# Patient Record
Sex: FEMALE | Race: OTHER | NOT HISPANIC OR LATINO
[De-identification: names, ages, dates, MRNs, and addresses within clinical notes are randomized per-mention and may not be internally consistent; named-entity substitution may affect disease eponyms.]

---

## 2018-05-21 PROBLEM — Z00.00 ENCOUNTER FOR PREVENTIVE HEALTH EXAMINATION: Status: ACTIVE | Noted: 2018-05-21

## 2018-06-08 ENCOUNTER — APPOINTMENT (OUTPATIENT)
Dept: PULMONOLOGY | Facility: CLINIC | Age: 83
End: 2018-06-08
Payer: MEDICARE

## 2018-06-08 VITALS
BODY MASS INDEX: 32.95 KG/M2 | HEART RATE: 64 BPM | WEIGHT: 157 LBS | HEIGHT: 58 IN | SYSTOLIC BLOOD PRESSURE: 124 MMHG | RESPIRATION RATE: 14 BRPM | OXYGEN SATURATION: 95 % | DIASTOLIC BLOOD PRESSURE: 80 MMHG

## 2018-06-08 DIAGNOSIS — Z87.891 PERSONAL HISTORY OF NICOTINE DEPENDENCE: ICD-10-CM

## 2018-06-08 DIAGNOSIS — Z86.39 PERSONAL HISTORY OF OTHER ENDOCRINE, NUTRITIONAL AND METABOLIC DISEASE: ICD-10-CM

## 2018-06-08 DIAGNOSIS — K63.9 DISEASE OF INTESTINE, UNSPECIFIED: ICD-10-CM

## 2018-06-08 DIAGNOSIS — Z86.79 PERSONAL HISTORY OF OTHER DISEASES OF THE CIRCULATORY SYSTEM: ICD-10-CM

## 2018-06-08 DIAGNOSIS — Z87.19 PERSONAL HISTORY OF OTHER DISEASES OF THE DIGESTIVE SYSTEM: ICD-10-CM

## 2018-06-08 DIAGNOSIS — Z63.4 DISAPPEARANCE AND DEATH OF FAMILY MEMBER: ICD-10-CM

## 2018-06-08 PROCEDURE — 85018 HEMOGLOBIN: CPT | Mod: QW

## 2018-06-08 PROCEDURE — 94729 DIFFUSING CAPACITY: CPT

## 2018-06-08 PROCEDURE — 99204 OFFICE O/P NEW MOD 45 MIN: CPT | Mod: 25

## 2018-06-08 PROCEDURE — 94727 GAS DIL/WSHOT DETER LNG VOL: CPT

## 2018-06-08 PROCEDURE — 94010 BREATHING CAPACITY TEST: CPT

## 2018-06-08 RX ORDER — METOPROLOL SUCCINATE 50 MG/1
50 TABLET, EXTENDED RELEASE ORAL
Qty: 180 | Refills: 0 | Status: ACTIVE | COMMUNITY
Start: 2018-01-08

## 2018-06-08 RX ORDER — ROSUVASTATIN CALCIUM 10 MG/1
10 TABLET, FILM COATED ORAL
Qty: 90 | Refills: 0 | Status: ACTIVE | COMMUNITY
Start: 2018-05-31 | End: 1900-01-01

## 2018-06-08 RX ORDER — AMOXICILLIN AND CLAVULANATE POTASSIUM 875; 125 MG/1; MG/1
875-125 TABLET, COATED ORAL
Qty: 14 | Refills: 0 | Status: DISCONTINUED | COMMUNITY
Start: 2018-05-19 | End: 2018-06-08

## 2018-06-08 RX ORDER — FUROSEMIDE 40 MG/1
40 TABLET ORAL
Qty: 90 | Refills: 0 | Status: ACTIVE | COMMUNITY
Start: 2018-04-03

## 2018-06-08 RX ORDER — LEVOTHYROXINE SODIUM 0.1 MG/1
100 TABLET ORAL
Qty: 90 | Refills: 0 | Status: DISCONTINUED | COMMUNITY
Start: 2018-01-08

## 2018-06-08 RX ORDER — PANTOPRAZOLE 40 MG/1
40 TABLET, DELAYED RELEASE ORAL
Qty: 90 | Refills: 0 | Status: ACTIVE | COMMUNITY
Start: 2018-04-04

## 2018-06-08 RX ORDER — FLUTICASONE PROPIONATE AND SALMETEROL 50; 500 UG/1; UG/1
500-50 POWDER RESPIRATORY (INHALATION)
Qty: 60 | Refills: 0 | Status: DISCONTINUED | COMMUNITY
Start: 2018-05-31 | End: 2018-06-08

## 2018-06-08 SDOH — SOCIAL STABILITY - SOCIAL INSECURITY: DISSAPEARANCE AND DEATH OF FAMILY MEMBER: Z63.4

## 2018-08-09 ENCOUNTER — FORM ENCOUNTER (OUTPATIENT)
Age: 83
End: 2018-08-09

## 2018-08-10 ENCOUNTER — APPOINTMENT (OUTPATIENT)
Dept: CT IMAGING | Facility: CLINIC | Age: 83
End: 2018-08-10
Payer: MEDICARE

## 2018-08-10 ENCOUNTER — OUTPATIENT (OUTPATIENT)
Dept: OUTPATIENT SERVICES | Facility: HOSPITAL | Age: 83
LOS: 1 days | End: 2018-08-10
Payer: MEDICARE

## 2018-08-10 DIAGNOSIS — Z00.8 ENCOUNTER FOR OTHER GENERAL EXAMINATION: ICD-10-CM

## 2018-08-10 DIAGNOSIS — R93.8 ABNORMAL FINDINGS ON DIAGNOSTIC IMAGING OF OTHER SPECIFIED BODY STRUCTURES: ICD-10-CM

## 2018-08-10 PROCEDURE — 71250 CT THORAX DX C-: CPT

## 2018-08-10 PROCEDURE — 71250 CT THORAX DX C-: CPT | Mod: 26

## 2018-10-09 ENCOUNTER — APPOINTMENT (OUTPATIENT)
Dept: PULMONOLOGY | Facility: CLINIC | Age: 83
End: 2018-10-09
Payer: MEDICARE

## 2018-10-09 VITALS
WEIGHT: 160 LBS | HEART RATE: 68 BPM | OXYGEN SATURATION: 96 % | SYSTOLIC BLOOD PRESSURE: 142 MMHG | DIASTOLIC BLOOD PRESSURE: 80 MMHG | BODY MASS INDEX: 33.44 KG/M2 | RESPIRATION RATE: 14 BRPM

## 2018-10-09 PROCEDURE — 99214 OFFICE O/P EST MOD 30 MIN: CPT

## 2019-03-08 ENCOUNTER — CHART COPY (OUTPATIENT)
Age: 84
End: 2019-03-08

## 2019-03-25 ENCOUNTER — RX RENEWAL (OUTPATIENT)
Age: 84
End: 2019-03-25

## 2019-04-12 ENCOUNTER — APPOINTMENT (OUTPATIENT)
Dept: PULMONOLOGY | Facility: CLINIC | Age: 84
End: 2019-04-12
Payer: MEDICARE

## 2019-04-12 VITALS
BODY MASS INDEX: 33.65 KG/M2 | HEART RATE: 68 BPM | WEIGHT: 161 LBS | DIASTOLIC BLOOD PRESSURE: 80 MMHG | SYSTOLIC BLOOD PRESSURE: 138 MMHG | RESPIRATION RATE: 16 BRPM | OXYGEN SATURATION: 98 %

## 2019-04-12 PROCEDURE — 99214 OFFICE O/P EST MOD 30 MIN: CPT

## 2019-04-12 NOTE — PROCEDURE
[FreeTextEntry1] : PFTs performed previously were essentially normal with a mildly reduced diffusion capacity which corrected for alveolar volume.

## 2019-04-12 NOTE — HISTORY OF PRESENT ILLNESS
[Dyspnea on Exertion] : dyspnea on exertion [None] : No associated symptoms are reported [Short-Acting Beta Agonists] : short-acting beta agonists [Long-Acting Beta Agonists] : long-acting beta agonists [Anticholinergics (Inhalation)] : inhaled anticholinergics [Inhaled Corticosteroids] : inhaled corticosteroids [Good Compliance] : good compliance with treatment [Good Tolerance] : good tolerance of treatment [Good Symptom Control] : good symptom control [Follow-Up - Routine Clinic] : a routine clinic follow-up of [Excess Weight] : excess weight [Currently Experiencing] : The patient is currently experiencing symptoms. [Dyspnea] : dyspnea [Low Calorie Diet] : low calorie diet [Fair Compliance] : fair compliance with treatment [Fair Tolerance] : fair tolerance of treatment [Poor Symptom Control] : poor symptom control [Dyslipidemia] : dyslipidemia [Hypertension] : hypertension [High] : high [Low Calorie] : low calorie [Well Balanced Diet] : well balanced meals [Infrequently] : exercises infrequently [Walking] : walking [On ___] : performed on [unfilled] [Patient] : the patient [To Assess ___] : to assess [unfilled] [Non-Productive Cough] : no non-productive cough [FreeTextEntry1] : The patient was recently hospitalized at [facility] for SOB and cough and was found to have pneumonia. She was treated with abx with now essentially resolution of symptoms. The patient was last seen in our office in 2013.\par  [FreeTextEntry9] : Chest CT  [FreeTextEntry8] : Bibasilar consolidations

## 2019-04-12 NOTE — DISCUSSION/SUMMARY
[FreeTextEntry1] : \par #1. PFTs performed today are essentially normal except for a mildly reduced diffusion capacity which corrects for alveolar volume\par #2. Decreased Advair to 250 previously; pt only takes once daily\par #3. Continue Spiriva for now but would consider decreasing her medications as tolerates but currently she is doing well \par #4. Repeat chest CT for 4 months for 12 month f/u to evaluate for reevaluation of her small nodule\par #5. Diet and exercise for weight loss\par #6. F/u in 4 months

## 2019-04-12 NOTE — REVIEW OF SYSTEMS
[Hypertension] : ~T hypertension [Reflux] : reflux [Thyroid Problem] : thyroid problem [Fever] : no fever [Chills] : no chills [Dry Eyes] : no dryness of the eyes [Nasal Congestion] : no nasal congestion [Eye Irritation] : no ~T irritation of the eyes [Sinus Problems] : no sinus problems [Postnasal Drip] : no postnasal drip [Epistaxis] : no nosebleeds [Cough] : no cough [Sputum] : not coughing up ~M sputum [Chest Tightness] : no chest tightness [Dyspnea] : no dyspnea [Pleuritic Pain] : no pleuritic pain [Dysrhythmia] : no dysrhythmia [Wheezing] : no wheezing [Chest Discomfort] : no chest discomfort [Palpitations] : no palpitations [Murmurs] : no murmurs were heard [Edema] : ~T edema was not present [Hay Fever] : no hay fever [Itchy Eyes] : no itching of ~T the eyes [Nausea] : no nausea [Constipation] : no constipation [Vomiting] : no vomiting [Abdominal Pain] : no abdominal pain [Diarrhea] : no diarrhea [Dysuria] : no dysuria [Trauma] : no ~T physical trauma [Anemia] : no anemia [Fracture] : no fracture [Headache] : no headache [Syncope] : no fainting [Dizziness] : no dizziness [Paralysis] : no paralysis was seen [Numbness] : no numbness [Depression] : no depression [Seizures] : no seizures [Anxiety] : no anxiety [Diabetes] : no diabetes mellitus [Snoring] : no snoring [Witnessed Apneas] : demonstrated no ~M apnea [Nonrestorative Sleep] : restorative sleep

## 2019-04-12 NOTE — REASON FOR VISIT
[Follow-Up] : a follow-up visit [COPD] : COPD [Shortness of Breath] : shortness of Breath [FreeTextEntry2] : obese

## 2019-04-12 NOTE — CONSULT LETTER
[Dear  ___] : Dear  [unfilled], [Consult Letter:] : I had the pleasure of evaluating your patient, [unfilled]. [Please see my note below.] : Please see my note below. [Consult Closing:] : Thank you very much for allowing me to participate in the care of this patient.  If you have any questions, please do not hesitate to contact me. [Sincerely,] : Sincerely, [Jose Cotto MD] : Jose Cotto MD [FreeTextEntry3] : Jose Cotto MD, FCCP, DARRON. ABSM\par

## 2019-08-14 ENCOUNTER — FORM ENCOUNTER (OUTPATIENT)
Age: 84
End: 2019-08-14

## 2019-08-15 ENCOUNTER — APPOINTMENT (OUTPATIENT)
Dept: CT IMAGING | Facility: CLINIC | Age: 84
End: 2019-08-15
Payer: MEDICARE

## 2019-08-15 ENCOUNTER — OUTPATIENT (OUTPATIENT)
Dept: OUTPATIENT SERVICES | Facility: HOSPITAL | Age: 84
LOS: 1 days | End: 2019-08-15
Payer: MEDICARE

## 2019-08-15 DIAGNOSIS — R91.1 SOLITARY PULMONARY NODULE: ICD-10-CM

## 2019-08-15 PROCEDURE — 71250 CT THORAX DX C-: CPT

## 2019-08-15 PROCEDURE — 71250 CT THORAX DX C-: CPT | Mod: 26

## 2019-08-22 ENCOUNTER — APPOINTMENT (OUTPATIENT)
Dept: PULMONOLOGY | Facility: CLINIC | Age: 84
End: 2019-08-22
Payer: MEDICARE

## 2019-08-22 VITALS
DIASTOLIC BLOOD PRESSURE: 60 MMHG | BODY MASS INDEX: 32.95 KG/M2 | SYSTOLIC BLOOD PRESSURE: 100 MMHG | OXYGEN SATURATION: 98 % | WEIGHT: 157 LBS | HEIGHT: 58 IN | HEART RATE: 74 BPM

## 2019-08-22 PROCEDURE — 94060 EVALUATION OF WHEEZING: CPT

## 2019-08-22 PROCEDURE — 99215 OFFICE O/P EST HI 40 MIN: CPT | Mod: 25

## 2019-08-22 PROCEDURE — 94664 DEMO&/EVAL PT USE INHALER: CPT | Mod: 59

## 2019-08-22 NOTE — PHYSICAL EXAM
[General Appearance - Well Developed] : well developed [Normal Appearance] : normal appearance [General Appearance - In No Acute Distress] : no acute distress [Normal Conjunctiva] : the conjunctiva exhibited no abnormalities [Neck Appearance] : the appearance of the neck was normal [Normal Oropharynx] : normal oropharynx [Heart Sounds] : normal S1 and S2 [Heart Rate And Rhythm] : heart rate and rhythm were normal [Murmurs] : no murmurs present [Edema] : no peripheral edema present [] : no respiratory distress [Respiration, Rhythm And Depth] : normal respiratory rhythm and effort [Auscultation Breath Sounds / Voice Sounds] : lungs were clear to auscultation bilaterally [Exaggerated Use Of Accessory Muscles For Inspiration] : no accessory muscle use [Abdomen Soft] : soft [Abdomen Tenderness] : non-tender [Abnormal Walk] : normal gait [Cyanosis, Localized] : no localized cyanosis [Nail Clubbing] : no clubbing of the fingernails [No Focal Deficits] : no focal deficits [Oriented To Time, Place, And Person] : oriented to person, place, and time [FreeTextEntry1] : No abnormalities.

## 2019-08-22 NOTE — DISCUSSION/SUMMARY
[FreeTextEntry1] : \par #1. PFTs performed previously were essentially normal except for a mildly reduced diffusion capacity which corrected for alveolar volume\par #2. Decreased Advair to 250 previously\par #3. Continue Spiriva for now but would consider decreasing her medications as tolerates but currently she is doing well \par #4. Repeat chest CT in 2 months to re-evaluate new RUL opacity for possible resolution\par #5. Diet and exercise for weight loss\par #6. Z-du and prednisone taper for cough and RUL opacity\par #7. F/u in 2 months

## 2019-08-22 NOTE — HISTORY OF PRESENT ILLNESS
[Dyspnea on Exertion] : dyspnea on exertion [None] : No associated symptoms are reported [Short-Acting Beta Agonists] : short-acting beta agonists [Long-Acting Beta Agonists] : long-acting beta agonists [Anticholinergics (Inhalation)] : inhaled anticholinergics [Inhaled Corticosteroids] : inhaled corticosteroids [Good Compliance] : good compliance with treatment [Good Tolerance] : good tolerance of treatment [Good Symptom Control] : good symptom control [Excess Weight] : excess weight [Follow-Up - Routine Clinic] : a routine clinic follow-up of [Currently Experiencing] : The patient is currently experiencing symptoms. [Dyspnea] : dyspnea [Low Calorie Diet] : low calorie diet [Fair Compliance] : fair compliance with treatment [Fair Tolerance] : fair tolerance of treatment [Poor Symptom Control] : poor symptom control [Dyslipidemia] : dyslipidemia [High] : high [Hypertension] : hypertension [Low Calorie] : low calorie [Well Balanced Diet] : well balanced meals [Walking] : walking [Infrequently] : exercises infrequently [On ___] : performed on [unfilled] [Patient] : the patient [To Assess ___] : to assess [unfilled] [FreeTextEntry1] : The patient was recently hospitalized at [facility] for SOB and cough and was found to have pneumonia. She was treated with abx with now essentially resolution of symptoms. The patient was last seen in our office in 2013.\par She reports that she was admitted to [facility] observation one month ago with chills and was sent home with abx for pneumonia. She now c/o congested cough productive of whitish sputum but fevers, chills.\par  [Non-Productive Cough] : no non-productive cough [FreeTextEntry9] : Chest CT  [FreeTextEntry8] : Bibasilar consolidations

## 2019-08-22 NOTE — REVIEW OF SYSTEMS
[Hypertension] : ~T hypertension [Reflux] : reflux [Thyroid Problem] : thyroid problem [Fever] : no fever [Chills] : no chills [Dry Eyes] : no dryness of the eyes [Eye Irritation] : no ~T irritation of the eyes [Nasal Congestion] : no nasal congestion [Epistaxis] : no nosebleeds [Postnasal Drip] : no postnasal drip [Sinus Problems] : no sinus problems [Cough] : no cough [Sputum] : not coughing up ~M sputum [Dyspnea] : no dyspnea [Chest Tightness] : no chest tightness [Pleuritic Pain] : no pleuritic pain [Wheezing] : no wheezing [Chest Discomfort] : no chest discomfort [Dysrhythmia] : no dysrhythmia [Murmurs] : no murmurs were heard [Palpitations] : no palpitations [Edema] : ~T edema was not present [Hay Fever] : no hay fever [Itchy Eyes] : no itching of ~T the eyes [Vomiting] : no vomiting [Nausea] : no nausea [Constipation] : no constipation [Diarrhea] : no diarrhea [Abdominal Pain] : no abdominal pain [Dysuria] : no dysuria [Fracture] : no fracture [Trauma] : no ~T physical trauma [Anemia] : no anemia [Headache] : no headache [Dizziness] : no dizziness [Syncope] : no fainting [Numbness] : no numbness [Paralysis] : no paralysis was seen [Seizures] : no seizures [Anxiety] : no anxiety [Depression] : no depression [Diabetes] : no diabetes mellitus [Snoring] : no snoring [Witnessed Apneas] : demonstrated no ~M apnea [Nonrestorative Sleep] : restorative sleep

## 2019-08-22 NOTE — PROCEDURE
[FreeTextEntry1] : PFTs performed previously were essentially normal with a mildly reduced diffusion capacity which corrected for alveolar volume.\par Spirometry 8/22/19 - Normal FVC and FEV1 but with an obstructive pattern and slightly worse from previous

## 2019-08-22 NOTE — CONSULT LETTER
[Dear  ___] : Dear  [unfilled], [Consult Letter:] : I had the pleasure of evaluating your patient, [unfilled]. [Please see my note below.] : Please see my note below. [Consult Closing:] : Thank you very much for allowing me to participate in the care of this patient.  If you have any questions, please do not hesitate to contact me. [Sincerely,] : Sincerely, [Jose Cotto MD] : Jose Cotto MD [FreeTextEntry3] : Jose Cotto MD, FCCP, D. ABSM\par Pulmonary and Sleep Medicine\par [facility] [place]\par

## 2019-10-15 ENCOUNTER — FORM ENCOUNTER (OUTPATIENT)
Age: 84
End: 2019-10-15

## 2019-10-16 ENCOUNTER — APPOINTMENT (OUTPATIENT)
Dept: CT IMAGING | Facility: CLINIC | Age: 84
End: 2019-10-16
Payer: MEDICARE

## 2019-10-16 ENCOUNTER — OUTPATIENT (OUTPATIENT)
Dept: OUTPATIENT SERVICES | Facility: HOSPITAL | Age: 84
LOS: 1 days | End: 2019-10-16
Payer: MEDICARE

## 2019-10-16 DIAGNOSIS — R91.1 SOLITARY PULMONARY NODULE: ICD-10-CM

## 2019-10-16 DIAGNOSIS — R93.89 ABNORMAL FINDINGS ON DIAGNOSTIC IMAGING OF OTHER SPECIFIED BODY STRUCTURES: ICD-10-CM

## 2019-10-16 DIAGNOSIS — R91.8 OTHER NONSPECIFIC ABNORMAL FINDING OF LUNG FIELD: ICD-10-CM

## 2019-10-16 DIAGNOSIS — Z00.00 ENCOUNTER FOR GENERAL ADULT MEDICAL EXAMINATION WITHOUT ABNORMAL FINDINGS: ICD-10-CM

## 2019-10-16 PROCEDURE — 71250 CT THORAX DX C-: CPT | Mod: 26

## 2019-10-16 PROCEDURE — 71250 CT THORAX DX C-: CPT

## 2019-10-28 ENCOUNTER — APPOINTMENT (OUTPATIENT)
Dept: PULMONOLOGY | Facility: CLINIC | Age: 84
End: 2019-10-28
Payer: MEDICARE

## 2019-10-28 VITALS — BODY MASS INDEX: 33.58 KG/M2 | WEIGHT: 160 LBS | HEIGHT: 58 IN

## 2019-10-28 VITALS
RESPIRATION RATE: 16 BRPM | OXYGEN SATURATION: 97 % | SYSTOLIC BLOOD PRESSURE: 142 MMHG | DIASTOLIC BLOOD PRESSURE: 80 MMHG | HEART RATE: 84 BPM

## 2019-10-28 PROCEDURE — 94010 BREATHING CAPACITY TEST: CPT

## 2019-10-28 PROCEDURE — 99214 OFFICE O/P EST MOD 30 MIN: CPT | Mod: 25

## 2019-10-28 NOTE — HISTORY OF PRESENT ILLNESS
[Dyspnea on Exertion] : dyspnea on exertion [None] : No associated symptoms are reported [Short-Acting Beta Agonists] : short-acting beta agonists [Long-Acting Beta Agonists] : long-acting beta agonists [Anticholinergics (Inhalation)] : inhaled anticholinergics [Inhaled Corticosteroids] : inhaled corticosteroids [Good Compliance] : good compliance with treatment [Good Tolerance] : good tolerance of treatment [Good Symptom Control] : good symptom control [Follow-Up - Routine Clinic] : a routine clinic follow-up of [Excess Weight] : excess weight [Currently Experiencing] : The patient is currently experiencing symptoms. [Dyspnea] : dyspnea [Low Calorie Diet] : low calorie diet [Fair Compliance] : fair compliance with treatment [Fair Tolerance] : fair tolerance of treatment [Poor Symptom Control] : poor symptom control [Dyslipidemia] : dyslipidemia [Hypertension] : hypertension [High] : high [Low Calorie] : low calorie [Well Balanced Diet] : well balanced meals [Infrequently] : exercises infrequently [Walking] : walking [On ___] : performed on [unfilled] [Patient] : the patient [To Assess ___] : to assess [unfilled] [FreeTextEntry1] : The patient was recently hospitalized at [facility] for SOB and cough and was found to have pneumonia. She was treated with abx with now essentially resolution of symptoms. The patient was last seen in our office in 2013.\par She reports that she was admitted to [facility] observation previously with chills and was sent home with abx for pneumonia. She now c/o congested cough productive of whitish sputum but no fevers, chills. She also c/o PNDS.\par  [Non-Productive Cough] : no non-productive cough [FreeTextEntry9] : Chest CT  [FreeTextEntry8] : Bibasilar consolidations

## 2019-10-28 NOTE — REVIEW OF SYSTEMS
[Hypertension] : ~T hypertension [Reflux] : reflux [Thyroid Problem] : thyroid problem [Fever] : no fever [Chills] : no chills [Dry Eyes] : no dryness of the eyes [Eye Irritation] : no ~T irritation of the eyes [Nasal Congestion] : no nasal congestion [Epistaxis] : no nosebleeds [Postnasal Drip] : no postnasal drip [Sinus Problems] : no sinus problems [Cough] : no cough [Sputum] : not coughing up ~M sputum [Dyspnea] : no dyspnea [Chest Tightness] : no chest tightness [Pleuritic Pain] : no pleuritic pain [Wheezing] : no wheezing [Chest Discomfort] : no chest discomfort [Dysrhythmia] : no dysrhythmia [Murmurs] : no murmurs were heard [Palpitations] : no palpitations [Edema] : ~T edema was not present [Hay Fever] : no hay fever [Itchy Eyes] : no itching of ~T the eyes [Nausea] : no nausea [Vomiting] : no vomiting [Constipation] : no constipation [Diarrhea] : no diarrhea [Abdominal Pain] : no abdominal pain [Dysuria] : no dysuria [Trauma] : no ~T physical trauma [Fracture] : no fracture [Anemia] : no anemia [Headache] : no headache [Dizziness] : no dizziness [Syncope] : no fainting [Numbness] : no numbness [Paralysis] : no paralysis was seen [Seizures] : no seizures [Depression] : no depression [Anxiety] : no anxiety [Diabetes] : no diabetes mellitus [Snoring] : no snoring [Witnessed Apneas] : demonstrated no ~M apnea [Nonrestorative Sleep] : restorative sleep

## 2019-10-28 NOTE — CONSULT LETTER
[Dear  ___] : Dear  [unfilled], [Consult Letter:] : I had the pleasure of evaluating your patient, [unfilled]. [Please see my note below.] : Please see my note below. [Consult Closing:] : Thank you very much for allowing me to participate in the care of this patient.  If you have any questions, please do not hesitate to contact me. [Sincerely,] : Sincerely, [Jose Cotto MD] : Jose Cotto MD [FreeTextEntry3] : Jose Cotto MD, FCCP, D. ABSM\par Pulmonary and Sleep Medicine\par [facility] at [place]\par

## 2019-10-28 NOTE — PROCEDURE
[FreeTextEntry1] : PFTs performed previously were essentially normal with a mildly reduced diffusion capacity which corrected for alveolar volume.\par Spirometry 8/22/19 - Normal FVC and FEV1 but with an obstructive pattern and slightly worse from previous\par Spirometry 10/28/19 - normal and back to baseline

## 2019-10-28 NOTE — DISCUSSION/SUMMARY
[FreeTextEntry1] : \par #1. PFTs performed previously were essentially normal except for a mildly reduced diffusion capacity which corrected for alveolar volume\par #2. Decreased Advair to 250 previously\par #3. Continue Spiriva for now but would consider decreasing her medications as tolerates but currently she is doing well \par #4. Repeat chest CT in 2 months to re-evaluate new RUL opacity for possible resolution\par #5. Diet and exercise for weight loss\par #6. Z-du and prednisone taper for cough and RML opacity\par #7. F/u in 3 months with chest CT\par #8. Spirometry is now back to baseline

## 2019-11-26 ENCOUNTER — RX RENEWAL (OUTPATIENT)
Age: 84
End: 2019-11-26

## 2020-01-20 ENCOUNTER — FORM ENCOUNTER (OUTPATIENT)
Age: 85
End: 2020-01-20

## 2020-01-21 ENCOUNTER — OUTPATIENT (OUTPATIENT)
Dept: OUTPATIENT SERVICES | Facility: HOSPITAL | Age: 85
LOS: 1 days | End: 2020-01-21
Payer: MEDICARE

## 2020-01-21 ENCOUNTER — APPOINTMENT (OUTPATIENT)
Dept: CT IMAGING | Facility: CLINIC | Age: 85
End: 2020-01-21
Payer: MEDICARE

## 2020-01-21 DIAGNOSIS — R93.89 ABNORMAL FINDINGS ON DIAGNOSTIC IMAGING OF OTHER SPECIFIED BODY STRUCTURES: ICD-10-CM

## 2020-01-21 DIAGNOSIS — Z00.00 ENCOUNTER FOR GENERAL ADULT MEDICAL EXAMINATION WITHOUT ABNORMAL FINDINGS: ICD-10-CM

## 2020-01-21 DIAGNOSIS — R91.8 OTHER NONSPECIFIC ABNORMAL FINDING OF LUNG FIELD: ICD-10-CM

## 2020-01-21 PROCEDURE — 71250 CT THORAX DX C-: CPT

## 2020-01-21 PROCEDURE — 71250 CT THORAX DX C-: CPT | Mod: 26

## 2020-01-31 ENCOUNTER — APPOINTMENT (OUTPATIENT)
Dept: PULMONOLOGY | Facility: CLINIC | Age: 85
End: 2020-01-31
Payer: MEDICARE

## 2020-01-31 VITALS
DIASTOLIC BLOOD PRESSURE: 84 MMHG | SYSTOLIC BLOOD PRESSURE: 140 MMHG | WEIGHT: 154 LBS | BODY MASS INDEX: 32.32 KG/M2 | HEIGHT: 58 IN

## 2020-01-31 VITALS — OXYGEN SATURATION: 97 % | HEART RATE: 67 BPM

## 2020-01-31 DIAGNOSIS — R91.8 OTHER NONSPECIFIC ABNORMAL FINDING OF LUNG FIELD: ICD-10-CM

## 2020-01-31 DIAGNOSIS — R93.89 ABNORMAL FINDINGS ON DIAGNOSTIC IMAGING OF OTHER SPECIFIED BODY STRUCTURES: ICD-10-CM

## 2020-01-31 PROCEDURE — 99214 OFFICE O/P EST MOD 30 MIN: CPT

## 2020-01-31 RX ORDER — AZITHROMYCIN 250 MG/1
250 TABLET, FILM COATED ORAL
Qty: 1 | Refills: 0 | Status: DISCONTINUED | COMMUNITY
Start: 2019-08-22 | End: 2020-01-31

## 2020-01-31 RX ORDER — AZITHROMYCIN 250 MG/1
250 TABLET, FILM COATED ORAL
Qty: 1 | Refills: 0 | Status: DISCONTINUED | COMMUNITY
Start: 2019-10-28 | End: 2020-01-31

## 2020-01-31 NOTE — CONSULT LETTER
[Dear  ___] : Dear  [unfilled], [Consult Letter:] : I had the pleasure of evaluating your patient, [unfilled]. [Please see my note below.] : Please see my note below. [Consult Closing:] : Thank you very much for allowing me to participate in the care of this patient.  If you have any questions, please do not hesitate to contact me. [Sincerely,] : Sincerely, [Jose Cotto MD] : Jose Cotto MD [FreeTextEntry3] : Jose Cotto MD, FCCP, D. ABSM\par Pulmonary and Sleep Medicine\par [facility] at [place]\par

## 2020-01-31 NOTE — REASON FOR VISIT
[Follow-Up] : a follow-up visit [COPD] : COPD [Shortness of Breath] : shortness of breath [Obesity] : obesity

## 2020-01-31 NOTE — HISTORY OF PRESENT ILLNESS
[Dyspnea on Exertion] : dyspnea on exertion [None] : No associated symptoms are reported [Short-Acting Beta Agonists] : short-acting beta agonists [Long-Acting Beta Agonists] : long-acting beta agonists [Anticholinergics (Inhalation)] : inhaled anticholinergics [Inhaled Corticosteroids] : inhaled corticosteroids [Good Compliance] : good compliance with treatment [Good Tolerance] : good tolerance of treatment [Good Symptom Control] : good symptom control [Follow-Up - Routine Clinic] : a routine clinic follow-up of [Excess Weight] : excess weight [Currently Experiencing] : The patient is currently experiencing symptoms. [Dyspnea] : dyspnea [Low Calorie Diet] : low calorie diet [Fair Compliance] : fair compliance with treatment [Fair Tolerance] : fair tolerance of treatment [Poor Symptom Control] : poor symptom control [Dyslipidemia] : dyslipidemia [Hypertension] : hypertension [High] : high [Low Calorie] : low calorie [Well Balanced Diet] : well balanced meals [Infrequently] : exercises infrequently [Walking] : walking [On ___] : performed on [unfilled] [Patient] : the patient [To Assess ___] : to assess [unfilled] [FreeTextEntry1] : The patient was recently hospitalized at [facility] for SOB and cough and was found to have pneumonia. She was treated with abx with now essentially resolution of symptoms. The patient was She reports that she was admitted to [facility] observation previously with chills and was sent home with abx for pneumonia. She now c/o congested cough productive of whitish sputum but no fevers, chills. She also c/o PNDS.  [Non-Productive Cough] : no non-productive cough [FreeTextEntry9] : Chest CT  [FreeTextEntry8] : Bibasilar consolidations

## 2020-01-31 NOTE — DISCUSSION/SUMMARY
[FreeTextEntry1] : \par #1. PFTs performed previously were essentially normal except for a mildly reduced diffusion capacity which corrected for alveolar volume\par #2. Decreased Advair to 250 previously but encouraged twice daily use as she is only using once daily\par #3. Continue Spiriva for now but would consider decreasing her medications as tolerates but currently she is doing well \par #4. Repeat chest CT with resolution of RML opacity; f/u as needed\par #5. Diet and exercise for weight loss\par #6. F/u in 4-6 months with PFTs\par #7. Last spirometry was now back to baseline

## 2020-07-30 ENCOUNTER — APPOINTMENT (OUTPATIENT)
Dept: PULMONOLOGY | Facility: CLINIC | Age: 85
End: 2020-07-30

## 2020-08-28 DIAGNOSIS — Z01.818 ENCOUNTER FOR OTHER PREPROCEDURAL EXAMINATION: ICD-10-CM

## 2020-08-31 ENCOUNTER — APPOINTMENT (OUTPATIENT)
Dept: DISASTER EMERGENCY | Facility: CLINIC | Age: 85
End: 2020-08-31

## 2020-08-31 ENCOUNTER — APPOINTMENT (OUTPATIENT)
Dept: PULMONOLOGY | Facility: CLINIC | Age: 85
End: 2020-08-31

## 2020-09-01 LAB — SARS-COV-2 N GENE NPH QL NAA+PROBE: NOT DETECTED

## 2020-09-03 ENCOUNTER — APPOINTMENT (OUTPATIENT)
Dept: PULMONOLOGY | Facility: CLINIC | Age: 85
End: 2020-09-03
Payer: MEDICARE

## 2020-09-03 VITALS — HEART RATE: 64 BPM | OXYGEN SATURATION: 98 %

## 2020-09-03 VITALS — BODY MASS INDEX: 32.12 KG/M2 | HEIGHT: 58 IN | WEIGHT: 153 LBS

## 2020-09-03 VITALS — DIASTOLIC BLOOD PRESSURE: 78 MMHG | SYSTOLIC BLOOD PRESSURE: 132 MMHG

## 2020-09-03 DIAGNOSIS — R91.1 SOLITARY PULMONARY NODULE: ICD-10-CM

## 2020-09-03 DIAGNOSIS — E66.9 OBESITY, UNSPECIFIED: ICD-10-CM

## 2020-09-03 PROCEDURE — 99214 OFFICE O/P EST MOD 30 MIN: CPT | Mod: 25

## 2020-09-03 PROCEDURE — 85018 HEMOGLOBIN: CPT | Mod: QW

## 2020-09-03 PROCEDURE — 94727 GAS DIL/WSHOT DETER LNG VOL: CPT

## 2020-09-03 PROCEDURE — 94729 DIFFUSING CAPACITY: CPT

## 2020-09-03 PROCEDURE — 94010 BREATHING CAPACITY TEST: CPT

## 2020-09-03 NOTE — PROCEDURE
[FreeTextEntry1] : PFTs performed previously were essentially normal with a mildly reduced diffusion capacity which corrected for alveolar volume.\par Spirometry 8/22/19 - Normal FVC and FEV1 but with an obstructive pattern and slightly worse from previous\par Spirometry 10/28/19 - normal and back to baseline\par PFTs 9/3/20 - normal

## 2020-09-03 NOTE — CONSULT LETTER
[Dear  ___] : Dear  [unfilled], [Consult Letter:] : I had the pleasure of evaluating your patient, [unfilled]. [Please see my note below.] : Please see my note below. [Consult Closing:] : Thank you very much for allowing me to participate in the care of this patient.  If you have any questions, please do not hesitate to contact me. [Sincerely,] : Sincerely, [DrAudelia  ___] : Dr. PHELAN [FreeTextEntry3] : Jose Cotto MD, FCCP, D. ABSM\par Pulmonary and Sleep Medicine\par [facility] at [place]\par

## 2020-09-03 NOTE — REVIEW OF SYSTEMS
[Hypertension] : ~T hypertension [Reflux] : reflux [Thyroid Problem] : thyroid problem [Fever] : no fever [Chills] : no chills [Dry Eyes] : no dryness of the eyes [Nasal Congestion] : no nasal congestion [Eye Irritation] : no ~T irritation of the eyes [Postnasal Drip] : no postnasal drip [Epistaxis] : no nosebleeds [Sinus Problems] : no sinus problems [Cough] : no cough [Sputum] : not coughing up ~M sputum [Dyspnea] : no dyspnea [Pleuritic Pain] : no pleuritic pain [Chest Tightness] : no chest tightness [Wheezing] : no wheezing [Chest Discomfort] : no chest discomfort [Palpitations] : no palpitations [Murmurs] : no murmurs were heard [Dysrhythmia] : no dysrhythmia [Hay Fever] : no hay fever [Itchy Eyes] : no itching of ~T the eyes [Edema] : ~T edema was not present [Vomiting] : no vomiting [Nausea] : no nausea [Abdominal Pain] : no abdominal pain [Diarrhea] : no diarrhea [Constipation] : no constipation [Dysuria] : no dysuria [Trauma] : no ~T physical trauma [Fracture] : no fracture [Headache] : no headache [Anemia] : no anemia [Dizziness] : no dizziness [Paralysis] : no paralysis was seen [Syncope] : no fainting [Numbness] : no numbness [Depression] : no depression [Anxiety] : no anxiety [Seizures] : no seizures [Diabetes] : no diabetes mellitus [Snoring] : no snoring [Witnessed Apneas] : demonstrated no ~M apnea [Nonrestorative Sleep] : restorative sleep

## 2020-09-03 NOTE — DISCUSSION/SUMMARY
[FreeTextEntry1] : \par #1. PFTs 9/3/20 were essentially normal\par #2. Doing well with the decreased Advair dose to 250 and encouraged twice daily use as she is only using once daily\par #3. Continue Spiriva for now but would consider decreasing her medications as tolerates but currently she is doing well \par #4. Repeat chest CT with resolution of RML opacity; f/u as needed\par #5. Diet and exercise for weight loss\par #6. F/u in 6 months\par #7. PFTs are back to baseline\par #8. There is no pulmonary contraindication for the patient's upcoming carpal tunnel surgery. I would recommend Duoneb therapy on call to the OR with Duoneb Q6h post-op until the patient can resume inhaler therapy (Advair 250). I would also recommend post op incentive spirometry, GI/DVT prophylaxis as needed, early ambulation as able, and adequate pain control. Would recommend that O2 saturation should be monitored during and after the procedure.\par #9. Reviewed risks of exposure and symptoms of Covid-19 virus, including how the virus is spread.\par \par Discussed the following risk-reducing strategies:\par -Wash hands with soap and water (proper technique reviewed) \par -Use alcohol based  when hand-washing is not an option\par -Maintain a social distance of at least 6 feet whenever possible\par -Avoid contact, especially hand shaking\par -Avoid touching eyes, nose, and mouth\par -Cover face/mouth when coughing or sneezing\par -Avoid close contact with sick people\par -Seek medical help if you develop a fever and/or respiratory symptoms (e.g. cough, chest pain, SOB)\par \par Patient's questions were answered and patient appears to understand these recommendations

## 2020-09-03 NOTE — HISTORY OF PRESENT ILLNESS
[Dyspnea on Exertion] : dyspnea on exertion [None] : No associated symptoms are reported [Short-Acting Beta Agonists] : short-acting beta agonists [Anticholinergics (Inhalation)] : inhaled anticholinergics [Long-Acting Beta Agonists] : long-acting beta agonists [Inhaled Corticosteroids] : inhaled corticosteroids [Good Compliance] : good compliance with treatment [Good Tolerance] : good tolerance of treatment [Follow-Up - Routine Clinic] : a routine clinic follow-up of [Good Symptom Control] : good symptom control [Currently Experiencing] : The patient is currently experiencing symptoms. [Excess Weight] : excess weight [Fair Compliance] : fair compliance with treatment [Dyspnea] : dyspnea [Low Calorie Diet] : low calorie diet [Fair Tolerance] : fair tolerance of treatment [Poor Symptom Control] : poor symptom control [Dyslipidemia] : dyslipidemia [Hypertension] : hypertension [High] : high [Low Calorie] : low calorie [Well Balanced Diet] : well balanced meals [Infrequently] : exercises infrequently [Walking] : walking [On ___] : performed on [unfilled] [Patient] : the patient [To Assess ___] : to assess [unfilled] [FreeTextEntry1] : The patient was recently hospitalized at [facility] for SOB and cough and was found to have pneumonia. She was treated with abx with now essentially resolution of symptoms. The patient was She reports that she was admitted to [facility] observation previously with chills and was sent home with abx for pneumonia. She now c/o congested cough productive of whitish sputum but no fevers, chills. She also c/o PNDS. She reports that she will be undergoing carpal tunnel release in the near future. [Non-Productive Cough] : no non-productive cough [FreeTextEntry9] : Chest CT  [FreeTextEntry8] : Bibasilar consolidations

## 2020-09-03 NOTE — PHYSICAL EXAM
[General Appearance - Well Developed] : well developed [General Appearance - In No Acute Distress] : no acute distress [Normal Appearance] : normal appearance [Normal Oropharynx] : normal oropharynx [Normal Conjunctiva] : the conjunctiva exhibited no abnormalities [Neck Appearance] : the appearance of the neck was normal [Heart Rate And Rhythm] : heart rate and rhythm were normal [Murmurs] : no murmurs present [Heart Sounds] : normal S1 and S2 [] : no respiratory distress [Edema] : no peripheral edema present [Auscultation Breath Sounds / Voice Sounds] : lungs were clear to auscultation bilaterally [Respiration, Rhythm And Depth] : normal respiratory rhythm and effort [Exaggerated Use Of Accessory Muscles For Inspiration] : no accessory muscle use [Abdomen Tenderness] : non-tender [Abdomen Soft] : soft [Abnormal Walk] : normal gait [Nail Clubbing] : no clubbing of the fingernails [No Focal Deficits] : no focal deficits [Oriented To Time, Place, And Person] : oriented to person, place, and time [Cyanosis, Localized] : no localized cyanosis [FreeTextEntry1] : No abnormalities.

## 2020-12-15 ENCOUNTER — EMERGENCY (EMERGENCY)
Facility: HOSPITAL | Age: 85
LOS: 1 days | Discharge: HOME | End: 2020-12-15
Attending: EMERGENCY MEDICINE
Payer: MEDICARE

## 2020-12-15 VITALS
WEIGHT: 160.06 LBS | SYSTOLIC BLOOD PRESSURE: 176 MMHG | RESPIRATION RATE: 20 BRPM | DIASTOLIC BLOOD PRESSURE: 95 MMHG | OXYGEN SATURATION: 92 % | HEIGHT: 59 IN | HEART RATE: 71 BPM | TEMPERATURE: 99 F

## 2020-12-15 VITALS
TEMPERATURE: 98 F | RESPIRATION RATE: 20 BRPM | DIASTOLIC BLOOD PRESSURE: 81 MMHG | SYSTOLIC BLOOD PRESSURE: 160 MMHG | OXYGEN SATURATION: 97 % | HEART RATE: 80 BPM

## 2020-12-15 LAB
ALBUMIN SERPL ELPH-MCNC: 4 G/DL — SIGNIFICANT CHANGE UP (ref 3.3–5.2)
ALP SERPL-CCNC: 112 U/L — SIGNIFICANT CHANGE UP (ref 40–120)
ALT FLD-CCNC: 16 U/L — SIGNIFICANT CHANGE UP
ANION GAP SERPL CALC-SCNC: 12 MMOL/L — SIGNIFICANT CHANGE UP (ref 5–17)
APTT BLD: 33.1 SEC — SIGNIFICANT CHANGE UP (ref 27.5–35.5)
AST SERPL-CCNC: 27 U/L — SIGNIFICANT CHANGE UP
BASOPHILS # BLD AUTO: 0.05 K/UL — SIGNIFICANT CHANGE UP (ref 0–0.2)
BASOPHILS NFR BLD AUTO: 0.4 % — SIGNIFICANT CHANGE UP (ref 0–2)
BILIRUB SERPL-MCNC: 0.4 MG/DL — SIGNIFICANT CHANGE UP (ref 0.4–2)
BUN SERPL-MCNC: 27 MG/DL — HIGH (ref 8–20)
CALCIUM SERPL-MCNC: 9.7 MG/DL — SIGNIFICANT CHANGE UP (ref 8.6–10.2)
CHLORIDE SERPL-SCNC: 104 MMOL/L — SIGNIFICANT CHANGE UP (ref 98–107)
CO2 SERPL-SCNC: 26 MMOL/L — SIGNIFICANT CHANGE UP (ref 22–29)
CREAT SERPL-MCNC: 0.77 MG/DL — SIGNIFICANT CHANGE UP (ref 0.5–1.3)
EOSINOPHIL # BLD AUTO: 0.25 K/UL — SIGNIFICANT CHANGE UP (ref 0–0.5)
EOSINOPHIL NFR BLD AUTO: 2.2 % — SIGNIFICANT CHANGE UP (ref 0–6)
GLUCOSE SERPL-MCNC: 84 MG/DL — SIGNIFICANT CHANGE UP (ref 70–99)
HCT VFR BLD CALC: 38.1 % — SIGNIFICANT CHANGE UP (ref 34.5–45)
HGB BLD-MCNC: 12.1 G/DL — SIGNIFICANT CHANGE UP (ref 11.5–15.5)
IMM GRANULOCYTES NFR BLD AUTO: 0.3 % — SIGNIFICANT CHANGE UP (ref 0–1.5)
INR BLD: 1.05 RATIO — SIGNIFICANT CHANGE UP (ref 0.88–1.16)
LYMPHOCYTES # BLD AUTO: 1.14 K/UL — SIGNIFICANT CHANGE UP (ref 1–3.3)
LYMPHOCYTES # BLD AUTO: 10 % — LOW (ref 13–44)
MCHC RBC-ENTMCNC: 30.7 PG — SIGNIFICANT CHANGE UP (ref 27–34)
MCHC RBC-ENTMCNC: 31.8 GM/DL — LOW (ref 32–36)
MCV RBC AUTO: 96.7 FL — SIGNIFICANT CHANGE UP (ref 80–100)
MONOCYTES # BLD AUTO: 0.52 K/UL — SIGNIFICANT CHANGE UP (ref 0–0.9)
MONOCYTES NFR BLD AUTO: 4.5 % — SIGNIFICANT CHANGE UP (ref 2–14)
NEUTROPHILS # BLD AUTO: 9.45 K/UL — HIGH (ref 1.8–7.4)
NEUTROPHILS NFR BLD AUTO: 82.6 % — HIGH (ref 43–77)
PLATELET # BLD AUTO: 260 K/UL — SIGNIFICANT CHANGE UP (ref 150–400)
POTASSIUM SERPL-MCNC: 5 MMOL/L — SIGNIFICANT CHANGE UP (ref 3.5–5.3)
POTASSIUM SERPL-SCNC: 5 MMOL/L — SIGNIFICANT CHANGE UP (ref 3.5–5.3)
PROT SERPL-MCNC: 7.8 G/DL — SIGNIFICANT CHANGE UP (ref 6.6–8.7)
PROTHROM AB SERPL-ACNC: 12.1 SEC — SIGNIFICANT CHANGE UP (ref 10.6–13.6)
RBC # BLD: 3.94 M/UL — SIGNIFICANT CHANGE UP (ref 3.8–5.2)
RBC # FLD: 13.3 % — SIGNIFICANT CHANGE UP (ref 10.3–14.5)
SODIUM SERPL-SCNC: 142 MMOL/L — SIGNIFICANT CHANGE UP (ref 135–145)
WBC # BLD: 11.45 K/UL — HIGH (ref 3.8–10.5)
WBC # FLD AUTO: 11.45 K/UL — HIGH (ref 3.8–10.5)

## 2020-12-15 PROCEDURE — 85730 THROMBOPLASTIN TIME PARTIAL: CPT

## 2020-12-15 PROCEDURE — 93971 EXTREMITY STUDY: CPT | Mod: 26,LT

## 2020-12-15 PROCEDURE — 99284 EMERGENCY DEPT VISIT MOD MDM: CPT | Mod: 25

## 2020-12-15 PROCEDURE — 85025 COMPLETE CBC W/AUTO DIFF WBC: CPT

## 2020-12-15 PROCEDURE — 99284 EMERGENCY DEPT VISIT MOD MDM: CPT

## 2020-12-15 PROCEDURE — 36415 COLL VENOUS BLD VENIPUNCTURE: CPT

## 2020-12-15 PROCEDURE — 80053 COMPREHEN METABOLIC PANEL: CPT

## 2020-12-15 PROCEDURE — 96366 THER/PROPH/DIAG IV INF ADDON: CPT

## 2020-12-15 PROCEDURE — 87040 BLOOD CULTURE FOR BACTERIA: CPT

## 2020-12-15 PROCEDURE — 96365 THER/PROPH/DIAG IV INF INIT: CPT

## 2020-12-15 PROCEDURE — 93971 EXTREMITY STUDY: CPT

## 2020-12-15 PROCEDURE — 85610 PROTHROMBIN TIME: CPT

## 2020-12-15 RX ORDER — CEFAZOLIN SODIUM 1 G
1000 VIAL (EA) INJECTION ONCE
Refills: 0 | Status: COMPLETED | OUTPATIENT
Start: 2020-12-15 | End: 2020-12-15

## 2020-12-15 RX ORDER — ACETAMINOPHEN 500 MG
650 TABLET ORAL ONCE
Refills: 0 | Status: COMPLETED | OUTPATIENT
Start: 2020-12-15 | End: 2020-12-15

## 2020-12-15 RX ORDER — CEPHALEXIN 500 MG
1 CAPSULE ORAL
Qty: 40 | Refills: 0
Start: 2020-12-15 | End: 2020-12-24

## 2020-12-15 RX ADMIN — Medication 100 MILLIGRAM(S): at 09:00

## 2020-12-15 RX ADMIN — Medication 1000 MILLIGRAM(S): at 12:27

## 2020-12-15 RX ADMIN — Medication 650 MILLIGRAM(S): at 09:00

## 2020-12-15 NOTE — ED PROVIDER NOTE - OBJECTIVE STATEMENT
88 year old female with PMh COPD not on home O2, chf presents with leg swelling. Pt states that 2-3 days ago she developed L calf cramping. Then today when she got out of the shower she noticed redness and swelling. Sx are constant, no alleviating/exacerbating factors. She denies fevers, chills, chest pain, SOB, numbness, tingling, weakness.

## 2020-12-15 NOTE — ED PROVIDER NOTE - PATIENT PORTAL LINK FT
You can access the FollowMyHealth Patient Portal offered by [facility] by registering at the following website: [email or web address]. By joining [employer]’s FollowMyHealth portal, you will also be able to view your health information using other applications (apps) compatible with our system.

## 2020-12-15 NOTE — ED ADULT TRIAGE NOTE - CHIEF COMPLAINT QUOTE
Pt c/o redness and warmth to left calf starting this morning, denies recent injury/trauma, swelling noted to area, pt [place], hx of emphysema Pt c/o redness and warmth to left calf starting this morning, denies recent injury/trauma, swelling noted to area, pt [place], hx of emphysema, not on home O2, denies SOB

## 2020-12-15 NOTE — ED PROVIDER NOTE - SKIN, MLM
Skin normal color for race, warm, dry and intact. L anterior shin with redness, edema, tenderness, induration

## 2020-12-15 NOTE — ED PROVIDER NOTE - CLINICAL SUMMARY MEDICAL DECISION MAKING FREE TEXT BOX
Pt with no pain out of proportion, no fever or systemic signs of illness, no crepitus, well appearing, no joint effusions, not circumferetnial. Suitable for PO outpt mgt, gave first dose IV here.
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
## 2020-12-15 NOTE — ED ADULT NURSE NOTE - OBJECTIVE STATEMENT
89 y/o female presents to ED with swelling/redness/tenderness to left lower leg.  Patient reports she noticed symptoms this morning after getting out of shower.  Patient denies fever, chills, headache.

## 2020-12-15 NOTE — ED ADULT NURSE NOTE - CHIEF COMPLAINT QUOTE
Pt c/o redness and warmth to left calf starting this morning, denies recent injury/trauma, swelling noted to area, pt [place], hx of emphysema, not on home O2, denies SOB

## 2020-12-20 LAB
CULTURE RESULTS: SIGNIFICANT CHANGE UP
CULTURE RESULTS: SIGNIFICANT CHANGE UP
SPECIMEN SOURCE: SIGNIFICANT CHANGE UP
SPECIMEN SOURCE: SIGNIFICANT CHANGE UP

## 2021-04-29 ENCOUNTER — RX CHANGE (OUTPATIENT)
Age: 86
End: 2021-04-29

## 2021-05-03 ENCOUNTER — RX CHANGE (OUTPATIENT)
Age: 86
End: 2021-05-03

## 2021-06-01 ENCOUNTER — RX RENEWAL (OUTPATIENT)
Age: 86
End: 2021-06-01

## 2021-06-03 ENCOUNTER — INPATIENT (INPATIENT)
Facility: HOSPITAL | Age: 86
LOS: 7 days | Discharge: HOME | DRG: 872 | End: 2021-06-11
Attending: STUDENT IN AN ORGANIZED HEALTH CARE EDUCATION/TRAINING PROGRAM | Admitting: HOSPITALIST
Payer: MEDICARE

## 2021-06-03 VITALS
WEIGHT: 149.91 LBS | DIASTOLIC BLOOD PRESSURE: 80 MMHG | RESPIRATION RATE: 20 BRPM | SYSTOLIC BLOOD PRESSURE: 148 MMHG | TEMPERATURE: 101 F | OXYGEN SATURATION: 94 % | HEIGHT: 59 IN | HEART RATE: 84 BPM

## 2021-06-03 LAB
ALBUMIN SERPL ELPH-MCNC: 3.6 G/DL — SIGNIFICANT CHANGE UP (ref 3.3–5.2)
ALP SERPL-CCNC: 101 U/L — SIGNIFICANT CHANGE UP (ref 40–120)
ALT FLD-CCNC: 12 U/L — SIGNIFICANT CHANGE UP
ANION GAP SERPL CALC-SCNC: 11 MMOL/L — SIGNIFICANT CHANGE UP (ref 5–17)
ANISOCYTOSIS BLD QL: SLIGHT — SIGNIFICANT CHANGE UP
APTT BLD: 31.4 SEC — SIGNIFICANT CHANGE UP (ref 27.5–35.5)
AST SERPL-CCNC: 18 U/L — SIGNIFICANT CHANGE UP
BASE EXCESS BLDV CALC-SCNC: 6.2 MMOL/L — HIGH (ref -2–2)
BASOPHILS # BLD AUTO: 0 K/UL — SIGNIFICANT CHANGE UP (ref 0–0.2)
BASOPHILS NFR BLD AUTO: 0 % — SIGNIFICANT CHANGE UP (ref 0–2)
BILIRUB SERPL-MCNC: 0.4 MG/DL — SIGNIFICANT CHANGE UP (ref 0.4–2)
BUN SERPL-MCNC: 30.2 MG/DL — HIGH (ref 8–20)
CA-I SERPL-SCNC: 1.2 MMOL/L — SIGNIFICANT CHANGE UP (ref 1.15–1.33)
CALCIUM SERPL-MCNC: 9.6 MG/DL — SIGNIFICANT CHANGE UP (ref 8.6–10.2)
CHLORIDE BLDV-SCNC: 104 MMOL/L — SIGNIFICANT CHANGE UP (ref 98–107)
CHLORIDE SERPL-SCNC: 101 MMOL/L — SIGNIFICANT CHANGE UP (ref 98–107)
CO2 SERPL-SCNC: 28 MMOL/L — SIGNIFICANT CHANGE UP (ref 22–29)
CREAT SERPL-MCNC: 0.81 MG/DL — SIGNIFICANT CHANGE UP (ref 0.5–1.3)
EOSINOPHIL # BLD AUTO: 0 K/UL — SIGNIFICANT CHANGE UP (ref 0–0.5)
EOSINOPHIL NFR BLD AUTO: 0 % — SIGNIFICANT CHANGE UP (ref 0–6)
GAS PNL BLDV: 145 MMOL/L — SIGNIFICANT CHANGE UP (ref 135–145)
GAS PNL BLDV: SIGNIFICANT CHANGE UP
GAS PNL BLDV: SIGNIFICANT CHANGE UP
GLUCOSE BLDV-MCNC: 139 MG/DL — HIGH (ref 70–99)
GLUCOSE SERPL-MCNC: 141 MG/DL — HIGH (ref 70–99)
HCO3 BLDV-SCNC: 29 MMOL/L — SIGNIFICANT CHANGE UP (ref 21–29)
HCT VFR BLD CALC: 34 % — LOW (ref 34.5–45)
HCT VFR BLDA CALC: 35 — LOW (ref 39–50)
HGB BLD CALC-MCNC: 11.3 G/DL — LOW (ref 11.5–15.5)
HGB BLD-MCNC: 11 G/DL — LOW (ref 11.5–15.5)
INR BLD: 1.18 RATIO — HIGH (ref 0.88–1.16)
LACTATE BLDV-MCNC: 1.3 MMOL/L — SIGNIFICANT CHANGE UP (ref 0.5–2)
LYMPHOCYTES # BLD AUTO: 0.35 K/UL — LOW (ref 1–3.3)
LYMPHOCYTES # BLD AUTO: 0.9 % — LOW (ref 13–44)
MACROCYTES BLD QL: SLIGHT — SIGNIFICANT CHANGE UP
MANUAL SMEAR VERIFICATION: SIGNIFICANT CHANGE UP
MCHC RBC-ENTMCNC: 30.4 PG — SIGNIFICANT CHANGE UP (ref 27–34)
MCHC RBC-ENTMCNC: 32.4 GM/DL — SIGNIFICANT CHANGE UP (ref 32–36)
MCV RBC AUTO: 93.9 FL — SIGNIFICANT CHANGE UP (ref 80–100)
MONOCYTES # BLD AUTO: 0.65 K/UL — SIGNIFICANT CHANGE UP (ref 0–0.9)
MONOCYTES NFR BLD AUTO: 1.7 % — LOW (ref 2–14)
NEUTROPHILS # BLD AUTO: 37.49 K/UL — HIGH (ref 1.8–7.4)
NEUTROPHILS NFR BLD AUTO: 94.8 % — HIGH (ref 43–77)
NEUTS BAND # BLD: 2.6 % — SIGNIFICANT CHANGE UP (ref 0–8)
OTHER CELLS CSF MANUAL: 6 ML/DL — LOW (ref 18–22)
OVALOCYTES BLD QL SMEAR: SLIGHT — SIGNIFICANT CHANGE UP
PCO2 BLDV: 44 MMHG — SIGNIFICANT CHANGE UP (ref 35–50)
PH BLDV: 7.46 — HIGH (ref 7.32–7.43)
PLAT MORPH BLD: NORMAL — SIGNIFICANT CHANGE UP
PLATELET # BLD AUTO: 296 K/UL — SIGNIFICANT CHANGE UP (ref 150–400)
PO2 BLDV: 24 MMHG — LOW (ref 25–45)
POIKILOCYTOSIS BLD QL AUTO: SLIGHT — SIGNIFICANT CHANGE UP
POLYCHROMASIA BLD QL SMEAR: SLIGHT — SIGNIFICANT CHANGE UP
POTASSIUM BLDV-SCNC: 4.2 MMOL/L — SIGNIFICANT CHANGE UP (ref 3.4–4.5)
POTASSIUM SERPL-MCNC: 4.4 MMOL/L — SIGNIFICANT CHANGE UP (ref 3.5–5.3)
POTASSIUM SERPL-SCNC: 4.4 MMOL/L — SIGNIFICANT CHANGE UP (ref 3.5–5.3)
PROT SERPL-MCNC: 7.6 G/DL — SIGNIFICANT CHANGE UP (ref 6.6–8.7)
PROTHROM AB SERPL-ACNC: 13.6 SEC — SIGNIFICANT CHANGE UP (ref 10.6–13.6)
RAPID RVP RESULT: SIGNIFICANT CHANGE UP
RBC # BLD: 3.62 M/UL — LOW (ref 3.8–5.2)
RBC # FLD: 12.4 % — SIGNIFICANT CHANGE UP (ref 10.3–14.5)
RBC BLD AUTO: ABNORMAL
SAO2 % BLDV: 42 % — SIGNIFICANT CHANGE UP
SARS-COV-2 RNA SPEC QL NAA+PROBE: SIGNIFICANT CHANGE UP
SODIUM SERPL-SCNC: 140 MMOL/L — SIGNIFICANT CHANGE UP (ref 135–145)
TROPONIN T SERPL-MCNC: <0.01 NG/ML — SIGNIFICANT CHANGE UP (ref 0–0.06)
WBC # BLD: 38.49 K/UL — HIGH (ref 3.8–10.5)
WBC # FLD AUTO: 38.49 K/UL — HIGH (ref 3.8–10.5)

## 2021-06-03 PROCEDURE — 71045 X-RAY EXAM CHEST 1 VIEW: CPT | Mod: 26

## 2021-06-03 PROCEDURE — 74177 CT ABD & PELVIS W/CONTRAST: CPT | Mod: 26,MA

## 2021-06-03 PROCEDURE — 99291 CRITICAL CARE FIRST HOUR: CPT | Mod: CS

## 2021-06-03 PROCEDURE — 73590 X-RAY EXAM OF LOWER LEG: CPT | Mod: 26,LT

## 2021-06-03 PROCEDURE — 71260 CT THORAX DX C+: CPT | Mod: 26,MA

## 2021-06-03 RX ORDER — FAMOTIDINE 10 MG/ML
20 INJECTION INTRAVENOUS ONCE
Refills: 0 | Status: COMPLETED | OUTPATIENT
Start: 2021-06-03 | End: 2021-06-03

## 2021-06-03 RX ORDER — SODIUM CHLORIDE 9 MG/ML
2100 INJECTION INTRAMUSCULAR; INTRAVENOUS; SUBCUTANEOUS ONCE
Refills: 0 | Status: COMPLETED | OUTPATIENT
Start: 2021-06-03 | End: 2021-06-03

## 2021-06-03 RX ORDER — ACETAMINOPHEN 500 MG
650 TABLET ORAL ONCE
Refills: 0 | Status: COMPLETED | OUTPATIENT
Start: 2021-06-03 | End: 2021-06-03

## 2021-06-03 RX ORDER — ONDANSETRON 8 MG/1
4 TABLET, FILM COATED ORAL ONCE
Refills: 0 | Status: COMPLETED | OUTPATIENT
Start: 2021-06-03 | End: 2021-06-03

## 2021-06-03 RX ORDER — CEFTRIAXONE 500 MG/1
1000 INJECTION, POWDER, FOR SOLUTION INTRAMUSCULAR; INTRAVENOUS ONCE
Refills: 0 | Status: COMPLETED | OUTPATIENT
Start: 2021-06-03 | End: 2021-06-03

## 2021-06-03 RX ADMIN — Medication 650 MILLIGRAM(S): at 17:35

## 2021-06-03 RX ADMIN — FAMOTIDINE 20 MILLIGRAM(S): 10 INJECTION INTRAVENOUS at 19:40

## 2021-06-03 RX ADMIN — CEFTRIAXONE 100 MILLIGRAM(S): 500 INJECTION, POWDER, FOR SOLUTION INTRAMUSCULAR; INTRAVENOUS at 17:35

## 2021-06-03 RX ADMIN — ONDANSETRON 4 MILLIGRAM(S): 8 TABLET, FILM COATED ORAL at 17:35

## 2021-06-03 RX ADMIN — SODIUM CHLORIDE 2100 MILLILITER(S): 9 INJECTION INTRAMUSCULAR; INTRAVENOUS; SUBCUTANEOUS at 17:35

## 2021-06-03 NOTE — ED PROVIDER NOTE - PHYSICAL EXAMINATION
Const: Awake, alert and oriented. Appears uncomfortable, actively vomiting.  HEENT: NC/AT. Moist mucous membranes.  Eyes: No scleral icterus. EOMI.  Neck:. Soft and supple. Full ROM without pain.  Cardiac: Regular rate and regular rhythm. +S1/S2. No murmurs. Peripheral pulses 2+ and symmetric. + b/l 2+ pitting edema to the knee.  Resp: Speaking in full sentences, satting 94% on room air. No evidence of respiratory distress. No wheezes, rales or rhonchi.  Abd: Soft, non-tender, non-distended. Normal bowel sounds in all 4 quadrants. No guarding or rebound.  Back: Spine midline and non-tender. No CVAT.  Skin: Erythema, warmth and TTP of the left lower leg with induration, no fluctuance.  Neuro: Awake, alert & oriented x 3. Moves all extremities symmetrically.

## 2021-06-03 NOTE — ED PROVIDER NOTE - OBJECTIVE STATEMENT
89 y/o F with PMH HTN, COPD presents complaining of chills, vomiting and feeling weak since yesterday. She states nothing comes up when she vomits, denies abdominal pain, denies diarrhea or constipation. She notes redness and swelling to her left lower leg and states "I take a water pill." She cannot give more history due to being hard of hearing and actively vomiting.    Her son Ed provides more history over the phone, however he is a very poor historian: She was admitted to [facility] for hypoxia and was Covid positive, she was discharged with oxygen, but does not use it. His sister Andressa lives with the patient.

## 2021-06-03 NOTE — ED ADULT NURSE REASSESSMENT NOTE - NS ED NURSE REASSESS COMMENT FT1
Pt desated to 84% on RA while sleeping, patient placed on 2L NC. Pt denies sob, chest pain and dizziness. Will monitor. Pt resp are even and unlabored ,skin color jones for race.

## 2021-06-03 NOTE — ED PROVIDER NOTE - CLINICAL SUMMARY MEDICAL DECISION MAKING FREE TEXT BOX
87 y/o F presents as code sepsis, vomiting, unclear source, however she does have cellulitis of her left lower leg. Per protocol, 30 cc/kg fluid bolus, rocephin, zofran for vomiting, tylenol for fever. She was recently hospitalized at [facility] for Covid, was discharged with home O2, but isn't using it. Patient not hypoxic on room air. Abd soft, non-tender, non-distended, however will obtain CT A/P and reassess.

## 2021-06-03 NOTE — ED ADULT NURSE REASSESSMENT NOTE - NS ED NURSE REASSESS COMMENT FT1
Pt is alert and oriented. Pt states that she is slightly nauseous after being boosted in the bed. Will medicate. Pt denies sob, chest pain, vomiting, headache, dizziness and pain. Pt resp are even and unlabored, skin color jones for race. pt educated on plan of care, pt able to successfully teach back plan of care to RN, RN will continue to reeducate pt during hospital stay. Assumed care of patient from previous RN at 19:30. Pt is alert and oriented. Pt states that she is slightly nauseous after being boosted in the bed. Will medicate. Pt denies sob, chest pain, vomiting, headache, dizziness and pain. Pt resp are even and unlabored, skin color jones for race. pt educated on plan of care, pt able to successfully teach back plan of care to RN, RN will continue to reeducate pt during hospital stay.

## 2021-06-03 NOTE — ED PROVIDER NOTE - PROGRESS NOTE DETAILS
Flores [place], Resident: Pt feeling better, erythema, warmth and swelling of the LLE, edges highlighted with skin marker for monitoring. Pt pending imaging. Ashley: Remains hemodynamically stable, no further vomiting, afebile. CT with question of lingula pneumonia, also with lymphadenitis in the left groin likely related to LLE cellulitis. Cholelithiasis without cholecystitis, will follow up with ultrasound but do not believe acute nika is the source of fever. Vancomycin added. Will admit to medicine on tele.

## 2021-06-03 NOTE — ED PROVIDER NOTE - NS ED ROS FT
Const: + fever, + chills  HEENT: Denies blurry vision, sore throat  Neck: Denies neck pain/stiffness  Resp: + SOB. Denies coughing  Cardiovascular: Denies CP, palpitations, LE edema  GI: + nausea, + vomiting. Denies abdominal pain, diarrhea, constipation, blood in stool  : Denies urinary frequency/urgency/dysuria, hematuria  MSK: Denies back pain  Neuro: Denies HA, dizziness, numbness, weakness  Skin: Denies rashes.

## 2021-06-03 NOTE — ED PROVIDER NOTE - PMH
Chronic obstructive pulmonary disease, unspecified COPD type    Essential hypertension    Hypothyroidism, unspecified type

## 2021-06-04 ENCOUNTER — TRANSCRIPTION ENCOUNTER (OUTPATIENT)
Age: 86
End: 2021-06-04

## 2021-06-04 DIAGNOSIS — Z98.890 OTHER SPECIFIED POSTPROCEDURAL STATES: Chronic | ICD-10-CM

## 2021-06-04 DIAGNOSIS — J18.9 PNEUMONIA, UNSPECIFIED ORGANISM: ICD-10-CM

## 2021-06-04 LAB
ALBUMIN SERPL ELPH-MCNC: 3.1 G/DL — LOW (ref 3.3–5.2)
ALP SERPL-CCNC: 84 U/L — SIGNIFICANT CHANGE UP (ref 40–120)
ALT FLD-CCNC: 9 U/L — SIGNIFICANT CHANGE UP
ANION GAP SERPL CALC-SCNC: 9 MMOL/L — SIGNIFICANT CHANGE UP (ref 5–17)
APPEARANCE UR: ABNORMAL
AST SERPL-CCNC: 16 U/L — SIGNIFICANT CHANGE UP
BACTERIA # UR AUTO: ABNORMAL
BASOPHILS # BLD AUTO: 0 K/UL — SIGNIFICANT CHANGE UP (ref 0–0.2)
BASOPHILS NFR BLD AUTO: 0 % — SIGNIFICANT CHANGE UP (ref 0–2)
BILIRUB SERPL-MCNC: 0.3 MG/DL — LOW (ref 0.4–2)
BILIRUB UR-MCNC: NEGATIVE — SIGNIFICANT CHANGE UP
BUN SERPL-MCNC: 26.3 MG/DL — HIGH (ref 8–20)
CALCIUM SERPL-MCNC: 9.1 MG/DL — SIGNIFICANT CHANGE UP (ref 8.6–10.2)
CHLORIDE SERPL-SCNC: 107 MMOL/L — SIGNIFICANT CHANGE UP (ref 98–107)
CO2 SERPL-SCNC: 26 MMOL/L — SIGNIFICANT CHANGE UP (ref 22–29)
COLOR SPEC: YELLOW — SIGNIFICANT CHANGE UP
CREAT SERPL-MCNC: 0.83 MG/DL — SIGNIFICANT CHANGE UP (ref 0.5–1.3)
DIFF PNL FLD: ABNORMAL
EOSINOPHIL # BLD AUTO: 0 K/UL — SIGNIFICANT CHANGE UP (ref 0–0.5)
EOSINOPHIL NFR BLD AUTO: 0 % — SIGNIFICANT CHANGE UP (ref 0–6)
EPI CELLS # UR: SIGNIFICANT CHANGE UP
GLUCOSE SERPL-MCNC: 98 MG/DL — SIGNIFICANT CHANGE UP (ref 70–99)
GLUCOSE UR QL: NEGATIVE MG/DL — SIGNIFICANT CHANGE UP
HCT VFR BLD CALC: 30.8 % — LOW (ref 34.5–45)
HGB BLD-MCNC: 9.6 G/DL — LOW (ref 11.5–15.5)
HYPOCHROMIA BLD QL: SLIGHT — SIGNIFICANT CHANGE UP
KETONES UR-MCNC: NEGATIVE — SIGNIFICANT CHANGE UP
LACTATE SERPL-SCNC: 1.5 MMOL/L — SIGNIFICANT CHANGE UP (ref 0.5–2)
LEUKOCYTE ESTERASE UR-ACNC: ABNORMAL
LYMPHOCYTES # BLD AUTO: 0.67 K/UL — LOW (ref 1–3.3)
LYMPHOCYTES # BLD AUTO: 2.6 % — LOW (ref 13–44)
MAGNESIUM SERPL-MCNC: 2 MG/DL — SIGNIFICANT CHANGE UP (ref 1.6–2.6)
MANUAL SMEAR VERIFICATION: SIGNIFICANT CHANGE UP
MCHC RBC-ENTMCNC: 30.2 PG — SIGNIFICANT CHANGE UP (ref 27–34)
MCHC RBC-ENTMCNC: 31.2 GM/DL — LOW (ref 32–36)
MCV RBC AUTO: 96.9 FL — SIGNIFICANT CHANGE UP (ref 80–100)
MONOCYTES # BLD AUTO: 0.67 K/UL — SIGNIFICANT CHANGE UP (ref 0–0.9)
MONOCYTES NFR BLD AUTO: 2.6 % — SIGNIFICANT CHANGE UP (ref 2–14)
NEUTROPHILS # BLD AUTO: 24.38 K/UL — HIGH (ref 1.8–7.4)
NEUTROPHILS NFR BLD AUTO: 94.8 % — HIGH (ref 43–77)
NITRITE UR-MCNC: NEGATIVE — SIGNIFICANT CHANGE UP
PH UR: 5 — SIGNIFICANT CHANGE UP (ref 5–8)
PHOSPHATE SERPL-MCNC: 3.3 MG/DL — SIGNIFICANT CHANGE UP (ref 2.4–4.7)
PLAT MORPH BLD: NORMAL — SIGNIFICANT CHANGE UP
PLATELET # BLD AUTO: 229 K/UL — SIGNIFICANT CHANGE UP (ref 150–400)
POLYCHROMASIA BLD QL SMEAR: SLIGHT — SIGNIFICANT CHANGE UP
POTASSIUM SERPL-MCNC: 3.8 MMOL/L — SIGNIFICANT CHANGE UP (ref 3.5–5.3)
POTASSIUM SERPL-SCNC: 3.8 MMOL/L — SIGNIFICANT CHANGE UP (ref 3.5–5.3)
PROT SERPL-MCNC: 6.6 G/DL — SIGNIFICANT CHANGE UP (ref 6.6–8.7)
PROT UR-MCNC: 30 MG/DL
RBC # BLD: 3.18 M/UL — LOW (ref 3.8–5.2)
RBC # FLD: 12.8 % — SIGNIFICANT CHANGE UP (ref 10.3–14.5)
RBC BLD AUTO: ABNORMAL
RBC CASTS # UR COMP ASSIST: ABNORMAL /HPF (ref 0–4)
SODIUM SERPL-SCNC: 142 MMOL/L — SIGNIFICANT CHANGE UP (ref 135–145)
SP GR SPEC: 1.01 — SIGNIFICANT CHANGE UP (ref 1.01–1.02)
UROBILINOGEN FLD QL: NEGATIVE MG/DL — SIGNIFICANT CHANGE UP
WBC # BLD: 25.72 K/UL — HIGH (ref 3.8–10.5)
WBC # FLD AUTO: 25.72 K/UL — HIGH (ref 3.8–10.5)
WBC UR QL: SIGNIFICANT CHANGE UP

## 2021-06-04 PROCEDURE — 99223 1ST HOSP IP/OBS HIGH 75: CPT

## 2021-06-04 PROCEDURE — 93970 EXTREMITY STUDY: CPT | Mod: 26

## 2021-06-04 PROCEDURE — 93010 ELECTROCARDIOGRAM REPORT: CPT

## 2021-06-04 PROCEDURE — 76705 ECHO EXAM OF ABDOMEN: CPT | Mod: 26,RT

## 2021-06-04 RX ORDER — PANTOPRAZOLE SODIUM 20 MG/1
40 TABLET, DELAYED RELEASE ORAL DAILY
Refills: 0 | Status: DISCONTINUED | OUTPATIENT
Start: 2021-06-04 | End: 2021-06-11

## 2021-06-04 RX ORDER — VANCOMYCIN HCL 1 G
1000 VIAL (EA) INTRAVENOUS ONCE
Refills: 0 | Status: DISCONTINUED | OUTPATIENT
Start: 2021-06-04 | End: 2021-06-04

## 2021-06-04 RX ORDER — CEFAZOLIN SODIUM 1 G
2000 VIAL (EA) INJECTION EVERY 8 HOURS
Refills: 0 | Status: DISCONTINUED | OUTPATIENT
Start: 2021-06-04 | End: 2021-06-11

## 2021-06-04 RX ORDER — TIOTROPIUM BROMIDE 18 UG/1
1 CAPSULE ORAL; RESPIRATORY (INHALATION) DAILY
Refills: 0 | Status: DISCONTINUED | OUTPATIENT
Start: 2021-06-04 | End: 2021-06-11

## 2021-06-04 RX ORDER — CEFTRIAXONE 500 MG/1
2 INJECTION, POWDER, FOR SOLUTION INTRAMUSCULAR; INTRAVENOUS EVERY 24 HOURS
Refills: 0 | Status: DISCONTINUED | OUTPATIENT
Start: 2021-06-04 | End: 2021-06-04

## 2021-06-04 RX ORDER — VANCOMYCIN HCL 1 G
VIAL (EA) INTRAVENOUS
Refills: 0 | Status: DISCONTINUED | OUTPATIENT
Start: 2021-06-04 | End: 2021-06-04

## 2021-06-04 RX ORDER — LACTOBACILLUS ACIDOPHILUS 100MM CELL
1 CAPSULE ORAL
Refills: 0 | Status: COMPLETED | OUTPATIENT
Start: 2021-06-04 | End: 2021-06-10

## 2021-06-04 RX ORDER — ACETAMINOPHEN 500 MG
650 TABLET ORAL EVERY 6 HOURS
Refills: 0 | Status: DISCONTINUED | OUTPATIENT
Start: 2021-06-04 | End: 2021-06-11

## 2021-06-04 RX ORDER — ONDANSETRON 8 MG/1
8 TABLET, FILM COATED ORAL EVERY 8 HOURS
Refills: 0 | Status: DISCONTINUED | OUTPATIENT
Start: 2021-06-04 | End: 2021-06-11

## 2021-06-04 RX ORDER — VANCOMYCIN HCL 1 G
750 VIAL (EA) INTRAVENOUS EVERY 12 HOURS
Refills: 0 | Status: DISCONTINUED | OUTPATIENT
Start: 2021-06-04 | End: 2021-06-06

## 2021-06-04 RX ORDER — VANCOMYCIN HCL 1 G
750 VIAL (EA) INTRAVENOUS EVERY 12 HOURS
Refills: 0 | Status: DISCONTINUED | OUTPATIENT
Start: 2021-06-04 | End: 2021-06-04

## 2021-06-04 RX ORDER — ENOXAPARIN SODIUM 100 MG/ML
70 INJECTION SUBCUTANEOUS EVERY 12 HOURS
Refills: 0 | Status: DISCONTINUED | OUTPATIENT
Start: 2021-06-04 | End: 2021-06-10

## 2021-06-04 RX ORDER — LEVOTHYROXINE SODIUM 125 MCG
200 TABLET ORAL DAILY
Refills: 0 | Status: DISCONTINUED | OUTPATIENT
Start: 2021-06-04 | End: 2021-06-11

## 2021-06-04 RX ORDER — VANCOMYCIN HCL 1 G
1000 VIAL (EA) INTRAVENOUS EVERY 12 HOURS
Refills: 0 | Status: DISCONTINUED | OUTPATIENT
Start: 2021-06-04 | End: 2021-06-04

## 2021-06-04 RX ORDER — HEPARIN SODIUM 5000 [USP'U]/ML
5000 INJECTION INTRAVENOUS; SUBCUTANEOUS EVERY 12 HOURS
Refills: 0 | Status: DISCONTINUED | OUTPATIENT
Start: 2021-06-04 | End: 2021-06-04

## 2021-06-04 RX ADMIN — Medication 200 MICROGRAM(S): at 05:37

## 2021-06-04 RX ADMIN — ENOXAPARIN SODIUM 70 MILLIGRAM(S): 100 INJECTION SUBCUTANEOUS at 05:37

## 2021-06-04 RX ADMIN — ONDANSETRON 8 MILLIGRAM(S): 8 TABLET, FILM COATED ORAL at 15:22

## 2021-06-04 RX ADMIN — Medication 250 MILLIGRAM(S): at 03:23

## 2021-06-04 RX ADMIN — TIOTROPIUM BROMIDE 1 CAPSULE(S): 18 CAPSULE ORAL; RESPIRATORY (INHALATION) at 08:42

## 2021-06-04 RX ADMIN — Medication 250 MILLIGRAM(S): at 15:19

## 2021-06-04 RX ADMIN — Medication 100 MILLIGRAM(S): at 13:26

## 2021-06-04 RX ADMIN — PANTOPRAZOLE SODIUM 40 MILLIGRAM(S): 20 TABLET, DELAYED RELEASE ORAL at 09:17

## 2021-06-04 RX ADMIN — Medication 1 TABLET(S): at 05:37

## 2021-06-04 RX ADMIN — ENOXAPARIN SODIUM 70 MILLIGRAM(S): 100 INJECTION SUBCUTANEOUS at 18:00

## 2021-06-04 RX ADMIN — Medication 100 MILLIGRAM(S): at 23:00

## 2021-06-04 NOTE — DISCHARGE NOTE NURSING/CASE MANAGEMENT/SOCIAL WORK - PATIENT PORTAL LINK FT
You can access the FollowMyHealth Patient Portal offered by [facility] by registering at the following website: [email or web address]. By joining [employer]’s FollowMyHealth portal, you will also be able to view your health information using other applications (apps) compatible with our system.

## 2021-06-04 NOTE — H&P ADULT - NSHPPHYSICALEXAM_GEN_ALL_CORE
Gen: elderly woman, hard of hearing   HEENT: eomi, perrla, no pallor, left sided erythema noted below left orbit and upper cheek   CVS: S1S2nl no m/r/g RRR   Lungs: fair b/l ae, no wheezing noted   GI: soft, nt bs +   Ext: No c/c. RLE 1+ minimal pitting edema. LLE erythema, warmth, pitting edema 2+ with two superficial lesions noted to lower extremity anteriorly and medially w/o oozing at this time   Skin: as above, otherwise grossly intact   Neuro: aaox3, moves all 4 ext, power 5/5 in all ext

## 2021-06-04 NOTE — CHART NOTE - NSCHARTNOTEFT_GEN_A_CORE
Per radiology - probable thrombus in Right calf, report pending. Lovenox ordered in the interim pending report Per radiology - probable thrombus in left calf, report pending. Lovenox ordered in the interim pending report

## 2021-06-04 NOTE — H&P ADULT - REASON FOR ADMISSION
nausea/vomiting Male  born by unscheduled R/C/S. Apgar 8-9. Prenatals normal.  Passed urine & stool.  Hep B vaccine given. O NEG/ A POS/ POSITIVE. Monitoring Bilirubins. So far not high.  T(C): 36.9 (19 @ 08:52), Max: 36.9 (19 @ 08:52)  HR: 129 (19 @ 08:52) (126 - 140)  BP: --  RR: 43 (19 @ 08:52) (43 - 52)  SpO2: --  Wt(kg): --    LABS:  Bilirubin Total, Cord: 1.8 mg/dL ( 16:27)  Bilirubin Total, Serum: 4.9 mg/dL ( @ 06:15)  Bilirubin Total, Serum: 3.8 mg/dL ( 01:10)                        13.5   x     )-----------( x        ( 2019 01:10 )             37.1       PHYSICAL EXAM: for [facility] admission  Height (cm): 53 ( @ 18:09)  Weight (kg): 3.74 ( @ 18:09)  BMI (kg/m2): 13.3 ( @ 18:09)  BSA (m2): 0.22 ( @ 18:09)  Eyes: deferred RR  HENT: Normal  Neck: Normal  Breasts: Normal  Back: Normal  Respiratory: Normal  Cardiovascular:Normal, no murmur  Gastrointestinal:Normal  Genitourinary: normal male, descended testis.  Rectal: patent  Extremities: Normal,  hips normal without clicks, crepitus, dislocation  Neurological: active,  normal  reflexes present  Skin: Normal  Musculoskeletal: Normal.  A :FT, R/C/S, MATERNAL LABS WNL (HEPBAg neg, HIV neg, RPR NR), GBS NEG.  PLAN :routine  care

## 2021-06-04 NOTE — H&P ADULT - NSHPSOCIALHISTORY_GEN_ALL_CORE
, 2 children, ex tob (quit 38 years ago, prior to that daughter is not sure how much she smoked), no etoh

## 2021-06-04 NOTE — H&P ADULT - NSHPREVIEWOFSYSTEMS_GEN_ALL_CORE
No chest pain, palpitations, sob, light headedness/dizziness, difficulty breathing/cough, fevers/chills, abdominal pain, n/v, diarrhea/constipation, dysuria or increased urinary frequency. All other ROS negative

## 2021-06-04 NOTE — DISCHARGE NOTE NURSING/CASE MANAGEMENT/SOCIAL WORK - NSDCFUADDAPPT_GEN_ALL_CORE_FT
STAR :   Patient declines meds to bed program  Patient has no issues with obtaining her medications from her local [facility] in [place], [place] ( [facility]) pharmacy  Patient has a prescheduled appointment with Pulmonologist , Dr Cotto, on ______________________________ STAR :   Patient declines meds to bed program  Patient has no issues with obtaining her medications from her local [facility] in [place], [place] ( [facility]) pharmacy  Patient has a prescheduled appointment with Pulmonologist , Dr Jose Cotto, on Sherry 10, 2021 at 1:30 pm .   Address: [address] [place] [address] Phone: [phone]  STAR :   Patient declines meds to bed program  Patient has no issues with obtaining her medications from her local [facility] in [place], [place] ( [facility]) pharmacy  Patient has an appointment with Pulmonologist , Dr Jose Cotto, on   6/17/21 at  1:45 pm.  Address: [address] [place] [address] Phone: [phone]

## 2021-06-04 NOTE — CONSULT NOTE ADULT - SUBJECTIVE AND OBJECTIVE BOX
[facility]  INFECTIOUS DISEASES AND INTERNAL MEDICINE at [place]  =======================================================  Micah Mcclain MD  Diplomates American Board of Internal Medicine and Infectious Diseases  Tel: [phone]      Fax: [phone]  =======================================================      [id number]  TERESA NESS    CC: Patient is a 88y old  Female who presents with a chief complaint of nausea/vomiting (04 Jun 2021 01:21)      88y  Female       Past Medical & Surgical Hx:  PAST MEDICAL & SURGICAL HISTORY:  Essential hypertension    Chronic obstructive pulmonary disease, unspecified COPD type    Hypothyroidism, unspecified type    H/O exploratory laparotomy  for a benign colon mass 4/19/12            Social Hx:    FAMILY HISTORY:  No pertinent family history in first degree relatives        Allergies    codeine (Vomiting)  iodine (Unknown)    Intolerances             REVIEW OF SYSTEMS:  CONSTITUTIONAL:  No Fever or chills  HEENT:  No diplopia or blurred vision.  No earache, sore throat or runny nose.  CARDIOVASCULAR:  No pressure, squeezing, strangling, tightness, heaviness or aching about the chest, neck, axilla or epigastrium.  RESPIRATORY:  No cough, shortness of breath  GASTROINTESTINAL:  No nausea, vomiting or diarrhea.  GENITOURINARY:  No dysuria, frequency or urgency. No Blood in urine  MUSCULOSKELETAL:  no joint aches, no muscle pain  SKIN:  No change in skin, hair or nails.  NEUROLOGIC:  No Headaches, seizures or weakness.  PSYCHIATRIC:  No disorder of thought or mood.  ENDOCRINE:  No heat or cold intolerance  HEMATOLOGICAL:  No easy bruising or bleeding.       Physical Exam:    GEN: NAD, pleasant  HEENT: normocephalic and atraumatic. EOMI. PERRL.  Anicteric  NECK: Supple.   LUNGS: Clear to auscultation.  HEART: Regular rate and rhythm without murmur.  ABDOMEN: Soft, nontender, and nondistended.  Positive bowel sounds.    : No CVA tenderness  EXTREMITIES: Without any edema.  MSK: No joint swelling  NEUROLOGIC: Cranial nerves II through XII are grossly intact. No Focal Deficits  PSYCHIATRIC: Appropriate affect .  SKIN: No Rash    Height (cm): 149.9 (06-03 @ 17:08)  Weight (kg): 68 (06-03 @ 17:08)  BMI (kg/m2): 30.3 (06-03 @ 17:08)  BSA (m2): 1.63 (06-03 @ 17:08)    Vitals:    T(F): 98.3 (04 Jun 2021 07:36), Max: 101.3 (03 Jun 2021 20:30)  HR: 77 (04 Jun 2021 08:47)  BP: 127/80 (04 Jun 2021 07:36)  RR: 18 (04 Jun 2021 07:36)  SpO2: 93% (04 Jun 2021 07:36) (93% - 99%)  temp max in last 48H T(F): , Max: 101.3 (06-03-21 @ 20:30)    Current Antibiotics:  ceFAZolin   IVPB 2000 milliGRAM(s) IV Intermittent every 8 hours  vancomycin  IVPB 750 milliGRAM(s) IV Intermittent every 12 hours    Other medications:  enoxaparin Injectable 70 milliGRAM(s) SubCutaneous every 12 hours  lactobacillus acidophilus 1 Tablet(s) Oral two times a day  levothyroxine 200 MICROGram(s) Oral daily  pantoprazole  Injectable 40 milliGRAM(s) IV Push daily  tiotropium 18 MICROgram(s) Capsule 1 Capsule(s) Inhalation daily                            9.6    25.72 )-----------( 229      ( 04 Jun 2021 06:51 )             30.8     06-04    142  |  107  |  26.3<H>  ----------------------------<  98  3.8   |  26.0  |  0.83    Ca    9.1      04 Jun 2021 06:51  Phos  3.3     06-04  Mg     2.0     06-04    TPro  6.6  /  Alb  3.1<L>  /  TBili  0.3<L>  /  DBili  x   /  AST  16  /  ALT  9   /  AlkPhos  84  06-04    RECENT CULTURES:  06-03 @ 19:22          NotDetec      WBC Count: 25.72 K/uL (06-04-21 @ 06:51)  WBC Count: 38.49 K/uL (06-03-21 @ 17:47)    Creatinine, Serum: 0.83 mg/dL (06-04-21 @ 06:51)  Creatinine, Serum: 0.81 mg/dL (06-03-21 @ 17:47)             SARS-CoV-2: NotDetec (06-03-21 @ 19:22)  Rapid RVP Result: NotDetec (06-03-21 @ 19:22)     [facility]  INFECTIOUS DISEASES AND INTERNAL MEDICINE at [place]  =======================================================  Micah Mcclain MD  Diplomates American Board of Internal Medicine and Infectious Diseases  Tel: [phone]      Fax: [phone]  =======================================================      [id number]  TERESA NESS    CC: Patient is a 88y old  Female who presents with a chief complaint of nausea/vomiting (2021 01:21)      89 y/o F with PMH HTN, [place] s/p hearing aides in, 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 Per daughter at bedside, mom was in her USOH when she noticed this happening today and asked to bring her in. Patient initially did not want to come in. No changes in appetite prior to today or outside food noted. Daughter does her grocery shopping for her but patient is otherwise fairly independent. In the ER, noted to have a wbc of 30+ along with a left lower extremity cellulitis. Patients hearing aide battery  and she is hard of hearing, history taken from her with pen/paper - writing questions down. She states that she didn't realize her leg was so red, is not sure when it happened and doesn't have any pain there. She states that she has a new door stopper and from time to time, she hits her left leg on the door. No pain or swelling noted subjectively by patient. No fevers/chills. Shaking with last vomitus per daughter prior to being picked up by EMS. Of note, Patient was admitted to [facility] for hypoxia, noted to be covid positive and treated for pneumonia.     Patient denies any complaints at the bedside except for a dry mouth, thirst and wanting to use the bathroom.         Past Medical & Surgical Hx:  PAST MEDICAL & SURGICAL HISTORY:  Essential hypertension    Chronic obstructive pulmonary disease, unspecified COPD type    Hypothyroidism, unspecified type    H/O exploratory laparotomy  for a benign colon mass 12            Social Hx: non smoker    FAMILY HISTORY:  No pertinent family history in first degree relatives        Allergies    codeine (Vomiting)  iodine (Unknown)    Intolerances             REVIEW OF SYSTEMS:  CONSTITUTIONAL:  No Fever or chills  HEENT:  No diplopia or blurred vision.  No earache, sore throat or runny nose.  CARDIOVASCULAR:  No pressure, squeezing, strangling, tightness, heaviness or aching about the chest, neck, axilla or epigastrium.  RESPIRATORY:  No cough, shortness of breath  GASTROINTESTINAL:  No nausea, vomiting or diarrhea.  GENITOURINARY:  No dysuria, frequency or urgency. No Blood in urine  MUSCULOSKELETAL:  no joint aches, no muscle pain  SKIN:  No change in skin, hair or nails.  NEUROLOGIC:  No Headaches, seizures or weakness.  PSYCHIATRIC:  No disorder of thought or mood.  ENDOCRINE:  No heat or cold intolerance  HEMATOLOGICAL:  No easy bruising or bleeding.       Physical Exam:    GEN: NAD, pleasant  HEENT: normocephalic and atraumatic. EOMI. PERRL.  Anicteric  NECK: Supple.   LUNGS: Clear to auscultation.  HEART: Regular rate and rhythm without murmur.  ABDOMEN: Soft, nontender, and nondistended.  Positive bowel sounds.    : No CVA tenderness  EXTREMITIES: LLE erythema + warmth and swelling, 3 healing wounds on LLE calf  MSK: No joint swelling  NEUROLOGIC: Cranial nerves II through XII are grossly intact. No Focal Deficits  PSYCHIATRIC: Appropriate affect  SKIN: No Rash    Height (cm): 149.9 ( @ 17:08)  Weight (kg): 68 ( @ 17:08)  BMI (kg/m2): 30.3 ( @ 17:08)  BSA (m2): 1.63 ( @ 17:08)    Vitals:    T(F): 98.3 (2021 07:36), Max: 101.3 (2021 20:30)  HR: 77 (2021 08:47)  BP: 127/80 (2021 07:36)  RR: 18 (2021 07:36)  SpO2: 93% (2021 07:36) (93% - 99%)  temp max in last 48H T(F): , Max: 101.3 (21 @ 20:30)    Current Antibiotics:  ceFAZolin   IVPB 2000 milliGRAM(s) IV Intermittent every 8 hours  vancomycin  IVPB 750 milliGRAM(s) IV Intermittent every 12 hours    Other medications:  enoxaparin Injectable 70 milliGRAM(s) SubCutaneous every 12 hours  lactobacillus acidophilus 1 Tablet(s) Oral two times a day  levothyroxine 200 MICROGram(s) Oral daily  pantoprazole  Injectable 40 milliGRAM(s) IV Push daily  tiotropium 18 MICROgram(s) Capsule 1 Capsule(s) Inhalation daily                            9.6    25.72 )-----------( 229      ( 2021 06:51 )             30.8     06-    142  |  107  |  26.3<H>  ----------------------------<  98  3.8   |  26.0  |  0.83    Ca    9.1      2021 06:51  Phos  3.3     06-  Mg     2.0     -    TPro  6.6  /  Alb  3.1<L>  /  TBili  0.3<L>  /  DBili  x   /  AST  16  /  ALT  9   /  AlkPhos  84  06-04    RECENT CULTURES:   @ 19:22          NotDetec      WBC Count: 25.72 K/uL (21 @ 06:51)  WBC Count: 38.49 K/uL (21 @ 17:47)    Creatinine, Serum: 0.83 mg/dL (21 @ 06:51)  Creatinine, Serum: 0.81 mg/dL (21 @ 17:47)             SARS-CoV-2: NotDetec (21 @ 19:22)  Rapid RVP Result: NotDetec (21 @ 19:22)    < from: US Duplex Venous Lower Ext Complete, Bilateral (21 @ 02:21) >  FINDINGS:    RIGHT:  Normal compressibility of the RIGHT common femoral, femoral and popliteal veins.  Doppler examinationshows normal spontaneous and phasic flow.  No RIGHT calf vein thrombosis is detected.    LEFT:  Normal compressibility of the LEFT common femoral, femoral and popliteal veins.  Doppler examination shows normal spontaneous and phasic flow.  Calf vein evaluation is limited by edema and patient body habitus. There is lack of flow seen in the posterior tibial and peroneal veins as well as suggestion of poor compression of the veins.    IMPRESSION:  No evidence of deep venous thrombosis in the right lower extremity.  Suspect occlusive thrombus in the left posterior tibial and peroneal veins. Evaluation is limited by body habitus.    Findings were discussed with Dr. Whalen 2021 3:47 AM by Dr. Archer with read back confirmation.      < end of copied text >    < from: US Abdomen Upper Quadrant Right (21 @ 01:42) >  FINDINGS:    Liver: Within normal limits.  Bile ducts: Normal caliber. Common bile duct measures 5 mm.  Gallbladder: Cholelithiasis within a distended gallbladder..  No wall thickening, focal tenderness or evidenceof cholecystitis.  Pancreas: Increased echogenicity which may reflect some fatty replacement  Right kidney: 8.1 cm. No hydronephrosis.  Ascites: None.  IVC: Visualized portions are within normal limits.    IMPRESSION:    Cholelithiasis. No sonographic evidence of acute cholecystitis.    < end of copied text >    < from: CT Chest w/ IV Cont (21 @ 23:01) >  IMPRESSION:    1. Small cluster of tree-in-bud micronodules in the lingula, new from the prior, may be infectious or inflammatory etiology.  2. No lobar consolidation.  3. No bowel obstruction or other acute abnormality in the alimentary tract.  4. Cholelithiasis without stigmata of acute cholecystitis.  5. Left groin lymphadenitis, of uncertain etiology.    < end of copied text >

## 2021-06-04 NOTE — PROGRESS NOTE ADULT - ASSESSMENT
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 Noted in the ER to have LLE cellulitis with Leucocytosis of 30+. Admitted for AGE and LLE cellulitis complicated with sepsis     Nausea/vomiting -resolved  - from gastroenteritis   -change to soft diet   clinically improved  - ct scan: Cholelithiasis without stigmata of acute cholecystitis.    Acute thrombus - LE on duplex  - c.w lovenox bid  - DOAC on dc    LLE cellulitis   id consult appreciated  -c.w iv ancef and vanco     Prerenal azotemia - improced    normocytic anemia - likely due to age and chronic illness   -monitor cbc, no e/o acute bleeding     h/o COPD - c/w spiriva, f/up Pulm as OP     hypothyroidism - c/w synthroid     h/o htn - BP now normal, f/up and resume metoprolol as needed     DVT ppx - sq heparin     Dispo -called daughter and left a message - [phone]  dispo - lionelley needs to be monitored over the weekend

## 2021-06-04 NOTE — H&P ADULT - NSHPLABSRESULTS_GEN_ALL_CORE
Reviewed   Ekg pending    `< from: CT Abdomen and Pelvis w/ IV Cont (06.03.21 @ 23:01) >    IMPRESSION:    1. Small cluster of tree-in-bud micronodules in the lingula, new from the prior, may be infectious or inflammatory etiology.  2. No lobar consolidation.  3. No bowel obstruction or other acute abnormality in the alimentary tract.  4. Cholelithiasis without stigmata of acute cholecystitis.  5. Left groin lymphadenitis, of uncertain etiology.

## 2021-06-04 NOTE — H&P ADULT - ASSESSMENT
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 Noted in the ER to have LLE cellulitis with Leucocytosis of 30+. Admitted for AGE and LLE cellulitis complicated with sepsis     Nausea/vomiting - likely due to AGE which could ultimately be due to infectious state  -ppi IVP, zofran, IVF given   -start on clear liquid diet, and advance as tolerated   -no elevated LFTs at this time  -CT abd noted, US abd ordered and pending by ER (as she was noted to have cholelithiasis w/o acute cholecystitis)    LLE cellulitis complicated by sepsis with likely strep or staph infection (sepsis criteria met by fever/leucocytosis and likely cause being LLE infection)   -given erythema to face, would treat as LLE cellulitis/erysipelas as well as ? facial  -started on vanco/rocephin; continued vanco and dosed rocephin at 2gm daily dose   -blood cultures in lab   -US LE ordered to r/o acute dvt as well, however, unlikely   -patient is on lasix for fluid ? of LE. Uncertain. Will resume lasix at lower dose once she is hydrating better (also given bun)   -tylenol prn fever   -mrsa swab  -admit to any bed - afebrile now with HR in the 80s  -ID evaluation called     Prerenal azotemia - likely due to nausea/vomiting   -s/p 2.1 L of IVF, f/up labs in am and hold off of further fluids     h/o COPD - c/w spiriva, f/up Pulm as OP     hypothyroidism - c/w synthroid     h/o htn - BP now normal, f/up and resume metoprolol as needed     DVT ppx - sq heparin     Dispo - daughter at bedside gave some of the history. She doesn't think there is a designated HCP, but she is the oldest and she lives with mom. [facility] [place] for HCP. I asked about code status from daughter, she said they have never had that discussion. Started to tear up, I informed her that once her mothers hearing aide is charged, we can have the discussion with her to see what her own wishes are (would not have this conversation the same way I took history - pen/paper). Full code at this time. 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 Noted in the ER to have LLE cellulitis with Leucocytosis of 30+. Admitted for AGE and LLE cellulitis complicated with sepsis     Nausea/vomiting - likely due to AGE which could ultimately be due to infectious state  -ppi IVP, zofran, IVF given   -start on clear liquid diet, and advance as tolerated   -no elevated LFTs at this time  -CT abd noted, US abd ordered and pending by ER (as she was noted to have cholelithiasis w/o acute cholecystitis)    LLE cellulitis complicated by sepsis with likely strep or staph infection (sepsis criteria met by fever/leucocytosis and likely cause being LLE infection)   -given erythema to face, would treat as LLE cellulitis/erysipelas as well as ? facial  -started on vanco/rocephin; continued vanco and dosed rocephin at 2gm daily dose   -blood cultures in lab   -US LE ordered to r/o acute dvt as well, however, unlikely   -patient is on lasix for fluid ? of LE. Uncertain. Will resume lasix at lower dose once she is hydrating better (also given bun)   -tylenol prn fever   -mrsa swab  -admit to any bed - afebrile now with HR in the 80s  -ID evaluation called     Prerenal azotemia - likely due to nausea/vomiting   -s/p 2.1 L of IVF, f/up labs in am and hold off of further fluids     normocytic anemia - likely due to age and chronic illness   -monitor cbc, no e/o acute bleeding     h/o COPD - c/w spiriva, f/up Pulm as OP     hypothyroidism - c/w synthroid     h/o htn - BP now normal, f/up and resume metoprolol as needed     DVT ppx - sq heparin     Dispo - daughter at bedside gave some of the history. She doesn't think there is a designated HCP, but she is the oldest and she lives with mom. [facility] [place] for HCP. I asked about code status from daughter, she said they have never had that discussion. Started to tear up, I informed her that once her mothers hearing aide is charged, we can have the discussion with her to see what her own wishes are (would not have this conversation the same way I took history - pen/paper). Full code at this time. PT [place]

## 2021-06-04 NOTE — H&P ADULT - NSICDXPASTMEDICALHX_GEN_ALL_CORE_FT
PAST MEDICAL HISTORY:  Chronic obstructive pulmonary disease, unspecified COPD type     Essential hypertension     Hypothyroidism, unspecified type

## 2021-06-05 LAB
ALBUMIN SERPL ELPH-MCNC: 2.6 G/DL — LOW (ref 3.3–5.2)
ALP SERPL-CCNC: 90 U/L — SIGNIFICANT CHANGE UP (ref 40–120)
ALT FLD-CCNC: 10 U/L — SIGNIFICANT CHANGE UP
ANION GAP SERPL CALC-SCNC: 11 MMOL/L — SIGNIFICANT CHANGE UP (ref 5–17)
AST SERPL-CCNC: 17 U/L — SIGNIFICANT CHANGE UP
BASOPHILS # BLD AUTO: 0.03 K/UL — SIGNIFICANT CHANGE UP (ref 0–0.2)
BASOPHILS NFR BLD AUTO: 0.2 % — SIGNIFICANT CHANGE UP (ref 0–2)
BILIRUB SERPL-MCNC: 0.3 MG/DL — LOW (ref 0.4–2)
BUN SERPL-MCNC: 19.9 MG/DL — SIGNIFICANT CHANGE UP (ref 8–20)
CALCIUM SERPL-MCNC: 9.1 MG/DL — SIGNIFICANT CHANGE UP (ref 8.6–10.2)
CHLORIDE SERPL-SCNC: 104 MMOL/L — SIGNIFICANT CHANGE UP (ref 98–107)
CO2 SERPL-SCNC: 24 MMOL/L — SIGNIFICANT CHANGE UP (ref 22–29)
CREAT SERPL-MCNC: 0.71 MG/DL — SIGNIFICANT CHANGE UP (ref 0.5–1.3)
CULTURE RESULTS: SIGNIFICANT CHANGE UP
EOSINOPHIL # BLD AUTO: 0.02 K/UL — SIGNIFICANT CHANGE UP (ref 0–0.5)
EOSINOPHIL NFR BLD AUTO: 0.1 % — SIGNIFICANT CHANGE UP (ref 0–6)
FERRITIN SERPL-MCNC: 365 NG/ML — HIGH (ref 15–150)
GLUCOSE SERPL-MCNC: 86 MG/DL — SIGNIFICANT CHANGE UP (ref 70–99)
HCT VFR BLD CALC: 28.6 % — LOW (ref 34.5–45)
HGB BLD-MCNC: 9.2 G/DL — LOW (ref 11.5–15.5)
IMM GRANULOCYTES NFR BLD AUTO: 0.6 % — SIGNIFICANT CHANGE UP (ref 0–1.5)
IRON SATN MFR SERPL: 10 UG/DL — LOW (ref 37–145)
IRON SATN MFR SERPL: 5 % — LOW (ref 14–50)
LYMPHOCYTES # BLD AUTO: 1.36 K/UL — SIGNIFICANT CHANGE UP (ref 1–3.3)
LYMPHOCYTES # BLD AUTO: 8.6 % — LOW (ref 13–44)
MAGNESIUM SERPL-MCNC: 2 MG/DL — SIGNIFICANT CHANGE UP (ref 1.8–2.6)
MCHC RBC-ENTMCNC: 30 PG — SIGNIFICANT CHANGE UP (ref 27–34)
MCHC RBC-ENTMCNC: 32.2 GM/DL — SIGNIFICANT CHANGE UP (ref 32–36)
MCV RBC AUTO: 93.2 FL — SIGNIFICANT CHANGE UP (ref 80–100)
MONOCYTES # BLD AUTO: 0.63 K/UL — SIGNIFICANT CHANGE UP (ref 0–0.9)
MONOCYTES NFR BLD AUTO: 4 % — SIGNIFICANT CHANGE UP (ref 2–14)
NEUTROPHILS # BLD AUTO: 13.74 K/UL — HIGH (ref 1.8–7.4)
NEUTROPHILS NFR BLD AUTO: 86.5 % — HIGH (ref 43–77)
PLATELET # BLD AUTO: 218 K/UL — SIGNIFICANT CHANGE UP (ref 150–400)
POTASSIUM SERPL-MCNC: 3.2 MMOL/L — LOW (ref 3.5–5.3)
POTASSIUM SERPL-SCNC: 3.2 MMOL/L — LOW (ref 3.5–5.3)
PROCALCITONIN SERPL-MCNC: 2.67 NG/ML — HIGH (ref 0.02–0.1)
PROT SERPL-MCNC: 6.1 G/DL — LOW (ref 6.6–8.7)
RBC # BLD: 3.07 M/UL — LOW (ref 3.8–5.2)
RBC # BLD: 3.07 M/UL — LOW (ref 3.8–5.2)
RBC # FLD: 12.8 % — SIGNIFICANT CHANGE UP (ref 10.3–14.5)
RETICS #: 42.7 K/UL — SIGNIFICANT CHANGE UP (ref 25–125)
RETICS/RBC NFR: 1.4 % — SIGNIFICANT CHANGE UP (ref 0.5–2.5)
SODIUM SERPL-SCNC: 139 MMOL/L — SIGNIFICANT CHANGE UP (ref 135–145)
SPECIMEN SOURCE: SIGNIFICANT CHANGE UP
TIBC SERPL-MCNC: 199 UG/DL — LOW (ref 220–430)
TRANSFERRIN SERPL-MCNC: 139 MG/DL — LOW (ref 192–382)
WBC # BLD: 15.87 K/UL — HIGH (ref 3.8–10.5)
WBC # FLD AUTO: 15.87 K/UL — HIGH (ref 3.8–10.5)

## 2021-06-05 PROCEDURE — 99233 SBSQ HOSP IP/OBS HIGH 50: CPT

## 2021-06-05 RX ORDER — POTASSIUM CHLORIDE 20 MEQ
40 PACKET (EA) ORAL ONCE
Refills: 0 | Status: COMPLETED | OUTPATIENT
Start: 2021-06-05 | End: 2021-06-05

## 2021-06-05 RX ADMIN — Medication 200 MICROGRAM(S): at 05:31

## 2021-06-05 RX ADMIN — PANTOPRAZOLE SODIUM 40 MILLIGRAM(S): 20 TABLET, DELAYED RELEASE ORAL at 12:52

## 2021-06-05 RX ADMIN — Medication 100 MILLIGRAM(S): at 14:35

## 2021-06-05 RX ADMIN — ENOXAPARIN SODIUM 70 MILLIGRAM(S): 100 INJECTION SUBCUTANEOUS at 17:39

## 2021-06-05 RX ADMIN — Medication 1 TABLET(S): at 17:39

## 2021-06-05 RX ADMIN — TIOTROPIUM BROMIDE 1 CAPSULE(S): 18 CAPSULE ORAL; RESPIRATORY (INHALATION) at 08:33

## 2021-06-05 RX ADMIN — Medication 250 MILLIGRAM(S): at 02:50

## 2021-06-05 RX ADMIN — Medication 250 MILLIGRAM(S): at 13:17

## 2021-06-05 RX ADMIN — Medication 100 MILLIGRAM(S): at 05:31

## 2021-06-05 RX ADMIN — Medication 1 TABLET(S): at 05:31

## 2021-06-05 RX ADMIN — ENOXAPARIN SODIUM 70 MILLIGRAM(S): 100 INJECTION SUBCUTANEOUS at 05:32

## 2021-06-05 RX ADMIN — Medication 100 MILLIGRAM(S): at 22:43

## 2021-06-05 RX ADMIN — Medication 40 MILLIEQUIVALENT(S): at 17:39

## 2021-06-05 NOTE — PROGRESS NOTE ADULT - ASSESSMENT
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 Noted in the ER to have LLE cellulitis with Leucocytosis of 30+. Admitted for AGE and LLE cellulitis complicated with sepsis       1. LLE cellulitis   Afebrile WBC trending down   c/w Cefazolin and Vancomycin  Tylenol for pain     2. Nausea/vomiting -resolved  - from gastroenteritis   -change to soft diet   clinically improved  - ct scan: Cholelithiasis without stigmata of acute cholecystitis.    3. Acute thrombus - LE on duplex  c/w Enoxaparin 70 mg twice daily   Will switch to PO Eliquis on discharge     4. Prerenal azotemia - improved    5. Normocytic anemia - likely due to age and chronic illness   -monitor cbc, no e/o acute bleeding     6. h/o COPD - c/w spiriva,     7. Hypothyroidism - c/w synthroid     8. HTN - BP controlled.         Daughter:  - [phone]  mary krause needs to be monitored over the weekend

## 2021-06-06 LAB
ANION GAP SERPL CALC-SCNC: 7 MMOL/L — SIGNIFICANT CHANGE UP (ref 5–17)
BASOPHILS # BLD AUTO: 0.03 K/UL — SIGNIFICANT CHANGE UP (ref 0–0.2)
BASOPHILS NFR BLD AUTO: 0.3 % — SIGNIFICANT CHANGE UP (ref 0–2)
BUN SERPL-MCNC: 19.6 MG/DL — SIGNIFICANT CHANGE UP (ref 8–20)
CALCIUM SERPL-MCNC: 9.1 MG/DL — SIGNIFICANT CHANGE UP (ref 8.6–10.2)
CHLORIDE SERPL-SCNC: 107 MMOL/L — SIGNIFICANT CHANGE UP (ref 98–107)
CO2 SERPL-SCNC: 26 MMOL/L — SIGNIFICANT CHANGE UP (ref 22–29)
COVID-19 SPIKE DOMAIN AB INTERP: POSITIVE
COVID-19 SPIKE DOMAIN ANTIBODY RESULT: 46.8 U/ML — HIGH
CREAT SERPL-MCNC: 0.72 MG/DL — SIGNIFICANT CHANGE UP (ref 0.5–1.3)
EOSINOPHIL # BLD AUTO: 0.07 K/UL — SIGNIFICANT CHANGE UP (ref 0–0.5)
EOSINOPHIL NFR BLD AUTO: 0.6 % — SIGNIFICANT CHANGE UP (ref 0–6)
GLUCOSE SERPL-MCNC: 96 MG/DL — SIGNIFICANT CHANGE UP (ref 70–99)
HCT VFR BLD CALC: 29.9 % — LOW (ref 34.5–45)
HGB BLD-MCNC: 9.3 G/DL — LOW (ref 11.5–15.5)
IMM GRANULOCYTES NFR BLD AUTO: 0.5 % — SIGNIFICANT CHANGE UP (ref 0–1.5)
LYMPHOCYTES # BLD AUTO: 1.62 K/UL — SIGNIFICANT CHANGE UP (ref 1–3.3)
LYMPHOCYTES # BLD AUTO: 14.9 % — SIGNIFICANT CHANGE UP (ref 13–44)
MAGNESIUM SERPL-MCNC: 2 MG/DL — SIGNIFICANT CHANGE UP (ref 1.8–2.6)
MCHC RBC-ENTMCNC: 29.8 PG — SIGNIFICANT CHANGE UP (ref 27–34)
MCHC RBC-ENTMCNC: 31.1 GM/DL — LOW (ref 32–36)
MCV RBC AUTO: 95.8 FL — SIGNIFICANT CHANGE UP (ref 80–100)
MONOCYTES # BLD AUTO: 0.62 K/UL — SIGNIFICANT CHANGE UP (ref 0–0.9)
MONOCYTES NFR BLD AUTO: 5.7 % — SIGNIFICANT CHANGE UP (ref 2–14)
NEUTROPHILS # BLD AUTO: 8.45 K/UL — HIGH (ref 1.8–7.4)
NEUTROPHILS NFR BLD AUTO: 78 % — HIGH (ref 43–77)
PLATELET # BLD AUTO: 233 K/UL — SIGNIFICANT CHANGE UP (ref 150–400)
POTASSIUM SERPL-MCNC: 4.7 MMOL/L — SIGNIFICANT CHANGE UP (ref 3.5–5.3)
POTASSIUM SERPL-SCNC: 4.7 MMOL/L — SIGNIFICANT CHANGE UP (ref 3.5–5.3)
RBC # BLD: 3.12 M/UL — LOW (ref 3.8–5.2)
RBC # FLD: 12.6 % — SIGNIFICANT CHANGE UP (ref 10.3–14.5)
SARS-COV-2 IGG+IGM SERPL QL IA: 46.8 U/ML — HIGH
SARS-COV-2 IGG+IGM SERPL QL IA: POSITIVE
SODIUM SERPL-SCNC: 140 MMOL/L — SIGNIFICANT CHANGE UP (ref 135–145)
VANCOMYCIN TROUGH SERPL-MCNC: 14.9 UG/ML — SIGNIFICANT CHANGE UP (ref 10–20)
WBC # BLD: 10.84 K/UL — HIGH (ref 3.8–10.5)
WBC # FLD AUTO: 10.84 K/UL — HIGH (ref 3.8–10.5)

## 2021-06-06 PROCEDURE — 99233 SBSQ HOSP IP/OBS HIGH 50: CPT

## 2021-06-06 PROCEDURE — 99232 SBSQ HOSP IP/OBS MODERATE 35: CPT

## 2021-06-06 RX ORDER — METOPROLOL TARTRATE 50 MG
50 TABLET ORAL DAILY
Refills: 0 | Status: DISCONTINUED | OUTPATIENT
Start: 2021-06-06 | End: 2021-06-11

## 2021-06-06 RX ADMIN — Medication 100 MILLIGRAM(S): at 21:28

## 2021-06-06 RX ADMIN — ENOXAPARIN SODIUM 70 MILLIGRAM(S): 100 INJECTION SUBCUTANEOUS at 05:48

## 2021-06-06 RX ADMIN — Medication 100 MILLIGRAM(S): at 05:47

## 2021-06-06 RX ADMIN — Medication 250 MILLIGRAM(S): at 02:26

## 2021-06-06 RX ADMIN — Medication 250 MILLIGRAM(S): at 14:09

## 2021-06-06 RX ADMIN — ENOXAPARIN SODIUM 70 MILLIGRAM(S): 100 INJECTION SUBCUTANEOUS at 17:00

## 2021-06-06 RX ADMIN — Medication 200 MICROGRAM(S): at 05:50

## 2021-06-06 RX ADMIN — Medication 100 MILLIGRAM(S): at 13:32

## 2021-06-06 RX ADMIN — Medication 1 TABLET(S): at 05:50

## 2021-06-06 RX ADMIN — Medication 50 MILLIGRAM(S): at 09:34

## 2021-06-06 RX ADMIN — Medication 1 TABLET(S): at 17:00

## 2021-06-06 RX ADMIN — PANTOPRAZOLE SODIUM 40 MILLIGRAM(S): 20 TABLET, DELAYED RELEASE ORAL at 12:33

## 2021-06-06 NOTE — PROGRESS NOTE ADULT - ASSESSMENT
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  In the ER, noted to have a wbc of 30+ along with a left lower extremity cellulitis.     Cellulitis LLe  LLe DVT  Leukocytosis  Fever      - afebrile today  - WBC improved  - f/u BCX (-)  - c/wcefazolin  - dc vanco  - complete total 7 days abx with oral keflex

## 2021-06-06 NOTE — PROGRESS NOTE ADULT - ASSESSMENT
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 Noted in the ER to have LLE cellulitis with Leucocytosis of 30+. Admitted for AGE and LLE cellulitis complicated with sepsis       1. LLE cellulitis   Afebrile WBC trending down   c/w Cefazolin and Vancomycin  Tylenol for pain   Get out of bed to chair   PT evaluation     2. Nausea/vomiting -resolved  - from gastroenteritis   -change to soft diet   clinically improved  - ct scan: Cholelithiasis without stigmata of acute cholecystitis.    3. Acute thrombus - LE on duplex  c/w Enoxaparin 70 mg twice daily   Will switch to PO Eliquis on discharge     4. Prerenal azotemia - improved    5. Normocytic anemia - likely due to age and chronic illness   -monitor cbc, no e/o acute bleeding     6. h/o COPD - c/w spiriva,     7. Hypothyroidism - c/w synthroid     8. HTN - BP controlled.         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 Noted in the ER to have LLE cellulitis with Leucocytosis of 30+. Admitted for AGE and LLE cellulitis complicated with sepsis       1. LLE cellulitis   Afebrile WBC trending down   c/w Cefazolin and Vancomycin  Tylenol for pain   Get out of bed to chair   PT evaluation     2. Nausea/vomiting -resolved  - from gastroenteritis   -change to soft diet   clinically improved  - ct scan: Cholelithiasis without stigmata of acute cholecystitis.    3. Acute thrombus - LE on duplex  c/w Enoxaparin 70 mg twice daily   Will switch to PO Eliquis on discharge     4. Prerenal azotemia - improved    5. Normocytic anemia - likely due to age and chronic illness   -monitor cbc, no e/o acute bleeding     6. h/o COPD - c/w spiriva,     7. Hypothyroidism - c/w synthroid     8. HTN - BP controlled.   Resume Metoprolol XL 50 mg po daily         Daughter:  - [phone]  dispo - likely needs to be monitored over the weekend

## 2021-06-07 LAB
ANION GAP SERPL CALC-SCNC: 9 MMOL/L — SIGNIFICANT CHANGE UP (ref 5–17)
BASOPHILS # BLD AUTO: 0.04 K/UL — SIGNIFICANT CHANGE UP (ref 0–0.2)
BASOPHILS NFR BLD AUTO: 0.4 % — SIGNIFICANT CHANGE UP (ref 0–2)
BUN SERPL-MCNC: 18.2 MG/DL — SIGNIFICANT CHANGE UP (ref 8–20)
CALCIUM SERPL-MCNC: 9 MG/DL — SIGNIFICANT CHANGE UP (ref 8.6–10.2)
CHLORIDE SERPL-SCNC: 107 MMOL/L — SIGNIFICANT CHANGE UP (ref 98–107)
CO2 SERPL-SCNC: 25 MMOL/L — SIGNIFICANT CHANGE UP (ref 22–29)
CREAT SERPL-MCNC: 0.66 MG/DL — SIGNIFICANT CHANGE UP (ref 0.5–1.3)
EOSINOPHIL # BLD AUTO: 0.16 K/UL — SIGNIFICANT CHANGE UP (ref 0–0.5)
EOSINOPHIL NFR BLD AUTO: 1.7 % — SIGNIFICANT CHANGE UP (ref 0–6)
GLUCOSE SERPL-MCNC: 88 MG/DL — SIGNIFICANT CHANGE UP (ref 70–99)
HCT VFR BLD CALC: 29.2 % — LOW (ref 34.5–45)
HGB BLD-MCNC: 9.2 G/DL — LOW (ref 11.5–15.5)
IMM GRANULOCYTES NFR BLD AUTO: 0.6 % — SIGNIFICANT CHANGE UP (ref 0–1.5)
LYMPHOCYTES # BLD AUTO: 2.14 K/UL — SIGNIFICANT CHANGE UP (ref 1–3.3)
LYMPHOCYTES # BLD AUTO: 22.8 % — SIGNIFICANT CHANGE UP (ref 13–44)
MAGNESIUM SERPL-MCNC: 2 MG/DL — SIGNIFICANT CHANGE UP (ref 1.8–2.6)
MCHC RBC-ENTMCNC: 29.8 PG — SIGNIFICANT CHANGE UP (ref 27–34)
MCHC RBC-ENTMCNC: 31.5 GM/DL — LOW (ref 32–36)
MCV RBC AUTO: 94.5 FL — SIGNIFICANT CHANGE UP (ref 80–100)
MONOCYTES # BLD AUTO: 0.66 K/UL — SIGNIFICANT CHANGE UP (ref 0–0.9)
MONOCYTES NFR BLD AUTO: 7 % — SIGNIFICANT CHANGE UP (ref 2–14)
NEUTROPHILS # BLD AUTO: 6.33 K/UL — SIGNIFICANT CHANGE UP (ref 1.8–7.4)
NEUTROPHILS NFR BLD AUTO: 67.5 % — SIGNIFICANT CHANGE UP (ref 43–77)
PLATELET # BLD AUTO: 272 K/UL — SIGNIFICANT CHANGE UP (ref 150–400)
POTASSIUM SERPL-MCNC: 4.3 MMOL/L — SIGNIFICANT CHANGE UP (ref 3.5–5.3)
POTASSIUM SERPL-SCNC: 4.3 MMOL/L — SIGNIFICANT CHANGE UP (ref 3.5–5.3)
RBC # BLD: 3.09 M/UL — LOW (ref 3.8–5.2)
RBC # FLD: 12.6 % — SIGNIFICANT CHANGE UP (ref 10.3–14.5)
SODIUM SERPL-SCNC: 141 MMOL/L — SIGNIFICANT CHANGE UP (ref 135–145)
WBC # BLD: 9.39 K/UL — SIGNIFICANT CHANGE UP (ref 3.8–10.5)
WBC # FLD AUTO: 9.39 K/UL — SIGNIFICANT CHANGE UP (ref 3.8–10.5)

## 2021-06-07 PROCEDURE — 99233 SBSQ HOSP IP/OBS HIGH 50: CPT

## 2021-06-07 RX ADMIN — Medication 100 MILLIGRAM(S): at 21:27

## 2021-06-07 RX ADMIN — Medication 200 MICROGRAM(S): at 05:33

## 2021-06-07 RX ADMIN — PANTOPRAZOLE SODIUM 40 MILLIGRAM(S): 20 TABLET, DELAYED RELEASE ORAL at 13:30

## 2021-06-07 RX ADMIN — Medication 50 MILLIGRAM(S): at 05:38

## 2021-06-07 RX ADMIN — Medication 1 TABLET(S): at 05:33

## 2021-06-07 RX ADMIN — Medication 100 MILLIGRAM(S): at 05:35

## 2021-06-07 RX ADMIN — ENOXAPARIN SODIUM 70 MILLIGRAM(S): 100 INJECTION SUBCUTANEOUS at 18:05

## 2021-06-07 RX ADMIN — Medication 1 TABLET(S): at 18:05

## 2021-06-07 RX ADMIN — ENOXAPARIN SODIUM 70 MILLIGRAM(S): 100 INJECTION SUBCUTANEOUS at 05:32

## 2021-06-07 RX ADMIN — Medication 100 MILLIGRAM(S): at 13:35

## 2021-06-07 NOTE — PROGRESS NOTE ADULT - ASSESSMENT
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 Noted in the ER to have LLE cellulitis with Leucocytosis of 30+. Admitted for AGE and LLE cellulitis complicated with sepsis       1. LLE cellulitis   Afebrile WBC normalized   Vancomycin d/allyn   c/w Cefazolin   Tylenol for pain   Get out of bed to chair   PT evaluation pending     2. Nausea/vomiting -resolved  - from gastroenteritis   -change to soft diet   clinically improved  - ct scan: Cholelithiasis without stigmata of acute cholecystitis.    3. Acute thrombus - LE on duplex  c/w Enoxaparin 70 mg twice daily   Will switch to PO Eliquis on discharge     4. Prerenal azotemia - improved    5. Normocytic anemia - likely due to age and chronic illness   -monitor cbc, no e/o acute bleeding     6. h/o COPD - c/w spiriva,     7. Hypothyroidism - c/w synthroid     8. HTN - BP controlled.   Resume Metoprolol XL 50 mg po daily         Son - [phone], spoke to daughter [phone]  dispo -Discharge planning

## 2021-06-08 LAB
CULTURE RESULTS: SIGNIFICANT CHANGE UP
CULTURE RESULTS: SIGNIFICANT CHANGE UP
MRSA PCR RESULT.: DETECTED
S AUREUS DNA NOSE QL NAA+PROBE: DETECTED
SPECIMEN SOURCE: SIGNIFICANT CHANGE UP
SPECIMEN SOURCE: SIGNIFICANT CHANGE UP

## 2021-06-08 PROCEDURE — 93971 EXTREMITY STUDY: CPT | Mod: 26,LT

## 2021-06-08 PROCEDURE — 99232 SBSQ HOSP IP/OBS MODERATE 35: CPT

## 2021-06-08 PROCEDURE — 99221 1ST HOSP IP/OBS SF/LOW 40: CPT

## 2021-06-08 PROCEDURE — 99233 SBSQ HOSP IP/OBS HIGH 50: CPT

## 2021-06-08 RX ORDER — LIDOCAINE HCL 20 MG/ML
30 VIAL (ML) INJECTION ONCE
Refills: 0 | Status: DISCONTINUED | OUTPATIENT
Start: 2021-06-08 | End: 2021-06-11

## 2021-06-08 RX ORDER — MUPIROCIN 20 MG/G
1 OINTMENT TOPICAL
Refills: 0 | Status: DISCONTINUED | OUTPATIENT
Start: 2021-06-08 | End: 2021-06-11

## 2021-06-08 RX ADMIN — ENOXAPARIN SODIUM 70 MILLIGRAM(S): 100 INJECTION SUBCUTANEOUS at 05:24

## 2021-06-08 RX ADMIN — ENOXAPARIN SODIUM 70 MILLIGRAM(S): 100 INJECTION SUBCUTANEOUS at 18:25

## 2021-06-08 RX ADMIN — Medication 200 MICROGRAM(S): at 05:24

## 2021-06-08 RX ADMIN — Medication 50 MILLIGRAM(S): at 05:24

## 2021-06-08 RX ADMIN — Medication 100 MILLIGRAM(S): at 13:48

## 2021-06-08 RX ADMIN — MUPIROCIN 1 APPLICATION(S): 20 OINTMENT TOPICAL at 05:25

## 2021-06-08 RX ADMIN — MUPIROCIN 1 APPLICATION(S): 20 OINTMENT TOPICAL at 18:25

## 2021-06-08 RX ADMIN — Medication 100 MILLIGRAM(S): at 05:24

## 2021-06-08 RX ADMIN — Medication 100 MILLIGRAM(S): at 20:58

## 2021-06-08 RX ADMIN — Medication 1 TABLET(S): at 05:24

## 2021-06-08 RX ADMIN — PANTOPRAZOLE SODIUM 40 MILLIGRAM(S): 20 TABLET, DELAYED RELEASE ORAL at 11:40

## 2021-06-08 RX ADMIN — TIOTROPIUM BROMIDE 1 CAPSULE(S): 18 CAPSULE ORAL; RESPIRATORY (INHALATION) at 18:24

## 2021-06-08 RX ADMIN — Medication 1 TABLET(S): at 18:25

## 2021-06-08 NOTE — PROGRESS NOTE ADULT - ASSESSMENT
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  In the ER, noted to have a wbc of 30+ along with a left lower extremity cellulitis.     Cellulitis LLe  LLe DVT  Leukocytosis  Fever      - still has some LLE edema and erythema although no fluctuance or drainage  - agree with imaging r/o collection  - c/w cefazolin  - add doxycycline  - BCX (-)  - WBC wnl  - MRsa screen +    will f/u

## 2021-06-08 NOTE — CONSULT NOTE ADULT - ASSESSMENT
ASSESSMENT: Patient is a 88y old female with cellulitis and improving WBC and markings. On therapeutic Lovenox for LE DVT.     PLAN:    - Will f/u US to r/o abscess  - Plan discussed with Attending, Dr. Hough    Consult called at: 1pm  Consult seen at: 1:30pm  
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  In the ER, noted to have a wbc of 30+ along with a left lower extremity cellulitis.     Cellulitis LLe  LLe DVT  Leukocytosis  Fever      - afebrile today  - WBC improved  - f/u BCX  - Dc ceftriaxone-->cefazolin  - c/w vancomycin, monitor trough and adjust per pharmacy protocol  - f/u MRSa screen  - Trend Fever  - Trend Leukocytosis    d/w Dr Nolan  Will Follow

## 2021-06-08 NOTE — PROGRESS NOTE ADULT - ASSESSMENT
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 Noted in the ER to have LLE cellulitis with Leucocytosis of 30+. Admitted for AGE and LLE cellulitis complicated with sepsis       1. LLE cellulitis   Afebrile WBC normalized   Vancomycin d/allyn   c/w Cefazolin   Tylenol for pain   Get out of bed to chair   PT evaluation pending   Surgery consult called for possible abscess drainage     2. Nausea/vomiting -resolved  - from gastroenteritis   -change to soft diet   clinically improved  - ct scan: Cholelithiasis without stigmata of acute cholecystitis.    3. Acute thrombus - LE on duplex  c/w Enoxaparin 70 mg twice daily   Will switch to PO Eliquis on discharge     4. Prerenal azotemia - improved    5. Normocytic anemia - likely due to age and chronic illness   -monitor cbc, no e/o acute bleeding     6. h/o COPD - c/w spiriva,     7. Hypothyroidism - c/w synthroid     8. HTN - BP controlled.   c/w  Metoprolol XL 50 mg po daily         Son - [phone], spoke to daughter [phone]  dispo -Discharge planning

## 2021-06-08 NOTE — CONSULT NOTE ADULT - SUBJECTIVE AND OBJECTIVE BOX
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 Per daughter at bedside, mom was in her USOH when she noticed this happening today and asked to bring her in. Patient initially did not want to come in. No changes in appetite prior to today or outside food noted. Daughter does her grocery shopping for her but patient is otherwise fairly independent. In the ER, noted to have a wbc of 30+ along with a left lower extremity cellulitis. Patients hearing aide battery  and she is hard of hearing, history taken from her with pen/paper - writing questions down. She states that she didn't realize her leg was so red, is not sure when it happened and doesn't have any pain there. She states that she has a new door stopper and from time to time, she hits her left leg on the door. No pain or swelling noted subjectively by patient. No fevers/chills. Shaking with last vomitus per daughter prior to being picked up by EMS. Of note, Patient was admitted to [facility] for hypoxia, noted to be covid positive and treated for pneumonia.     Patient denies any complaints at the bedside except for a dry mouth, thirst and wanting to use the bathroom.     Patients WBC has been downtrending a cellulitis improving. General surgery was consulted today to r/o abscess of extremity. Examined at bedside and found stable with no complaints. Denies fever, chills, nausea, vomiting, chest pain, and sob.       ROS: 10-system review is otherwise negative except HPI above.      PAST MEDICAL & SURGICAL HISTORY:  Essential hypertension    Chronic obstructive pulmonary disease, unspecified COPD type    Hypothyroidism, unspecified type    H/O exploratory laparotomy  for a benign colon mass 12      FAMILY HISTORY:  No pertinent family history in first degree relatives      Family history not pertinent as reviewed with the patient.    SOCIAL HISTORY:  Denies any toxic habits    ALLERGIES: NKA codeine (Vomiting)  iodine (Unknown)      HOME MEDICATIONS:   ergocalciferol 50,000 intl units (1.25 mg) oral capsule: 1 cap(s) orally once a week (2021 15:45)  Lasix 40 mg oral tablet: 1 tab(s) orally once a day (2021 15:45)  levothyroxine 200 mcg (0.2 mg) oral tablet: 1 tab(s) orally once a day (2021 15:45)  metoprolol succinate 50 mg oral tablet, extended release: 1 tab(s) orally once a day (2021 15:45)  potassium chloride 10 mEq oral tablet, extended release: 1 tab(s) orally 2 times a day (2021 15:45)  Protonix 40 mg oral delayed release tablet: 1 tab(s) orally once a day (2021 15:45)  rosuvastatin 10 mg oral tablet: 1 tab(s) orally once a day (2021 15:45)  Spiriva 18 mcg inhalation capsule: 1 cap(s) inhaled once a day (2021 15:45)  Wixela Inhub 250 mcg-50 mcg inhalation powder: 1 puff(s) inhaled 2 times a day (2021 15:45)      --------------------------------------------------------------------------------------------    PHYSICAL EXAM:   General: NAD, Lying in bed comfortably  Neuro: A+Ox3  HEENT: EOMI, PERRLA, MMM  Cardio: RRR  Resp: Non labored breathing on RA  GI/Abd: Soft, NT/ND, no rebound/guarding, no masses palpated  Vascular: All 4 extremities warm and well perfused.   Pelvis: stable  Musculoskeletal: All 4 extremities moving spontaneously, no limitations, no spinal tenderness. RLE cellulitis that is improving per prior markings, not localized.  --------------------------------------------------------------------------------------------    LABS                   CAPILLARY BLOOD GLUCOSE              --------------------------------------------------------------------------------------------  IMAGING  pending US

## 2021-06-08 NOTE — CONSULT NOTE ADULT - ATTENDING COMMENTS
Pt seen and examined with resident. Pt with greatly improved erythema, LLE with pitting edema, + DVT. on therapeutic lovenox. no discernible areas of discrete fluctuance appreciated on my exam, will obtain US to determine if a drainable pocket is present vs. thrombophlebitis.   surgery will follow

## 2021-06-09 PROCEDURE — 99232 SBSQ HOSP IP/OBS MODERATE 35: CPT

## 2021-06-09 PROCEDURE — 73701 CT LOWER EXTREMITY W/DYE: CPT | Mod: 26,LT

## 2021-06-09 RX ADMIN — ENOXAPARIN SODIUM 70 MILLIGRAM(S): 100 INJECTION SUBCUTANEOUS at 05:55

## 2021-06-09 RX ADMIN — Medication 200 MICROGRAM(S): at 05:55

## 2021-06-09 RX ADMIN — Medication 100 MILLIGRAM(S): at 19:08

## 2021-06-09 RX ADMIN — Medication 100 MILLIGRAM(S): at 22:11

## 2021-06-09 RX ADMIN — MUPIROCIN 1 APPLICATION(S): 20 OINTMENT TOPICAL at 19:09

## 2021-06-09 RX ADMIN — Medication 50 MILLIGRAM(S): at 05:55

## 2021-06-09 RX ADMIN — Medication 100 MILLIGRAM(S): at 05:55

## 2021-06-09 RX ADMIN — ENOXAPARIN SODIUM 70 MILLIGRAM(S): 100 INJECTION SUBCUTANEOUS at 19:08

## 2021-06-09 RX ADMIN — Medication 1 TABLET(S): at 19:08

## 2021-06-09 RX ADMIN — MUPIROCIN 1 APPLICATION(S): 20 OINTMENT TOPICAL at 05:56

## 2021-06-09 RX ADMIN — Medication 1 TABLET(S): at 05:55

## 2021-06-09 NOTE — DIETITIAN INITIAL EVALUATION ADULT. - ETIOLOGY
related to inability to meet sufficient protein-energy in setting of advanced age, COPD, now with decreased appetite and LLE cellulitis

## 2021-06-09 NOTE — PROGRESS NOTE ADULT - ASSESSMENT
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 Noted in the ER to have LLE cellulitis with Leucocytosis of 30+. Admitted for AGE and LLE cellulitis complicated with sepsis       1. LLE cellulitis   Afebrile WBC normalized   Vancomycin d/allyn   c/w Cefazolin   Doxycycline started yesterday   Tylenol for pain   Get out of bed to chair   Get CT LLE, the extremity is very tender and draining from a small wound     2. Nausea/vomiting -resolved  - from gastroenteritis   -change to soft diet   clinically improved  - ct scan: Cholelithiasis without stigmata of acute cholecystitis.    3. Acute thrombus - LE on duplex  c/w Enoxaparin 70 mg twice daily   Will switch to PO Eliquis on discharge     4. Prerenal azotemia - improved    5. Normocytic anemia - likely due to age and chronic illness   -monitor cbc, no e/o acute bleeding     6. h/o COPD - c/w spiriva,     7. Hypothyroidism - c/w synthroid     8. HTN - BP controlled.   c/w  Metoprolol XL 50 mg po daily         Son - [phone], spoke to daughter [phone]  dispo -Discharge planning

## 2021-06-09 NOTE — PROGRESS NOTE ADULT - ASSESSMENT
ASSESSMENT: Patient is a 88y old female with cellulitis and improving WBC and markings. On therapeutic Lovenox for LE DVT. Now with repeat dupplex without focal collections or DVT on LLE.    PLAN:    - No evidence of abscess on physical exam or on US  -No acute surgical intervention required  -Will re-evaluate with attending.   -Continue with abx  -Rest of care by primary team

## 2021-06-09 NOTE — PROGRESS NOTE ADULT - ASSESSMENT
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  In the ER, noted to have a wbc of 30+ along with a left lower extremity cellulitis.     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  In the ER, noted to have a wbc of 30+ along with a left lower extremity cellulitis.     Cellulitis LLe  LLe DVT  Leukocytosis  Fever      - still has some LLE edema and erythema although no fluctuance or drainage  - Ct LLE did not show any abscess  - c/w cefazolin/doxycyline-can change to oral keflex 500 mg q8h + doxycyline 100 mg po bid end 6/13  - keep leg elevated  - BCX (-)  - WBC wnl  - MRsa screen +    please call with questions

## 2021-06-09 NOTE — DIETITIAN INITIAL EVALUATION ADULT. - PERTINENT MEDS FT
MEDICATIONS  (STANDING):  ceFAZolin   IVPB 2000 milliGRAM(s) IV Intermittent every 8 hours  doxycycline hyclate Capsule 100 milliGRAM(s) Oral every 12 hours  enoxaparin Injectable 70 milliGRAM(s) SubCutaneous every 12 hours  lactobacillus acidophilus 1 Tablet(s) Oral two times a day  levothyroxine 200 MICROGram(s) Oral daily  lidocaine 1% Injectable 30 milliLiter(s) Local Injection once  metoprolol succinate ER 50 milliGRAM(s) Oral daily  mupirocin 2% Nasal 1 Application(s) Nasal two times a day  pantoprazole  Injectable 40 milliGRAM(s) IV Push daily  tiotropium 18 MICROgram(s) Capsule 1 Capsule(s) Inhalation daily    MEDICATIONS  (PRN):  acetaminophen   Tablet .. 650 milliGRAM(s) Oral every 6 hours PRN Temp greater or equal to 38C (100.4F), Mild Pain (1 - 3)  ondansetron Injectable 8 milliGRAM(s) IV Push every 8 hours PRN Nausea and/or Vomiting

## 2021-06-09 NOTE — DIETITIAN INITIAL EVALUATION ADULT. - ORAL NUTRITION SUPPLEMENTS
add Ensure Enlive TID to optimize po intake and provide an additional 350 kcal, 20g protein per serving.

## 2021-06-09 NOTE — DIETITIAN INITIAL EVALUATION ADULT. - OTHER INFO
88 year old female with PMH HTN, [place], COPD/emphysema, and hypothyroidism presents with acute nausea and vomiting over 10 times with rigors during the last event. Noted in the ER to have LLE cellulitis with Leucocytosis of 30+. Admitted for LLE cellulitis complicated with sepsis. Pt reports fair appetite PTA; currently with decreased po intake consuming 25-50% of meals. Denies any significant weight changes PTA. N/V now resolved. Encouraged HBV protein sources and to make protein a priority. RD to follow up.

## 2021-06-09 NOTE — PROGRESS NOTE ADULT - ATTENDING COMMENTS
Patient was seen on 6/9/2021 for the first time  Afebrile with stable vital signs.  Left leg swelling with erythema and mild tenderness.  No fluctuant collection. Couple of crusted lesions.  Sonogram showed no deep abscess.  Dopplers showed DVT below knee ?  Recommend leg elevation, IV antibiotics, Vascular surgery input, Patient already on PO anticoagulants per Hx.

## 2021-06-10 PROCEDURE — 99232 SBSQ HOSP IP/OBS MODERATE 35: CPT

## 2021-06-10 RX ORDER — APIXABAN 2.5 MG/1
5 TABLET, FILM COATED ORAL EVERY 12 HOURS
Refills: 0 | Status: DISCONTINUED | OUTPATIENT
Start: 2021-06-10 | End: 2021-06-11

## 2021-06-10 RX ORDER — FUROSEMIDE 40 MG
40 TABLET ORAL ONCE
Refills: 0 | Status: COMPLETED | OUTPATIENT
Start: 2021-06-10 | End: 2021-06-10

## 2021-06-10 RX ORDER — FLUTICASONE PROPIONATE AND SALMETEROL 250; 50 UG/1; UG/1
250-50 POWDER RESPIRATORY (INHALATION)
Qty: 3 | Refills: 1 | Status: DISCONTINUED | COMMUNITY
Start: 2018-06-08 | End: 2021-06-10

## 2021-06-10 RX ADMIN — ENOXAPARIN SODIUM 70 MILLIGRAM(S): 100 INJECTION SUBCUTANEOUS at 06:59

## 2021-06-10 RX ADMIN — APIXABAN 5 MILLIGRAM(S): 2.5 TABLET, FILM COATED ORAL at 18:13

## 2021-06-10 RX ADMIN — Medication 50 MILLIGRAM(S): at 06:59

## 2021-06-10 RX ADMIN — Medication 100 MILLIGRAM(S): at 21:59

## 2021-06-10 RX ADMIN — Medication 1 TABLET(S): at 15:18

## 2021-06-10 RX ADMIN — Medication 200 MICROGRAM(S): at 06:58

## 2021-06-10 RX ADMIN — MUPIROCIN 1 APPLICATION(S): 20 OINTMENT TOPICAL at 06:59

## 2021-06-10 RX ADMIN — PANTOPRAZOLE SODIUM 40 MILLIGRAM(S): 20 TABLET, DELAYED RELEASE ORAL at 13:23

## 2021-06-10 RX ADMIN — Medication 100 MILLIGRAM(S): at 06:59

## 2021-06-10 RX ADMIN — TIOTROPIUM BROMIDE 1 CAPSULE(S): 18 CAPSULE ORAL; RESPIRATORY (INHALATION) at 07:54

## 2021-06-10 RX ADMIN — MUPIROCIN 1 APPLICATION(S): 20 OINTMENT TOPICAL at 15:18

## 2021-06-10 RX ADMIN — Medication 100 MILLIGRAM(S): at 13:21

## 2021-06-10 RX ADMIN — Medication 1 TABLET(S): at 06:58

## 2021-06-10 RX ADMIN — Medication 40 MILLIGRAM(S): at 13:21

## 2021-06-10 RX ADMIN — Medication 100 MILLIGRAM(S): at 15:18

## 2021-06-10 RX ADMIN — ENOXAPARIN SODIUM 70 MILLIGRAM(S): 100 INJECTION SUBCUTANEOUS at 15:18

## 2021-06-10 NOTE — PROGRESS NOTE ADULT - REASON FOR ADMISSION
nausea/vomiting
nausea/vomiting/ cellulitis
nausea/vomiting

## 2021-06-10 NOTE — PROGRESS NOTE ADULT - SUBJECTIVE AND OBJECTIVE BOX
[facility]  INFECTIOUS DISEASES AND INTERNAL MEDICINE at [place]  =======================================================  Micah Mcclain MD  Diplomates American Board of Internal Medicine and Infectious Diseases  Tel: [phone]      Fax: [phone]  =======================================================    TERESA NESS [id number]    Follow up:cellulitis  Id re-called due to concern for LLE abscess  patient feels well, reports pain in lower LLE      Allergies:  codeine (Vomiting)  iodine (Unknown)           REVIEW OF SYSTEMS:  CONSTITUTIONAL:  No Fever or chills  HEENT:   No diplopia or blurred vision.  No earache, sore throat or runny nose.  CARDIOVASCULAR:  No pressure, squeezing, strangling, tightness, heaviness or aching about the chest, neck, axilla or epigastrium.  RESPIRATORY:  No cough, shortness of breath  GASTROINTESTINAL:  No nausea, vomiting or diarrhea.  GENITOURINARY:  No dysuria, frequency or urgency. No Blood in urine  MUSCULOSKELETAL:  no joint aches, no muscle pain  SKIN:  No change in skin, hair or nails.  NEUROLOGIC:  No Headaches, seizures or weakness.  PSYCHIATRIC:  No disorder of thought or mood.  ENDOCRINE:  No heat or cold intolerance  HEMATOLOGICAL:  No easy bruising or bleeding.       Physical Exam:  GEN: NAD, pleasant  HEENT: normocephalic and atraumatic. EOMI. PERRL.  Anicteric   NECK: Supple.   LUNGS: Clear to auscultation.  HEART: Regular rate and rhythm without murmur.  ABDOMEN: Soft, nontender, and nondistended.  Positive bowel sounds.    : No CVA tenderness  EXTREMITIES: lle lower aspect with + swelling, erythema improved, receded from demarcated region, 3 healing wounds no drainage, although bedsheets appear stained, superior wound has some breakdown. no induration or fluctuance, no crepitus  MSK: no joint swelling  NEUROLOGIC: Cranial nerves II through XII are grossly intact. No focal deficits  PSYCHIATRIC: Appropriate affect .  SKIN: No Rash      Vitals:    Vital Signs Last 24 Hrs  T(C): 36.9 (08 Jun 2021 17:26), Max: 37.1 (08 Jun 2021 09:26)  T(F): 98.4 (08 Jun 2021 17:26), Max: 98.7 (08 Jun 2021 09:26)  HR: 82 (08 Jun 2021 17:26) (77 - 117)  BP: 158/79 (08 Jun 2021 17:26) (129/74 - 158/79)  BP(mean): --  RR: 18 (08 Jun 2021 17:26) (18 - 18)  SpO2: 93% (08 Jun 2021 17:26) (93% - 97%)    Current Antibiotics:  ceFAZolin   IVPB 2000 milliGRAM(s) IV Intermittent every 8 hours    Other medications:  enoxaparin Injectable 70 milliGRAM(s) SubCutaneous every 12 hours  lactobacillus acidophilus 1 Tablet(s) Oral two times a day  levothyroxine 200 MICROGram(s) Oral daily  metoprolol succinate ER 50 milliGRAM(s) Oral daily  pantoprazole  Injectable 40 milliGRAM(s) IV Push daily  tiotropium 18 MICROgram(s) Capsule 1 Capsule(s) Inhalation daily                            9.2    9.39  )-----------( 272      ( 07 Jun 2021 06:50 )             29.2       06-07    141  |  107  |  18.2  ----------------------------<  88  4.3   |  25.0  |  0.66    Ca    9.0      07 Jun 2021 06:50  Mg     2.0     06-07                              CAPILLARY BLOOD GLUCOSE                  RECENT CULTURES:  06-04 @ 09:49 .Urine Clean Catch (Midstream)     <10,000 CFU/mL Normal Urogenital Breanna        06-03 @ 19:22          NotDetec  06-03 @ 17:49 .Blood Blood-Peripheral     No growth at 48 hours        06-03 @ 17:48 .Blood Blood-Peripheral     No growth at 48 hours            WBC Count: 10.84 K/uL (06-06-21 @ 06:10)  WBC Count: 15.87 K/uL (06-05-21 @ 06:52)  WBC Count: 25.72 K/uL (06-04-21 @ 06:51)  WBC Count: 38.49 K/uL (06-03-21 @ 17:47)    Creatinine, Serum: 0.72 mg/dL (06-06-21 @ 06:10)  Creatinine, Serum: 0.71 mg/dL (06-05-21 @ 06:52)  Creatinine, Serum: 0.83 mg/dL (06-04-21 @ 06:51)  Creatinine, Serum: 0.81 mg/dL (06-03-21 @ 17:47)      Ferritin, Serum: 365 ng/mL (06-05-21 @ 06:52)      Procalcitonin, Serum: 2.67 ng/mL (06-05-21 @ 06:52)     SARS-CoV-2: NotDetec (06-03-21 @ 19:22)  Rapid RVP Result: NotDetec (06-03-21 @ 19:22)      
Patient is a 88y old  Female who presents with a chief complaint of nausea/vomiting (09 Jun 2021 18:24)      INTERVAL HPI/OVERNIGHT EVENTS: No significant improvement. The left LE still in pain and very swollen     MEDICATIONS  (STANDING):  ceFAZolin   IVPB 2000 milliGRAM(s) IV Intermittent every 8 hours  doxycycline hyclate Capsule 100 milliGRAM(s) Oral every 12 hours  enoxaparin Injectable 70 milliGRAM(s) SubCutaneous every 12 hours  lactobacillus acidophilus 1 Tablet(s) Oral two times a day  levothyroxine 200 MICROGram(s) Oral daily  lidocaine 1% Injectable 30 milliLiter(s) Local Injection once  metoprolol succinate ER 50 milliGRAM(s) Oral daily  mupirocin 2% Nasal 1 Application(s) Nasal two times a day  pantoprazole  Injectable 40 milliGRAM(s) IV Push daily  tiotropium 18 MICROgram(s) Capsule 1 Capsule(s) Inhalation daily    MEDICATIONS  (PRN):  acetaminophen   Tablet .. 650 milliGRAM(s) Oral every 6 hours PRN Temp greater or equal to 38C (100.4F), Mild Pain (1 - 3)  ondansetron Injectable 8 milliGRAM(s) IV Push every 8 hours PRN Nausea and/or Vomiting      Allergies    codeine (Vomiting)  iodine (Unknown)    Intolerances        REVIEW OF SYSTEMS:  CONSTITUTIONAL: No fever, weight loss, or fatigue  RESPIRATORY: No cough, wheezing, chills or hemoptysis; No shortness of breath  CARDIOVASCULAR: No chest pain, palpitations, dizziness, or leg swelling  GASTROINTESTINAL: No abdominal or epigastric pain. No nausea, vomiting, or hematemesis; No diarrhea or constipation. No melena or hematochezia.  NEUROLOGICAL: No headaches, memory loss, loss of strength, numbness, or tremors  MUSCULOSKELETAL: No joint pain or swelling; No muscle, back, or extremity pain      Vital Signs Last 24 Hrs  T(C): 36.7 (10 Darci 2021 08:10), Max: 37.6 (09 Jun 2021 16:34)  T(F): 98 (10 Darci 2021 08:10), Max: 99.6 (09 Jun 2021 16:34)  HR: 79 (10 Darci 2021 08:29) (73 - 79)  BP: 118/80 (10 Darci 2021 08:29) (118/80 - 169/82)  BP(mean): --  RR: 18 (10 Darci 2021 08:10) (16 - 18)  SpO2: 93% (10 Darci 2021 08:10) (93% - 98%)    PHYSICAL EXAM:  GENERAL: pleasant elderly lady, sitting on the bed NAD, well-groomed, well-developed  HEAD:  Atraumatic, Normocephalic  EYES: EOMI, PERRLA, conjunctiva and sclera clear  NECK: Supple, No JVD, Normal thyroid  NERVOUS SYSTEM:  Alert & Oriented X3, No gross focal deficits  CHEST/LUNG: Clear to percussion bilaterally; No rales, rhonchi, wheezing, or rubs  HEART: Regular rate and rhythm; No murmurs, rubs, or gallops  ABDOMEN: Soft, Nontender, Nondistended; Bowel sounds present  EXTREMITIES:  lower part of LE is swollen, very tender with skin coming off.   SKIN: No rashes or lesions    LABS:              CAPILLARY BLOOD GLUCOSE          RADIOLOGY & ADDITIONAL TESTS:    Imaging Personally Reviewed:  [ ] YES  [ ] NO    Consultant(s) Notes Reviewed:  [ ] YES  [ ] NO    Care Discussed with Consultants/Other Providers [ ] YES  [ ] NO    Plan of Care discussed with Housestaff [ ]YES [ ] NO
Patient is a 88y old  Female who presents with a chief complaint of nausea/vomiting (05 Jun 2021 12:00)      INTERVAL HPI/OVERNIGHT EVENTS: Cellulitis improving. No other complains     MEDICATIONS  (STANDING):  ceFAZolin   IVPB 2000 milliGRAM(s) IV Intermittent every 8 hours  enoxaparin Injectable 70 milliGRAM(s) SubCutaneous every 12 hours  lactobacillus acidophilus 1 Tablet(s) Oral two times a day  levothyroxine 200 MICROGram(s) Oral daily  metoprolol succinate ER 50 milliGRAM(s) Oral daily  pantoprazole  Injectable 40 milliGRAM(s) IV Push daily  tiotropium 18 MICROgram(s) Capsule 1 Capsule(s) Inhalation daily  vancomycin  IVPB 750 milliGRAM(s) IV Intermittent every 12 hours    MEDICATIONS  (PRN):  acetaminophen   Tablet .. 650 milliGRAM(s) Oral every 6 hours PRN Temp greater or equal to 38C (100.4F), Mild Pain (1 - 3)  ondansetron Injectable 8 milliGRAM(s) IV Push every 8 hours PRN Nausea and/or Vomiting      Allergies    codeine (Vomiting)  iodine (Unknown)    Intolerances        REVIEW OF SYSTEMS:  CONSTITUTIONAL: No fever, weight loss, or fatigue  RESPIRATORY: No cough, wheezing, chills or hemoptysis; No shortness of breath  CARDIOVASCULAR: No chest pain, palpitations, dizziness, or leg swelling  GASTROINTESTINAL: No abdominal or epigastric pain. No nausea, vomiting, or hematemesis; No diarrhea or constipation. No melena or hematochezia.  NEUROLOGICAL: No headaches, memory loss, loss of strength, numbness, or tremors  MUSCULOSKELETAL: No joint pain or swelling; No muscle, back, or extremity pain      Vital Signs Last 24 Hrs  T(C): 37.2 (06 Jun 2021 08:08), Max: 37.3 (05 Jun 2021 16:07)  T(F): 98.9 (06 Jun 2021 08:08), Max: 99.1 (05 Jun 2021 16:07)  HR: 108 (06 Jun 2021 09:33) (89 - 113)  BP: 117/76 (06 Jun 2021 09:33) (117/76 - 146/84)  BP(mean): --  RR: 18 (06 Jun 2021 08:08) (16 - 19)  SpO2: 96% (06 Jun 2021 08:08) (93% - 96%)    PHYSICAL EXAM:  GENERAL: NAD, well-groomed, well-developed, pleasant, sitting on the bed   HEAD:  Atraumatic, Normocephalic  EYES: EOMI, PERRLA, conjunctiva and sclera clear  NECK: Supple, No JVD, Normal thyroid  NERVOUS SYSTEM:  Alert & Oriented X3, No gross focal deficits  CHEST/LUNG: Clear to percussion bilaterally; No rales, rhonchi, wheezing, or rubs  HEART: Regular rate and rhythm; No murmurs, rubs, or gallops  ABDOMEN: Soft, Nontender, Nondistended; Bowel sounds present  EXTREMITIES:  left foot erythema and edema   SKIN: No rashes or lesions    LABS:                        9.3    10.84 )-----------( 233      ( 06 Jun 2021 06:10 )             29.9     06-06    140  |  107  |  19.6  ----------------------------<  96  4.7   |  26.0  |  0.72    Ca    9.1      06 Jun 2021 06:10  Mg     2.0     06-06    TPro  6.1<L>  /  Alb  2.6<L>  /  TBili  0.3<L>  /  DBili  x   /  AST  17  /  ALT  10  /  AlkPhos  90  06-05        CAPILLARY BLOOD GLUCOSE          RADIOLOGY & ADDITIONAL TESTS:    Imaging Personally Reviewed:  [ ] YES  [ ] NO    Consultant(s) Notes Reviewed:  [ ] YES  [ ] NO    Care Discussed with Consultants/Other Providers [ ] YES  [ ] NO    Plan of Care discussed with Housestaff [ ]YES [ ] NO
Patient is a 88y old  Female who presents with a chief complaint of nausea/vomiting (09 Jun 2021 09:52)      INTERVAL HPI/OVERNIGHT EVENTS: still c/o left LE pain.     MEDICATIONS  (STANDING):  ceFAZolin   IVPB 2000 milliGRAM(s) IV Intermittent every 8 hours  doxycycline hyclate Capsule 100 milliGRAM(s) Oral every 12 hours  enoxaparin Injectable 70 milliGRAM(s) SubCutaneous every 12 hours  lactobacillus acidophilus 1 Tablet(s) Oral two times a day  levothyroxine 200 MICROGram(s) Oral daily  lidocaine 1% Injectable 30 milliLiter(s) Local Injection once  metoprolol succinate ER 50 milliGRAM(s) Oral daily  mupirocin 2% Nasal 1 Application(s) Nasal two times a day  pantoprazole  Injectable 40 milliGRAM(s) IV Push daily  tiotropium 18 MICROgram(s) Capsule 1 Capsule(s) Inhalation daily    MEDICATIONS  (PRN):  acetaminophen   Tablet .. 650 milliGRAM(s) Oral every 6 hours PRN Temp greater or equal to 38C (100.4F), Mild Pain (1 - 3)  ondansetron Injectable 8 milliGRAM(s) IV Push every 8 hours PRN Nausea and/or Vomiting      Allergies    codeine (Vomiting)  iodine (Unknown)    Intolerances        REVIEW OF SYSTEMS:  CONSTITUTIONAL: No fever, weight loss, or fatigue  RESPIRATORY: No cough, wheezing, chills or hemoptysis; No shortness of breath  CARDIOVASCULAR: No chest pain, palpitations, dizziness, or leg swelling  GASTROINTESTINAL: No abdominal or epigastric pain. No nausea, vomiting, or hematemesis; No diarrhea or constipation. No melena or hematochezia.  NEUROLOGICAL: No headaches, memory loss, loss of strength, numbness, or tremors  MUSCULOSKELETAL: No joint pain or swelling; No muscle, back, or extremity pain      Vital Signs Last 24 Hrs  T(C): 36.9 (09 Jun 2021 07:46), Max: 37.2 (08 Jun 2021 22:18)  T(F): 98.4 (09 Jun 2021 07:46), Max: 99 (08 Jun 2021 22:18)  HR: 77 (09 Jun 2021 07:46) (76 - 88)  BP: 165/70 (09 Jun 2021 07:46) (148/76 - 174/71)  BP(mean): --  RR: 18 (09 Jun 2021 07:46) (18 - 18)  SpO2: 94% (09 Jun 2021 07:46) (82% - 94%)    PHYSICAL EXAM:  GENERAL: pleasant elderly lady, sitting on the bed NAD, well-groomed, well-developed  HEAD:  Atraumatic, Normocephalic  EYES: EOMI, PERRLA, conjunctiva and sclera clear  NECK: Supple, No JVD, Normal thyroid  NERVOUS SYSTEM:  Alert & Oriented X3, No gross focal deficits  CHEST/LUNG: Clear to percussion bilaterally; No rales, rhonchi, wheezing, or rubs  HEART: Regular rate and rhythm; No murmurs, rubs, or gallops  ABDOMEN: Soft, Nontender, Nondistended; Bowel sounds present  EXTREMITIES:  Left LE swollen, open wound which draining- clubbing, cyanosis, or edema  SKIN: No rashes or lesions    LABS:              CAPILLARY BLOOD GLUCOSE          RADIOLOGY & ADDITIONAL TESTS:    Imaging Personally Reviewed:  [ ] YES  [ ] NO    Consultant(s) Notes Reviewed:  [ ] YES  [ ] NO    Care Discussed with Consultants/Other Providers [ ] YES  [ ] NO    Plan of Care discussed with Housestaff [ ]YES [ ] NO
Patient is a 88y old  Female who presents with a chief complaint of nausea/vomiting/ cellulitis (2021 11:46)      INTERVAL HPI/OVERNIGHT EVENTS: Reports slight improvement of the redness and pain     MEDICATIONS  (STANDING):  ceFAZolin   IVPB 2000 milliGRAM(s) IV Intermittent every 8 hours  enoxaparin Injectable 70 milliGRAM(s) SubCutaneous every 12 hours  lactobacillus acidophilus 1 Tablet(s) Oral two times a day  levothyroxine 200 MICROGram(s) Oral daily  pantoprazole  Injectable 40 milliGRAM(s) IV Push daily  tiotropium 18 MICROgram(s) Capsule 1 Capsule(s) Inhalation daily  vancomycin  IVPB 750 milliGRAM(s) IV Intermittent every 12 hours    MEDICATIONS  (PRN):  acetaminophen   Tablet .. 650 milliGRAM(s) Oral every 6 hours PRN Temp greater or equal to 38C (100.4F), Mild Pain (1 - 3)  ondansetron Injectable 8 milliGRAM(s) IV Push every 8 hours PRN Nausea and/or Vomiting      Allergies    codeine (Vomiting)  iodine (Unknown)    Intolerances        REVIEW OF SYSTEMS:  CONSTITUTIONAL: No fever, weight loss, or fatigue  RESPIRATORY: No cough, wheezing, chills or hemoptysis; No shortness of breath  CARDIOVASCULAR: No chest pain, palpitations, dizziness, or leg swelling  GASTROINTESTINAL: No abdominal or epigastric pain. No nausea, vomiting, or hematemesis; No diarrhea or constipation. No melena or hematochezia.  NEUROLOGICAL: No headaches, memory loss, loss of strength, numbness, or tremors  MUSCULOSKELETAL: No joint pain or swelling; No muscle, back, or extremity pain      Vital Signs Last 24 Hrs  T(C): 36.9 (2021 07:48), Max: 37.8 (2021 19:28)  T(F): 98.4 (2021 07:48), Max: 100.1 (2021 19:28)  HR: 93 (2021 07:48) (82 - 98)  BP: 142/71 (2021 07:48) (142/71 - 167/53)  BP(mean): --  RR: 18 (2021 07:48) (17 - 20)  SpO2: 93% (2021 07:48) (92% - 98%)    PHYSICAL EXAM:  GENERAL: pleasant elderly lady, sitting on the bed and eating   HEAD:  Atraumatic, Normocephalic  EYES: EOMI, PERRLA, conjunctiva and sclera clear  NECK: Supple, No JVD, Normal thyroid  NERVOUS SYSTEM:  Alert & Oriented X3, No gross focal deficits  CHEST/LUNG: Clear to percussion bilaterally; No rales, rhonchi, wheezing, or rubs  HEART: Regular rate and rhythm; No murmurs, rubs, or gallops  ABDOMEN: Soft, Nontender, Nondistended; Bowel sounds present  EXTREMITIES: left LE swollen with erythema, slightly tender to touch       LABS:                        9.2    15.87 )-----------( 218      ( 2021 06:52 )             28.6     06-05    139  |  104  |  19.9  ----------------------------<  86  3.2<L>   |  24.0  |  0.71    Ca    9.1      2021 06:52  Phos  3.3     06-04  Mg     2.0     06-05    TPro  6.1<L>  /  Alb  2.6<L>  /  TBili  0.3<L>  /  DBili  x   /  AST  17  /  ALT  10  /  AlkPhos  90  06-05    PT/INR - ( 2021 17:47 )   PT: 13.6 sec;   INR: 1.18 ratio         PTT - ( 2021 17:47 )  PTT:31.4 sec  Urinalysis Basic - ( 2021 03:40 )    Color: Yellow / Appearance: Slightly Turbid / S.010 / pH: x  Gluc: x / Ketone: Negative  / Bili: Negative / Urobili: Negative mg/dL   Blood: x / Protein: 30 mg/dL / Nitrite: Negative   Leuk Esterase: Moderate / RBC: 3-5 /HPF / WBC 3-5   Sq Epi: x / Non Sq Epi: Occasional / Bacteria: Occasional      CAPILLARY BLOOD GLUCOSE          RADIOLOGY & ADDITIONAL TESTS:    Imaging Personally Reviewed:  [ ] YES  [ ] NO    Consultant(s) Notes Reviewed:  [ ] YES  [ ] NO    Care Discussed with Consultants/Other Providers [ ] YES  [ ] NO    Plan of Care discussed with Housestaff [ ]YES [ ] NO
TERESA NESS    [id number]    88y      Female    INTERVAL HPI/OVERNIGHT EVENTS: patient being seen for viral gastroenteritis and cellulitis.    Patient states feeling much better and wants to eat a better diet    REVIEW OF SYSTEMS:    CONSTITUTIONAL: No fever, weight loss, or fatigue  RESPIRATORY: No cough, wheezing, hemoptysis; No shortness of breath  CARDIOVASCULAR: No chest pain, palpitations  GASTROINTESTINAL: No abdominal or epigastric pain. No nausea, vomiting  NEUROLOGICAL: No headaches, memory loss, loss of strength.  MISCELLANEOUS:      Vital Signs Last 24 Hrs  T(C): 37.2 (2021 11:15), Max: 38.5 (2021 20:30)  T(F): 99 (2021 11:15), Max: 101.3 (2021 20:30)  HR: 77 (:15) (76 - 98)  BP: 143/63 (2021 11:15) (114/63 - 148/80)  BP(mean): --  RR: 18 (2021 11:15) (17 - 20)  SpO2: 93% (2021 11:15) (93% - 99%)    PHYSICAL EXAM:     Gen: elderly woman, hard of hearing   HEENT: eomi, perrla, no pallor,   CVS: S1S2nl no m/r/g RRR   Lungs: fair b/l ae, no wheezing noted   GI: soft, nt bs +   Ext: No c/c. RLE 1+ minimal pitting edema. LLE erythema, warm to touch but improved from line  Skin: as above, otherwise grossly intact   Neuro: aaox3, moves all 4 ext, power 5/5 in all ext      LABS:                        9.6    25.72 )-----------( 229      ( 2021 06:51 )             30.8     06-04    142  |  107  |  26.3<H>  ----------------------------<  98  3.8   |  26.0  |  0.83    Ca    9.1      2021 06:51  Phos  3.3     06-04  Mg     2.0     06-04    TPro  6.6  /  Alb  3.1<L>  /  TBili  0.3<L>  /  DBili  x   /  AST  16  /  ALT  9   /  AlkPhos  84  06-04    PT/INR - ( 2021 17:47 )   PT: 13.6 sec;   INR: 1.18 ratio         PTT - ( 2021 17:47 )  PTT:31.4 sec  Urinalysis Basic - ( 2021 03:40 )    Color: Yellow / Appearance: Slightly Turbid / S.010 / pH: x  Gluc: x / Ketone: Negative  / Bili: Negative / Urobili: Negative mg/dL   Blood: x / Protein: 30 mg/dL / Nitrite: Negative   Leuk Esterase: Moderate / RBC: 3-5 /HPF / WBC 3-5   Sq Epi: x / Non Sq Epi: Occasional / Bacteria: Occasional          MEDICATIONS  (STANDING):  ceFAZolin   IVPB 2000 milliGRAM(s) IV Intermittent every 8 hours  enoxaparin Injectable 70 milliGRAM(s) SubCutaneous every 12 hours  lactobacillus acidophilus 1 Tablet(s) Oral two times a day  levothyroxine 200 MICROGram(s) Oral daily  pantoprazole  Injectable 40 milliGRAM(s) IV Push daily  tiotropium 18 MICROgram(s) Capsule 1 Capsule(s) Inhalation daily  vancomycin  IVPB 750 milliGRAM(s) IV Intermittent every 12 hours    MEDICATIONS  (PRN):  acetaminophen   Tablet .. 650 milliGRAM(s) Oral every 6 hours PRN Temp greater or equal to 38C (100.4F), Mild Pain (1 - 3)  ondansetron Injectable 8 milliGRAM(s) IV Push every 8 hours PRN Nausea and/or Vomiting      RADIOLOGY & ADDITIONAL TESTS:  
[facility]  INFECTIOUS DISEASES AND INTERNAL MEDICINE at [place]  =======================================================  Micah Mcclain MD  Diplomates American Board of Internal Medicine and Infectious Diseases  Tel: [phone]      Fax: [phone]  =======================================================    TERESA NESS [id number]    Follow up:cellulitis    patient feels well, reports pain in lower LLE when palpated      Allergies:  codeine (Vomiting)  iodine (Unknown)           REVIEW OF SYSTEMS:  CONSTITUTIONAL:  No Fever or chills  HEENT:   No diplopia or blurred vision.  No earache, sore throat or runny nose.  CARDIOVASCULAR:  No pressure, squeezing, strangling, tightness, heaviness or aching about the chest, neck, axilla or epigastrium.  RESPIRATORY:  No cough, shortness of breath  GASTROINTESTINAL:  No nausea, vomiting or diarrhea.  GENITOURINARY:  No dysuria, frequency or urgency. No Blood in urine  MUSCULOSKELETAL:  no joint aches, no muscle pain  SKIN:  No change in skin, hair or nails.  NEUROLOGIC:  No Headaches, seizures or weakness.  PSYCHIATRIC:  No disorder of thought or mood.  ENDOCRINE:  No heat or cold intolerance  HEMATOLOGICAL:  No easy bruising or bleeding.       Physical Exam:  GEN: NAD, pleasant  HEENT: normocephalic and atraumatic. EOMI. PERRL.  Anicteric   NECK: Supple.   LUNGS: Clear to auscultation.  HEART: Regular rate and rhythm without murmur.  ABDOMEN: Soft, nontender, and nondistended.  Positive bowel sounds.    : No CVA tenderness  EXTREMITIES: lle lower aspect with + swelling, erythema improved, receded from demarcated region, 3 healing wounds no drainage , superior wound has some breakdown and is crusted. no induration or fluctuance, no crepitus  MSK: no joint swelling  NEUROLOGIC: Cranial nerves II through XII are grossly intact. No focal deficits  PSYCHIATRIC: Appropriate affect .  SKIN: No Rash      Vitals:    Vital Signs Last 24 Hrs  T(C): 37.6 (09 Jun 2021 16:34), Max: 37.6 (09 Jun 2021 16:34)  T(F): 99.6 (09 Jun 2021 16:34), Max: 99.6 (09 Jun 2021 16:34)  HR: 75 (09 Jun 2021 16:34) (75 - 88)  BP: 132/74 (09 Jun 2021 16:34) (132/74 - 174/71)  BP(mean): --  RR: 18 (09 Jun 2021 16:34) (18 - 18)  SpO2: 94% (09 Jun 2021 16:34) (82% - 94%)    Current Antibiotics:  ceFAZolin   IVPB 2000 milliGRAM(s) IV Intermittent every 8 hours    Other medications:  enoxaparin Injectable 70 milliGRAM(s) SubCutaneous every 12 hours  lactobacillus acidophilus 1 Tablet(s) Oral two times a day  levothyroxine 200 MICROGram(s) Oral daily  metoprolol succinate ER 50 milliGRAM(s) Oral daily  pantoprazole  Injectable 40 milliGRAM(s) IV Push daily  tiotropium 18 MICROgram(s) Capsule 1 Capsule(s) Inhalation daily                            9.2    9.39  )-----------( 272      ( 07 Jun 2021 06:50 )             29.2       06-07    141  |  107  |  18.2  ----------------------------<  88  4.3   |  25.0  |  0.66    Ca    9.0      07 Jun 2021 06:50  Mg     2.0     06-07                              CAPILLARY BLOOD GLUCOSE                  RECENT CULTURES:  06-04 @ 09:49 .Urine Clean Catch (Midstream)     <10,000 CFU/mL Normal Urogenital Breanna        06-03 @ 19:22          NotDetec  06-03 @ 17:49 .Blood Blood-Peripheral     No growth at 48 hours        06-03 @ 17:48 .Blood Blood-Peripheral     No growth at 48 hours            WBC Count: 10.84 K/uL (06-06-21 @ 06:10)  WBC Count: 15.87 K/uL (06-05-21 @ 06:52)  WBC Count: 25.72 K/uL (06-04-21 @ 06:51)  WBC Count: 38.49 K/uL (06-03-21 @ 17:47)    Creatinine, Serum: 0.72 mg/dL (06-06-21 @ 06:10)  Creatinine, Serum: 0.71 mg/dL (06-05-21 @ 06:52)  Creatinine, Serum: 0.83 mg/dL (06-04-21 @ 06:51)  Creatinine, Serum: 0.81 mg/dL (06-03-21 @ 17:47)      Ferritin, Serum: 365 ng/mL (06-05-21 @ 06:52)      Procalcitonin, Serum: 2.67 ng/mL (06-05-21 @ 06:52)     SARS-CoV-2: NotDetec (06-03-21 @ 19:22)  Rapid RVP Result: NotDetec (06-03-21 @ 19:22)    < from: CT Lower Extremity w/ IV Cont, Left (06.09.21 @ 17:16) >  Findings:    There is circumferential subcutaneous edema about the lower leg which is most prominent from the mid to distal portion with associated skin thickening. Findings are consistent with cellulitis. Mild cutaneous irregularity is seen along the anterior medial soft tissues at the level of the distal tibial diaphysis. There is no peripherally enhancing fluid collection to suggest abscess. This appears largely confined to the subcutaneous fat without definite infiltration of the deep myofascial planes. Prominent subcutaneous varices are noted. Soft tissue mineralization is seen within the medial subcutaneous fat at the level of the mid tibia. There is no subcutaneous air.    The bones are diffusely demineralized. There is no evidence of acute fracture. There is no osseous erosion, destruction, or periosteal reaction to suggest osteolysis. 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 There is a loose ossific body within the suprapatellar joint recess measuring 1.1 x 1.3 cm. There is a small to moderate knee joint effusion. There is a bipartite tibial sesamoid.    There is no disproportionate fatty atrophy of the musculature.    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    Impression:    Findings consistent with cellulitis throughout the left lower extremity. No drainable fluid collection. No CT evidence of osteomyelitis. No subcutaneous air.        < end of copied text >    < from: US Duplex Venous Lower Ext Ltd, Left (06.08.21 @ 17:39) >  FINDINGS:    There is normal compressibility of the left common femoral, femoral and popliteal veins.  The contralateral common femoral vein is patent.  Doppler examination shows normal spontaneous and phasic flow.    No calf vein thrombosis is detected.    No focal collection is seen.    IMPRESSION:  No evidence of left lower extremity deep venous thrombosis.      < end of copied text >    
INTERVAL HPI/OVERNIGHT EVENTS:    Patient evaluated at bedside. No acute distress. No acute events overnight. Patient remains HDN stable and complains only of LLE pain. Denies fevers, chills, nausea, emesis.  Denies chest pain, dyspnea.  Denies constipation, diarrhea. Denies headaches, dizziness, changes in vision.   US repeated without evidence of DVT.     MEDICATIONS  (STANDING):  ceFAZolin   IVPB 2000 milliGRAM(s) IV Intermittent every 8 hours  doxycycline hyclate Capsule 100 milliGRAM(s) Oral every 12 hours  enoxaparin Injectable 70 milliGRAM(s) SubCutaneous every 12 hours  lactobacillus acidophilus 1 Tablet(s) Oral two times a day  levothyroxine 200 MICROGram(s) Oral daily  lidocaine 1% Injectable 30 milliLiter(s) Local Injection once  metoprolol succinate ER 50 milliGRAM(s) Oral daily  mupirocin 2% Nasal 1 Application(s) Nasal two times a day  pantoprazole  Injectable 40 milliGRAM(s) IV Push daily  tiotropium 18 MICROgram(s) Capsule 1 Capsule(s) Inhalation daily    MEDICATIONS  (PRN):  acetaminophen   Tablet .. 650 milliGRAM(s) Oral every 6 hours PRN Temp greater or equal to 38C (100.4F), Mild Pain (1 - 3)  ondansetron Injectable 8 milliGRAM(s) IV Push every 8 hours PRN Nausea and/or Vomiting      Vital Signs Last 24 Hrs  T(C): 37.2 (08 Jun 2021 22:18), Max: 37.2 (08 Jun 2021 22:18)  T(F): 99 (08 Jun 2021 22:18), Max: 99 (08 Jun 2021 22:18)  HR: 84 (08 Jun 2021 22:18) (77 - 117)  BP: 148/76 (08 Jun 2021 22:18) (129/74 - 158/79)  BP(mean): --  RR: 18 (08 Jun 2021 22:18) (18 - 18)  SpO2: 94% (08 Jun 2021 22:18) (93% - 96%)    PHYSICAL EXAM:   General: NAD, Lying in bed comfortably  Neuro: A+Ox3  HEENT: EOMI, PERRLA, MMM  Cardio: RRR  Resp: Non labored breathing on RA  GI/Abd: Soft, NT/ND, no rebound/guarding, no masses palpated  Vascular: All 4 extremities warm and well perfused.   Pelvis: stable  Musculoskeletal: All 4 extremities moving spontaneously, no limitations, no spinal tenderness. LLE cellulitis that is improving per prior markings, no fluctuance, edema+, tender to palpation and warm.      I&O's Detail    07 Jun 2021 07:01  -  08 Jun 2021 07:00  --------------------------------------------------------  IN:    IV PiggyBack: 50 mL  Total IN: 50 mL    OUT:  Total OUT: 0 mL    Total NET: 50 mL          LABS:                        9.2    9.39  )-----------( 272      ( 07 Jun 2021 06:50 )             29.2     06-07    141  |  107  |  18.2  ----------------------------<  88  4.3   |  25.0  |  0.66    Ca    9.0      07 Jun 2021 06:50  Mg     2.0     06-07            RADIOLOGY & ADDITIONAL STUDIES:
Patient is a 88y old  Female who presents with a chief complaint of nausea/vomiting (07 Jun 2021 11:51)      INTERVAL HPI/OVERNIGHT EVENTS: c/o pain at the lower part of calf       MEDICATIONS  (STANDING):  ceFAZolin   IVPB 2000 milliGRAM(s) IV Intermittent every 8 hours  enoxaparin Injectable 70 milliGRAM(s) SubCutaneous every 12 hours  lactobacillus acidophilus 1 Tablet(s) Oral two times a day  levothyroxine 200 MICROGram(s) Oral daily  metoprolol succinate ER 50 milliGRAM(s) Oral daily  mupirocin 2% Nasal 1 Application(s) Nasal two times a day  pantoprazole  Injectable 40 milliGRAM(s) IV Push daily  tiotropium 18 MICROgram(s) Capsule 1 Capsule(s) Inhalation daily    MEDICATIONS  (PRN):  acetaminophen   Tablet .. 650 milliGRAM(s) Oral every 6 hours PRN Temp greater or equal to 38C (100.4F), Mild Pain (1 - 3)  ondansetron Injectable 8 milliGRAM(s) IV Push every 8 hours PRN Nausea and/or Vomiting      Allergies    codeine (Vomiting)  iodine (Unknown)    Intolerances        REVIEW OF SYSTEMS:  CONSTITUTIONAL: No fever, weight loss, or fatigue  RESPIRATORY: No cough, wheezing, chills or hemoptysis; No shortness of breath  CARDIOVASCULAR: No chest pain, palpitations, dizziness, or leg swelling  GASTROINTESTINAL: No abdominal or epigastric pain. No nausea, vomiting, or hematemesis; No diarrhea or constipation. No melena or hematochezia.  NEUROLOGICAL: No headaches, memory loss, loss of strength, numbness, or tremors  MUSCULOSKELETAL: No joint pain or swelling; No muscle, back, or extremity pain      Vital Signs Last 24 Hrs  T(C): 37.1 (08 Jun 2021 09:26), Max: 37.1 (08 Jun 2021 09:26)  T(F): 98.7 (08 Jun 2021 09:26), Max: 98.7 (08 Jun 2021 09:26)  HR: 77 (08 Jun 2021 09:26) (77 - 117)  BP: 129/74 (08 Jun 2021 09:26) (129/74 - 154/72)  BP(mean): --  RR: 18 (08 Jun 2021 09:26) (18 - 18)  SpO2: 96% (08 Jun 2021 09:26) (94% - 97%)    PHYSICAL EXAM:  GENERAL: Pleasant lady sitting on the chair . NAD, well-groomed, well-developed  HEAD:  Atraumatic, Normocephalic  EYES: EOMI, PERRLA, conjunctiva and sclera clear  NECK: Supple, No JVD, Normal thyroid  NERVOUS SYSTEM:  Alert & Oriented X3, No gross focal deficits  CHEST/LUNG: Clear to percussion bilaterally; No rales, rhonchi, wheezing, or rubs  HEART: Regular rate and rhythm; No murmurs, rubs, or gallops  ABDOMEN: Soft, Nontender, Nondistended; Bowel sounds present  EXTREMITIES: Left LE swollen, with small ulcer that draining       LABS:                        9.2    9.39  )-----------( 272      ( 07 Jun 2021 06:50 )             29.2     06-07    141  |  107  |  18.2  ----------------------------<  88  4.3   |  25.0  |  0.66    Ca    9.0      07 Jun 2021 06:50  Mg     2.0     06-07          CAPILLARY BLOOD GLUCOSE          RADIOLOGY & ADDITIONAL TESTS:    Imaging Personally Reviewed:  [ ] YES  [ ] NO    Consultant(s) Notes Reviewed:  [ ] YES  [ ] NO    Care Discussed with Consultants/Other Providers [ ] YES  [ ] NO    Plan of Care discussed with Housestaff [ ]YES [ ] NO
Patient is a 88y old  Female who presents with a chief complaint of nausea/vomiting (06 Jun 2021 18:29)      INTERVAL HPI/OVERNIGHT EVENTS: No new complains. Cellulitis improving     MEDICATIONS  (STANDING):  ceFAZolin   IVPB 2000 milliGRAM(s) IV Intermittent every 8 hours  enoxaparin Injectable 70 milliGRAM(s) SubCutaneous every 12 hours  lactobacillus acidophilus 1 Tablet(s) Oral two times a day  levothyroxine 200 MICROGram(s) Oral daily  metoprolol succinate ER 50 milliGRAM(s) Oral daily  pantoprazole  Injectable 40 milliGRAM(s) IV Push daily  tiotropium 18 MICROgram(s) Capsule 1 Capsule(s) Inhalation daily    MEDICATIONS  (PRN):  acetaminophen   Tablet .. 650 milliGRAM(s) Oral every 6 hours PRN Temp greater or equal to 38C (100.4F), Mild Pain (1 - 3)  ondansetron Injectable 8 milliGRAM(s) IV Push every 8 hours PRN Nausea and/or Vomiting      Allergies    codeine (Vomiting)  iodine (Unknown)    Intolerances        REVIEW OF SYSTEMS:  CONSTITUTIONAL: No fever, weight loss, or fatigue  RESPIRATORY: No cough, wheezing, chills or hemoptysis; No shortness of breath  CARDIOVASCULAR: No chest pain, palpitations, dizziness, or leg swelling  GASTROINTESTINAL: No abdominal or epigastric pain. No nausea, vomiting, or hematemesis; No diarrhea or constipation. No melena or hematochezia.  NEUROLOGICAL: No headaches, memory loss, loss of strength, numbness, or tremors  MUSCULOSKELETAL: No joint pain or swelling; No muscle, back, or extremity pain      Vital Signs Last 24 Hrs  T(C): 37.1 (07 Jun 2021 08:37), Max: 37.1 (07 Jun 2021 08:37)  T(F): 98.8 (07 Jun 2021 08:37), Max: 98.8 (07 Jun 2021 08:37)  HR: 100 (07 Jun 2021 08:37) (76 - 100)  BP: 166/81 (07 Jun 2021 08:37) (128/77 - 166/81)  BP(mean): --  RR: 19 (07 Jun 2021 08:37) (18 - 19)  SpO2: 92% (07 Jun 2021 08:37) (92% - 98%)    PHYSICAL EXAM:  GENERAL: NAD, well-groomed, well-developed, pleasant, sitting on the chair   HEAD:  Atraumatic, Normocephalic  EYES: EOMI, PERRLA, conjunctiva and sclera clear  NECK: Supple, No JVD, Normal thyroid  NERVOUS SYSTEM:  Alert & Oriented X3, No gross focal deficits  CHEST/LUNG: Clear to percussion bilaterally; No rales, rhonchi, wheezing, or rubs  HEART: Regular rate and rhythm; No murmurs, rubs, or gallops  ABDOMEN: Soft, Nontender, Nondistended; Bowel sounds present  EXTREMITIES:  No clubbing, cyanosis, or edema  SKIN: No rashes or lesions    LABS:                        9.2    9.39  )-----------( 272      ( 07 Jun 2021 06:50 )             29.2     06-07    141  |  107  |  18.2  ----------------------------<  88  4.3   |  25.0  |  0.66    Ca    9.0      07 Jun 2021 06:50  Mg     2.0     06-07          CAPILLARY BLOOD GLUCOSE          RADIOLOGY & ADDITIONAL TESTS:    Imaging Personally Reviewed:  [ ] YES  [ ] NO    Consultant(s) Notes Reviewed:  [ ] YES  [ ] NO    Care Discussed with Consultants/Other Providers [ ] YES  [ ] NO    Plan of Care discussed with Housestaff [ ]YES [ ] NO
[facility]  INFECTIOUS DISEASES AND INTERNAL MEDICINE at [place]  =======================================================  Micah Mcclain MD  Diplomates American Board of Internal Medicine and Infectious Diseases  Tel: [phone]      Fax: [phone]  =======================================================    TERESA NESS [id number]    Follow up:cellulitis  feeling better  lle pain improved      Allergies:  codeine (Vomiting)  iodine (Unknown)           REVIEW OF SYSTEMS:  CONSTITUTIONAL:  No Fever or chills  HEENT:   No diplopia or blurred vision.  No earache, sore throat or runny nose.  CARDIOVASCULAR:  No pressure, squeezing, strangling, tightness, heaviness or aching about the chest, neck, axilla or epigastrium.  RESPIRATORY:  No cough, shortness of breath  GASTROINTESTINAL:  No nausea, vomiting or diarrhea.  GENITOURINARY:  No dysuria, frequency or urgency. No Blood in urine  MUSCULOSKELETAL:  no joint aches, no muscle pain  SKIN:  No change in skin, hair or nails.  NEUROLOGIC:  No Headaches, seizures or weakness.  PSYCHIATRIC:  No disorder of thought or mood.  ENDOCRINE:  No heat or cold intolerance  HEMATOLOGICAL:  No easy bruising or bleeding.       Physical Exam:  GEN: NAD, pleasant  HEENT: normocephalic and atraumatic. EOMI. PERRL.  Anicteric   NECK: Supple.   LUNGS: Clear to auscultation.  HEART: Regular rate and rhythm without murmur.  ABDOMEN: Soft, nontender, and nondistended.  Positive bowel sounds.    : No CVA tenderness  EXTREMITIES: lle with + swelling, erythema improved 3 healing wounds no drainage  MSK: no joint swelling  NEUROLOGIC: Cranial nerves II through XII are grossly intact. No focal deficits  PSYCHIATRIC: Appropriate affect .  SKIN: No Rash      Vitals:    T(F): 98.4 (06 Jun 2021 16:52), Max: 98.9 (06 Jun 2021 08:08)  HR: 76 (06 Jun 2021 16:52)  BP: 144/83 (06 Jun 2021 16:52)  RR: 18 (06 Jun 2021 16:52)  SpO2: 97% (06 Jun 2021 16:52) (93% - 97%)  temp max in last 48H T(F): , Max: 100.1 (06-04-21 @ 19:28)    Current Antibiotics:  ceFAZolin   IVPB 2000 milliGRAM(s) IV Intermittent every 8 hours    Other medications:  enoxaparin Injectable 70 milliGRAM(s) SubCutaneous every 12 hours  lactobacillus acidophilus 1 Tablet(s) Oral two times a day  levothyroxine 200 MICROGram(s) Oral daily  metoprolol succinate ER 50 milliGRAM(s) Oral daily  pantoprazole  Injectable 40 milliGRAM(s) IV Push daily  tiotropium 18 MICROgram(s) Capsule 1 Capsule(s) Inhalation daily                            9.3    10.84 )-----------( 233      ( 06 Jun 2021 06:10 )             29.9     06-06    140  |  107  |  19.6  ----------------------------<  96  4.7   |  26.0  |  0.72    Ca    9.1      06 Jun 2021 06:10  Mg     2.0     06-06    TPro  6.1<L>  /  Alb  2.6<L>  /  TBili  0.3<L>  /  DBili  x   /  AST  17  /  ALT  10  /  AlkPhos  90  06-05    RECENT CULTURES:  06-04 @ 09:49 .Urine Clean Catch (Midstream)     <10,000 CFU/mL Normal Urogenital Breanna        06-03 @ 19:22          NotDetec  06-03 @ 17:49 .Blood Blood-Peripheral     No growth at 48 hours        06-03 @ 17:48 .Blood Blood-Peripheral     No growth at 48 hours            WBC Count: 10.84 K/uL (06-06-21 @ 06:10)  WBC Count: 15.87 K/uL (06-05-21 @ 06:52)  WBC Count: 25.72 K/uL (06-04-21 @ 06:51)  WBC Count: 38.49 K/uL (06-03-21 @ 17:47)    Creatinine, Serum: 0.72 mg/dL (06-06-21 @ 06:10)  Creatinine, Serum: 0.71 mg/dL (06-05-21 @ 06:52)  Creatinine, Serum: 0.83 mg/dL (06-04-21 @ 06:51)  Creatinine, Serum: 0.81 mg/dL (06-03-21 @ 17:47)      Ferritin, Serum: 365 ng/mL (06-05-21 @ 06:52)      Procalcitonin, Serum: 2.67 ng/mL (06-05-21 @ 06:52)     SARS-CoV-2: NotDetec (06-03-21 @ 19:22)  Rapid RVP Result: NotDetec (06-03-21 @ 19:22)

## 2021-06-10 NOTE — PROGRESS NOTE ADULT - ASSESSMENT
Patient is an 87 y/o F with PMH HTN, 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 Noted in the ER to have LLE cellulitis with Leucocytosis of 30+. Admitted for AGE and LLE cellulitis complicated with sepsis       1. LLE cellulitis - the lower part of LE is still very inflamed   Afebrile WBC normalized   Vancomycin d/allyn   c/w Cefazolin and Doxycycline   Tylenol for pain   Keep LE elevated   Will give one dose of Furosemide 40 mg IV     CT LLE - abscess ruled out      2. Nausea/vomiting -resolved  - from gastroenteritis   -change to soft diet   clinically improved  - ct scan: Cholelithiasis without stigmata of acute cholecystitis.    3. Acute thrombus - LE on duplex  c/w Enoxaparin 70 mg twice daily   Switch to Eliquis 5 mg po twice daily       4. Prerenal azotemia - improved    5. Normocytic anemia - likely due to age and chronic illness   -monitor cbc, no e/o acute bleeding     6. h/o COPD - c/w spiriva,     7. Hypothyroidism - c/w synthroid     8. HTN - BP controlled.   c/w  Metoprolol XL 50 mg po daily         Son - [phone], spoke to daughter [phone]  dispo -Discharge planning

## 2021-06-10 NOTE — PROGRESS NOTE ADULT - PROVIDER SPECIALTY LIST ADULT
Hospitalist
Hospitalist
Infectious Disease
Infectious Disease
Hospitalist
Internal Medicine
Infectious Disease
Surgery
Hospitalist

## 2021-06-10 NOTE — PROGRESS NOTE ADULT - NSICDXPILOT_GEN_ALL_CORE
[place]

## 2021-06-11 ENCOUNTER — TRANSCRIPTION ENCOUNTER (OUTPATIENT)
Age: 86
End: 2021-06-11

## 2021-06-11 VITALS
DIASTOLIC BLOOD PRESSURE: 67 MMHG | HEART RATE: 76 BPM | SYSTOLIC BLOOD PRESSURE: 134 MMHG | TEMPERATURE: 99 F | RESPIRATION RATE: 18 BRPM | OXYGEN SATURATION: 95 %

## 2021-06-11 LAB
ANION GAP SERPL CALC-SCNC: 8 MMOL/L — SIGNIFICANT CHANGE UP (ref 5–17)
BASOPHILS # BLD AUTO: 0.03 K/UL — SIGNIFICANT CHANGE UP (ref 0–0.2)
BASOPHILS NFR BLD AUTO: 0.4 % — SIGNIFICANT CHANGE UP (ref 0–2)
BUN SERPL-MCNC: 14.2 MG/DL — SIGNIFICANT CHANGE UP (ref 8–20)
CALCIUM SERPL-MCNC: 8.8 MG/DL — SIGNIFICANT CHANGE UP (ref 8.6–10.2)
CHLORIDE SERPL-SCNC: 104 MMOL/L — SIGNIFICANT CHANGE UP (ref 98–107)
CO2 SERPL-SCNC: 26 MMOL/L — SIGNIFICANT CHANGE UP (ref 22–29)
CREAT SERPL-MCNC: 0.67 MG/DL — SIGNIFICANT CHANGE UP (ref 0.5–1.3)
EOSINOPHIL # BLD AUTO: 0.27 K/UL — SIGNIFICANT CHANGE UP (ref 0–0.5)
EOSINOPHIL NFR BLD AUTO: 3.4 % — SIGNIFICANT CHANGE UP (ref 0–6)
GLUCOSE SERPL-MCNC: 84 MG/DL — SIGNIFICANT CHANGE UP (ref 70–99)
HCT VFR BLD CALC: 27.1 % — LOW (ref 34.5–45)
HGB BLD-MCNC: 8.6 G/DL — LOW (ref 11.5–15.5)
IMM GRANULOCYTES NFR BLD AUTO: 1.6 % — HIGH (ref 0–1.5)
LYMPHOCYTES # BLD AUTO: 2.02 K/UL — SIGNIFICANT CHANGE UP (ref 1–3.3)
LYMPHOCYTES # BLD AUTO: 25.6 % — SIGNIFICANT CHANGE UP (ref 13–44)
MAGNESIUM SERPL-MCNC: 1.9 MG/DL — SIGNIFICANT CHANGE UP (ref 1.6–2.6)
MCHC RBC-ENTMCNC: 29.7 PG — SIGNIFICANT CHANGE UP (ref 27–34)
MCHC RBC-ENTMCNC: 31.7 GM/DL — LOW (ref 32–36)
MCV RBC AUTO: 93.4 FL — SIGNIFICANT CHANGE UP (ref 80–100)
MONOCYTES # BLD AUTO: 0.73 K/UL — SIGNIFICANT CHANGE UP (ref 0–0.9)
MONOCYTES NFR BLD AUTO: 9.3 % — SIGNIFICANT CHANGE UP (ref 2–14)
NEUTROPHILS # BLD AUTO: 4.7 K/UL — SIGNIFICANT CHANGE UP (ref 1.8–7.4)
NEUTROPHILS NFR BLD AUTO: 59.7 % — SIGNIFICANT CHANGE UP (ref 43–77)
PLATELET # BLD AUTO: 351 K/UL — SIGNIFICANT CHANGE UP (ref 150–400)
POTASSIUM SERPL-MCNC: 4.1 MMOL/L — SIGNIFICANT CHANGE UP (ref 3.5–5.3)
POTASSIUM SERPL-SCNC: 4.1 MMOL/L — SIGNIFICANT CHANGE UP (ref 3.5–5.3)
RBC # BLD: 2.9 M/UL — LOW (ref 3.8–5.2)
RBC # FLD: 12.3 % — SIGNIFICANT CHANGE UP (ref 10.3–14.5)
SODIUM SERPL-SCNC: 138 MMOL/L — SIGNIFICANT CHANGE UP (ref 135–145)
WBC # BLD: 7.88 K/UL — SIGNIFICANT CHANGE UP (ref 3.8–10.5)
WBC # FLD AUTO: 7.88 K/UL — SIGNIFICANT CHANGE UP (ref 3.8–10.5)

## 2021-06-11 PROCEDURE — 73701 CT LOWER EXTREMITY W/DYE: CPT

## 2021-06-11 PROCEDURE — 82435 ASSAY OF BLOOD CHLORIDE: CPT

## 2021-06-11 PROCEDURE — 84466 ASSAY OF TRANSFERRIN: CPT

## 2021-06-11 PROCEDURE — 83550 IRON BINDING TEST: CPT

## 2021-06-11 PROCEDURE — 99285 EMERGENCY DEPT VISIT HI MDM: CPT | Mod: 25

## 2021-06-11 PROCEDURE — 84145 PROCALCITONIN (PCT): CPT

## 2021-06-11 PROCEDURE — 85018 HEMOGLOBIN: CPT

## 2021-06-11 PROCEDURE — 83690 ASSAY OF LIPASE: CPT

## 2021-06-11 PROCEDURE — 71045 X-RAY EXAM CHEST 1 VIEW: CPT

## 2021-06-11 PROCEDURE — 84100 ASSAY OF PHOSPHORUS: CPT

## 2021-06-11 PROCEDURE — 36415 COLL VENOUS BLD VENIPUNCTURE: CPT

## 2021-06-11 PROCEDURE — 82803 BLOOD GASES ANY COMBINATION: CPT

## 2021-06-11 PROCEDURE — 85025 COMPLETE CBC W/AUTO DIFF WBC: CPT

## 2021-06-11 PROCEDURE — 83605 ASSAY OF LACTIC ACID: CPT

## 2021-06-11 PROCEDURE — 84484 ASSAY OF TROPONIN QUANT: CPT

## 2021-06-11 PROCEDURE — 80202 ASSAY OF VANCOMYCIN: CPT

## 2021-06-11 PROCEDURE — 93971 EXTREMITY STUDY: CPT

## 2021-06-11 PROCEDURE — 96375 TX/PRO/DX INJ NEW DRUG ADDON: CPT

## 2021-06-11 PROCEDURE — 93970 EXTREMITY STUDY: CPT

## 2021-06-11 PROCEDURE — 87086 URINE CULTURE/COLONY COUNT: CPT

## 2021-06-11 PROCEDURE — 71260 CT THORAX DX C+: CPT

## 2021-06-11 PROCEDURE — 86769 SARS-COV-2 COVID-19 ANTIBODY: CPT

## 2021-06-11 PROCEDURE — 76705 ECHO EXAM OF ABDOMEN: CPT

## 2021-06-11 PROCEDURE — 84132 ASSAY OF SERUM POTASSIUM: CPT

## 2021-06-11 PROCEDURE — 0225U NFCT DS DNA&RNA 21 SARSCOV2: CPT

## 2021-06-11 PROCEDURE — 81001 URINALYSIS AUTO W/SCOPE: CPT

## 2021-06-11 PROCEDURE — 87640 STAPH A DNA AMP PROBE: CPT

## 2021-06-11 PROCEDURE — 82947 ASSAY GLUCOSE BLOOD QUANT: CPT

## 2021-06-11 PROCEDURE — 82728 ASSAY OF FERRITIN: CPT

## 2021-06-11 PROCEDURE — 85610 PROTHROMBIN TIME: CPT

## 2021-06-11 PROCEDURE — 74177 CT ABD & PELVIS W/CONTRAST: CPT

## 2021-06-11 PROCEDURE — 85730 THROMBOPLASTIN TIME PARTIAL: CPT

## 2021-06-11 PROCEDURE — 87040 BLOOD CULTURE FOR BACTERIA: CPT

## 2021-06-11 PROCEDURE — 85014 HEMATOCRIT: CPT

## 2021-06-11 PROCEDURE — 80048 BASIC METABOLIC PNL TOTAL CA: CPT

## 2021-06-11 PROCEDURE — 87641 MR-STAPH DNA AMP PROBE: CPT

## 2021-06-11 PROCEDURE — 99239 HOSP IP/OBS DSCHRG MGMT >30: CPT

## 2021-06-11 PROCEDURE — 80053 COMPREHEN METABOLIC PANEL: CPT

## 2021-06-11 PROCEDURE — 83735 ASSAY OF MAGNESIUM: CPT

## 2021-06-11 PROCEDURE — 85045 AUTOMATED RETICULOCYTE COUNT: CPT

## 2021-06-11 PROCEDURE — 94640 AIRWAY INHALATION TREATMENT: CPT

## 2021-06-11 PROCEDURE — 84295 ASSAY OF SERUM SODIUM: CPT

## 2021-06-11 PROCEDURE — 83540 ASSAY OF IRON: CPT

## 2021-06-11 PROCEDURE — 82330 ASSAY OF CALCIUM: CPT

## 2021-06-11 PROCEDURE — 96374 THER/PROPH/DIAG INJ IV PUSH: CPT

## 2021-06-11 PROCEDURE — 73590 X-RAY EXAM OF LOWER LEG: CPT

## 2021-06-11 PROCEDURE — 93005 ELECTROCARDIOGRAM TRACING: CPT

## 2021-06-11 RX ORDER — CEPHALEXIN 500 MG
1 CAPSULE ORAL
Qty: 9 | Refills: 0
Start: 2021-06-11 | End: 2021-06-13

## 2021-06-11 RX ORDER — APIXABAN 2.5 MG/1
1 TABLET, FILM COATED ORAL
Qty: 60 | Refills: 0
Start: 2021-06-11 | End: 2021-07-10

## 2021-06-11 RX ORDER — APIXABAN 2.5 MG/1
1 TABLET, FILM COATED ORAL
Qty: 180 | Refills: 0
Start: 2021-06-11 | End: 2021-09-08

## 2021-06-11 RX ORDER — CEPHALEXIN 500 MG
1 CAPSULE ORAL
Qty: 6 | Refills: 0
Start: 2021-06-11 | End: 2021-06-13

## 2021-06-11 RX ADMIN — Medication 100 MILLIGRAM(S): at 05:54

## 2021-06-11 RX ADMIN — APIXABAN 5 MILLIGRAM(S): 2.5 TABLET, FILM COATED ORAL at 17:24

## 2021-06-11 RX ADMIN — TIOTROPIUM BROMIDE 1 CAPSULE(S): 18 CAPSULE ORAL; RESPIRATORY (INHALATION) at 08:57

## 2021-06-11 RX ADMIN — MUPIROCIN 1 APPLICATION(S): 20 OINTMENT TOPICAL at 17:24

## 2021-06-11 RX ADMIN — PANTOPRAZOLE SODIUM 40 MILLIGRAM(S): 20 TABLET, DELAYED RELEASE ORAL at 12:15

## 2021-06-11 RX ADMIN — Medication 100 MILLIGRAM(S): at 17:24

## 2021-06-11 RX ADMIN — Medication 100 MILLIGRAM(S): at 12:15

## 2021-06-11 RX ADMIN — MUPIROCIN 1 APPLICATION(S): 20 OINTMENT TOPICAL at 05:54

## 2021-06-11 RX ADMIN — Medication 100 MILLIGRAM(S): at 05:53

## 2021-06-11 RX ADMIN — Medication 200 MICROGRAM(S): at 05:52

## 2021-06-11 RX ADMIN — Medication 50 MILLIGRAM(S): at 05:53

## 2021-06-11 RX ADMIN — APIXABAN 5 MILLIGRAM(S): 2.5 TABLET, FILM COATED ORAL at 05:53

## 2021-06-11 NOTE — DISCHARGE NOTE PROVIDER - NSDCMRMEDTOKEN_GEN_ALL_CORE_FT
ergocalciferol 50,000 intl units (1.25 mg) oral capsule: 1 cap(s) orally once a week  Lasix 40 mg oral tablet: 1 tab(s) orally once a day  levothyroxine 200 mcg (0.2 mg) oral tablet: 1 tab(s) orally once a day  metoprolol succinate 50 mg oral tablet, extended release: 1 tab(s) orally once a day  potassium chloride 10 mEq oral tablet, extended release: 1 tab(s) orally 2 times a day  Protonix 40 mg oral delayed release tablet: 1 tab(s) orally once a day  rosuvastatin 10 mg oral tablet: 1 tab(s) orally once a day  Spiriva 18 mcg inhalation capsule: 1 cap(s) inhaled once a day  Wixela Inhub 250 mcg-50 mcg inhalation powder: 1 puff(s) inhaled 2 times a day   apixaban 5 mg oral tablet: 1 tab(s) orally every 12 hours  doxycycline monohydrate 100 mg oral capsule: 1 cap(s) orally every 12 hours  ergocalciferol 50,000 intl units (1.25 mg) oral capsule: 1 cap(s) orally once a week  Keflex 500 mg oral capsule: 1 cap(s) orally every 8 hours   Lasix 40 mg oral tablet: 1 tab(s) orally once a day  levothyroxine 200 mcg (0.2 mg) oral tablet: 1 tab(s) orally once a day  metoprolol succinate 50 mg oral tablet, extended release: 1 tab(s) orally once a day  potassium chloride 10 mEq oral tablet, extended release: 1 tab(s) orally 2 times a day  Protonix 40 mg oral delayed release tablet: 1 tab(s) orally once a day  rosuvastatin 10 mg oral tablet: 1 tab(s) orally once a day  Spiriva 18 mcg inhalation capsule: 1 cap(s) inhaled once a day  Wixela Inhub 250 mcg-50 mcg inhalation powder: 1 puff(s) inhaled 2 times a day

## 2021-06-11 NOTE — DISCHARGE NOTE PROVIDER - NSDCCPCAREPLAN_GEN_ALL_CORE_FT
PRINCIPAL DISCHARGE DIAGNOSIS  Diagnosis: Cellulitis of lower extremity  Assessment and Plan of Treatment: - Continue with cefazolin/doxycyline-can change to oral keflex 500 mg q8h + doxycyline 100 mg po bid end 6/13  - No surgical intervention at this time  - Follow up with surgery outpatient in 1 week  - Follow up with PMD in 1 week      SECONDARY DISCHARGE DIAGNOSES  Diagnosis: DVT, lower extremity  Assessment and Plan of Treatment: - Repeat Duplex negative for DVT  - Continue with eliquis 5mg BID for 30 days  - Follow up with PMD in 1 week    Diagnosis: Nausea and vomiting  Assessment and Plan of Treatment: - Resolved   - Observe the BRAT diet

## 2021-06-11 NOTE — DISCHARGE NOTE PROVIDER - NSDCFUADDAPPT_GEN_ALL_CORE_FT
STAR :   Patient declines meds to bed program  Patient has no issues with obtaining her medications from her local [facility] in [place], [place] ( [facility]) pharmacy  Patient has an appointment with Pulmonologist , Dr Jose Cotto, on   6/17/21 at  1:45 pm.  Address: [address] [place] [address] Phone: [phone]

## 2021-06-11 NOTE — DISCHARGE NOTE PROVIDER - PROVIDER TOKENS
FREE:[LAST:[PMD],PHONE:[(   )    -],FAX:[(   )    -],FOLLOWUP:[1 week]],PROVIDER:[TOKEN:[[id number]],FOLLOWUP:[1 week]]

## 2021-06-11 NOTE — DISCHARGE NOTE PROVIDER - CARE PROVIDER_API CALL
PMD,   Phone: (   )    -  Fax: (   )    -  Follow Up Time: 1 week    Sincere Miller (MD)  Surgery; Surgical Critical Care  [address] [place] [address]  Phone: [phone]  Fax: [phone]  Follow Up Time: 1 week

## 2021-06-11 NOTE — DISCHARGE NOTE PROVIDER - HOSPITAL COURSE
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 Noted in the ER to have LLE cellulitis with Leucocytosis of 30+. Admitted for AGE and LLE cellulitis complicated with sepsis. Pt was treated with cefazolin and vancomycin which was later transitioned to doxycyline. Pt has been afebrile and WBC is now with in normal . CT of LE consistent with cellulitis throughout the left lower extremity. No drainable fluid collection. No CT evidence of osteomyelitis. No subcutaneous air. Pt had some complaints of N/V 2/2 gastroenteritis. CTAP revealing cholelithiasis without stigmata of acute cholecystitis, diet was modified and pt has clinically improved. US duplex of LE 06/04/2021 suspicious of occlusive thrombus in the left posterior tibial and peroneal veins. Pt initiated on enoxaparin and transitioned to eliquis. Repeat duplex 06/08/2021 negative for thrombi. Pt with prerenal azotemia which has now improved. Pt has normocytic anemia likely 2/2 age and chronic illness, no e/o acute active bleeding, H&H stable. Pt has a hx of COPD, continue don spiriva. Pt has a hx of HTN, BP well-controlled, continue on metoprolol. Pt medically stable for DC.    All electrolyte abnormalities were monitored carefully and repleted as necessary during this hospitalization. At the time of discharge patient was hemodynamically stable and amenable to all terms of discharge. The patient has received verbal instructions from myself regarding discharge plans.     Length of Discharge: 45MIN    Vital Signs Last 24 Hrs  T(C): 36.7 (11 Jun 2021 07:52), Max: 37 (10 Darci 2021 16:39)  T(F): 98.1 (11 Jun 2021 07:52), Max: 98.6 (10 Darci 2021 16:39)  HR: 73 (11 Jun 2021 07:52) (73 - 83)  BP: 130/73 (11 Jun 2021 07:52) (122/71 - 141/62)  BP(mean): --  RR: 18 (11 Jun 2021 07:52) (17 - 18)  SpO2: 95% (11 Jun 2021 07:52) (92% - 95%)    PHYSICAL EXAM:  GENERAL: Pt lying in bed comfortably in NAD  HEAD:  Atraumatic   EYES: EOMI, PERRL, conjunctiva and sclera clear  ENT: Moist mucous membranes  NECK: Supple, No JVD  CHEST/LUNG: Clear to auscultation bilaterally; No rales, rhonchi, wheezing or rubs. Unlabored respirations  HEART: Regular rate and rhythm; No murmurs, rubs, or gallops  ABDOMEN: Bowel sounds present; Soft, Nontender, Nondistended. No guarding or rigidity    EXTREMITIES:  2+ Peripheral Pulses, brisk capillary refill. No clubbing, cyanosis, or edema  NERVOUS SYSTEM:  Alert & Oriented X3, speech clear. Answers questions appropriately. Full and equal strength B/L upper and lower extremities. 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 Noted in the ER to have LLE cellulitis with Leucocytosis of 30+. Admitted for AGE and LLE cellulitis complicated with sepsis. Pt was treated with cefazolin and vancomycin which was later transitioned to doxycyline. Pt has been afebrile and WBC is now with in normal limits. CT of LE consistent with cellulitis throughout the left lower extremity. No drainable fluid collection. No CT evidence of osteomyelitis. No subcutaneous air. Pt with N/V 2/2 gastroenteritis. CTAP revealing cholelithiasis without stigmata of acute cholecystitis, diet was modified and pt has clinically improved. US duplex of LE 06/04/2021 suspicious of occlusive thrombus in the left posterior tibial and peroneal veins. Pt initiated on enoxaparin and transitioned to eliquis. Repeat duplex 06/08/2021 negative for thrombi. Pt with prerenal azotemia which has now improved. Pt has normocytic anemia likely 2/2 age and chronic illness, no e/o acute active bleeding, H&H stable. Pt has a hx of COPD, continue don spiriva. Pt has a hx of HTN, BP well-controlled, continue on metoprolol. Pt medically stable for DC.    All electrolyte abnormalities were monitored carefully and repleted as necessary during this hospitalization. At the time of discharge patient was hemodynamically stable and amenable to all terms of discharge. The patient has received verbal instructions from myself regarding discharge plans.     Length of Discharge: 45MIN    Vital Signs Last 24 Hrs  T(C): 36.7 (11 Jun 2021 07:52), Max: 37 (10 Darci 2021 16:39)  T(F): 98.1 (11 Jun 2021 07:52), Max: 98.6 (10 Darci 2021 16:39)  HR: 73 (11 Jun 2021 07:52) (73 - 83)  BP: 130/73 (11 Jun 2021 07:52) (122/71 - 141/62)  BP(mean): --  RR: 18 (11 Jun 2021 07:52) (17 - 18)  SpO2: 95% (11 Jun 2021 07:52) (92% - 95%)    PHYSICAL EXAM:  GENERAL: Pt lying in bed comfortably in NAD  HEAD:  Atraumatic   CHEST/LUNG: Clear to auscultation bilaterally; Unlabored respirations  HEART: Regular rate and rhythm  ABDOMEN: Bowel sounds present; Soft, Nontender, Nondistended  EXTREMITIES:  LLE cellulitis  NERVOUS SYSTEM:  Alert & Oriented X3, speech clear. Answers questions appropriately  SKIN: Warm and dry

## 2021-06-14 ENCOUNTER — NON-APPOINTMENT (OUTPATIENT)
Age: 86
End: 2021-06-14

## 2021-06-15 PROBLEM — J44.9 CHRONIC OBSTRUCTIVE PULMONARY DISEASE, UNSPECIFIED: Chronic | Status: ACTIVE | Noted: 2021-06-03

## 2021-06-15 PROBLEM — I10 ESSENTIAL (PRIMARY) HYPERTENSION: Chronic | Status: ACTIVE | Noted: 2021-06-03

## 2021-06-15 PROBLEM — E03.9 HYPOTHYROIDISM, UNSPECIFIED: Chronic | Status: ACTIVE | Noted: 2021-06-03

## 2021-06-16 ENCOUNTER — APPOINTMENT (OUTPATIENT)
Dept: CARE COORDINATION | Facility: HOME HEALTH | Age: 86
End: 2021-06-16
Payer: MEDICARE

## 2021-06-16 VITALS
RESPIRATION RATE: 18 BRPM | OXYGEN SATURATION: 95 % | DIASTOLIC BLOOD PRESSURE: 80 MMHG | SYSTOLIC BLOOD PRESSURE: 136 MMHG | HEART RATE: 80 BPM | TEMPERATURE: 97.7 F

## 2021-06-16 DIAGNOSIS — L03.116 CELLULITIS OF LEFT LOWER LIMB: ICD-10-CM

## 2021-06-16 PROCEDURE — 99348 HOME/RES VST EST LOW MDM 30: CPT

## 2021-06-17 ENCOUNTER — APPOINTMENT (OUTPATIENT)
Dept: PULMONOLOGY | Facility: CLINIC | Age: 86
End: 2021-06-17

## 2021-06-17 PROBLEM — L03.116 CELLULITIS OF LEFT LOWER EXTREMITY: Status: ACTIVE | Noted: 2021-06-17

## 2021-06-21 NOTE — PHYSICAL EXAM
[No Acute Distress] : no acute distress [Well Nourished] : well nourished [Well Developed] : well developed [Well-Appearing] : well-appearing [Normal Sclera/Conjunctiva] : normal sclera/conjunctiva [PERRL] : pupils equal round and reactive to light [EOMI] : extraocular movements intact [Normal Outer Ear/Nose] : the outer ears and nose were normal in appearance [Normal Oropharynx] : the oropharynx was normal [No JVD] : no jugular venous distention [Supple] : supple [No Lymphadenopathy] : no lymphadenopathy [Thyroid Normal, No Nodules] : the thyroid was normal and there were no nodules present [No Respiratory Distress] : no respiratory distress  [Clear to Auscultation] : lungs were clear to auscultation bilaterally [No Accessory Muscle Use] : no accessory muscle use [Normal Rate] : normal rate  [Regular Rhythm] : with a regular rhythm [Normal S1, S2] : normal S1 and S2 [No Murmur] : no murmur heard [No Carotid Bruits] : no carotid bruits [No Abdominal Bruit] : a ~M bruit was not heard ~T in the abdomen [No Varicosities] : no varicosities [Pedal Pulses Present] : the pedal pulses are present [No Edema] : there was no peripheral edema [No Extremity Clubbing/Cyanosis] : no extremity clubbing/cyanosis [No Palpable Aorta] : no palpable aorta [Soft] : abdomen soft [Non Tender] : non-tender [Non-distended] : non-distended [No Masses] : no abdominal mass palpated [No HSM] : no HSM [Normal Bowel Sounds] : normal bowel sounds [Normal Anterior Cervical Nodes] : no anterior cervical lymphadenopathy [No Joint Swelling] : no joint swelling [Grossly Normal Strength/Tone] : grossly normal strength/tone [No Rash] : no rash [Normal Gait] : normal gait [Coordination Grossly Intact] : coordination grossly intact [No Focal Deficits] : no focal deficits [Speech Grossly Normal] : speech grossly normal [Memory Grossly Normal] : memory grossly normal [Normal Affect] : the affect was normal [Alert and Oriented x3] : oriented to person, place, and time [Normal Mood] : the mood was normal [Normal Insight/Judgement] : insight and judgment were intact [de-identified] : LLE with redenss, swelling and warmth. [id number] ulceration with serous drainange

## 2021-06-21 NOTE — ASSESSMENT
[FreeTextEntry1] : [] COPD with SOBOE\par - stable on exam\par - doxycycline completed.\par - continue Spiriva and Wixela. duoneb PRN\par \par \par [] Cellulitis of LLE\par - completed keflex\par - START Augmentin 875-125mg q tab BID x 7days with probiotics\par - Follow up with Dr. Miller 6/30\par \par Reviewed all medications at length with patient, All questions addressed. Worsening symptoms discussed with pt understanding. No issues or concerns at this time. Pt encouraged to call CCC/CN at [phone] w/any questions, concerns or issues. Will continue to follow up with patient status\par \par

## 2021-06-21 NOTE — HISTORY OF PRESENT ILLNESS
[Post-hospitalization from ___ Hospital] : Post-hospitalization from [unfilled] Hospital [Admitted on: ___] : The patient was admitted on [unfilled] [Discharged on ___] : discharged on [unfilled] [Discharge Summary] : discharge summary [Pertinent Labs] : pertinent labs [Radiology Findings] : radiology findings [Discharge Med List] : discharge medication list [Med Reconciliation] : medication reconciliation has been completed [Patient Contacted By: ____] : and contacted by [unfilled] [FreeTextEntry2] : Copied from [employer]\par 'Patient is an 89 y/o F with PMH HTN, [place] s/p hearing aides in, 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 Noted in the ER to have LLE cellulitis with Leucocytosis of 30+. Admitted for AGE and LLE cellulitis complicated with sepsis. Pt was treated with cefazolin and vancomycin which was later transitioned to doxycyline. Pt has been afebrile and WBC is now with in normal limits. CT of LE consistent with cellulitis throughout the left lower extremity. No drainable fluid collection. No CT evidence of osteomyelitis. No subcutaneous air. Pt with N/V 2/2 gastroenteritis. CTAP revealing cholelithiasis without stigmata of acute cholecystitis, diet was modified and pt has clinically improved. US duplex of LE 06/04/2021 suspicious of occlusive thrombus in the left posterior tibial and peroneal veins. Pt initiated on enoxaparin and transitioned to eliquis. Repeat duplex 06/08/2021 negative for thrombi. Pt with prerenal azotemia which has now improved. Pt has normocytic anemia likely 2/2 age and chronic illness, no e/o acute active bleeding, H&H stable. Pt has a hx of COPD, continue don spiriva. Pt has a hx of HTN, BP well-controlled, continue on metoprolol. Pt medically stable for DC.\par \par All electrolyte abnormalities were monitored carefully and repleted as necessary during this hospitalization. At the time of discharge patient was hemodynamically stable and amenable to all terms of discharge. The patient has received verbal instructions from myself regarding discharge plans.'\par \par Pt seen at home for post discharge follow up. Pt has severe [place], AOx3, denies SOB, CP or dizziness. afebrile. reporting completion of oral abx which was ordered at discharge. However LLE has severe swelling, redness and warmth. \par \par \par

## 2021-06-21 NOTE — REVIEW OF SYSTEMS
[Fatigue] : fatigue [Chest Pain] : no chest pain [Palpitations] : no palpitations [Leg Claudication] : no leg claudication [Lower Ext Edema] : lower extremity edema [Orthopnea] : no orthopnea [Shortness Of Breath] : no shortness of breath [Wheezing] : no wheezing [Cough] : no cough [Dyspnea on Exertion] : no dyspnea on exertion [Abdominal Pain] : no abdominal pain [Nausea] : no nausea [Constipation] : no constipation [Vomiting] : no vomiting [Melena] : no melena [Dysuria] : no dysuria [Incontinence] : no incontinence [Hematuria] : no hematuria [Joint Pain] : no joint pain [Joint Stiffness] : no joint stiffness [Muscle Weakness] : no muscle weakness [Muscle Pain] : no muscle pain [Headache] : no headache [Dizziness] : no dizziness [Fainting] : no fainting [Confusion] : no confusion [Memory Loss] : no memory loss [Anxiety] : no anxiety [Depression] : no depression [Negative] : Heme/Lymph

## 2021-06-28 ENCOUNTER — APPOINTMENT (OUTPATIENT)
Dept: VASCULAR SURGERY | Facility: CLINIC | Age: 86
End: 2021-06-28
Payer: MEDICARE

## 2021-06-28 ENCOUNTER — APPOINTMENT (OUTPATIENT)
Dept: VASCULAR SURGERY | Facility: CLINIC | Age: 86
End: 2021-06-28

## 2021-06-28 VITALS
DIASTOLIC BLOOD PRESSURE: 77 MMHG | HEIGHT: 58 IN | SYSTOLIC BLOOD PRESSURE: 172 MMHG | TEMPERATURE: 96.7 F | HEART RATE: 92 BPM | OXYGEN SATURATION: 98 %

## 2021-06-28 PROCEDURE — XXXXX: CPT | Mod: 1L

## 2021-06-28 PROCEDURE — 93970 EXTREMITY STUDY: CPT

## 2021-10-26 ENCOUNTER — RESULT REVIEW (OUTPATIENT)
Age: 86
End: 2021-10-26

## 2021-11-12 ENCOUNTER — APPOINTMENT (OUTPATIENT)
Dept: PULMONOLOGY | Facility: CLINIC | Age: 86
End: 2021-11-12
Payer: MEDICARE

## 2021-11-12 VITALS — BODY MASS INDEX: 30.85 KG/M2 | WEIGHT: 143 LBS | HEIGHT: 57 IN

## 2021-11-12 VITALS — SYSTOLIC BLOOD PRESSURE: 144 MMHG | DIASTOLIC BLOOD PRESSURE: 70 MMHG | HEART RATE: 60 BPM | OXYGEN SATURATION: 95 %

## 2021-11-12 DIAGNOSIS — Z01.811 ENCOUNTER FOR PREPROCEDURAL RESPIRATORY EXAMINATION: ICD-10-CM

## 2021-11-12 DIAGNOSIS — R91.8 OTHER NONSPECIFIC ABNORMAL FINDING OF LUNG FIELD: ICD-10-CM

## 2021-11-12 PROCEDURE — 94010 BREATHING CAPACITY TEST: CPT

## 2021-11-12 PROCEDURE — 94729 DIFFUSING CAPACITY: CPT

## 2021-11-12 PROCEDURE — 85018 HEMOGLOBIN: CPT | Mod: QW

## 2021-11-12 PROCEDURE — 94727 GAS DIL/WSHOT DETER LNG VOL: CPT

## 2021-11-12 PROCEDURE — 99214 OFFICE O/P EST MOD 30 MIN: CPT | Mod: 25

## 2021-11-12 RX ORDER — AMOXICILLIN AND CLAVULANATE POTASSIUM 875; 125 MG/1; MG/1
875-125 TABLET, COATED ORAL
Qty: 14 | Refills: 0 | Status: DISCONTINUED | COMMUNITY
Start: 2021-06-17 | End: 2021-11-12

## 2021-11-12 RX ORDER — LEVOTHYROXINE SODIUM 0.05 MG/1
50 TABLET ORAL
Qty: 90 | Refills: 0 | Status: DISCONTINUED | COMMUNITY
Start: 2018-03-24 | End: 2021-11-12

## 2021-11-12 NOTE — HISTORY OF PRESENT ILLNESS
[TextBox_4] : 89 yo lady, known to me from distant past with obstructive disease\par Here for pulmonary clearance prior to carpal tunnel surgery\par \par Previous noted stable pulmonary nodules,\par  Has been treated for COPD with fairly benign PFTs\par On Advair, Spiriva and albuterol prn\par \par She looks quite well at this time\par Last here in 9/20

## 2021-11-12 NOTE — ASSESSMENT
[FreeTextEntry1] : 89 yo lady, known to me from distant past with obstructive disease\par Here for pulmonary clearance prior to carpal tunnel surgery\par \par Previous noted stable pulmonary nodules,\par  Has been treated for COPD with fairly benign PFTs\par On Wixela, Spiriva and albuterol prn\par \par She looks quite well at this time\par Last here in 9/20\par \par Imp \par 89 yo lady with mild obstructive airways disease whose pulmonary status is optimized for planned surgery and she is cleared\par Would continue bronchodilator regiman and return as required

## 2021-11-12 NOTE — CONSULT LETTER
[Dear  ___] : Dear  [unfilled], [FreeTextEntry1] : I had the pleasure of evaluating your patient, TERESA NESS , in the office today.  Please review my consultation and evaluation report that follows below.  Please do not hesitate to call me if further information is necessary or if you wish to discuss ongoing care or diagnostic work-up.   \par I very much appreciate your referral and it is a privilege to be able to provide care for your patient.\par \par Sincerely,\par  \par Clifton Freitas MD, MHCM, FACP, DINH-C\par Pulmonary Medicine\par  of Medicine\par [employer] [facility]\par [email or web address]\par \par [facility] [place]\par [address]\[place]  [address]\par    Fax \par \par [facility] [place]\par [address]\par [place], [place] \par \par

## 2021-11-12 NOTE — PROCEDURE
[FreeTextEntry1] : PFT FEV1 1.60 FVC 2.17 \par FEV1%  74%\par Mideexpir normal\par \par Normal volumes\par and diffusion\par

## 2022-03-15 RX ORDER — FLUTICASONE PROPIONATE AND SALMETEROL 250; 50 UG/1; UG/1
250-50 POWDER RESPIRATORY (INHALATION)
Qty: 3 | Refills: 1 | Status: COMPLETED | COMMUNITY
Start: 2021-06-01 | End: 2022-03-15

## 2022-06-03 ENCOUNTER — APPOINTMENT (OUTPATIENT)
Dept: PULMONOLOGY | Facility: CLINIC | Age: 87
End: 2022-06-03
Payer: MEDICARE

## 2022-06-03 VITALS
HEIGHT: 57 IN | BODY MASS INDEX: 33.01 KG/M2 | OXYGEN SATURATION: 96 % | HEART RATE: 52 BPM | SYSTOLIC BLOOD PRESSURE: 128 MMHG | WEIGHT: 153 LBS | RESPIRATION RATE: 16 BRPM | DIASTOLIC BLOOD PRESSURE: 72 MMHG

## 2022-06-03 DIAGNOSIS — R06.02 SHORTNESS OF BREATH: ICD-10-CM

## 2022-06-03 DIAGNOSIS — J20.9 ACUTE BRONCHITIS, UNSPECIFIED: ICD-10-CM

## 2022-06-03 PROCEDURE — 99214 OFFICE O/P EST MOD 30 MIN: CPT

## 2022-06-03 RX ORDER — POTASSIUM CHLORIDE 750 MG/1
10 TABLET, FILM COATED, EXTENDED RELEASE ORAL
Qty: 90 | Refills: 0 | Status: DISCONTINUED | COMMUNITY
Start: 2018-04-30 | End: 2022-06-03

## 2022-06-03 NOTE — HISTORY OF PRESENT ILLNESS
[TextBox_4] : 88 yo lady seen for COPD in the past\par On Advair spiriva, and Albuterol PRN\par Recent congestion and some sputum production, no fever however,\par She is concerned about sputum\par \par Rest of medical status appears stable

## 2022-06-03 NOTE — PHYSICAL EXAM
[No Acute Distress] : no acute distress [Normal Oropharynx] : normal oropharynx [Normal Appearance] : normal appearance [Normal Rate/Rhythm] : normal rate/rhythm [No Neck Mass] : no neck mass [Normal S1, S2] : normal s1, s2 [No Resp Distress] : no resp distress [No Murmurs] : no murmurs [Clear to Auscultation Bilaterally] : clear to auscultation bilaterally [No Abnormalities] : no abnormalities [Benign] : benign [Normal Gait] : normal gait [No Clubbing] : no clubbing [No Cyanosis] : no cyanosis [No Edema] : no edema [FROM] : FROM [Normal Color/ Pigmentation] : normal color/ pigmentation [No Focal Deficits] : no focal deficits [Oriented x3] : oriented x3 [Normal Affect] : normal affect

## 2022-06-03 NOTE — ASSESSMENT
[FreeTextEntry1] : 89 yo lady seen for COPD in the past\par On Advair spiriva, and Albuterol PRN\par Recent congestion and some sputum production, no fever however,\par She is concerned about sputum\par \par Rest of medical status appears stable\par \par Imp 89 yo lady with COPD on Advair spiriva and albuterol\par Some congestion and sputum\par Cannot rule out bronchitis\par Would prefer not to give steroids\par Recommend Azithro course\par Return in 4 months or earlier if needed PRN

## 2022-06-13 ENCOUNTER — RX RENEWAL (OUTPATIENT)
Age: 87
End: 2022-06-13

## 2022-06-13 RX ORDER — ALBUTEROL SULFATE 90 UG/1
108 (90 BASE) INHALANT RESPIRATORY (INHALATION)
Qty: 1 | Refills: 5 | Status: ACTIVE | COMMUNITY
Start: 2018-06-08 | End: 1900-01-01

## 2022-09-28 ENCOUNTER — RESULT REVIEW (OUTPATIENT)
Age: 87
End: 2022-09-28

## 2022-11-21 NOTE — ED PROVIDER NOTE - DATE/TIME 1
Recall colonoscopy  Received: Today  Fran BOYLE Recall Nurse Review Pool  Cc: Fran Ghosh  Hello,     This patient's last colonoscopy was performed by Dr. Kim on 8/12/2019.  Patient is calling to schedule his 3 year follow up colonoscopy.     Please review chart and send to  to contact patient to schedule.     Thank you,     [facility] Sched Dept      03-Jun-2021 19:01

## 2022-11-29 ENCOUNTER — INPATIENT (INPATIENT)
Facility: HOSPITAL | Age: 87
LOS: 22 days | Discharge: HOME | DRG: 177 | End: 2022-12-22
Attending: INTERNAL MEDICINE | Admitting: INTERNAL MEDICINE
Payer: MEDICARE

## 2022-11-29 VITALS
OXYGEN SATURATION: 95 % | TEMPERATURE: 97 F | WEIGHT: 149.91 LBS | RESPIRATION RATE: 20 BRPM | HEIGHT: 60 IN | DIASTOLIC BLOOD PRESSURE: 67 MMHG | SYSTOLIC BLOOD PRESSURE: 136 MMHG | HEART RATE: 102 BPM

## 2022-11-29 DIAGNOSIS — J18.9 PNEUMONIA, UNSPECIFIED ORGANISM: ICD-10-CM

## 2022-11-29 DIAGNOSIS — Z98.890 OTHER SPECIFIED POSTPROCEDURAL STATES: Chronic | ICD-10-CM

## 2022-11-29 LAB
ALBUMIN SERPL ELPH-MCNC: 4.5 G/DL — SIGNIFICANT CHANGE UP (ref 3.3–5.2)
ALP SERPL-CCNC: 80 U/L — SIGNIFICANT CHANGE UP (ref 40–120)
ALT FLD-CCNC: 32 U/L — SIGNIFICANT CHANGE UP
ANION GAP SERPL CALC-SCNC: 12 MMOL/L — SIGNIFICANT CHANGE UP (ref 5–17)
AST SERPL-CCNC: 50 U/L — HIGH
BASOPHILS # BLD AUTO: 0.02 K/UL — SIGNIFICANT CHANGE UP (ref 0–0.2)
BASOPHILS NFR BLD AUTO: 0.2 % — SIGNIFICANT CHANGE UP (ref 0–2)
BILIRUB SERPL-MCNC: 0.4 MG/DL — SIGNIFICANT CHANGE UP (ref 0.4–2)
BUN SERPL-MCNC: 33.6 MG/DL — HIGH (ref 8–20)
CALCIUM SERPL-MCNC: 10 MG/DL — SIGNIFICANT CHANGE UP (ref 8.4–10.5)
CHLORIDE SERPL-SCNC: 105 MMOL/L — SIGNIFICANT CHANGE UP (ref 96–108)
CO2 SERPL-SCNC: 24 MMOL/L — SIGNIFICANT CHANGE UP (ref 22–29)
CREAT SERPL-MCNC: 1.45 MG/DL — HIGH (ref 0.5–1.3)
EGFR: 34 ML/MIN/1.73M2 — LOW
EOSINOPHIL # BLD AUTO: 0.06 K/UL — SIGNIFICANT CHANGE UP (ref 0–0.5)
EOSINOPHIL NFR BLD AUTO: 0.6 % — SIGNIFICANT CHANGE UP (ref 0–6)
FLUAV AG NPH QL: SIGNIFICANT CHANGE UP
FLUBV AG NPH QL: SIGNIFICANT CHANGE UP
GLUCOSE SERPL-MCNC: 99 MG/DL — SIGNIFICANT CHANGE UP (ref 70–99)
HCT VFR BLD CALC: 32.4 % — LOW (ref 34.5–45)
HGB BLD-MCNC: 11 G/DL — LOW (ref 11.5–15.5)
IMM GRANULOCYTES NFR BLD AUTO: 0.3 % — SIGNIFICANT CHANGE UP (ref 0–0.9)
LIDOCAIN IGE QN: 53 U/L — HIGH (ref 22–51)
LYMPHOCYTES # BLD AUTO: 1.19 K/UL — SIGNIFICANT CHANGE UP (ref 1–3.3)
LYMPHOCYTES # BLD AUTO: 11.3 % — LOW (ref 13–44)
MCHC RBC-ENTMCNC: 34 GM/DL — SIGNIFICANT CHANGE UP (ref 32–36)
MCHC RBC-ENTMCNC: 34.3 PG — HIGH (ref 27–34)
MCV RBC AUTO: 100.9 FL — HIGH (ref 80–100)
MONOCYTES # BLD AUTO: 0.26 K/UL — SIGNIFICANT CHANGE UP (ref 0–0.9)
MONOCYTES NFR BLD AUTO: 2.5 % — SIGNIFICANT CHANGE UP (ref 2–14)
NEUTROPHILS # BLD AUTO: 8.97 K/UL — HIGH (ref 1.8–7.4)
NEUTROPHILS NFR BLD AUTO: 85.1 % — HIGH (ref 43–77)
PLATELET # BLD AUTO: 198 K/UL — SIGNIFICANT CHANGE UP (ref 150–400)
POTASSIUM SERPL-MCNC: 4.2 MMOL/L — SIGNIFICANT CHANGE UP (ref 3.5–5.3)
POTASSIUM SERPL-SCNC: 4.2 MMOL/L — SIGNIFICANT CHANGE UP (ref 3.5–5.3)
PROT SERPL-MCNC: 7.1 G/DL — SIGNIFICANT CHANGE UP (ref 6.6–8.7)
RBC # BLD: 3.21 M/UL — LOW (ref 3.8–5.2)
RBC # FLD: 14.3 % — SIGNIFICANT CHANGE UP (ref 10.3–14.5)
RSV RNA NPH QL NAA+NON-PROBE: SIGNIFICANT CHANGE UP
SARS-COV-2 RNA SPEC QL NAA+PROBE: SIGNIFICANT CHANGE UP
SODIUM SERPL-SCNC: 141 MMOL/L — SIGNIFICANT CHANGE UP (ref 135–145)
WBC # BLD: 10.53 K/UL — HIGH (ref 3.8–10.5)
WBC # FLD AUTO: 10.53 K/UL — HIGH (ref 3.8–10.5)

## 2022-11-29 PROCEDURE — 93306 TTE W/DOPPLER COMPLETE: CPT | Mod: 26

## 2022-11-29 PROCEDURE — 71045 X-RAY EXAM CHEST 1 VIEW: CPT | Mod: 26

## 2022-11-29 PROCEDURE — 99285 EMERGENCY DEPT VISIT HI MDM: CPT

## 2022-11-29 PROCEDURE — 93010 ELECTROCARDIOGRAM REPORT: CPT

## 2022-11-29 PROCEDURE — 99223 1ST HOSP IP/OBS HIGH 75: CPT

## 2022-11-29 RX ORDER — CEFTRIAXONE 500 MG/1
1000 INJECTION, POWDER, FOR SOLUTION INTRAMUSCULAR; INTRAVENOUS ONCE
Refills: 0 | Status: COMPLETED | OUTPATIENT
Start: 2022-11-29 | End: 2022-11-29

## 2022-11-29 RX ORDER — FLUTICASONE PROPIONATE AND SALMETEROL 50; 250 UG/1; UG/1
1 POWDER ORAL; RESPIRATORY (INHALATION)
Qty: 0 | Refills: 0 | DISCHARGE

## 2022-11-29 RX ORDER — METOPROLOL TARTRATE 50 MG
1 TABLET ORAL
Qty: 0 | Refills: 0 | DISCHARGE

## 2022-11-29 RX ORDER — CEFTRIAXONE 500 MG/1
1000 INJECTION, POWDER, FOR SOLUTION INTRAMUSCULAR; INTRAVENOUS EVERY 24 HOURS
Refills: 0 | Status: DISCONTINUED | OUTPATIENT
Start: 2022-11-29 | End: 2022-11-29

## 2022-11-29 RX ORDER — AZITHROMYCIN 500 MG/1
500 TABLET, FILM COATED ORAL ONCE
Refills: 0 | Status: COMPLETED | OUTPATIENT
Start: 2022-11-29 | End: 2022-11-29

## 2022-11-29 RX ORDER — PANTOPRAZOLE SODIUM 20 MG/1
1 TABLET, DELAYED RELEASE ORAL
Qty: 0 | Refills: 0 | DISCHARGE

## 2022-11-29 RX ORDER — IPRATROPIUM/ALBUTEROL SULFATE 18-103MCG
3 AEROSOL WITH ADAPTER (GRAM) INHALATION EVERY 6 HOURS
Refills: 0 | Status: DISCONTINUED | OUTPATIENT
Start: 2022-11-29 | End: 2022-12-17

## 2022-11-29 RX ORDER — PANTOPRAZOLE SODIUM 20 MG/1
40 TABLET, DELAYED RELEASE ORAL
Refills: 0 | Status: DISCONTINUED | OUTPATIENT
Start: 2022-11-29 | End: 2022-12-22

## 2022-11-29 RX ORDER — ONDANSETRON 8 MG/1
4 TABLET, FILM COATED ORAL ONCE
Refills: 0 | Status: COMPLETED | OUTPATIENT
Start: 2022-11-29 | End: 2022-11-29

## 2022-11-29 RX ORDER — SODIUM CHLORIDE 9 MG/ML
1000 INJECTION INTRAMUSCULAR; INTRAVENOUS; SUBCUTANEOUS
Refills: 0 | Status: DISCONTINUED | OUTPATIENT
Start: 2022-11-29 | End: 2022-12-01

## 2022-11-29 RX ORDER — TIOTROPIUM BROMIDE 18 UG/1
1 CAPSULE ORAL; RESPIRATORY (INHALATION)
Qty: 0 | Refills: 0 | DISCHARGE

## 2022-11-29 RX ORDER — METOPROLOL TARTRATE 50 MG
50 TABLET ORAL DAILY
Refills: 0 | Status: DISCONTINUED | OUTPATIENT
Start: 2022-11-29 | End: 2022-12-14

## 2022-11-29 RX ORDER — CEFTRIAXONE 500 MG/1
1000 INJECTION, POWDER, FOR SOLUTION INTRAMUSCULAR; INTRAVENOUS ONCE
Refills: 0 | Status: DISCONTINUED | OUTPATIENT
Start: 2022-11-29 | End: 2022-11-29

## 2022-11-29 RX ORDER — TIOTROPIUM BROMIDE 18 UG/1
2 CAPSULE ORAL; RESPIRATORY (INHALATION) DAILY
Refills: 0 | Status: DISCONTINUED | OUTPATIENT
Start: 2022-11-29 | End: 2022-12-02

## 2022-11-29 RX ORDER — POTASSIUM CHLORIDE 20 MEQ
1 PACKET (EA) ORAL
Qty: 0 | Refills: 0 | DISCHARGE

## 2022-11-29 RX ORDER — ERGOCALCIFEROL 1.25 MG/1
1 CAPSULE ORAL
Qty: 0 | Refills: 0 | DISCHARGE

## 2022-11-29 RX ORDER — ROSUVASTATIN CALCIUM 5 MG/1
1 TABLET ORAL
Qty: 0 | Refills: 0 | DISCHARGE

## 2022-11-29 RX ORDER — FUROSEMIDE 40 MG
1 TABLET ORAL
Qty: 0 | Refills: 0 | DISCHARGE

## 2022-11-29 RX ORDER — LEVOTHYROXINE SODIUM 125 MCG
1 TABLET ORAL
Qty: 0 | Refills: 0 | DISCHARGE

## 2022-11-29 RX ORDER — LOSARTAN POTASSIUM 100 MG/1
100 TABLET, FILM COATED ORAL DAILY
Refills: 0 | Status: DISCONTINUED | OUTPATIENT
Start: 2022-11-29 | End: 2022-11-30

## 2022-11-29 RX ORDER — ENOXAPARIN SODIUM 100 MG/ML
30 INJECTION SUBCUTANEOUS EVERY 24 HOURS
Refills: 0 | Status: DISCONTINUED | OUTPATIENT
Start: 2022-11-29 | End: 2022-12-14

## 2022-11-29 RX ORDER — LEVOTHYROXINE SODIUM 125 MCG
25 TABLET ORAL
Refills: 0 | Status: DISCONTINUED | OUTPATIENT
Start: 2022-11-29 | End: 2022-11-30

## 2022-11-29 RX ORDER — SODIUM CHLORIDE 9 MG/ML
500 INJECTION INTRAMUSCULAR; INTRAVENOUS; SUBCUTANEOUS ONCE
Refills: 0 | Status: DISCONTINUED | OUTPATIENT
Start: 2022-11-29 | End: 2022-11-29

## 2022-11-29 RX ORDER — AZITHROMYCIN 500 MG/1
500 TABLET, FILM COATED ORAL EVERY 24 HOURS
Refills: 0 | Status: DISCONTINUED | OUTPATIENT
Start: 2022-11-29 | End: 2022-12-06

## 2022-11-29 RX ORDER — ONDANSETRON 8 MG/1
4 TABLET, FILM COATED ORAL EVERY 6 HOURS
Refills: 0 | Status: DISCONTINUED | OUTPATIENT
Start: 2022-11-29 | End: 2022-12-22

## 2022-11-29 RX ORDER — CEFTRIAXONE 500 MG/1
1000 INJECTION, POWDER, FOR SOLUTION INTRAMUSCULAR; INTRAVENOUS EVERY 24 HOURS
Refills: 0 | Status: COMPLETED | OUTPATIENT
Start: 2022-11-30 | End: 2022-12-04

## 2022-11-29 RX ORDER — FLUTICASONE FUROATE, UMECLIDINIUM BROMIDE AND VILANTEROL TRIFENATATE 200; 62.5; 25 UG/1; UG/1; UG/1
1 POWDER RESPIRATORY (INHALATION)
Qty: 0 | Refills: 0 | DISCHARGE

## 2022-11-29 RX ORDER — ATORVASTATIN CALCIUM 80 MG/1
40 TABLET, FILM COATED ORAL AT BEDTIME
Refills: 0 | Status: DISCONTINUED | OUTPATIENT
Start: 2022-11-29 | End: 2022-12-22

## 2022-11-29 RX ADMIN — Medication 3 MILLILITER(S): at 21:25

## 2022-11-29 RX ADMIN — Medication 3 MILLILITER(S): at 15:55

## 2022-11-29 RX ADMIN — ENOXAPARIN SODIUM 30 MILLIGRAM(S): 100 INJECTION SUBCUTANEOUS at 18:11

## 2022-11-29 RX ADMIN — Medication 40 MILLIGRAM(S): at 15:23

## 2022-11-29 RX ADMIN — AZITHROMYCIN 255 MILLIGRAM(S): 500 TABLET, FILM COATED ORAL at 06:39

## 2022-11-29 RX ADMIN — ATORVASTATIN CALCIUM 40 MILLIGRAM(S): 80 TABLET, FILM COATED ORAL at 22:50

## 2022-11-29 RX ADMIN — ONDANSETRON 4 MILLIGRAM(S): 8 TABLET, FILM COATED ORAL at 05:30

## 2022-11-29 RX ADMIN — CEFTRIAXONE 1000 MILLIGRAM(S): 500 INJECTION, POWDER, FOR SOLUTION INTRAMUSCULAR; INTRAVENOUS at 09:00

## 2022-11-29 RX ADMIN — Medication 40 MILLIGRAM(S): at 22:50

## 2022-11-29 RX ADMIN — AZITHROMYCIN 255 MILLIGRAM(S): 500 TABLET, FILM COATED ORAL at 18:11

## 2022-11-29 NOTE — H&P ADULT - NSHPPHYSICALEXAM_GEN_ALL_CORE
Vital Signs Last 24 Hrs  T(C): 36.7 (11-29-22 @ 07:46), Max: 36.7 (11-29-22 @ 07:46)  T(F): 98.1 (11-29-22 @ 07:46), Max: 98.1 (11-29-22 @ 07:46)  HR: 62 (11-29-22 @ 07:46) (62 - 102)  BP: 114/54 (11-29-22 @ 07:46) (114/54 - 136/67)  BP(mean): --  RR: 20 (11-29-22 @ 07:46) (20 - 20)  SpO2: 95% (11-29-22 @ 07:46) (95% - 95%)    GENERAL: Obese, sitting in bed with HOB elevated, NC O2 3 L on, spitting up, mild resp effort, no distress, able to converse well, cough + bronchitic sounding  HEAD:  Atraumatic, Normocephalic  EYES: EOMI, PERRLA, conjunctiva and sclera clear  NECK: Supple, No JVD, Normal thyroid  NERVOUS SYSTEM:  Alert & Oriented X 3, Motor Strength 5/5 B/L upper and lower extremities  CHEST/LUNG: coarse breath sounds over bronchi antrly and interscapular postrly L>R; No rales, rhonchi, wheezing, or rubs  HEART: Regular rate and rhythm; No murmurs, rubs, or gallops  ABDOMEN: Soft, Nontender, Nondistended; Bowel sounds present  EXTREMITIES:  2+ Peripheral Pulses, No clubbing, cyanosis, or edema  SKIN: No rashes or lesions

## 2022-11-29 NOTE — ED ADULT TRIAGE NOTE - CHIEF COMPLAINT QUOTE
BIB ambulance with c/o chills n/v since tonight.  Pt denies chest pain, sob.  + cough.  Hx of CH, LITOD

## 2022-11-29 NOTE — ED PROVIDER NOTE - NS ED MD DISPO DIVISION
Bedside and Verbal shift change report given to Luna Nicholas RN (oncoming nurse) by Aba Odonnell RN (offgoing nurse). Report given with SBAR, Kardex, Intake/Output and MAR. [facility]

## 2022-11-29 NOTE — ED PROVIDER NOTE - PHYSICAL EXAMINATION
General: coughing frequently, emesis bag in hand, NAD  Head: NC/AT  Cardiac: RRR, no m/r/g  Respiratory: decreased breath sounds LLL, equal chest wall expansions, no conversational dyspnea, coughing frequently, 88-91% on room air  Abdomen: soft, ND, NT  Neuro: AAOx3, coordinated movement of all 4 extremities  Psych: calm, cooperative, normal affect  Skin: warm and dry

## 2022-11-29 NOTE — ED PROVIDER NOTE - ATTENDING CONTRIBUTION TO CARE
89 yo female with hx of COPD presenting with acute cough and dyspnea.   Gen: Alert, NAD  Head: NC, AT, PERRL, EOMI, normal lids/conjunctiva  ENT: normal hearing, patent oropharynx without erythema/exudate, uvula midline  Neck: +supple, no tenderness/meningismus/JVD, +Trachea midline  Pulm: Bilateral BS, normal resp effort, no wheeze/stridor/retractions  CV: RRR, no R/G, +dist pulses  Abd: soft, NT/ND, +BS, no hepatosplenomegaly  Mskel: no edema/erythema/cyanosis  Skin: no rash  Neuro: AAO, no gross sensory/motor deficits  I personally saw the patient with the resident, and completed the key components of the history and physical exam. I then discussed the management plan with the resident.

## 2022-11-29 NOTE — ED PROVIDER NOTE - NS ED ROS FT
Constitutional: no fever, no sweats, +chills.  CV: no chest pain, no edema.  Resp: +cough, +dyspnea  GI: no abdominal pain, +nausea and +vomiting.  MSK: no msk pain, no weakness  Skin: no lesions, and no rashes.  Neuro: no LOC, no headache, no dizziness  ROS otherwise negative except as noted in HPI.

## 2022-11-29 NOTE — H&P ADULT - ASSESSMENT
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 She has been having cough, without any change. Denies SOB, CP, fever, abd pain, constipation, N, sick contact. Admits to overeating last night.   In Triage . In ED noted O2 saturation 88- 91%.       # Acute Hypoxic Resp failure  saturating 88-91 % on room air prosper while conversing  reduced 3 L NC to 1 L.  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 She has been having cough, without any change. Denies SOB, CP, fever, abd pain, constipation, N, sick contact. Admits to overeating last night.   In Triage . In ED noted O2 saturation 88- 91%.       # Acute Hypoxic Resp failure  saturating 88-91 % on room air prosper while conversing  reduced 3 L NC to 1 L.  no resp distress  wean off as tolerated    # Known COPD, bronchitic cough, mild hypoxia = AE COPD  Medrol, PPI  Robitussin  Duonebs  Viral panel neg  CXR- vinay hilar infiltrates    # CXR findings= CAP  Rocephin + Zithromax  Legionella   bld c/s testing    # KALPANA  on losartan at home. Cont here for now  gentle IVF x 10 hrs  reassess in AM    # Macrocytosis  likely sec to COPD  check B 12 and folate    # Emesis  Antiemetics    # h/o hypothyroid  is being tapered off replacement   check TFTs    # HTN cont  cont current regimen  monitor on steroids    Lovenox  ECHO    Meds from pharmacy:    levothyrox 2/ wk, metoprolol 50 QD, losartan 100, trelegy/ spiriva, PPI, rosuvastatin 10

## 2022-11-29 NOTE — ED ADULT NURSE NOTE - OBJECTIVE STATEMENT
pt coming in via ambulance for fever, cough and shortness of breath per daughter. symptoms started about yesterday. pt with productive cough (clear colored sputum) and nausea with vomiting. pt attached to cardiac monitor, pulse ox with 97% O2sat on 2lpm via nc. changed pt into hospital gown and placed on moderate high back rest.

## 2022-11-29 NOTE — ED ADULT TRIAGE NOTE - BP NONINVASIVE DIASTOLIC (MM HG)
67 Dermal Autograft Text: The defect edges were debeveled with a #15 scalpel blade.  Given the location of the defect, shape of the defect and the proximity to free margins a dermal autograft was deemed most appropriate.  Using a sterile surgical marker, the primary defect shape was transferred to the donor site. The area thus outlined was incised deep to adipose tissue with a #15 scalpel blade.  The harvested graft was then trimmed of adipose and epidermal tissue until only dermis was left.  The skin graft was then placed in the primary defect and oriented appropriately.

## 2022-11-29 NOTE — ED PROVIDER NOTE - CLINICAL SUMMARY MEDICAL DECISION MAKING FREE TEXT BOX
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 VSS. Decreased breath sounds left lower lobe. EKG, labs, CXR pending.

## 2022-11-29 NOTE — ED ADULT NURSE NOTE - NSIMPLEMENTINTERV_GEN_ALL_ED
Implemented All Fall with Harm Risk Interventions:  [place] to call system. Call bell, personal items and telephone within reach. Instruct patient to call for assistance. Room bathroom lighting operational. Non-slip footwear when patient is off stretcher. Physically safe environment: no spills, clutter or unnecessary equipment. Stretcher in lowest position, wheels locked, appropriate side rails in place. Provide visual cue, wrist band, yellow gown, etc. Monitor gait and stability. Monitor for mental status changes and reorient to person, place, and time. Review medications for side effects contributing to fall risk. Reinforce activity limits and safety measures with patient and family. Provide visual clues: red socks.

## 2022-11-29 NOTE — H&P ADULT - HISTORY OF PRESENT ILLNESS
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 She has been having cough, without any change. Denies SOB, CP, fever, abd pain, constipation, N, sick contact. Admits to overeating last night. states she was on thyroid meds which her PMD stopped. and KCL was increased recently.  In Triage . In ED noted O2 saturation 88- 91%.     SH- smoke 1ppd or more since age 15 yrs to 50 yrs. Quit 40 yrs ago, denies other toxic habits  FH- states unaware of any medical condition

## 2022-11-29 NOTE — ED PROVIDER NOTE - OBJECTIVE STATEMENT
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 Reports onset about 2 hours ago. Denies fever, CP, abdominal pain. Denies sick contacts. Covid vaccinated, has not received the flu shot.

## 2022-11-30 LAB
ANION GAP SERPL CALC-SCNC: 10 MMOL/L — SIGNIFICANT CHANGE UP (ref 5–17)
BUN SERPL-MCNC: 38.8 MG/DL — HIGH (ref 8–20)
CALCIUM SERPL-MCNC: 9.7 MG/DL — SIGNIFICANT CHANGE UP (ref 8.4–10.5)
CHLORIDE SERPL-SCNC: 102 MMOL/L — SIGNIFICANT CHANGE UP (ref 96–108)
CO2 SERPL-SCNC: 25 MMOL/L — SIGNIFICANT CHANGE UP (ref 22–29)
CREAT SERPL-MCNC: 2.07 MG/DL — HIGH (ref 0.5–1.3)
EGFR: 22 ML/MIN/1.73M2 — LOW
FOLATE SERPL-MCNC: 17 NG/ML — SIGNIFICANT CHANGE UP
GLUCOSE SERPL-MCNC: 131 MG/DL — HIGH (ref 70–99)
HCT VFR BLD CALC: 30.1 % — LOW (ref 34.5–45)
HGB BLD-MCNC: 9.9 G/DL — LOW (ref 11.5–15.5)
MCHC RBC-ENTMCNC: 32.9 GM/DL — SIGNIFICANT CHANGE UP (ref 32–36)
MCHC RBC-ENTMCNC: 33.3 PG — SIGNIFICANT CHANGE UP (ref 27–34)
MCV RBC AUTO: 101.3 FL — HIGH (ref 80–100)
PLATELET # BLD AUTO: 211 K/UL — SIGNIFICANT CHANGE UP (ref 150–400)
POTASSIUM SERPL-MCNC: 4.6 MMOL/L — SIGNIFICANT CHANGE UP (ref 3.5–5.3)
POTASSIUM SERPL-SCNC: 4.6 MMOL/L — SIGNIFICANT CHANGE UP (ref 3.5–5.3)
RBC # BLD: 2.97 M/UL — LOW (ref 3.8–5.2)
RBC # FLD: 14.8 % — HIGH (ref 10.3–14.5)
SODIUM SERPL-SCNC: 137 MMOL/L — SIGNIFICANT CHANGE UP (ref 135–145)
T3 SERPL-MCNC: <40 NG/DL — LOW (ref 80–200)
T4 AB SER-ACNC: <1 UG/DL — LOW (ref 4.5–12)
TSH SERPL-MCNC: 37.97 UIU/ML — HIGH (ref 0.27–4.2)
VIT B12 SERPL-MCNC: 500 PG/ML — SIGNIFICANT CHANGE UP (ref 232–1245)
WBC # BLD: 16.54 K/UL — HIGH (ref 3.8–10.5)
WBC # FLD AUTO: 16.54 K/UL — HIGH (ref 3.8–10.5)

## 2022-11-30 PROCEDURE — 99222 1ST HOSP IP/OBS MODERATE 55: CPT

## 2022-11-30 PROCEDURE — 99233 SBSQ HOSP IP/OBS HIGH 50: CPT

## 2022-11-30 RX ORDER — LEVOTHYROXINE SODIUM 125 MCG
25 TABLET ORAL DAILY
Refills: 0 | Status: DISCONTINUED | OUTPATIENT
Start: 2022-11-30 | End: 2022-11-30

## 2022-11-30 RX ORDER — LEVOTHYROXINE SODIUM 125 MCG
50 TABLET ORAL DAILY
Refills: 0 | Status: DISCONTINUED | OUTPATIENT
Start: 2022-12-01 | End: 2022-12-06

## 2022-11-30 RX ADMIN — Medication 25 MICROGRAM(S): at 09:33

## 2022-11-30 RX ADMIN — ATORVASTATIN CALCIUM 40 MILLIGRAM(S): 80 TABLET, FILM COATED ORAL at 21:00

## 2022-11-30 RX ADMIN — Medication 40 MILLIGRAM(S): at 05:51

## 2022-11-30 RX ADMIN — Medication 3 MILLILITER(S): at 16:17

## 2022-11-30 RX ADMIN — Medication 3 MILLILITER(S): at 08:57

## 2022-11-30 RX ADMIN — AZITHROMYCIN 255 MILLIGRAM(S): 500 TABLET, FILM COATED ORAL at 17:36

## 2022-11-30 RX ADMIN — CEFTRIAXONE 1000 MILLIGRAM(S): 500 INJECTION, POWDER, FOR SOLUTION INTRAMUSCULAR; INTRAVENOUS at 05:51

## 2022-11-30 RX ADMIN — Medication 50 MILLIGRAM(S): at 09:28

## 2022-11-30 RX ADMIN — Medication 3 MILLILITER(S): at 20:29

## 2022-11-30 RX ADMIN — Medication 40 MILLIGRAM(S): at 21:01

## 2022-11-30 RX ADMIN — TIOTROPIUM BROMIDE 2 PUFF(S): 18 CAPSULE ORAL; RESPIRATORY (INHALATION) at 08:57

## 2022-11-30 RX ADMIN — PANTOPRAZOLE SODIUM 40 MILLIGRAM(S): 20 TABLET, DELAYED RELEASE ORAL at 05:52

## 2022-11-30 RX ADMIN — LOSARTAN POTASSIUM 100 MILLIGRAM(S): 100 TABLET, FILM COATED ORAL at 09:28

## 2022-11-30 RX ADMIN — Medication 40 MILLIGRAM(S): at 17:37

## 2022-11-30 RX ADMIN — ENOXAPARIN SODIUM 30 MILLIGRAM(S): 100 INJECTION SUBCUTANEOUS at 17:37

## 2022-11-30 NOTE — PATIENT PROFILE ADULT - FALL HARM RISK - HARM RISK INTERVENTIONS
Assistance with ambulation/Assistance OOB with selected safe patient handling equipment/Communicate Risk of Fall with Harm to all staff/Discuss with provider need for PT consult/Monitor gait and stability/Provide patient with walking aids - walker, cane, crutches/Reinforce activity limits and safety measures with patient and family/Sit up slowly, dangle for a short time, stand at bedside before walking/Tailored Fall Risk Interventions/Visual Cue: Yellow wristband and red socks/Bed in lowest position, wheels locked, appropriate side rails in place/Call bell, personal items and telephone in reach/Instruct patient to call for assistance before getting out of bed or chair/Non-slip footwear when patient is out of bed/[place] to call system/Physically safe environment - no spills, clutter or unnecessary equipment/Purposeful Proactive Rounding/Room/bathroom lighting operational, light cord in reach

## 2022-11-30 NOTE — PROGRESS NOTE ADULT - SUBJECTIVE AND OBJECTIVE BOX
[facility] Division of Hospital Medicine    Chief Complaint:      SUBJECTIVE / OVERNIGHT EVENTS:    Patient denies chest pain, SOB, abd pain, N/V, fever, chills, dysuria or any other complaints. All remainder ROS negative.     MEDICATIONS  (STANDING):  albuterol/ipratropium for Nebulization 3 milliLiter(s) Nebulizer every 6 hours  atorvastatin 40 milliGRAM(s) Oral at bedtime  azithromycin  IVPB 500 milliGRAM(s) IV Intermittent every 24 hours  cefTRIAXone Injectable. 1000 milliGRAM(s) IV Push every 24 hours  enoxaparin Injectable 30 milliGRAM(s) SubCutaneous every 24 hours  levothyroxine 25 MICROGram(s) Oral daily  losartan 100 milliGRAM(s) Oral daily  methylPREDNISolone sodium succinate Injectable 40 milliGRAM(s) IV Push every 8 hours  metoprolol succinate ER 50 milliGRAM(s) Oral daily  pantoprazole    Tablet 40 milliGRAM(s) Oral before breakfast  sodium chloride 0.9%. 1000 milliLiter(s) (75 mL/Hr) IV Continuous <Continuous>  tiotropium 2.5 MICROgram(s) Inhaler 2 Puff(s) Inhalation daily    MEDICATIONS  (PRN):  guaiFENesin Oral Liquid (Sugar-Free) 100 milliGRAM(s) Oral every 6 hours PRN Cough  ondansetron Injectable 4 milliGRAM(s) IV Push every 6 hours PRN Nausea and/or Vomiting        I&O's Summary      PHYSICAL EXAM:  Vital Signs Last 24 Hrs  T(C): 36.6 (30 Nov 2022 04:58), Max: 37.2 (30 Nov 2022 00:57)  T(F): 97.9 (30 Nov 2022 04:58), Max: 99 (30 Nov 2022 00:57)  HR: 81 (30 Nov 2022 09:21) (63 - 94)  BP: 111/60 (30 Nov 2022 09:21) (93/63 - 119/61)  BP(mean): --  RR: 17 (30 Nov 2022 04:58) (17 - 20)  SpO2: 94% (30 Nov 2022 09:02) (91% - 98%)    Parameters below as of 30 Nov 2022 09:02  Patient On (Oxygen Delivery Method): nasal cannula,3l            GENERAL: Obese, sitting in bed with HOB elevated, NC O2 3 L on, spitting up, mild resp effort, no distress, able to converse well, cough + bronchitic sounding  HEAD:  Atraumatic, Normocephalic  EYES: EOMI, PERRLA, conjunctiva and sclera clear  NECK: Supple, No JVD, Normal thyroid  NERVOUS SYSTEM:  Alert & Oriented X 3, Motor Strength 5/5 B/L upper and lower extremities  CHEST/LUNG: coarse breath sounds over bronchi antrly and interscapular postrly L>R; No rales, rhonchi, wheezing, or rubs  HEART: Regular rate and rhythm; No murmurs, rubs, or gallops  ABDOMEN: Soft, Nontender, Nondistended; Bowel sounds present  EXTREMITIES:  2+ Peripheral Pulses, No clubbing, cyanosis, or edema  SKIN: No rashes or lesions    LABS:                        9.9    16.54 )-----------( 211      ( 30 Nov 2022 04:38 )             30.1     11-30    137  |  102  |  38.8<H>  ----------------------------<  131<H>  4.6   |  25.0  |  2.07<H>    Ca    9.7      30 Nov 2022 04:38    TPro  7.1  /  Alb  4.5  /  TBili  0.4  /  DBili  x   /  AST  50<H>  /  ALT  32  /  AlkPhos  80  11-29              CAPILLARY BLOOD GLUCOSE            RADIOLOGY & ADDITIONAL TESTS:  Results Reviewed: y  Imaging Personally Reviewed: y  Electrocardiogram Personally Reviewed: n

## 2022-11-30 NOTE — PROGRESS NOTE ADULT - ASSESSMENT
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 She has been having cough, without any change. Denies SOB, CP, fever, abd pain, constipation, N, sick contact. Admits to overeating last night.   In Triage . In ED noted O2 saturation 88- 91%.       # Acute Hypoxic Resp failure  stable on 1LPM NC  no resp distress  wean off as tolerated    # Known COPD, bronchitic cough, mild hypoxia = AE COPD  Medrol, PPI  Robitussin  Duonebs  Viral panel neg  CXR- vinay hilar infiltrates    # CXR findings= CAP  Rocephin + Zithromax  Legionella   bld c/s testing    # KALPANA  Hold losartan for now as KALPANA worsened  reassess in AM    # Macrocytosis  likely sec to COPD  check B 12 and folate    # Emesis  Antiemetics    # h/o hypothyroid  Severely Low  TFTs w/ elevated TSH  restart daily thyroid medication  endocrine consult for replacement optimization    # HTN cont  cont current regimen  monitor on steroids    #Healthcare Maintenance  DVT PPx- Lovenox  pending ECHO  Dispo - Likely [facility] vs Home pending PT

## 2022-11-30 NOTE — CONSULT NOTE ADULT - SUBJECTIVE AND OBJECTIVE BOX
HPI:  89 y/o F with PMH HTN, [place] s/p cochlear implants, COPD/emphysema and hypothyroidism woke up around 2 AM shaking and 30 mins later emesis x 1.  She has been having cough, without any change. Denies SOB, CP, fever, abd pain, constipation, N, sick contact. States she was on thyroid meds which her PMD stopped.      SH- smoke 1ppd or more since age 15 yrs to 50 yrs. Quit 40 yrs ago, denies other toxic habits  FH- states unaware of any medical condition (29 Nov 2022 09:15)    PAST MEDICAL & SURGICAL HISTORY:  Essential hypertension  Chronic obstructive pulmonary disease, unspecified COPD type  Hypothyroidism, unspecified type    H/O exploratory laparotomy for a benign colon mass 4/19/12    FAMILY HISTORY:  No pertinent family history in first degree relatives    SOCIAL HISTORY:    REVIEW OF SYSTEMS:    Constitutional: No fever, no chills, no change in weight.    Eyes: No eye swelling,no  blurry vision, no redness, no loss of vision.  Neck: No neck pain, no change in voice.    Lungs: No shortness of breath, no wheezing, no cough    CV: No chest pain, no palpitations, no pain with walking.    GI: No nausea, no vomiting, no constipation, no diarrhea, no abdominal pain    : No urinary frequency, no blood in urine, no urinary burning, no difficulty voiding.    Musculoskeletal: No muscle pain, no joint pain, no swelling.    Skin: No rash, no infections.    Neurologic: No headaches, no weakness, no burning or pain in feet, no tremor.    Endocrine: No heat intolerance, no cold intolerance, no increased sweating, no shakiness between meals.    Psych: No depression, no anxiety, no trouble concentrating    MEDICATIONS  (STANDING):  albuterol/ipratropium for Nebulization 3 milliLiter(s) Nebulizer every 6 hours  atorvastatin 40 milliGRAM(s) Oral at bedtime  azithromycin  IVPB 500 milliGRAM(s) IV Intermittent every 24 hours  cefTRIAXone Injectable. 1000 milliGRAM(s) IV Push every 24 hours  enoxaparin Injectable 30 milliGRAM(s) SubCutaneous every 24 hours  levothyroxine 25 MICROGram(s) Oral daily  losartan 100 milliGRAM(s) Oral daily  methylPREDNISolone sodium succinate Injectable 40 milliGRAM(s) IV Push every 8 hours  metoprolol succinate ER 50 milliGRAM(s) Oral daily  pantoprazole    Tablet 40 milliGRAM(s) Oral before breakfast  sodium chloride 0.9%. 1000 milliLiter(s) (75 mL/Hr) IV Continuous <Continuous>  tiotropium 2.5 MICROgram(s) Inhaler 2 Puff(s) Inhalation daily    MEDICATIONS  (PRN):  guaiFENesin Oral Liquid (Sugar-Free) 100 milliGRAM(s) Oral every 6 hours PRN Cough  ondansetron Injectable 4 milliGRAM(s) IV Push every 6 hours PRN Nausea and/or Vomiting      Allergies  codeine (Vomiting)  iodine (Unknown)    CAPILLARY BLOOD GLUCOSE    PHYSICAL EXAM:  Vital Signs Last 24 Hrs  T(C): 36.6 (30 Nov 2022 04:58), Max: 37.2 (30 Nov 2022 00:57)  T(F): 97.9 (30 Nov 2022 04:58), Max: 99 (30 Nov 2022 00:57)  HR: 81 (30 Nov 2022 09:21) (63 - 94)  BP: 111/60 (30 Nov 2022 09:21) (93/63 - 119/61)  BP(mean): --  RR: 17 (30 Nov 2022 04:58) (17 - 20)  SpO2: 94% (30 Nov 2022 09:02) (91% - 98%)    GENERAL: Obese, NC O2 3 L on, able to converse well, cough + bronchitic sounding  HEAD:  Atraumatic, Normocephalic  EYES: EOMI, PERRLA, conjunctiva and sclera clear  NECK: Supple, No JVD, no thyromegaly   NERVOUS SYSTEM:  Alert & Oriented X 3  CHEST/LUNG: coarse breath sounds L>R; No rales, rhonchi, wheezing, or rubs  HEART: Regular rate and rhythm; No murmurs, rubs, or gallops  ABDOMEN: Soft, Nontender, Nondistended; Bowel sounds present  EXTREMITIES:  2+ Peripheral Pulses, No clubbing, cyanosis, or edema  SKIN: No rashes     LABS:                      9.9    16.54 )-----------( 211      ( 30 Nov 2022 04:38 )             30.1     11-30    137  |  102  |  38.8<H>  ----------------------------<  131<H>  4.6   |  25.0  |  2.07<H>  Ca    9.7      30 Nov 2022 04:38  TPro  7.1  /  Alb  4.5  /  TBili  0.4  /  DBili  x   /  AST  50<H>  /  ALT  32  /  AlkPhos  80  11-29  LIVER FUNCTIONS - ( 29 Nov 2022 05:15 )  Alb: 4.5 g/dL / Pro: 7.1 g/dL / ALK PHOS: 80 U/L / ALT: 32 U/L / AST: 50 U/L / GGT: x         T4, Serum: <1.0 ug/dL (11-30 @ 04:38)       HPI:  89 y/o F with PMH HTN, [place] s/p cochlear implants, COPD/emphysema and hypothyroidism woke up around 2 AM shaking and 30 mins later emesis x 1.  She has been having cough, without any change. Denies SOB, CP, fever, abd pain, constipation, N, sick contact. States she was on thyroid meds which her PMD stopped.      SH- smoke 1ppd or more since age 15 yrs to 50 yrs. Quit 40 yrs ago, denies other toxic habits  FH- states unaware of any medical condition     PAST MEDICAL & SURGICAL HISTORY:  Essential hypertension  Chronic obstructive pulmonary disease, unspecified COPD type  Hypothyroidism, unspecified type    H/O exploratory laparotomy for a benign colon mass 4/19/12    FAMILY HISTORY:  No pertinent family history in first degree relatives    REVIEW OF SYSTEMS:  Constitutional: No fever, no chills, no change in weight.  Eyes: No eye swelling,no  blurry vision, no redness, no loss of vision.  Neck: No neck pain  Lungs: No shortness of breath, no wheezing, no cough  CV: No chest pain, no palpitations  GI: No nausea, no vomiting, no constipation, no diarrhea, no abdominal pain  : No urinary frequency, no blood in urine  Musculoskeletal: No muscle pain, no joint pain, no swelling.  Skin: No rash  Neurologic: No headaches, no weakness  Endocrine: No heat intolerance, no cold intolerance  Psych: No depression, no anxiety    MEDICATIONS  (STANDING):  albuterol/ipratropium for Nebulization 3 milliLiter(s) Nebulizer every 6 hours  atorvastatin 40 milliGRAM(s) Oral at bedtime  azithromycin  IVPB 500 milliGRAM(s) IV Intermittent every 24 hours  cefTRIAXone Injectable. 1000 milliGRAM(s) IV Push every 24 hours  enoxaparin Injectable 30 milliGRAM(s) SubCutaneous every 24 hours  levothyroxine 25 MICROGram(s) Oral daily  losartan 100 milliGRAM(s) Oral daily  methylPREDNISolone sodium succinate Injectable 40 milliGRAM(s) IV Push every 8 hours  metoprolol succinate ER 50 milliGRAM(s) Oral daily  pantoprazole    Tablet 40 milliGRAM(s) Oral before breakfast  sodium chloride 0.9%. 1000 milliLiter(s) (75 mL/Hr) IV Continuous <Continuous>  tiotropium 2.5 MICROgram(s) Inhaler 2 Puff(s) Inhalation daily    MEDICATIONS  (PRN):  guaiFENesin Oral Liquid (Sugar-Free) 100 milliGRAM(s) Oral every 6 hours PRN Cough  ondansetron Injectable 4 milliGRAM(s) IV Push every 6 hours PRN Nausea and/or Vomiting      Allergies  codeine (Vomiting)  iodine (Unknown)    CAPILLARY BLOOD GLUCOSE    PHYSICAL EXAM:  Vital Signs Last 24 Hrs  T(C): 36.6 (30 Nov 2022 04:58), Max: 37.2 (30 Nov 2022 00:57)  T(F): 97.9 (30 Nov 2022 04:58), Max: 99 (30 Nov 2022 00:57)  HR: 81 (30 Nov 2022 09:21) (63 - 94)  BP: 111/60 (30 Nov 2022 09:21) (93/63 - 119/61)  BP(mean): --  RR: 17 (30 Nov 2022 04:58) (17 - 20)  SpO2: 94% (30 Nov 2022 09:02) (91% - 98%)    GENERAL: Obese, NC O2 3 L on, able to converse well, cough + bronchitic sounding  HEAD:  Atraumatic, Normocephalic  EYES: EOMI, PERRLA, conjunctiva and sclera clear  NECK: Supple, No JVD, no thyromegaly   NERVOUS SYSTEM:  Alert & Oriented X 3  CHEST/LUNG: coarse breath sounds L>R; No rales, rhonchi, wheezing, or rubs  HEART: Regular rate and rhythm; No murmurs, rubs, or gallops  ABDOMEN: Soft, Nontender, Nondistended; Bowel sounds present  EXTREMITIES:  2+ Peripheral Pulses, No clubbing, cyanosis, or edema  SKIN: No rashes     LABS:                      9.9    16.54 )-----------( 211      ( 30 Nov 2022 04:38 )             30.1     11-30    137  |  102  |  38.8<H>  ----------------------------<  131<H>  4.6   |  25.0  |  2.07<H>  Ca    9.7      30 Nov 2022 04:38  TPro  7.1  /  Alb  4.5  /  TBili  0.4  /  DBili  x   /  AST  50<H>  /  ALT  32  /  AlkPhos  80  11-29  LIVER FUNCTIONS - ( 29 Nov 2022 05:15 )  Alb: 4.5 g/dL / Pro: 7.1 g/dL / ALK PHOS: 80 U/L / ALT: 32 U/L / AST: 50 U/L / GGT: x         T4, Serum: <1.0 ug/dL (11-30 @ 04:38)

## 2022-11-30 NOTE — CONSULT NOTE ADULT - ASSESSMENT
[facility]   Advanced Heart Failure Clinic Note    Date of visit: 9/14/2021  Patient Name: Rk Muse  Medical Record Number: [id number]  YOB: 1959   Primary Physician: Sherry Adair MD.     SUBJECTIVE     History of Present Illness:  Rk Muse is a 62 year old man with past medical history significant for hypertension, diabetes mellitus type II, dyslipidemia, who returns for follow-up at our clinic. The patient originally arrived to our clinic with a severe cardiomyopathy and concern for sarcoidosis.  He was recommended to follow-up at [facility] and actually received an extensive work-up at that time.       Rk Muse is doing very well.    He denies any angina, shortness of breath, orthopnea, paroxysmal nocturnal dyspnea or LE edema.      Functional status is not significantly changed from prior visit.    He is adherent with medications and denies any side effects. No dizziness or lightheadedness. He is amenable to repeat echocardiogram.    He lives at home.    He is unaccompanied    Past Medical History:  Past Medical History:   Diagnosis Date   • Elevated prostate specific antigen (PSA)    • Essential (primary) hypertension    • History of balanitis    • History of prostatitis    • Hyperbilirubinemia    • Screening for hypercholesterolemia        Past Surgical History:  No past surgical history on file.    Past Family History:  Family History   Problem Relation Age of Onset   • Diabetes Mother    • Hypertension Mother    • Other Mother         Hypercholesteremia   • Diabetes Father    • Hypertension Father    • Other Father         Hypercholesteremia     Past Social History:  Social History     Tobacco Use   • Smoking status: Never Smoker   • Smokeless tobacco: Never Used   Substance Use Topics   • Alcohol use: No   • Drug use: Never     Medications:  Outpatient Medications Marked as Taking for the 9/14/21 encounter (Office Visit) with Lamont SANTANA  89 y/o F with PMH HTN, [place], COPD/emphysema,  and hypothyroidism woke up around 2 AM shaking and 30 mins later emesis x 1  and cough. Denies SOB, CP, fever, abd pain, constipation, N, sick contact.     hypoT :   start Lt4 50 cmg qd and repeat tfts in 1 wk     Acute Hypoxic Resp failure /COPD/ CAP   saturating 88-91 % on room air prosper while conversing  reduced 3 L NC to 1 L.  Medrol, PPI, Robitussin, Duonebs  CXR- vinay hilar infiltrates, cont Abx     KALPANA: cont ARB, gentle IVF x 10 hrs    HTN: cont current regimen  monitor on steroids   MD Rina   Medication Sig Dispense Refill   • losartan (COZAAR) 100 MG tablet TAKE ONE TABLET BY MOUTH EVERY DAY 90 tablet 1   • atorvastatin (LIPITOR) 40 MG tablet TAKE 1 TABLET BY MOUTH DAILY 90 tablet 3   • carvedilol (COREG) 25 MG tablet Take 1 tablet by mouth 2 times daily (with meals). 180 tablet 3   • spironolactone (ALDACTONE) 25 MG tablet Take 1 tablet by mouth daily. 90 tablet 3   • Omega-3 Fatty Acids (Fish Oil) 1000 MG capsule Take 1 g by mouth daily.     • torsemide (DEMADEX) 20 MG tablet Take 0.5 tablets by mouth 2 times daily. 60 tablet 3   • aspirin 81 MG tablet Take 1 tablet by mouth daily. 90 tablet 1   • Multiple Vitamin (MULTIVITAMIN) capsule Take 1 capsule by mouth daily.       Allergies:  ALLERGIES:  No Known Allergies    Devices:      Symptom Frequency and Description:  No Chest Pain/Pressure:   No Claudication:  No Pre/Syncope:  No Dyspnea:   No Orthopnea:  No PND (paroxysmal nocturnal dyspnea):   No Pedal Edema:  No Change in Appetite:   No Nausea/Vomiting:  No Abdominal Swelling/Bloating:   No Early Satiety:   No Weight gain:    No Hospitalization:   No ER Visit:     No Other:    Adherence:  Yes Medication:   Yes Diet    General Review of Systems:    Constitutional: No for fatigue. No unexpected weight change.   HENT: Negative for nosebleeds.    Respiratory: No shortness of breath. No cough.  Cardiovascular: No for chest pain. No palpitations. No  leg swelling.   Gastrointestinal: No for abdominal distention. No  abdominal pain and blood in stool.   Endocrine: Negative for polyuria.   Genitourinary: Negative for hematuria.   Musculoskeletal: Negative for myalgias.   Skin: Negative for pallor.   Neurological: No syncope and light-headedness.   Hematological: Does not bruise/bleed easily.   Otherwise complete ROS is negative.     OBJECTIVE  Vitals:    Visit Vitals  /79 (BP Location: LUE - Left upper extremity, Patient Position: Sitting, Cuff Size: Regular)   Pulse 79   Wt 107 kg (236  lb)   SpO2 95%   BMI 38.09 kg/m²          Wt Readings from Last 5 Encounters:   09/14/21 107 kg (236 lb)   11/17/20 108.9 kg (240 lb)   06/02/20 103 kg (227 lb)   02/05/20 103.7 kg (228 lb 9.9 oz)   02/04/20 104.3 kg (230 lb)       Physical Exam:  Constitutional: 62 year old man in no acute distress.  Skin: Warm, dry, intact, no lesions.  HEENT: Normocephalic, atraumatic. Oral mucous membranes moist, EOMs (extraocular movements) intact.  Neck: Supple, trachea midline, JVP is normal, negative HJR (hepatojugular reflux)  Cardiovascular: Normal S1, S2. regular rhythm. Murmur: none. negative S3 and S4.  Respiratory: Clear  to auscultation bilaterally.   Abdomen: negative distension. Soft, nontender, negative hepatomegaly and normal bowel sounds.  Musculoskeletal/Extremities: CRT (capillary refill time is <3. No clubbing, no cyanosis. Edema: no edema.  Neurological: No focal neurological deficits and speech normal. Sensation grossly intact.  Psychiatric: Alert and oriented to person, place and time.    Labs:  Lab Results   Component Value Date    POTASSIUM 4.4 03/13/2021    SODIUM 141 03/13/2021    CO2 29 03/13/2021    CHLORIDE 109 (H) 03/13/2021    BUN 21 (H) 03/13/2021    CREATININE 0.98 03/13/2021    GLUCOSE 105 (H) 03/13/2021    CALCIUM 8.9 03/13/2021    NTPROB 48 03/13/2021       Lab Results   Component Value Date    WBC 5.7 03/13/2021    HCT 41.3 03/13/2021    HGB 13.3 03/13/2021     03/13/2021       Lab Results   Component Value Date    NTPROB 48 03/13/2021    NTPROB 182 (H) 11/17/2020    NTPROB 314 (H) 02/27/2019     Lab Results   Component Value Date    CREATININE 0.98 03/13/2021    CREATININE 0.89 11/17/2020    CREATININE 0.84 10/05/2019       Diagnostic Test:  No results found for this or any previous visit (from the past 4464 hour(s)).    ASSESSMENT/PLAN  62 year old with past medical history significant for hypertension, diabetes mellitus type II, dyslipidemia, who returns for follow-up at our clinic.  The patient originally arrived to our clinic with a severe cardiomyopathy and concern for sarcoidosis.    Impression:   Patient is stable at this time.No evidence of decompensaton.    Problem List Items Addressed This Visit        Cardiac and Vasculature    Chronic combined systolic and diastolic heart failure, NYHA class 2 (CMS/HCC) - Primary    Relevant Medications    spironolactone (ALDACTONE) 25 MG tablet    torsemide (DEMADEX) 20 MG tablet    carvedilol (COREG) 25 MG tablet    losartan (COZAAR) 100 MG tablet    atorvastatin (LIPITOR) 40 MG tablet    Other Relevant Orders    TRANSTHORACIC ECHO(TTE) COMPLETE W/ W/O IMAGING AGENT    Essential hypertension    Relevant Medications    spironolactone (ALDACTONE) 25 MG tablet    torsemide (DEMADEX) 20 MG tablet    carvedilol (COREG) 25 MG tablet    losartan (COZAAR) 100 MG tablet    atorvastatin (LIPITOR) 40 MG tablet    Other Relevant Orders    TRANSTHORACIC ECHO(TTE) COMPLETE W/ W/O IMAGING AGENT       Endocrine and Metabolic    Type 2 diabetes mellitus (CMS/HCC)    Relevant Medications    atorvastatin (LIPITOR) 40 MG tablet      Other Visit Diagnoses     AICD (automatic cardioverter/defibrillator) present        Relevant Orders    TRANSTHORACIC ECHO(TTE) COMPLETE W/ W/O IMAGING AGENT        From cardiac standpoint, hemodynamically stable : ACC/ AHA Stage C: Structural heart disease with prior or current symptoms of heart failure, New York Heart Association Classification: NYHA Class III: Significant HF symptoms/activity intolerance (Only able to do light housework, can walk slowly on level ground)    Volume status is normal .   Perfusion status is normal.   Most recent labs are Reviewed.    Based on objective data and clinical data will augment medical therapy as follows:    Optimized GDMT ( Guideline-directed medical therapy):   - no change in medications  - could benefit from repeat echocardiogram    ICD (implantable cardioverter defibrillator)/CRT-D: No    If  moderate-to-severe or severe MR, has patient been referred to valve clinic?: No    Dietary Guidelines discussed with the patient, instructed to consume < 2.5 g Na per day.      A total of greater than 30 minutes were spent face-to-face with the patient during this encounter and over half of that time was spent on counseling and coordination of care.     I have also discussed fluid and salt intake, daily measurement of weight. I also included education on the patient's disease process including a discussion on the physiology of heart failure and progression of disease.    Patient understands they can return sooner if any issues such as orthopnea, dyspnea on exertion, paroxysmal nocturnal dyspnea, dizziness, lightheadedness or angina should arise.     Lamont Flynn MD   Advanced Heart Failure  [facility]

## 2022-12-01 LAB
ANION GAP SERPL CALC-SCNC: 13 MMOL/L — SIGNIFICANT CHANGE UP (ref 5–17)
BUN SERPL-MCNC: 52 MG/DL — HIGH (ref 8–20)
CALCIUM SERPL-MCNC: 9.7 MG/DL — SIGNIFICANT CHANGE UP (ref 8.4–10.5)
CHLORIDE SERPL-SCNC: 101 MMOL/L — SIGNIFICANT CHANGE UP (ref 96–108)
CO2 SERPL-SCNC: 24 MMOL/L — SIGNIFICANT CHANGE UP (ref 22–29)
CREAT SERPL-MCNC: 2.3 MG/DL — HIGH (ref 0.5–1.3)
EGFR: 20 ML/MIN/1.73M2 — LOW
GLUCOSE SERPL-MCNC: 171 MG/DL — HIGH (ref 70–99)
HCT VFR BLD CALC: 29.6 % — LOW (ref 34.5–45)
HGB BLD-MCNC: 9.8 G/DL — LOW (ref 11.5–15.5)
MAGNESIUM SERPL-MCNC: 2.2 MG/DL — SIGNIFICANT CHANGE UP (ref 1.6–2.6)
MCHC RBC-ENTMCNC: 33.1 GM/DL — SIGNIFICANT CHANGE UP (ref 32–36)
MCHC RBC-ENTMCNC: 33.7 PG — SIGNIFICANT CHANGE UP (ref 27–34)
MCV RBC AUTO: 101.7 FL — HIGH (ref 80–100)
PHOSPHATE SERPL-MCNC: 4 MG/DL — SIGNIFICANT CHANGE UP (ref 2.4–4.7)
PLATELET # BLD AUTO: 192 K/UL — SIGNIFICANT CHANGE UP (ref 150–400)
POTASSIUM SERPL-MCNC: 4.8 MMOL/L — SIGNIFICANT CHANGE UP (ref 3.5–5.3)
POTASSIUM SERPL-SCNC: 4.8 MMOL/L — SIGNIFICANT CHANGE UP (ref 3.5–5.3)
RBC # BLD: 2.91 M/UL — LOW (ref 3.8–5.2)
RBC # FLD: 14.9 % — HIGH (ref 10.3–14.5)
SODIUM SERPL-SCNC: 138 MMOL/L — SIGNIFICANT CHANGE UP (ref 135–145)
WBC # BLD: 13.53 K/UL — HIGH (ref 3.8–10.5)
WBC # FLD AUTO: 13.53 K/UL — HIGH (ref 3.8–10.5)

## 2022-12-01 PROCEDURE — 99232 SBSQ HOSP IP/OBS MODERATE 35: CPT

## 2022-12-01 PROCEDURE — 99233 SBSQ HOSP IP/OBS HIGH 50: CPT

## 2022-12-01 RX ORDER — SODIUM CHLORIDE 9 MG/ML
1000 INJECTION INTRAMUSCULAR; INTRAVENOUS; SUBCUTANEOUS
Refills: 0 | Status: DISCONTINUED | OUTPATIENT
Start: 2022-12-01 | End: 2022-12-05

## 2022-12-01 RX ADMIN — TIOTROPIUM BROMIDE 2 PUFF(S): 18 CAPSULE ORAL; RESPIRATORY (INHALATION) at 09:14

## 2022-12-01 RX ADMIN — ATORVASTATIN CALCIUM 40 MILLIGRAM(S): 80 TABLET, FILM COATED ORAL at 21:39

## 2022-12-01 RX ADMIN — Medication 40 MILLIGRAM(S): at 05:01

## 2022-12-01 RX ADMIN — AZITHROMYCIN 255 MILLIGRAM(S): 500 TABLET, FILM COATED ORAL at 18:37

## 2022-12-01 RX ADMIN — SODIUM CHLORIDE 100 MILLILITER(S): 9 INJECTION INTRAMUSCULAR; INTRAVENOUS; SUBCUTANEOUS at 14:08

## 2022-12-01 RX ADMIN — Medication 3 MILLILITER(S): at 09:13

## 2022-12-01 RX ADMIN — Medication 40 MILLIGRAM(S): at 18:37

## 2022-12-01 RX ADMIN — Medication 100 MILLIGRAM(S): at 11:46

## 2022-12-01 RX ADMIN — PANTOPRAZOLE SODIUM 40 MILLIGRAM(S): 20 TABLET, DELAYED RELEASE ORAL at 05:01

## 2022-12-01 RX ADMIN — Medication 3 MILLILITER(S): at 20:29

## 2022-12-01 RX ADMIN — Medication 50 MILLIGRAM(S): at 05:01

## 2022-12-01 RX ADMIN — Medication 50 MICROGRAM(S): at 05:01

## 2022-12-01 RX ADMIN — Medication 3 MILLILITER(S): at 03:59

## 2022-12-01 RX ADMIN — ENOXAPARIN SODIUM 30 MILLIGRAM(S): 100 INJECTION SUBCUTANEOUS at 18:37

## 2022-12-01 RX ADMIN — CEFTRIAXONE 1000 MILLIGRAM(S): 500 INJECTION, POWDER, FOR SOLUTION INTRAMUSCULAR; INTRAVENOUS at 05:01

## 2022-12-01 RX ADMIN — Medication 3 MILLILITER(S): at 16:04

## 2022-12-01 NOTE — PROGRESS NOTE ADULT - ASSESSMENT
89 y/o F with PMH HTN, [place] s/p cochlear implants, 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 She has been having cough, without any change. Denies SOB, CP, fever, abd pain, constipation, N, sick contact. Admits to overeating last night.   In Triage . In ED noted O2 saturation 88- 91%.       # Acute Hypoxic Resp failure  Stable  tolerating NC  SpO2 Ranging 91-96%  no resp distress  wean off as tolerated    # Known COPD, bronchitic cough, mild hypoxia = AE COPD  c/w PPI  titrate down steroids as tolerated  Robitussin  Duonebs  Viral panel neg  CXR- vinay hilar infiltrates    # CXR findings= CAP  Rocephin + Zithromax  Legionella   bld c/s testing    # KALPANA  Worsening  TTE showing EF 65-70%  restart fluids  reassess in AM    # Macrocytosis  likely sec to COPD  check B 12 and folate    # Emesis  Antiemetics    # h/o hypothyroid  Severely Low  TFTs w/ elevated TSH  restart daily thyroid medication  endocrine consult for replacement optimization    # HTN cont  cont current regimen  monitor on steroids    #Healthcare Maintenance  DVT PPx- Lovenox  pending ECHO  Dispo - Likely [facility] vs Home pending PT

## 2022-12-01 NOTE — PROGRESS NOTE ADULT - ASSESSMENT
89 y/o F with PMH HTN, [place] s/p cochlear implants, COPD/emphysema, hypothyroidism woke up around 2 AM shaking and 30 mins later emesis and her daughter called EMS. Endocrine follows for hypothyroidism, pt was on LT4 but she states her doctor told her to stop taking it.    1. Hypothyroid - Elevated TSH/low T3  - LT4 increased to 50mcg PO daily on 11/30  - Recheck TFTs in one week  - Discussed with pt about importance of adherence with LT4 medication    2. Acute hypoxic respiratory failure/COPD/CAP  - On steroids  - Duonebs q6hr   - CAP: continue ceftriaxone and azithromycin     3. HTN  - On betablocker   89 y/o F with PMH HTN, [place] s/p cochlear implants, COPD/emphysema, hypothyroidism woke up around 2 AM shaking and 30 mins later emesis and her daughter called EMS. Endocrine follows for hypothyroidism, pt was on LT4 but she states her doctor told her to stop taking it.    1. Hypothyroid - Elevated TSH/low T3  - LT4 increased to 50mcg PO daily on 11/30  - Recheck TFTs in one week  - Discussed with pt about importance of adherence with LT4 medication  - Follow up appointment: 1/13 at 2:00pm [place] office     2. Acute hypoxic respiratory failure/COPD/CAP  - On steroids  - Duonebs q6hr   - CAP: continue ceftriaxone and azithromycin     3. HTN  - On betablocker

## 2022-12-01 NOTE — PROGRESS NOTE ADULT - SUBJECTIVE AND OBJECTIVE BOX
INTERVAL EVENTS:  Follow up hypothyroid management    ROS: Patient denies chest pain, SOB, abd pain, N/V.    MEDICATIONS  (STANDING):  albuterol/ipratropium for Nebulization 3 milliLiter(s) Nebulizer every 6 hours  atorvastatin 40 milliGRAM(s) Oral at bedtime  azithromycin  IVPB 500 milliGRAM(s) IV Intermittent every 24 hours  cefTRIAXone Injectable. 1000 milliGRAM(s) IV Push every 24 hours  enoxaparin Injectable 30 milliGRAM(s) SubCutaneous every 24 hours  levothyroxine 50 MICROGram(s) Oral daily  methylPREDNISolone sodium succinate Injectable 40 milliGRAM(s) IV Push every 12 hours  metoprolol succinate ER 50 milliGRAM(s) Oral daily  pantoprazole    Tablet 40 milliGRAM(s) Oral before breakfast  sodium chloride 0.9%. 1000 milliLiter(s) (75 mL/Hr) IV Continuous <Continuous>  tiotropium 2.5 MICROgram(s) Inhaler 2 Puff(s) Inhalation daily    MEDICATIONS  (PRN):  guaiFENesin Oral Liquid (Sugar-Free) 100 milliGRAM(s) Oral every 6 hours PRN Cough  ondansetron Injectable 4 milliGRAM(s) IV Push every 6 hours PRN Nausea and/or Vomiting    Allergies  codeine (Vomiting)  iodine (Unknown)      Vital Signs Last 24 Hrs  T(C): 37 (01 Dec 2022 10:35), Max: 37 (01 Dec 2022 10:35)  T(F): 98.6 (01 Dec 2022 10:35), Max: 98.6 (01 Dec 2022 10:35)  HR: 78 (01 Dec 2022 10:35) (63 - 78)  BP: 133/79 (01 Dec 2022 10:35) (114/55 - 159/72)  BP(mean): 75 (30 Nov 2022 20:21) (75 - 75)  RR: 18 (01 Dec 2022 10:35) (18 - 19)  SpO2: 95% (01 Dec 2022 10:35) (88% - 98%)    Parameters below as of 01 Dec 2022 10:35  Patient On (Oxygen Delivery Method): nasal cannula  O2 Flow (L/min): 3      PHYSICAL EXAM:  General: No apparent distress, very [place]  Neck: Supple, trachea midline, no thyromegaly  Respiratory: Course bs bilaterally  Cardiac: +S1, S2, no m/r/g  GI: +BS, soft, non tender, non distended  Extremities: No peripheral edema, no pedal lesions  Neuro: A+O X3, no tremor      LABS:                        9.8    13.53 )-----------( 192      ( 01 Dec 2022 05:05 )             29.6     12-01    138  |  101  |  52.0<H>  ----------------------------<  171<H>  4.8   |  24.0  |  2.30<H>    Ca    9.7      01 Dec 2022 05:05  Phos  4.0     12-01  Mg     2.2     12-01      Triiodothyronine, Total (T3 Total): <40 ng/dL (11-30-22 @ 04:38)  Thyroid Stimulating Hormone, Serum: 37.97 uIU/mL (11-30-22 @ 04:38)   INTERVAL EVENTS:  Follow up hypothyroid management.    ROS: Patient denies chest pain, SOB, abd pain, N/V.    MEDICATIONS  (STANDING):  albuterol/ipratropium for Nebulization 3 milliLiter(s) Nebulizer every 6 hours  atorvastatin 40 milliGRAM(s) Oral at bedtime  azithromycin  IVPB 500 milliGRAM(s) IV Intermittent every 24 hours  cefTRIAXone Injectable. 1000 milliGRAM(s) IV Push every 24 hours  enoxaparin Injectable 30 milliGRAM(s) SubCutaneous every 24 hours  levothyroxine 50 MICROGram(s) Oral daily  methylPREDNISolone sodium succinate Injectable 40 milliGRAM(s) IV Push every 12 hours  metoprolol succinate ER 50 milliGRAM(s) Oral daily  pantoprazole    Tablet 40 milliGRAM(s) Oral before breakfast  sodium chloride 0.9%. 1000 milliLiter(s) (75 mL/Hr) IV Continuous <Continuous>  tiotropium 2.5 MICROgram(s) Inhaler 2 Puff(s) Inhalation daily    MEDICATIONS  (PRN):  guaiFENesin Oral Liquid (Sugar-Free) 100 milliGRAM(s) Oral every 6 hours PRN Cough  ondansetron Injectable 4 milliGRAM(s) IV Push every 6 hours PRN Nausea and/or Vomiting    Allergies  codeine (Vomiting)  iodine (Unknown)    Vital Signs Last 24 Hrs  T(C): 37 (01 Dec 2022 10:35), Max: 37 (01 Dec 2022 10:35)  T(F): 98.6 (01 Dec 2022 10:35), Max: 98.6 (01 Dec 2022 10:35)  HR: 78 (01 Dec 2022 10:35) (63 - 78)  BP: 133/79 (01 Dec 2022 10:35) (114/55 - 159/72)  BP(mean): 75 (30 Nov 2022 20:21) (75 - 75)  RR: 18 (01 Dec 2022 10:35) (18 - 19)  SpO2: 95% (01 Dec 2022 10:35) (88% - 98%)    Parameters below as of 01 Dec 2022 10:35  Patient On (Oxygen Delivery Method): nasal cannula  O2 Flow (L/min): 3    PHYSICAL EXAM:  General: No apparent distress, very [place]  Neck: Supple, trachea midline, no thyromegaly  Respiratory: Course bs bilaterally  Cardiac: +S1, S2, no m/r/g  GI: +BS, soft, non tender, non distended  Extremities: No peripheral edema, no pedal lesions  Neuro: A+O X3, no tremor    LABS:                        9.8    13.53 )-----------( 192      ( 01 Dec 2022 05:05 )             29.6     12-01    138  |  101  |  52.0<H>  ----------------------------<  171<H>  4.8   |  24.0  |  2.30<H>    Ca    9.7      01 Dec 2022 05:05  Phos  4.0     12-01  Mg     2.2     12-01    Triiodothyronine, Total (T3 Total): <40 ng/dL (11-30-22 @ 04:38)  Thyroid Stimulating Hormone, Serum: 37.97 uIU/mL (11-30-22 @ 04:38)

## 2022-12-01 NOTE — PROGRESS NOTE ADULT - SUBJECTIVE AND OBJECTIVE BOX
[facility] Division of Hospital Medicine    Chief Complaint:      SUBJECTIVE / OVERNIGHT EVENTS:    Patient denies chest pain, SOB, abd pain, N/V, fever, chills, dysuria or any other complaints. All remainder ROS negative.     MEDICATIONS  (STANDING):  albuterol/ipratropium for Nebulization 3 milliLiter(s) Nebulizer every 6 hours  atorvastatin 40 milliGRAM(s) Oral at bedtime  azithromycin  IVPB 500 milliGRAM(s) IV Intermittent every 24 hours  cefTRIAXone Injectable. 1000 milliGRAM(s) IV Push every 24 hours  enoxaparin Injectable 30 milliGRAM(s) SubCutaneous every 24 hours  levothyroxine 50 MICROGram(s) Oral daily  methylPREDNISolone sodium succinate Injectable 40 milliGRAM(s) IV Push every 12 hours  metoprolol succinate ER 50 milliGRAM(s) Oral daily  pantoprazole    Tablet 40 milliGRAM(s) Oral before breakfast  sodium chloride 0.9%. 1000 milliLiter(s) (75 mL/Hr) IV Continuous <Continuous>  tiotropium 2.5 MICROgram(s) Inhaler 2 Puff(s) Inhalation daily    MEDICATIONS  (PRN):  guaiFENesin Oral Liquid (Sugar-Free) 100 milliGRAM(s) Oral every 6 hours PRN Cough  ondansetron Injectable 4 milliGRAM(s) IV Push every 6 hours PRN Nausea and/or Vomiting        I&O's Summary      PHYSICAL EXAM:  Vital Signs Last 24 Hrs  T(C): 36.8 (01 Dec 2022 09:09), Max: 37 (30 Nov 2022 11:00)  T(F): 98.3 (01 Dec 2022 09:09), Max: 98.6 (30 Nov 2022 11:00)  HR: 63 (01 Dec 2022 09:09) (63 - 76)  BP: 159/72 (01 Dec 2022 09:09) (111/61 - 159/72)  BP(mean): 75 (30 Nov 2022 20:21) (75 - 75)  RR: 18 (01 Dec 2022 09:09) (18 - 19)  SpO2: 93% (01 Dec 2022 09:09) (88% - 98%)    Parameters below as of 01 Dec 2022 09:09  Patient On (Oxygen Delivery Method): nasal cannula  O2 Flow (L/min): 3        GENERAL: Obese, sitting in bed with HOB elevated, NC O2 3 L on, spitting up, mild resp effort, no distress, able to converse well, cough + bronchitic sounding  HEAD:  Atraumatic, Normocephalic  EYES: EOMI, PERRLA, conjunctiva and sclera clear  NECK: Supple, No JVD, Normal thyroid  NERVOUS SYSTEM:  Alert & Oriented X 3, Motor Strength 5/5 B/L upper and lower extremities  CHEST/LUNG: coarse breath sounds over bronchi antrly and interscapular postrly L>R; No rales, rhonchi, wheezing, or rubs  HEART: Regular rate and rhythm; No murmurs, rubs, or gallops  ABDOMEN: Soft, Nontender, Nondistended; Bowel sounds present  EXTREMITIES:  2+ Peripheral Pulses, No clubbing, cyanosis, or edema  SKIN: No rashes or lesions  LABS:                        9.8    13.53 )-----------( 192      ( 01 Dec 2022 05:05 )             29.6     12-01    138  |  101  |  52.0<H>  ----------------------------<  171<H>  4.8   |  24.0  |  2.30<H>    Ca    9.7      01 Dec 2022 05:05  Phos  4.0     12-01  Mg     2.2     12-01                Culture - Blood (collected 29 Nov 2022 07:31)  Source: .Blood Blood  Preliminary Report (30 Nov 2022 14:02):    No growth to date.    Culture - Blood (collected 29 Nov 2022 06:38)  Source: .Blood Blood  Preliminary Report (30 Nov 2022 14:02):    No growth to date.      CAPILLARY BLOOD GLUCOSE            RADIOLOGY & ADDITIONAL TESTS:  Results Reviewed: y  Imaging Personally Reviewed: y  Electrocardiogram Personally Reviewed: tran

## 2022-12-02 ENCOUNTER — TRANSCRIPTION ENCOUNTER (OUTPATIENT)
Age: 87
End: 2022-12-02

## 2022-12-02 ENCOUNTER — APPOINTMENT (OUTPATIENT)
Dept: PULMONOLOGY | Facility: CLINIC | Age: 87
End: 2022-12-02

## 2022-12-02 LAB
ANION GAP SERPL CALC-SCNC: 15 MMOL/L — SIGNIFICANT CHANGE UP (ref 5–17)
APPEARANCE UR: CLEAR — SIGNIFICANT CHANGE UP
BACTERIA # UR AUTO: ABNORMAL
BILIRUB UR-MCNC: NEGATIVE — SIGNIFICANT CHANGE UP
BUN SERPL-MCNC: 45.7 MG/DL — HIGH (ref 8–20)
CALCIUM SERPL-MCNC: 9.3 MG/DL — SIGNIFICANT CHANGE UP (ref 8.4–10.5)
CHLORIDE SERPL-SCNC: 101 MMOL/L — SIGNIFICANT CHANGE UP (ref 96–108)
CO2 SERPL-SCNC: 17 MMOL/L — LOW (ref 22–29)
COLOR SPEC: YELLOW — SIGNIFICANT CHANGE UP
CREAT SERPL-MCNC: 1.6 MG/DL — HIGH (ref 0.5–1.3)
DIFF PNL FLD: ABNORMAL
EGFR: 30 ML/MIN/1.73M2 — LOW
EPI CELLS # UR: SIGNIFICANT CHANGE UP
GLUCOSE SERPL-MCNC: 153 MG/DL — HIGH (ref 70–99)
GLUCOSE UR QL: NEGATIVE MG/DL — SIGNIFICANT CHANGE UP
HCT VFR BLD CALC: 33.2 % — LOW (ref 34.5–45)
HGB BLD-MCNC: 10.3 G/DL — LOW (ref 11.5–15.5)
KETONES UR-MCNC: NEGATIVE — SIGNIFICANT CHANGE UP
LEUKOCYTE ESTERASE UR-ACNC: ABNORMAL
MCHC RBC-ENTMCNC: 31 GM/DL — LOW (ref 32–36)
MCHC RBC-ENTMCNC: 33.6 PG — SIGNIFICANT CHANGE UP (ref 27–34)
MCV RBC AUTO: 108.1 FL — HIGH (ref 80–100)
NITRITE UR-MCNC: NEGATIVE — SIGNIFICANT CHANGE UP
PH UR: 5 — SIGNIFICANT CHANGE UP (ref 5–8)
PLATELET # BLD AUTO: SIGNIFICANT CHANGE UP K/UL (ref 150–400)
POTASSIUM SERPL-MCNC: 4.8 MMOL/L — SIGNIFICANT CHANGE UP (ref 3.5–5.3)
POTASSIUM SERPL-SCNC: 4.8 MMOL/L — SIGNIFICANT CHANGE UP (ref 3.5–5.3)
PROT UR-MCNC: 15
RBC # BLD: 3.07 M/UL — LOW (ref 3.8–5.2)
RBC # FLD: 15 % — HIGH (ref 10.3–14.5)
RBC CASTS # UR COMP ASSIST: SIGNIFICANT CHANGE UP /HPF (ref 0–4)
SODIUM SERPL-SCNC: 133 MMOL/L — LOW (ref 135–145)
SP GR SPEC: 1.01 — SIGNIFICANT CHANGE UP (ref 1.01–1.02)
UROBILINOGEN FLD QL: NEGATIVE MG/DL — SIGNIFICANT CHANGE UP
WBC # BLD: 13.1 K/UL — HIGH (ref 3.8–10.5)
WBC # FLD AUTO: 13.1 K/UL — HIGH (ref 3.8–10.5)
WBC UR QL: SIGNIFICANT CHANGE UP /HPF (ref 0–5)

## 2022-12-02 PROCEDURE — 99233 SBSQ HOSP IP/OBS HIGH 50: CPT

## 2022-12-02 RX ORDER — LEVOTHYROXINE SODIUM 125 MCG
1 TABLET ORAL
Qty: 0 | Refills: 0 | DISCHARGE

## 2022-12-02 RX ORDER — SACCHAROMYCES BOULARDII 250 MG
250 POWDER IN PACKET (EA) ORAL
Refills: 0 | Status: DISCONTINUED | OUTPATIENT
Start: 2022-12-02 | End: 2022-12-07

## 2022-12-02 RX ORDER — LOSARTAN POTASSIUM 100 MG/1
1 TABLET, FILM COATED ORAL
Qty: 0 | Refills: 0 | DISCHARGE

## 2022-12-02 RX ORDER — LEVOTHYROXINE SODIUM 125 MCG
1 TABLET ORAL
Qty: 90 | Refills: 0
Start: 2022-12-02 | End: 2023-03-01

## 2022-12-02 RX ORDER — CEFPODOXIME PROXETIL 100 MG
1 TABLET ORAL
Qty: 8 | Refills: 0
Start: 2022-12-02 | End: 2022-12-05

## 2022-12-02 RX ADMIN — Medication 40 MILLIGRAM(S): at 22:17

## 2022-12-02 RX ADMIN — TIOTROPIUM BROMIDE 2 PUFF(S): 18 CAPSULE ORAL; RESPIRATORY (INHALATION) at 08:49

## 2022-12-02 RX ADMIN — Medication 100 MILLIGRAM(S): at 10:39

## 2022-12-02 RX ADMIN — Medication 250 MILLIGRAM(S): at 18:28

## 2022-12-02 RX ADMIN — Medication 3 MILLILITER(S): at 14:45

## 2022-12-02 RX ADMIN — SODIUM CHLORIDE 100 MILLILITER(S): 9 INJECTION INTRAMUSCULAR; INTRAVENOUS; SUBCUTANEOUS at 06:29

## 2022-12-02 RX ADMIN — ENOXAPARIN SODIUM 30 MILLIGRAM(S): 100 INJECTION SUBCUTANEOUS at 18:28

## 2022-12-02 RX ADMIN — Medication 50 MILLIGRAM(S): at 05:52

## 2022-12-02 RX ADMIN — ATORVASTATIN CALCIUM 40 MILLIGRAM(S): 80 TABLET, FILM COATED ORAL at 22:14

## 2022-12-02 RX ADMIN — Medication 100 MILLIGRAM(S): at 18:49

## 2022-12-02 RX ADMIN — AZITHROMYCIN 255 MILLIGRAM(S): 500 TABLET, FILM COATED ORAL at 18:28

## 2022-12-02 RX ADMIN — Medication 3 MILLILITER(S): at 04:30

## 2022-12-02 RX ADMIN — CEFTRIAXONE 1000 MILLIGRAM(S): 500 INJECTION, POWDER, FOR SOLUTION INTRAMUSCULAR; INTRAVENOUS at 05:53

## 2022-12-02 RX ADMIN — Medication 50 MICROGRAM(S): at 05:52

## 2022-12-02 RX ADMIN — PANTOPRAZOLE SODIUM 40 MILLIGRAM(S): 20 TABLET, DELAYED RELEASE ORAL at 05:53

## 2022-12-02 RX ADMIN — Medication 3 MILLILITER(S): at 08:48

## 2022-12-02 RX ADMIN — Medication 40 MILLIGRAM(S): at 05:53

## 2022-12-02 RX ADMIN — Medication 3 MILLILITER(S): at 20:50

## 2022-12-02 NOTE — DISCHARGE NOTE PROVIDER - PROVIDER TOKENS
PROVIDER:[TOKEN:[[id number]],FOLLOWUP:[2 weeks]] PROVIDER:[TOKEN:[[id number]],FOLLOWUP:[2 weeks]],PROVIDER:[TOKEN:[[id number]]] PROVIDER:[TOKEN:[[id number]],FOLLOWUP:[2 weeks]],PROVIDER:[TOKEN:[[id number]]],PROVIDER:[TOKEN:[[id number]]]

## 2022-12-02 NOTE — DISCHARGE NOTE PROVIDER - NSDCCPCAREPLAN_GEN_ALL_CORE_FT
PRINCIPAL DISCHARGE DIAGNOSIS  Diagnosis: LLL pneumonia  Assessment and Plan of Treatment: # Acute Hypoxic Resp failure 2/2 COPD exacerbation and LLE PNA  Stable  tolerating NC  SpO2 Ranging 91-96%  no resp distress  Home oxygen continuously  c/w Cefpodoxime outpatient  # Known COPD, bronchitic cough, mild hypoxia = AE COPD  c/w PPI  c/w Prednisone 40mg for total 5 days  c/w home inhalers as prescribed  # KALPANA  Resolved likely 2/2 dehydration  Must drink more water daily  # h/o hypothyroid  take 50mcg synthroid daily  f/u w Endocrine outpatient  # HTN cont  can restart losartan outpatient      SECONDARY DISCHARGE DIAGNOSES  Diagnosis: Hypoxia  Assessment and Plan of Treatment:      PRINCIPAL DISCHARGE DIAGNOSIS  Diagnosis: LLL pneumonia  Assessment and Plan of Treatment: # Acute Hypoxic Resp failure 2/2 COPD exacerbation and LLE PNA  Stable  no resp distress  Home oxygen continuously  # Known COPD, bronchitic cough, mild hypoxia = AE COPD  c/w PPI  c/w Prednisone taper  c/w home inhalers as prescribed  # KALPANA  Resolved likely 2/2 dehydration  Must drink more water daily  # h/o hypothyroid  take 100 mcg synthroid daily  f/u w Endocrine outpatient  # HTN cont  can restart losartan outpatient      SECONDARY DISCHARGE DIAGNOSES  Diagnosis: Hypoxia  Assessment and Plan of Treatment:      PRINCIPAL DISCHARGE DIAGNOSIS  Diagnosis: Acute respiratory failure with hypoxia  Assessment and Plan of Treatment:       SECONDARY DISCHARGE DIAGNOSES  Diagnosis: Hypothyroidism  Assessment and Plan of Treatment:     Diagnosis: KALPANA (acute kidney injury)  Assessment and Plan of Treatment:     Diagnosis: Acute on chronic diastolic congestive heart failure  Assessment and Plan of Treatment:     Diagnosis: Urinary retention  Assessment and Plan of Treatment:     Diagnosis: Paroxysmal atrial fibrillation  Assessment and Plan of Treatment:     Diagnosis: Hiatal hernia  Assessment and Plan of Treatment:     Diagnosis: Anemia due to acute blood loss  Assessment and Plan of Treatment:      PRINCIPAL DISCHARGE DIAGNOSIS  Diagnosis: Acute respiratory failure with hypoxia  Assessment and Plan of Treatment: Complete antibiotic course.  Follow up with primary doctor.      SECONDARY DISCHARGE DIAGNOSES  Diagnosis: Hypothyroidism  Assessment and Plan of Treatment: Continue current dose of Synthroid.  Will need repeat TFTs in 4 weeks.  Follow up with primary doctor and endocrinology 1/13/23 at 2pm at [facility].    Diagnosis: Acute on chronic diastolic congestive heart failure  Assessment and Plan of Treatment: Continue current medications as prescribed.  Follow up with primary doctor and cardiology.    Diagnosis: Urinary retention  Assessment and Plan of Treatment: Continue Espinal until more ambulatory. Failed TOV.    Diagnosis: Paroxysmal atrial fibrillation  Assessment and Plan of Treatment: Continue current medications as prescribed.  No AC given anemia and positive occult blood.    Diagnosis: Anemia due to acute blood loss  Assessment and Plan of Treatment: Follow up with primary doctor.    Diagnosis: Hiatal hernia  Assessment and Plan of Treatment: Continue current medications.

## 2022-12-02 NOTE — PROGRESS NOTE ADULT - SUBJECTIVE AND OBJECTIVE BOX
INTERVAL EVENTS:  Follow up hypothyroid management     ROS: Patient denies chest pain, SOB, abd pain, N/V. +fatigue    MEDICATIONS  (STANDING):  albuterol/ipratropium for Nebulization 3 milliLiter(s) Nebulizer every 6 hours  atorvastatin 40 milliGRAM(s) Oral at bedtime  azithromycin  IVPB 500 milliGRAM(s) IV Intermittent every 24 hours  cefTRIAXone Injectable. 1000 milliGRAM(s) IV Push every 24 hours  enoxaparin Injectable 30 milliGRAM(s) SubCutaneous every 24 hours  levothyroxine 50 MICROGram(s) Oral daily  metoprolol succinate ER 50 milliGRAM(s) Oral daily  pantoprazole    Tablet 40 milliGRAM(s) Oral before breakfast  predniSONE   Tablet 40 milliGRAM(s) Oral daily  saccharomyces boulardii 250 milliGRAM(s) Oral two times a day  sodium chloride 0.9%. 1000 milliLiter(s) (100 mL/Hr) IV Continuous <Continuous>    MEDICATIONS  (PRN):  guaiFENesin Oral Liquid (Sugar-Free) 100 milliGRAM(s) Oral every 6 hours PRN Cough  ondansetron Injectable 4 milliGRAM(s) IV Push every 6 hours PRN Nausea and/or Vomiting    Allergies  codeine (Vomiting)  iodine (Unknown)      Vital Signs Last 24 Hrs  T(C): 36.7 (02 Dec 2022 08:38), Max: 37.1 (01 Dec 2022 16:17)  T(F): 98.1 (02 Dec 2022 08:38), Max: 98.7 (01 Dec 2022 16:17)  HR: 68 (02 Dec 2022 14:51) (60 - 76)  BP: 167/78 (02 Dec 2022 08:38) (102/73 - 167/78)  BP(mean): --  RR: 19 (02 Dec 2022 08:38) (18 - 19)  SpO2: 94% (02 Dec 2022 14:51) (91% - 100%)    Parameters below as of 02 Dec 2022 14:51  Patient On (Oxygen Delivery Method): nasal cannula,3l      PHYSICAL EXAM:  General: No apparent distress  Neck: Supple, trachea midline, no thyromegaly  Respiratory: Course bs, cough  Cardiac: +S1, S2, no m/r/g  GI: +BS, soft, non tender, non distended  Extremities: No peripheral edema, no pedal lesions  Neuro: A+O X3, no tremor      LABS:                        10.3   13.10 )-----------( Clumped    ( 02 Dec 2022 06:55 )             33.2     12    133<L>  |  101  |  45.7<H>  ----------------------------<  153<H>  4.8   |  17.0<L>  |  1.60<H>    Ca    9.3      02 Dec 2022 06:55  Phos  4.0       Mg     2.2           Urinalysis Basic - ( 02 Dec 2022 01:45 )    Color: Yellow / Appearance: Clear / S.015 / pH: x  Gluc: x / Ketone: Negative  / Bili: Negative / Urobili: Negative mg/dL   Blood: x / Protein: 15 / Nitrite: Negative   Leuk Esterase: Moderate / RBC: 0-2 /HPF / WBC 3-5 /HPF   Sq Epi: x / Non Sq Epi: Few / Bacteria: Occasional      Triiodothyronine, Total (T3 Total): <40 ng/dL (22 @ 04:38)  Thyroid Stimulating Hormone, Serum: 37.97 uIU/mL (22 @ 04:38)

## 2022-12-02 NOTE — DISCHARGE NOTE PROVIDER - ATTENDING DISCHARGE PHYSICAL EXAMINATION:
Vital Signs Last 24 Hrs  T(C): 36.6 (22 Dec 2022 08:34), Max: 36.8 (22 Dec 2022 00:29)  T(F): 97.9 (22 Dec 2022 08:34), Max: 98.2 (22 Dec 2022 00:29)  HR: 70 (22 Dec 2022 08:34) (70 - 83)  BP: 120/66 (22 Dec 2022 08:34) (120/66 - 151/79)  BP(mean): --  RR: 18 (22 Dec 2022 08:34) (18 - 18)  SpO2: 98% (22 Dec 2022 08:34) (95% - 98%)    Parameters below as of 22 Dec 2022 08:34  Patient On (Oxygen Delivery Method): room air        PHYSICAL EXAM:    GENERAL: NAD, AOX3  HEAD:  Atraumatic, Normocephalic  EYES:conjunctiva and sclera clear  ENMT: Moist mucous membranes  NECK: Supple  CHEST/LUNG: Clear to auscultation bilaterally; No rales, rhonchi, wheezing, or rubs  HEART: Regular rate and rhythm; No murmurs, rubs, or gallops  ABDOMEN: Soft, Nontender, Nondistended; Bowel sounds present  + henson   EXTREMITIES:  2+ Peripheral Pulses, No clubbing, cyanosis, or edema

## 2022-12-02 NOTE — DISCHARGE NOTE PROVIDER - NSDCFUSCHEDAPPT_GEN_ALL_CORE_FT
Kirti Victor  [facility] Physician [facility]  [id number]  Scheduled Appointment: 01/13/2023

## 2022-12-02 NOTE — DISCHARGE NOTE PROVIDER - NSDCMRMEDTOKEN_GEN_ALL_CORE_FT
cefpodoxime 200 mg oral tablet: 1 tab(s) orally 2 times a day   levothyroxine 50 mcg (0.05 mg) oral tablet: 1 tab(s) orally once a day  metoprolol succinate 50 mg oral tablet, extended release: 1 tab(s) orally once a day  predniSONE 20 mg oral tablet: 2 tab(s) orally once a day  Protonix 40 mg oral delayed release tablet: 1 tab(s) orally once a day  rosuvastatin 10 mg oral capsule: 1 cap(s) orally once a day (at bedtime)  Spiriva 18 mcg inhalation capsule: 1 cap(s) inhaled once a day  Trelegy Ellipta 100 mcg-62.5 mcg-25 mcg/inh inhalation powder: 1 puff(s) inhaled once a day   cefpodoxime 200 mg oral tablet: 1 tab(s) orally 2 times a day   hydrALAZINE 25 mg oral tablet: 1 tab(s) orally 4 times a day  levothyroxine 100 mcg (0.1 mg) oral tablet: 1 tab(s) orally once a day  losartan 100 mg oral tablet: 1 tab(s) orally once a day  metoprolol succinate 50 mg oral tablet, extended release: 1 tab(s) orally once a day  predniSONE 10 mg oral tablet: 3 tab(s) orally once a day fpr 2 days  2 tabs once a day fpr 7 days   1 tab once a day   Protonix 40 mg oral delayed release tablet: 1 tab(s) orally once a day  rosuvastatin 10 mg oral capsule: 1 cap(s) orally once a day (at bedtime)  Spiriva 18 mcg inhalation capsule: 1 cap(s) inhaled once a day  Trelegy Ellipta 100 mcg-62.5 mcg-25 mcg/inh inhalation powder: 1 puff(s) inhaled once a day   hydrALAZINE 25 mg oral tablet: 1 tab(s) orally 4 times a day  levothyroxine 112 mcg (0.112 mg) oral tablet: 1 tab(s) orally once a day  metoprolol succinate 25 mg oral tablet, extended release: 1 tab(s) orally once a day  nystatin 100,000 units/mL oral suspension: 5 milliliter(s) orally 4 times a day  polyethylene glycol 3350 oral powder for reconstitution: 17 gram(s) orally once a day  Protonix 40 mg oral delayed release tablet: 1 tab(s) orally once a day  rosuvastatin 10 mg oral capsule: 1 cap(s) orally once a day (at bedtime)  senna leaf extract oral tablet: 2 tab(s) orally once a day  Spiriva 18 mcg inhalation capsule: 1 cap(s) inhaled once a day  tamsulosin 0.4 mg oral capsule: 1 cap(s) orally once a day (at bedtime)  Trelegy Ellipta 100 mcg-62.5 mcg-25 mcg/inh inhalation powder: 1 puff(s) inhaled once a day

## 2022-12-02 NOTE — DISCHARGE NOTE PROVIDER - NSDCPNSUBOBJ_GEN_ALL_CORE
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 She has been having cough, without any change. Denies SOB, CP, fever, abd pain, constipation, N, sick contact. Admits to overeating last night. states she was on thyroid meds which her PMD stopped. and KCL was increased recently.  In Triage . In ED noted O2 saturation 88- 91%.     SH- smoke 1ppd or more since age 15 yrs to 50 yrs. Quit 40 yrs ago, denies other toxic habits  FH- states unaware of any medical condition (29 Nov 2022 09:15)    The patient was seen and evaluated   The patient is in no acute distress.  Denied any fever chest pain, palpitations, shortness of breath, abdominal pain, fever, dysuria, cough, edema   Complains of     I&O's Summary    Allergies    codeine (Vomiting)  iodine (Unknown)    Intolerances      HEALTH ISSUES - PROBLEM Dx:        PAST MEDICAL & SURGICAL HISTORY:  Essential hypertension      Chronic obstructive pulmonary disease, unspecified COPD type      Hypothyroidism, unspecified type      H/O exploratory laparotomy  for a benign colon mass 4/19/12              Vital Signs Last 24 Hrs  T(C): 36.8 (07 Dec 2022 05:40), Max: 36.8 (06 Dec 2022 09:49)  T(F): 98.3 (07 Dec 2022 05:40), Max: 98.3 (07 Dec 2022 05:40)  HR: 68 (07 Dec 2022 05:40) (66 - 106)  BP: 161/79 (07 Dec 2022 05:40) (121/66 - 161/79)  BP(mean): --  RR: 18 (07 Dec 2022 05:40) (18 - 18)  SpO2: 93% (07 Dec 2022 05:40) (93% - 98%)    Parameters below as of 07 Dec 2022 05:40  Patient On (Oxygen Delivery Method): nasal cannula    T(C): 36.8 (12-07-22 @ 05:40), Max: 36.8 (12-06-22 @ 09:49)  HR: 68 (12-07-22 @ 05:40) (66 - 106)  BP: 161/79 (12-07-22 @ 05:40) (121/66 - 161/79)  RR: 18 (12-07-22 @ 05:40) (18 - 18)  SpO2: 93% (12-07-22 @ 05:40) (93% - 98%)  Wt(kg): --    PHYSICAL EXAM:    GENERAL: NAD, elderly conformable conversing sitting in bed eating  HEAD:  Atraumatic, Normocephalic  EYES: EOMI, PERRL, conjunctiva and sclera clear  ENMT:  Moist mucous membranes,  No lesions  NECK: Supple, No JVD, Normal thyroid  NERVOUS SYSTEM:  Alert & Oriented X3,  Moves upper and lower extremities; CNS-II-XII  CHEST/LUNG: Clear to auscultation bilaterally; No rales, rhonchi, wheezing,   HEART: Regular rate and rhythm; No murmurs,   ABDOMEN: Soft, Nontender, Nondistended; Bowel sounds present  EXTREMITIES: NO edema  SKIN: No rashes or lesions  psychiatry- mood and affect appropriate,     albuterol/ipratropium for Nebulization 3 milliLiter(s) Nebulizer every 6 hours  albuterol/ipratropium for Nebulization. 3 milliLiter(s) Nebulizer once  albuterol/ipratropium for Nebulization. 3 milliLiter(s) Nebulizer once  atorvastatin 40 milliGRAM(s) Oral at bedtime  enoxaparin Injectable 30 milliGRAM(s) SubCutaneous every 24 hours  guaiFENesin Oral Liquid (Sugar-Free) 100 milliGRAM(s) Oral every 6 hours PRN  hydrALAZINE 25 milliGRAM(s) Oral four times a day  levothyroxine 100 MICROGram(s) Oral daily  metoprolol succinate ER 50 milliGRAM(s) Oral daily  ondansetron Injectable 4 milliGRAM(s) IV Push every 6 hours PRN  pantoprazole    Tablet 40 milliGRAM(s) Oral before breakfast  predniSONE   Tablet 30 milliGRAM(s) Oral daily      LABS:                          12.0   10.98 )-----------( 251      ( 07 Dec 2022 06:53 )             36.6     12-07    140  |  101  |  40.0<H>  ----------------------------<  111<H>  4.3   |  25.0  |  1.17    Ca    9.6      07 Dec 2022 06:53    TPro  7.2  /  Alb  4.2  /  TBili  0.6  /  DBili  x   /  AST  23  /  ALT  36<H>  /  AlkPhos  76  12-07    LIVER FUNCTIONS - ( 07 Dec 2022 06:53 )  Alb: 4.2 g/dL / Pro: 7.2 g/dL / ALK PHOS: 76 U/L / ALT: 36 U/L / AST: 23 U/L / GGT: x           Case discussed with consultant/provider/ family /patient

## 2022-12-02 NOTE — PROGRESS NOTE ADULT - ASSESSMENT
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 She has been having cough, without any change. Denies SOB, CP, fever, abd pain, constipation, N, sick contact. Admits to overeating last night.   In Triage . In ED noted O2 saturation 88- 91%.       # Acute Hypoxic Resp failure  Stable  tolerating NC  SpO2 Ranging 91-96%  no resp distress  wean off as tolerated    # Known COPD, bronchitic cough, mild hypoxia = AE COPD  c/w PPI  titrate down steroids as tolerated  Robitussin  Duonebs  Viral panel neg  CXR- vinay hilar infiltrates    # CXR findings= CAP  Rocephin + Zithromax  Legionella   bld c/s testing    # KALPANA  Worsening  TTE showing EF 65-70%  restart fluids  reassess in AM    # Macrocytosis  likely sec to COPD  check B 12 and folate    # Emesis  Antiemetics    # h/o hypothyroid  Severely Low  TFTs w/ elevated TSH  restart daily thyroid medication  endocrine consult for replacement optimization    # HTN cont  cont current regimen  monitor on steroids    #Healthcare Maintenance  DVT PPx- Lovenox  pending ECHO  Dispo - Likely Home w/ home oxygen    Spoke to Daughter (real number - [phone]) - OK w/ plan and potential

## 2022-12-02 NOTE — DISCHARGE NOTE PROVIDER - ATTENDING ATTESTATION STATEMENT
I have personally seen and examined the patient. I have collaborated with and supervised the [Alert] : alert [No Acute Distress] : no acute distress [Playful] : playful [Conjunctivae with no discharge] : conjunctivae with no discharge [PERRL] : PERRL [Normocephalic] : normocephalic [EOMI Bilateral] : EOMI bilateral [Auricles Well Formed] : auricles well formed [Clear Tympanic membranes with present light reflex and bony landmarks] : clear tympanic membranes with present light reflex and bony landmarks [No Discharge] : no discharge [Nares Patent] : nares patent [Pink Nasal Mucosa] : pink nasal mucosa [Uvula Midline] : uvula midline [Palate Intact] : palate intact [No Caries] : no caries [Nonerythematous Oropharynx] : nonerythematous oropharynx [Trachea Midline] : trachea midline [Supple, full passive range of motion] : supple, full passive range of motion [No Palpable Masses] : no palpable masses [Symmetric Chest Rise] : symmetric chest rise [Clear to Ausculatation Bilaterally] : clear to auscultation bilaterally [Normoactive Precordium] : normoactive precordium [Regular Rate and Rhythm] : regular rate and rhythm [No Murmurs] : no murmurs [Normal S1, S2 present] : normal S1, S2 present [+2 Femoral Pulses] : +2 femoral pulses [Soft] : soft [NonTender] : non tender [Non Distended] : non distended [Normoactive Bowel Sounds] : normoactive bowel sounds [No Splenomegaly] : no splenomegaly [No Hepatomegaly] : no hepatomegaly [Cody 1] : Cody 1 [Testicles Descended Bilaterally] : testicles descended bilaterally [Central Urethral Opening] : central urethral opening [No Abnormal Lymph Nodes Palpated] : no abnormal lymph nodes palpated [Symmetric Hip Rotation] : symmetric hip rotation [Symmetric Buttocks Creases] : symmetric buttocks creases [No Gait Asymmetry] : no gait asymmetry [No pain or deformities with palpation of bone, muscles, joints] : no pain or deformities with palpation of bone, muscles, joints [Normal Muscle Tone] : normal muscle tone [No Spinal Dimple] : no spinal dimple [NoTuft of Hair] : no tuft of hair [Straight] : straight [Cranial Nerves Grossly Intact] : cranial nerves grossly intact [+2 Patella DTR] : +2 patella DTR [No Rash or Lesions] : no rash or lesions

## 2022-12-02 NOTE — DISCHARGE NOTE PROVIDER - DETAILS OF MALNUTRITION DIAGNOSIS/DIAGNOSES
This patient has been assessed with a concern for Malnutrition and was treated during this hospitalization for the following Nutrition diagnosis/diagnoses:     -  12/08/2022: Moderate protein-calorie malnutrition

## 2022-12-02 NOTE — DISCHARGE NOTE PROVIDER - HOSPITAL COURSE
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 She has been having cough, without any change. Denies SOB, CP, fever, abd pain, constipation, N, sick contact. Admits to overeating last night. states she was on thyroid meds which her PMD stopped. and KCL was increased recently.  In Triage . In ED noted O2 saturation 88- 91%.     Admitted to medicine, titrated steroids down to PO. Home oxygen eval performed. Pt deemed fit for DC home w/ f/u w/ endocrine and PMD. 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 She has been having cough, without any change. Denies SOB, CP, fever, abd pain, constipation, N, sick contact.     Patient states she was on thyroid meds which her PMD stopped.- labs here showed severe hypothyroidism- endocrine consulted- Synthroid increased to 100 microgram- TSH was 53.3  and her KCL was increased recently.  on admission . In ED noted hypoxic O2 saturation 88- 91%. - treated with antibiotics and IVF-with improvement - no longer hypoxic and renal function better  thought to be fluid overload 12/5and lasix given for 2 days- will stop now   today 12/7 is euvolemic - no edema- lungs clear   titrated steroids down to PO.   Home oxygen eval performed. Pt deemed fit for DC home w/ f/u w/ endocrine and PMD  Dx acute hypoxic resp failure with community acquired pneumonia and COPD exacerbation and ARF  ttd with IV abx and steroids now being tapered oral, euvolemic off fluids,   severe hypothyroidism- synthroid increased - this will probably help her get rid of free water- no need for Lasix on discharge-  Bp on slightly high side- hydralazine increased to 4 times a day   . 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 She has been having cough, without any change. Denies SOB, CP, fever, abd pain, constipation, N, sick contact.     Patient states she was on thyroid meds which her PMD stopped.- labs here showed severe hypothyroidism- endocrine consulted- Synthroid increased to 100 microgram- TSH was 53.3  and her KCL was increased recently.  on admission . In ED noted hypoxic O2 saturation 88- 91%. - treated with antibiotics and IVF-with improvement - no longer hypoxic and renal function better  thought to be fluid overload 12/5and lasix given for 2 days- will stop now   today 12/7 is euvolemic - no edema- lungs clear   titrated steroids down to PO.   Home oxygen eval performed. Pt deemed fit for DC home w/ f/u w/ endocrine and PMD  Dx acute hypoxic resp failure with community acquired pneumonia and COPD exacerbation and ARF  ttd with IV abx and steroids now being tapered oral, euvolemic off fluids,   severe hypothyroidism- synthroid increased - this will probably help her get rid of free water- no need for Lasix on discharge-  Bp on slightly high side- hydralazine increased to 4 times a day     discussed with daughter Andressa  . The patient is a 89 year old female with a past medical history of hypertension, COPD not on home oxygen and hypothyroidism who presented to the ER with complaints of shaking and emesis. Admitted for acute hypoxic respiratory failure secondary to pneumonia and acute on chronic diastolic heart failure. Started on IV antibiotics and diuresis with improvement.  Noted to have severe hypothyroidism secondary to non compliance. Seen by endocrinology and started on PO synthroid. Patient admitted for management of acute hypoxic respiratory faliure in the setting of COPD, pneumonia and CHF exacerbation. Patient was treated with IV abx. Found to have  Stenotrophomonas and completed course of bactrim. Hospital c/b AF w/ RVR and started on toprol. Not on AC due to anemia and positive guaiac. Patient also found to have suppressed TFT The patient is a 89 year old female with a past medical history of hypertension, COPD not on home oxygen and hypothyroidism who presented to the ER with complaints of shaking and emesis. Admitted for acute hypoxic respiratory failure secondary to pneumonia and acute on chronic diastolic heart failure. Started on IV antibiotics and diuresis with improvement.  Noted to have severe hypothyroidism secondary to non compliance. Seen by endocrinology and started on PO synthroid. Patient admitted for management of acute hypoxic respiratory faliure in the setting of COPD, pneumonia and CHF exacerbation. Patient was treated with IV abx. Found to have  Stenotrophomonas and completed course of bactrim. Hospital c/b AF w/ RVR and started on toprol. Not on AC due to anemia and positive guaiac. Patient also found to have suppressed Thyroid level and her synthroid was subsequently increased. Patient medically optimized for discharge to [facility]. The patient is a 89 year old female with a past medical history of hypertension, COPD not on home oxygen and hypothyroidism who presented to the ER with complaints of shaking and emesis. Patient admitted for management of acute hypoxic respiratory failure in the setting of COPD, pneumonia and CHF exacerbation. Patient was treated with IV abx. Found to have Stenotrophomonas and completed course of bactrim. Hospital c/b AF w/ RVR and started on toprol. Not on AC due to anemia and positive guaiac. Patient also found to have suppressed Thyroid level and her synthroid was subsequently increased. Patient followed by endocrinology. Patient medically optimized for discharge to [facility].

## 2022-12-02 NOTE — PROGRESS NOTE ADULT - SUBJECTIVE AND OBJECTIVE BOX
[facility] Division of Hospital Medicine    Chief Complaint:      SUBJECTIVE / OVERNIGHT EVENTS:    Patient denies chest pain, SOB, abd pain, N/V, fever, chills, dysuria or any other complaints. All remainder ROS negative.     MEDICATIONS  (STANDING):  albuterol/ipratropium for Nebulization 3 milliLiter(s) Nebulizer every 6 hours  atorvastatin 40 milliGRAM(s) Oral at bedtime  azithromycin  IVPB 500 milliGRAM(s) IV Intermittent every 24 hours  cefTRIAXone Injectable. 1000 milliGRAM(s) IV Push every 24 hours  enoxaparin Injectable 30 milliGRAM(s) SubCutaneous every 24 hours  levothyroxine 50 MICROGram(s) Oral daily  metoprolol succinate ER 50 milliGRAM(s) Oral daily  pantoprazole    Tablet 40 milliGRAM(s) Oral before breakfast  predniSONE   Tablet 40 milliGRAM(s) Oral daily  saccharomyces boulardii 250 milliGRAM(s) Oral two times a day  sodium chloride 0.9%. 1000 milliLiter(s) (100 mL/Hr) IV Continuous <Continuous>    MEDICATIONS  (PRN):  guaiFENesin Oral Liquid (Sugar-Free) 100 milliGRAM(s) Oral every 6 hours PRN Cough  ondansetron Injectable 4 milliGRAM(s) IV Push every 6 hours PRN Nausea and/or Vomiting        I&O's Summary      PHYSICAL EXAM:  Vital Signs Last 24 Hrs  T(C): 36.7 (02 Dec 2022 08:38), Max: 37.1 (01 Dec 2022 16:17)  T(F): 98.1 (02 Dec 2022 08:38), Max: 98.7 (01 Dec 2022 16:17)  HR: 60 (02 Dec 2022 08:50) (60 - 76)  BP: 167/78 (02 Dec 2022 08:38) (102/73 - 167/78)  BP(mean): --  RR: 19 (02 Dec 2022 08:38) (18 - 19)  SpO2: 93% (02 Dec 2022 08:50) (91% - 100%)    Parameters below as of 02 Dec 2022 08:50  Patient On (Oxygen Delivery Method): nasal cannula,5L          GENERAL: Obese, sitting in bed with HOB elevated, NC O2 3 L on, spitting up, mild resp effort, no distress, able to converse well, cough + bronchitic sounding  HEAD:  Atraumatic, Normocephalic  EYES: EOMI, PERRLA, conjunctiva and sclera clear  NECK: Supple, No JVD, Normal thyroid  NERVOUS SYSTEM:  Alert & Oriented X 3, Motor Strength 5/5 B/L upper and lower extremities  CHEST/LUNG: coarse breath sounds over bronchi antrly and interscapular postrly L>R; No rales, rhonchi, wheezing, or rubs  HEART: Regular rate and rhythm; No murmurs, rubs, or gallops  ABDOMEN: Soft, Nontender, Nondistended; Bowel sounds present  EXTREMITIES:  2+ Peripheral Pulses, No clubbing, cyanosis, or edema  SKIN: No rashes or lesions    LABS:                        10.3   13.10 )-----------( Clumped    ( 02 Dec 2022 06:55 )             33.2     12-02    133<L>  |  101  |  45.7<H>  ----------------------------<  153<H>  4.8   |  17.0<L>  |  1.60<H>    Ca    9.3      02 Dec 2022 06:55  Phos  4.0     12-  Mg     2.2     12-            Urinalysis Basic - ( 02 Dec 2022 01:45 )    Color: Yellow / Appearance: Clear / S.015 / pH: x  Gluc: x / Ketone: Negative  / Bili: Negative / Urobili: Negative mg/dL   Blood: x / Protein: 15 / Nitrite: Negative   Leuk Esterase: Moderate / RBC: 0-2 /HPF / WBC 3-5 /HPF   Sq Epi: x / Non Sq Epi: Few / Bacteria: Occasional        CAPILLARY BLOOD GLUCOSE            RADIOLOGY & ADDITIONAL TESTS:  Results Reviewed: y  Imaging Personally Reviewed: n  Electrocardiogram Personally Reviewed: thea

## 2022-12-02 NOTE — PROGRESS NOTE ADULT - ASSESSMENT
89 y/o F with PMH HTN, [place] s/p cochlear implants, COPD/emphysema, hypothyroidism woke up around 2 AM shaking and 30 mins later emesis and her daughter called EMS. Endocrine follows for hypothyroidism, pt was on LT4 but she states her doctor told her to stop taking it.    1. Hypothyroid - Elevated TSH/low T3  - LT4 increased to 50mcg PO daily on 11/30  - Recheck TFTs in one week  - Discussed with pt about importance of adherence with LT4 medication  - Follow up appointment: 1/13 at 2:00pm [place] office     2. Acute hypoxic respiratory failure/COPD/CAP  - On steroids  - Duonebs q6hr   - CAP: continue ceftriaxone and azithromycin     3. HTN  - On betablocker

## 2022-12-02 NOTE — DISCHARGE NOTE PROVIDER - CARE PROVIDER_API CALL
LATOYA GARCIA  [facility]  Phone: [phone]  Fax: [phone]  Follow Up Time: 2 weeks   LATOYA GARCIA  [facility]  Phone: [phone]  Fax: [phone]  Follow Up Time: 2 weeks    Les Yang)  EndocrinologyMetabDiabetes; Internal Medicine  [address] [place] [address]  Phone: [phone]  Fax: [phone]  Follow Up Time:    LATOYA GARCIA  [facility]  Phone: [phone]  Fax: [phone]  Follow Up Time: 2 weeks    Les Yang)  EndocrinologyMetabDiabetes; Internal Medicine  [address] [place] [address]  Phone: [phone]  Fax: [phone]  Follow Up Time:     Matt Henry; MPH)  Cardiology; Internal Medicine  [address] [place] [address]  Phone: [phone]  Fax: [phone]  Follow Up Time:

## 2022-12-02 NOTE — PROCEDURE NOTE - NSPOSTCAREGUIDE_GEN_A_CORE
Instructed patient/caregiver to follow-up with primary care physician/Instructed patient/caregiver regarding signs and symptoms of infection/Keep the cast/splint/dressing clean and dry/Care for catheter as per unit/ICU protocols

## 2022-12-02 NOTE — DISCHARGE NOTE PROVIDER - CARE PROVIDERS DIRECT ADDRESSES
[address], [address] [place] [address]                                OPERATIVE REPORT    PATIENT NAME: Tayler Swain                  :        1939  MED REC NO:   [id number]                          ROOM:       1095  ACCOUNT NO:   [de-identified]                           ADMIT DATE: 2019  PROVIDER:     Gunnar Cancino MD    DATE OF PROCEDURE:  2019    PROCEDURES:  ERCP with balloon dilation of the papilla and common bile  duct stone/sludge removal with the balloon. PRIMARY CARE PROVIDER:  Omer Zaidi MD    PREOPERATIVE DIAGNOSIS:  The patient is an 70-year-old white gentleman  with history of multiple common bile duct stones, status post multiple  ERCPs with removal of CBD stones, who was admitted to the hospital with  biliary sepsis. The ERCP is being performed to clear the common bile  duct of any stones/sludge. The patient is clinically stable to undergo  the procedure. INDICATION:  Risks, alternatives, and complications were discussed with  the patient's daughter who has signed informed consent. PREMEDICATION:  Please refer to the anesthesiologist's notes. DESCRIPTION OF PROCEDURE:  The patient was placed in the left lateral  decubitus position and a therapeutic video Olympus duodenoscope was  introduced in the back of the throat and was advanced into the esophagus  and stomach and finally second portion of the duodenum. The papilla of  Vater was easily identified in position and was normal in location, but  was widely opened/patent from prior endoscopic sphincterectomy. The common bile duct was very easily selectively cannulated upon first  attempt with the sphincterotome and the guidewire, and upon injecting,  it was dilated and there was suggestion of stone in the common bile  duct.   The films were reviewed and the sphincterotome was removed and  the papilla was dilated with a balloon size 10, 11, ,DirectAddress_Unknown ,DirectAddress_Unknown,[email or web address] ,DirectAddress_Unknown,[email or web address]

## 2022-12-03 LAB
ANION GAP SERPL CALC-SCNC: 15 MMOL/L — SIGNIFICANT CHANGE UP (ref 5–17)
BUN SERPL-MCNC: 39.2 MG/DL — HIGH (ref 8–20)
CALCIUM SERPL-MCNC: 9.7 MG/DL — SIGNIFICANT CHANGE UP (ref 8.4–10.5)
CHLORIDE SERPL-SCNC: 100 MMOL/L — SIGNIFICANT CHANGE UP (ref 96–108)
CO2 SERPL-SCNC: 22 MMOL/L — SIGNIFICANT CHANGE UP (ref 22–29)
CREAT SERPL-MCNC: 1.3 MG/DL — SIGNIFICANT CHANGE UP (ref 0.5–1.3)
EGFR: 39 ML/MIN/1.73M2 — LOW
GAS PNL BLDA: SIGNIFICANT CHANGE UP
GLUCOSE SERPL-MCNC: 138 MG/DL — HIGH (ref 70–99)
HCT VFR BLD CALC: 33.3 % — LOW (ref 34.5–45)
HGB BLD-MCNC: 11.1 G/DL — LOW (ref 11.5–15.5)
MCHC RBC-ENTMCNC: 33.3 GM/DL — SIGNIFICANT CHANGE UP (ref 32–36)
MCHC RBC-ENTMCNC: 33.8 PG — SIGNIFICANT CHANGE UP (ref 27–34)
MCV RBC AUTO: 101.5 FL — HIGH (ref 80–100)
PLATELET # BLD AUTO: 220 K/UL — SIGNIFICANT CHANGE UP (ref 150–400)
POTASSIUM SERPL-MCNC: 5.1 MMOL/L — SIGNIFICANT CHANGE UP (ref 3.5–5.3)
POTASSIUM SERPL-SCNC: 5.1 MMOL/L — SIGNIFICANT CHANGE UP (ref 3.5–5.3)
RBC # BLD: 3.28 M/UL — LOW (ref 3.8–5.2)
RBC # FLD: 14.6 % — HIGH (ref 10.3–14.5)
SODIUM SERPL-SCNC: 137 MMOL/L — SIGNIFICANT CHANGE UP (ref 135–145)
WBC # BLD: 13.83 K/UL — HIGH (ref 3.8–10.5)
WBC # FLD AUTO: 13.83 K/UL — HIGH (ref 3.8–10.5)

## 2022-12-03 PROCEDURE — 99233 SBSQ HOSP IP/OBS HIGH 50: CPT

## 2022-12-03 PROCEDURE — 71045 X-RAY EXAM CHEST 1 VIEW: CPT | Mod: 26

## 2022-12-03 RX ORDER — MORPHINE SULFATE 50 MG/1
1 CAPSULE, EXTENDED RELEASE ORAL ONCE
Refills: 0 | Status: DISCONTINUED | OUTPATIENT
Start: 2022-12-03 | End: 2022-12-03

## 2022-12-03 RX ORDER — IPRATROPIUM/ALBUTEROL SULFATE 18-103MCG
3 AEROSOL WITH ADAPTER (GRAM) INHALATION ONCE
Refills: 0 | Status: DISCONTINUED | OUTPATIENT
Start: 2022-12-03 | End: 2022-12-17

## 2022-12-03 RX ORDER — SODIUM CHLORIDE 9 MG/ML
500 INJECTION INTRAMUSCULAR; INTRAVENOUS; SUBCUTANEOUS ONCE
Refills: 0 | Status: COMPLETED | OUTPATIENT
Start: 2022-12-03 | End: 2022-12-03

## 2022-12-03 RX ADMIN — CEFTRIAXONE 1000 MILLIGRAM(S): 500 INJECTION, POWDER, FOR SOLUTION INTRAMUSCULAR; INTRAVENOUS at 05:11

## 2022-12-03 RX ADMIN — MORPHINE SULFATE 1 MILLIGRAM(S): 50 CAPSULE, EXTENDED RELEASE ORAL at 19:02

## 2022-12-03 RX ADMIN — Medication 3 MILLILITER(S): at 21:34

## 2022-12-03 RX ADMIN — Medication 1200 MILLIGRAM(S): at 20:04

## 2022-12-03 RX ADMIN — Medication 3 MILLILITER(S): at 09:02

## 2022-12-03 RX ADMIN — Medication 250 MILLIGRAM(S): at 17:44

## 2022-12-03 RX ADMIN — ENOXAPARIN SODIUM 30 MILLIGRAM(S): 100 INJECTION SUBCUTANEOUS at 17:44

## 2022-12-03 RX ADMIN — Medication 250 MILLIGRAM(S): at 05:11

## 2022-12-03 RX ADMIN — Medication 50 MILLIGRAM(S): at 05:11

## 2022-12-03 RX ADMIN — Medication 3 MILLILITER(S): at 03:39

## 2022-12-03 RX ADMIN — Medication 40 MILLIGRAM(S): at 05:12

## 2022-12-03 RX ADMIN — Medication 3 MILLILITER(S): at 15:22

## 2022-12-03 RX ADMIN — SODIUM CHLORIDE 1000 MILLILITER(S): 9 INJECTION INTRAMUSCULAR; INTRAVENOUS; SUBCUTANEOUS at 19:51

## 2022-12-03 RX ADMIN — MORPHINE SULFATE 1 MILLIGRAM(S): 50 CAPSULE, EXTENDED RELEASE ORAL at 19:32

## 2022-12-03 RX ADMIN — PANTOPRAZOLE SODIUM 40 MILLIGRAM(S): 20 TABLET, DELAYED RELEASE ORAL at 05:12

## 2022-12-03 RX ADMIN — Medication 50 MICROGRAM(S): at 05:11

## 2022-12-03 RX ADMIN — AZITHROMYCIN 255 MILLIGRAM(S): 500 TABLET, FILM COATED ORAL at 17:44

## 2022-12-03 NOTE — PROGRESS NOTE ADULT - SUBJECTIVE AND OBJECTIVE BOX
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 She has been having cough, without any change. Denies SOB, CP, fever, abd pain, constipation, N, sick contact. Admits to overeating last night. states she was on thyroid meds which her PMD stopped. and KCL was increased recently.  In Triage . In ED noted O2 saturation 88- 91%.     SH- smoke 1ppd or more since age 15 yrs to 50 yrs. Quit 40 yrs ago, denies other toxic habits  FH- states unaware of any medical condition (2022 09:15)    The patient was seen and evaluated states she feels like food is stuck in her throat   The patient is in no acute distress.  Denied any fever chest pain, palpitations, shortness of breath, abdominal pain, fever,     I&O's Summary    02 Dec 2022 07:01  -  03 Dec 2022 07:00  --------------------------------------------------------  IN: 250 mL / OUT: 0 mL / NET: 250 mL      Allergies    codeine (Vomiting)  iodine (Unknown)    Intolerances      HEALTH ISSUES - PROBLEM Dx:        PAST MEDICAL & SURGICAL HISTORY:  Essential hypertension      Chronic obstructive pulmonary disease, unspecified COPD type      Hypothyroidism, unspecified type      H/O exploratory laparotomy  for a benign colon mass 12              Vital Signs Last 24 Hrs  T(C): 36.7 (03 Dec 2022 15:41), Max: 37.1 (03 Dec 2022 14:34)  T(F): 98.1 (03 Dec 2022 15:41), Max: 98.7 (03 Dec 2022 14:34)  HR: 75 (03 Dec 2022 15:41) (64 - 87)  BP: 155/84 (03 Dec 2022 15:41) (146/72 - 188/102)  BP(mean): --  RR: 18 (03 Dec 2022 15:41) (18 - 20)  SpO2: 92% (03 Dec 2022 15:41) (86% - 93%)    Parameters below as of 03 Dec 2022 15:41  Patient On (Oxygen Delivery Method): nasal cannula  O2 Flow (L/min): 4  T(C): 36.7 (22 @ 15:41), Max: 37.1 (22 @ 14:34)  HR: 75 (22 @ 15:41) (64 - 87)  BP: 155/84 (22 @ 15:41) (146/72 - 188/102)  RR: 18 (22 @ 15:41) (18 - 20)  SpO2: 92% (22 @ 15:41) (86% - 93%)  Wt(kg): --    PHYSICAL EXAM:    GENERAL: NAD, elderly conversing - anxious   HEAD:  Atraumatic, Normocephalic  EYES: EOMI, PERRL, conjunctiva and sclera clear  ENMT:  Moist mucous membranes,  No lesions  NECK: Supple, No JVD, Normal thyroid  NERVOUS SYSTEM:  Alert & in bed barely  Moves upper and lower extremities; CNS-II-XII  CHEST/LUNG: Clear to auscultation bilaterally; No rales, rhonchi, wheezing,   HEART: Regular rate and rhythm; No murmurs,   ABDOMEN: Soft, Nontender, Nondistended; Bowel sounds present  EXTREMITIES:  Peripheral Pulses, No  cyanosis, or edema  SKIN: No rashes or lesions  psychiatry- mood and affect anxious    albuterol/ipratropium for Nebulization 3 milliLiter(s) Nebulizer every 6 hours  atorvastatin 40 milliGRAM(s) Oral at bedtime  azithromycin  IVPB 500 milliGRAM(s) IV Intermittent every 24 hours  cefTRIAXone Injectable. 1000 milliGRAM(s) IV Push every 24 hours  enoxaparin Injectable 30 milliGRAM(s) SubCutaneous every 24 hours  guaiFENesin Oral Liquid (Sugar-Free) 100 milliGRAM(s) Oral every 6 hours PRN  levothyroxine 50 MICROGram(s) Oral daily  metoprolol succinate ER 50 milliGRAM(s) Oral daily  ondansetron Injectable 4 milliGRAM(s) IV Push every 6 hours PRN  pantoprazole    Tablet 40 milliGRAM(s) Oral before breakfast  predniSONE   Tablet 40 milliGRAM(s) Oral daily  saccharomyces boulardii 250 milliGRAM(s) Oral two times a day  sodium chloride 0.9%. 1000 milliLiter(s) IV Continuous <Continuous>      LABS:                          11.1   13.83 )-----------( 220      ( 03 Dec 2022 06:00 )             33.3     12-03    137  |  100  |  39.2<H>  ----------------------------<  138<H>  5.1   |  22.0  |  1.30    Ca    9.7      03 Dec 2022 06:00              Urinalysis Basic - ( 02 Dec 2022 01:45 )    Color: Yellow / Appearance: Clear / S.015 / pH: x  Gluc: x / Ketone: Negative  / Bili: Negative / Urobili: Negative mg/dL   Blood: x / Protein: 15 / Nitrite: Negative   Leuk Esterase: Moderate / RBC: 0-2 /HPF / WBC 3-5 /HPF   Sq Epi: x / Non Sq Epi: Few / Bacteria: Occasional      CAPILLARY BLOOD GLUCOSE          RADIOLOGY & ADDITIONAL TESTS:      Consultant notes reviewed    Case discussed with consultant/provider/ family /patient

## 2022-12-03 NOTE — PROGRESS NOTE ADULT - ASSESSMENT
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 She has been having cough, without any change. Denies SOB, CP, fever, abd pain, constipation, N, sick contact. Admits to overeating last night.   In Triage . In ED noted O2 saturation 88- 91%.     Acute Hypoxic Resp failure/COPD/Pneumonia  SpO2 Ranging 91-96%  Duonebs, prednisone taper  Viral panel neg  Rocephin + Zithromax    KALPANA- resolved- was probably prerenal   EF 65-70%  on IVF    Hypothyroid-elevated TSH  synthroid 50 QD     HTN   cont current regimen      #Healthcare Maintenance  DVT PPx- Lovenox  pending ECHO  Dispo -? Likely Home w/ home oxygen    Daughter - [phone])

## 2022-12-04 LAB
ALBUMIN SERPL ELPH-MCNC: 3.6 G/DL — SIGNIFICANT CHANGE UP (ref 3.3–5.2)
ALP SERPL-CCNC: 71 U/L — SIGNIFICANT CHANGE UP (ref 40–120)
ALT FLD-CCNC: 50 U/L — HIGH
ANION GAP SERPL CALC-SCNC: 11 MMOL/L — SIGNIFICANT CHANGE UP (ref 5–17)
ANION GAP SERPL CALC-SCNC: 12 MMOL/L — SIGNIFICANT CHANGE UP (ref 5–17)
AST SERPL-CCNC: 27 U/L — SIGNIFICANT CHANGE UP
BASE EXCESS BLDA CALC-SCNC: 3.9 MMOL/L — HIGH (ref -2–3)
BASOPHILS # BLD AUTO: 0 K/UL — SIGNIFICANT CHANGE UP (ref 0–0.2)
BASOPHILS NFR BLD AUTO: 0 % — SIGNIFICANT CHANGE UP (ref 0–2)
BILIRUB SERPL-MCNC: 0.3 MG/DL — LOW (ref 0.4–2)
BLOOD GAS COMMENTS ARTERIAL: SIGNIFICANT CHANGE UP
BUN SERPL-MCNC: 25.6 MG/DL — HIGH (ref 8–20)
BUN SERPL-MCNC: 30.4 MG/DL — HIGH (ref 8–20)
CALCIUM SERPL-MCNC: 8.9 MG/DL — SIGNIFICANT CHANGE UP (ref 8.4–10.5)
CALCIUM SERPL-MCNC: 9.3 MG/DL — SIGNIFICANT CHANGE UP (ref 8.4–10.5)
CHLORIDE SERPL-SCNC: 101 MMOL/L — SIGNIFICANT CHANGE UP (ref 96–108)
CHLORIDE SERPL-SCNC: 102 MMOL/L — SIGNIFICANT CHANGE UP (ref 96–108)
CK MB CFR SERPL CALC: 5.8 NG/ML — SIGNIFICANT CHANGE UP (ref 0–6.7)
CK MB CFR SERPL CALC: 7.1 NG/ML — HIGH (ref 0–6.7)
CK SERPL-CCNC: 190 U/L — HIGH (ref 25–170)
CK SERPL-CCNC: 224 U/L — HIGH (ref 25–170)
CO2 SERPL-SCNC: 23 MMOL/L — SIGNIFICANT CHANGE UP (ref 22–29)
CO2 SERPL-SCNC: 24 MMOL/L — SIGNIFICANT CHANGE UP (ref 22–29)
CREAT SERPL-MCNC: 0.91 MG/DL — SIGNIFICANT CHANGE UP (ref 0.5–1.3)
CREAT SERPL-MCNC: 1.06 MG/DL — SIGNIFICANT CHANGE UP (ref 0.5–1.3)
CULTURE RESULTS: SIGNIFICANT CHANGE UP
CULTURE RESULTS: SIGNIFICANT CHANGE UP
EGFR: 50 ML/MIN/1.73M2 — LOW
EGFR: 60 ML/MIN/1.73M2 — SIGNIFICANT CHANGE UP
EOSINOPHIL # BLD AUTO: 0 K/UL — SIGNIFICANT CHANGE UP (ref 0–0.5)
EOSINOPHIL NFR BLD AUTO: 0 % — SIGNIFICANT CHANGE UP (ref 0–6)
GAS PNL BLDA: SIGNIFICANT CHANGE UP
GAS PNL BLDA: SIGNIFICANT CHANGE UP
GLUCOSE BLDC GLUCOMTR-MCNC: 147 MG/DL — HIGH (ref 70–99)
GLUCOSE SERPL-MCNC: 137 MG/DL — HIGH (ref 70–99)
GLUCOSE SERPL-MCNC: 96 MG/DL — SIGNIFICANT CHANGE UP (ref 70–99)
HCO3 BLDA-SCNC: 28 MMOL/L — SIGNIFICANT CHANGE UP (ref 21–28)
HCT VFR BLD CALC: 33.5 % — LOW (ref 34.5–45)
HCT VFR BLD CALC: 35 % — SIGNIFICANT CHANGE UP (ref 34.5–45)
HGB BLD-MCNC: 11.2 G/DL — LOW (ref 11.5–15.5)
HGB BLD-MCNC: 11.8 G/DL — SIGNIFICANT CHANGE UP (ref 11.5–15.5)
HOROWITZ INDEX BLDA+IHG-RTO: 0.6 — SIGNIFICANT CHANGE UP
LYMPHOCYTES # BLD AUTO: 1.2 K/UL — SIGNIFICANT CHANGE UP (ref 1–3.3)
LYMPHOCYTES # BLD AUTO: 10 % — LOW (ref 13–44)
MAGNESIUM SERPL-MCNC: 2.1 MG/DL — SIGNIFICANT CHANGE UP (ref 1.6–2.6)
MANUAL SMEAR VERIFICATION: SIGNIFICANT CHANGE UP
MCHC RBC-ENTMCNC: 33.1 PG — SIGNIFICANT CHANGE UP (ref 27–34)
MCHC RBC-ENTMCNC: 33.4 GM/DL — SIGNIFICANT CHANGE UP (ref 32–36)
MCHC RBC-ENTMCNC: 33.7 GM/DL — SIGNIFICANT CHANGE UP (ref 32–36)
MCHC RBC-ENTMCNC: 33.7 PG — SIGNIFICANT CHANGE UP (ref 27–34)
MCV RBC AUTO: 100.9 FL — HIGH (ref 80–100)
MCV RBC AUTO: 98.3 FL — SIGNIFICANT CHANGE UP (ref 80–100)
MONOCYTES # BLD AUTO: 0.5 K/UL — SIGNIFICANT CHANGE UP (ref 0–0.9)
MONOCYTES NFR BLD AUTO: 4.2 % — SIGNIFICANT CHANGE UP (ref 2–14)
MYELOCYTES NFR BLD: 0.8 % — HIGH (ref 0–0)
NEUTROPHILS # BLD AUTO: 10.21 K/UL — HIGH (ref 1.8–7.4)
NEUTROPHILS NFR BLD AUTO: 85 % — HIGH (ref 43–77)
NT-PROBNP SERPL-SCNC: 2791 PG/ML — HIGH (ref 0–300)
PCO2 BLDA: 45 MMHG — SIGNIFICANT CHANGE UP (ref 32–45)
PH BLDA: 7.41 — SIGNIFICANT CHANGE UP (ref 7.35–7.45)
PHOSPHATE SERPL-MCNC: 2.2 MG/DL — LOW (ref 2.4–4.7)
PLAT MORPH BLD: NORMAL — SIGNIFICANT CHANGE UP
PLATELET # BLD AUTO: 166 K/UL — SIGNIFICANT CHANGE UP (ref 150–400)
PLATELET # BLD AUTO: 218 K/UL — SIGNIFICANT CHANGE UP (ref 150–400)
PO2 BLDA: 108 MMHG — SIGNIFICANT CHANGE UP (ref 83–108)
POTASSIUM SERPL-MCNC: 4.2 MMOL/L — SIGNIFICANT CHANGE UP (ref 3.5–5.3)
POTASSIUM SERPL-MCNC: 4.5 MMOL/L — SIGNIFICANT CHANGE UP (ref 3.5–5.3)
POTASSIUM SERPL-SCNC: 4.2 MMOL/L — SIGNIFICANT CHANGE UP (ref 3.5–5.3)
POTASSIUM SERPL-SCNC: 4.5 MMOL/L — SIGNIFICANT CHANGE UP (ref 3.5–5.3)
PROT SERPL-MCNC: 6.7 G/DL — SIGNIFICANT CHANGE UP (ref 6.6–8.7)
RBC # BLD: 3.32 M/UL — LOW (ref 3.8–5.2)
RBC # BLD: 3.56 M/UL — LOW (ref 3.8–5.2)
RBC # FLD: 14.4 % — SIGNIFICANT CHANGE UP (ref 10.3–14.5)
RBC # FLD: 14.5 % — SIGNIFICANT CHANGE UP (ref 10.3–14.5)
RBC BLD AUTO: NORMAL — SIGNIFICANT CHANGE UP
SAO2 % BLDA: 98.9 % — HIGH (ref 94–98)
SODIUM SERPL-SCNC: 136 MMOL/L — SIGNIFICANT CHANGE UP (ref 135–145)
SODIUM SERPL-SCNC: 137 MMOL/L — SIGNIFICANT CHANGE UP (ref 135–145)
SPECIMEN SOURCE: SIGNIFICANT CHANGE UP
SPECIMEN SOURCE: SIGNIFICANT CHANGE UP
TROPONIN T SERPL-MCNC: <0.01 NG/ML — SIGNIFICANT CHANGE UP (ref 0–0.06)
TROPONIN T SERPL-MCNC: <0.01 NG/ML — SIGNIFICANT CHANGE UP (ref 0–0.06)
WBC # BLD: 12.01 K/UL — HIGH (ref 3.8–10.5)
WBC # BLD: 12.31 K/UL — HIGH (ref 3.8–10.5)
WBC # FLD AUTO: 12.01 K/UL — HIGH (ref 3.8–10.5)
WBC # FLD AUTO: 12.31 K/UL — HIGH (ref 3.8–10.5)

## 2022-12-04 PROCEDURE — 71045 X-RAY EXAM CHEST 1 VIEW: CPT | Mod: 26,76

## 2022-12-04 PROCEDURE — 93010 ELECTROCARDIOGRAM REPORT: CPT

## 2022-12-04 PROCEDURE — 99233 SBSQ HOSP IP/OBS HIGH 50: CPT

## 2022-12-04 RX ORDER — HYDRALAZINE HCL 50 MG
10 TABLET ORAL ONCE
Refills: 0 | Status: COMPLETED | OUTPATIENT
Start: 2022-12-04 | End: 2022-12-04

## 2022-12-04 RX ORDER — HYDRALAZINE HCL 50 MG
25 TABLET ORAL THREE TIMES A DAY
Refills: 0 | Status: DISCONTINUED | OUTPATIENT
Start: 2022-12-04 | End: 2022-12-07

## 2022-12-04 RX ORDER — METOPROLOL TARTRATE 50 MG
5 TABLET ORAL ONCE
Refills: 0 | Status: COMPLETED | OUTPATIENT
Start: 2022-12-04 | End: 2022-12-04

## 2022-12-04 RX ORDER — IPRATROPIUM/ALBUTEROL SULFATE 18-103MCG
3 AEROSOL WITH ADAPTER (GRAM) INHALATION ONCE
Refills: 0 | Status: DISCONTINUED | OUTPATIENT
Start: 2022-12-04 | End: 2022-12-17

## 2022-12-04 RX ORDER — MORPHINE SULFATE 50 MG/1
1 CAPSULE, EXTENDED RELEASE ORAL ONCE
Refills: 0 | Status: DISCONTINUED | OUTPATIENT
Start: 2022-12-04 | End: 2022-12-04

## 2022-12-04 RX ORDER — FUROSEMIDE 40 MG
40 TABLET ORAL ONCE
Refills: 0 | Status: COMPLETED | OUTPATIENT
Start: 2022-12-04 | End: 2022-12-04

## 2022-12-04 RX ORDER — SODIUM CHLORIDE 9 MG/ML
1000 INJECTION, SOLUTION INTRAVENOUS
Refills: 0 | Status: DISCONTINUED | OUTPATIENT
Start: 2022-12-04 | End: 2022-12-05

## 2022-12-04 RX ADMIN — Medication 3 MILLILITER(S): at 08:26

## 2022-12-04 RX ADMIN — Medication 3 MILLILITER(S): at 21:53

## 2022-12-04 RX ADMIN — Medication 10 MILLIGRAM(S): at 22:22

## 2022-12-04 RX ADMIN — Medication 40 MILLIGRAM(S): at 22:33

## 2022-12-04 RX ADMIN — CEFTRIAXONE 1000 MILLIGRAM(S): 500 INJECTION, POWDER, FOR SOLUTION INTRAMUSCULAR; INTRAVENOUS at 05:06

## 2022-12-04 RX ADMIN — SODIUM CHLORIDE 75 MILLILITER(S): 9 INJECTION, SOLUTION INTRAVENOUS at 12:36

## 2022-12-04 RX ADMIN — AZITHROMYCIN 255 MILLIGRAM(S): 500 TABLET, FILM COATED ORAL at 18:13

## 2022-12-04 RX ADMIN — ENOXAPARIN SODIUM 30 MILLIGRAM(S): 100 INJECTION SUBCUTANEOUS at 23:58

## 2022-12-04 RX ADMIN — Medication 5 MILLIGRAM(S): at 06:06

## 2022-12-04 RX ADMIN — Medication 3 MILLILITER(S): at 04:12

## 2022-12-04 RX ADMIN — SODIUM CHLORIDE 75 MILLILITER(S): 9 INJECTION, SOLUTION INTRAVENOUS at 18:12

## 2022-12-04 RX ADMIN — Medication 3 MILLILITER(S): at 15:35

## 2022-12-04 NOTE — PROGRESS NOTE ADULT - ASSESSMENT
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 She has been having cough, without any change. Denies SOB, CP, fever, abd pain, constipation, N, sick contact. Admits to overeating last night.   In Triage . In ED noted O2 saturation 88- 91%.     Acute Hypoxic Resp failure/COPD/Pneumonia  SpO2 Ranging 91-96%  Duoneb prednisone taper- on 40->30 QD  Viral panel neg, improving leukocytosis  Rocephin + Zithromax    KALPANA- resolved- was probably prerenal   EF 65-70%  on IVF    Hypothyroid-  synthroid 50 QD     /80  cont metoprolol  add hydralazine      #Healthcare Maintenance  DVT PPx- Lovenox    Dispo - Likely Home w/ home oxygen    Daughter - [phone])

## 2022-12-04 NOTE — SWALLOW BEDSIDE ASSESSMENT ADULT - PHARYNGEAL PHASE
4-5x / bite/Delayed pharyngeal swallow/Multiple swallows 2x tsp trials given/Decreased laryngeal elevation/Cough post oral intake

## 2022-12-04 NOTE — CHART NOTE - NSCHARTNOTEFT_GEN_A_CORE
Alerted by RN patient with c/o dyspnea. Seen and assessed at bedside, patient reports increased work of breathing and productive cough which has been worsening. She also states her chest "feels heavy because of her phlegm" though denies chest pain. Non-radiating, not associated w/ activity. Patient is without any other acute complaints. VS: HR 78, /80, RR 24, SpO2 94% on HFNC. On exam, patient resting in bed, A&O to baseline. Increased work of breathing noted, able to speak in full sentences. Lungs with audible rhonchi. Clear S1/S2. Nonfocal exam.     A/P Dyspnea in the setting of COPD exacerbation, PNA  - Will repeat CXR given rhonchi, patient NPO and unable to take PRN Mucinex. Chest PT ordered.   - 1 mg IV morphine for increased WOB  - STAT duoneb  - Chest heaviness does not seem cardiac in nature, likely secondary to cough/congestion. Will f/u ECG and cardiac enzymes.  - RN to continue to monitor, will alert provider w/ any change in patient status. Alerted by RN patient with c/o dyspnea. Seen and assessed at bedside, patient reports increased work of breathing and productive cough which has been worsening. She also states her chest "feels heavy because of her phlegm" though denies chest pain. Non-radiating, not associated w/ activity. Patient is without any other acute complaints. VS: HR 78, /80, RR 24, SpO2 94% on HFNC. On exam, patient resting in bed, A&O to baseline. Increased work of breathing noted, able to speak in full sentences. Lungs with audible rhonchi. Clear S1/S2. Nonfocal exam.     A/P Dyspnea in the setting of COPD exacerbation, PNA  - Will repeat CXR given rhonchi, patient NPO and unable to take PRN Mucinex. Chest PT ordered.   - STAT duoneb  - Chest heaviness does not seem cardiac in nature, likely secondary to cough/congestion. Will f/u ECG and cardiac enzymes.  - RN to continue to monitor, will alert provider w/ any change in patient status.

## 2022-12-04 NOTE — SWALLOW BEDSIDE ASSESSMENT ADULT - NS SPL SWALLOW CLINIC TRIAL FT
After 3 tsp trials of puree, pt reported "it feels like it's not going down, I'm getting full." No further trials given.

## 2022-12-04 NOTE — PROGRESS NOTE ADULT - SUBJECTIVE AND OBJECTIVE BOX
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 She has been having cough, without any change. Denies SOB, CP, fever, abd pain, constipation, N, sick contact. Admits to overeating last night. states she was on thyroid meds which her PMD stopped. and KCL was increased recently.  In Triage . In ED noted O2 saturation 88- 91%.     SH- smoke 1ppd or more since age 15 yrs to 50 yrs. Quit 40 yrs ago, denies other toxic habits  FH- states unaware of any medical condition (29 Nov 2022 09:15)    The patient was seen and evaluated- offers no complaints- sitting comfortably, in bed     The patient is in no acute distress.  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              Vital Signs Last 24 Hrs  T(C): 36.7 (04 Dec 2022 12:51), Max: 36.9 (03 Dec 2022 19:34)  T(F): 98 (04 Dec 2022 12:51), Max: 98.5 (03 Dec 2022 19:34)  HR: 78 (04 Dec 2022 15:44) (60 - 84)  BP: 170/80 (04 Dec 2022 12:51) (164/90 - 185/131)  BP(mean): --  RR: 19 (04 Dec 2022 15:44) (18 - 20)  SpO2: 93% (04 Dec 2022 15:44) (92% - 98%)    Parameters below as of 04 Dec 2022 15:44  Patient On (Oxygen Delivery Method): nasal cannula    T(C): 36.7 (12-04-22 @ 12:51), Max: 36.9 (12-03-22 @ 19:34)  HR: 78 (12-04-22 @ 15:44) (60 - 84)  BP: 170/80 (12-04-22 @ 12:51) (164/90 - 185/131)  RR: 19 (12-04-22 @ 15:44) (18 - 20)  SpO2: 93% (12-04-22 @ 15:44) (92% - 98%)  Wt(kg): --    PHYSICAL EXAM:    GENERAL: NAD, elderly pleasant conversing  HEAD:  Atraumatic, Normocephalic  EYES: EOMI,  conjunctiva and sclera clear  ENMT:  Moist mucous membranes,  No lesions  NECK: Supple, No JVD, Normal thyroid  NERVOUS SYSTEM:  Alert & Oriented X3, in bed can Move upper and lower extremities; CNS-II-XII  CHEST/LUNG: Clear to auscultation bilaterally; No rales, rhonchi, wheezing,   HEART: Regular rate and rhythm; No murmurs,   ABDOMEN: Soft, Nontender, Nondistended; Bowel sounds present  EXTREMITIES:  Peripheral Pulses, No  cyanosis, or edema  SKIN: No rashes or lesions  psychiatry- mood and affect appropriate     albuterol/ipratropium for Nebulization 3 milliLiter(s) Nebulizer every 6 hours  albuterol/ipratropium for Nebulization. 3 milliLiter(s) Nebulizer once  atorvastatin 40 milliGRAM(s) Oral at bedtime  azithromycin  IVPB 500 milliGRAM(s) IV Intermittent every 24 hours  dextrose 5% + sodium chloride 0.45%. 1000 milliLiter(s) IV Continuous <Continuous>  enoxaparin Injectable 30 milliGRAM(s) SubCutaneous every 24 hours  guaiFENesin ER 1200 milliGRAM(s) Oral every 12 hours  guaiFENesin Oral Liquid (Sugar-Free) 100 milliGRAM(s) Oral every 6 hours PRN  levothyroxine 50 MICROGram(s) Oral daily  metoprolol succinate ER 50 milliGRAM(s) Oral daily  ondansetron Injectable 4 milliGRAM(s) IV Push every 6 hours PRN  pantoprazole    Tablet 40 milliGRAM(s) Oral before breakfast  predniSONE   Tablet 40 milliGRAM(s) Oral daily  saccharomyces boulardii 250 milliGRAM(s) Oral two times a day  sodium chloride 0.9%. 1000 milliLiter(s) IV Continuous <Continuous>      LABS:  ABG - ( 04 Dec 2022 00:03 )  pH, Arterial: 7.410 pH, Blood: x     /  pCO2: 45    /  pO2: 108   / HCO3: 28    / Base Excess: 3.9   /  SaO2: 98.9                                    11.2   12.01 )-----------( 218      ( 04 Dec 2022 08:40 )             33.5     12-04    137  |  102  |  30.4<H>  ----------------------------<  96  4.5   |  23.0  |  1.06    Ca    9.3      04 Dec 2022 08:40    TPro  6.7  /  Alb  3.6  /  TBili  0.3<L>  /  DBili  x   /  AST  27  /  ALT  50<H>  /  AlkPhos  71  12-04    LIVER FUNCTIONS - ( 04 Dec 2022 08:40 )  Alb: 3.6 g/dL / Pro: 6.7 g/dL / ALK PHOS: 71 U/L / ALT: 50 U/L / AST: 27 U/L / GGT: x                   CAPILLARY BLOOD GLUCOSE          RADIOLOGY & ADDITIONAL TESTS:      Consultant notes reviewed    Case discussed with consultant/provider/ family /patient

## 2022-12-04 NOTE — RAPID RESPONSE TEAM SUMMARY - NSOTHERINTERVENTIONSRRT_GEN_ALL_CORE
ABG   EKG   CXR: + Pulm Congestion   CBC BMP BNP Troponins   Stop Fluids   Add Non rebreather mask to Hi Flow    ABG: ABG - ( 04 Dec 2022 22:18 )  pH, Arterial: 7.450 pH, Blood: x     /  pCO2: 39    /  pO2: 110   / HCO3: 27    / Base Excess: 3.1   /  SaO2: 99.2              EKG   CXR: + Pulm Congestion   CBC BMP BNP Troponins   Stop Fluids   Add Non rebreather mask to Hi Flow    ABG: ABG - ( 04 Dec 2022 22:18 )  pH, Arterial: 7.450 pH, Blood: x     /  pCO2: 39    /  pO2: 110   / HCO3: 27    / Base Excess: 3.1   /  SaO2: 99.2              EKG: NSR @ 70  + PVC otherwise No Acute changes from Previous   CXR: + Pulm Congestion B/L (unofficial)  CBC BMP BNP Troponins                     Stop Fluids   Add Non rebreather mask to Hi Flow

## 2022-12-04 NOTE — SWALLOW BEDSIDE ASSESSMENT ADULT - SLP PERTINENT HISTORY OF CURRENT PROBLEM
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 She has been having cough, without any change. Denies SOB, CP, fever, abd pain, constipation, N, sick contact. Admits to overeating last night. states she was on thyroid meds which her PMD stopped. and KCL was increased recently."

## 2022-12-04 NOTE — RAPID RESPONSE TEAM SUMMARY - NSADDTLFINDINGSRRT_GEN_ALL_CORE
General: WD Obese Female Sitting in Bed + Mod Resp distress   Vitals: /79  HR: 72  RR:21  Temp:   Cardiac: S1S2 + RRR  Lungs: + Crackles B/L Bases   Integument: No Pallor  Warm/Dry   Ext: No C/C/E x 4    General: WD Obese Female Sitting in Bed + Mod Resp distress + WOB   Vitals: /79  HR: 72  RR:21  Temp:   Cardiac: S1S2 + RRR  Lungs: + Crackles B/L Bases   Integument: No Pallor  Warm/Dry   Ext: No C/C/E x 4

## 2022-12-05 LAB
ALBUMIN SERPL ELPH-MCNC: 4.1 G/DL — SIGNIFICANT CHANGE UP (ref 3.3–5.2)
ALP SERPL-CCNC: 76 U/L — SIGNIFICANT CHANGE UP (ref 40–120)
ALT FLD-CCNC: 43 U/L — HIGH
ANION GAP SERPL CALC-SCNC: 11 MMOL/L — SIGNIFICANT CHANGE UP (ref 5–17)
AST SERPL-CCNC: 28 U/L — SIGNIFICANT CHANGE UP
BASOPHILS # BLD AUTO: 0.05 K/UL — SIGNIFICANT CHANGE UP (ref 0–0.2)
BASOPHILS NFR BLD AUTO: 0.4 % — SIGNIFICANT CHANGE UP (ref 0–2)
BILIRUB SERPL-MCNC: 0.4 MG/DL — SIGNIFICANT CHANGE UP (ref 0.4–2)
BUN SERPL-MCNC: 27.4 MG/DL — HIGH (ref 8–20)
CALCIUM SERPL-MCNC: 9.3 MG/DL — SIGNIFICANT CHANGE UP (ref 8.4–10.5)
CHLORIDE SERPL-SCNC: 99 MMOL/L — SIGNIFICANT CHANGE UP (ref 96–108)
CO2 SERPL-SCNC: 27 MMOL/L — SIGNIFICANT CHANGE UP (ref 22–29)
CREAT SERPL-MCNC: 0.99 MG/DL — SIGNIFICANT CHANGE UP (ref 0.5–1.3)
EGFR: 54 ML/MIN/1.73M2 — LOW
EOSINOPHIL # BLD AUTO: 0.03 K/UL — SIGNIFICANT CHANGE UP (ref 0–0.5)
EOSINOPHIL NFR BLD AUTO: 0.2 % — SIGNIFICANT CHANGE UP (ref 0–6)
GLUCOSE BLDC GLUCOMTR-MCNC: 108 MG/DL — HIGH (ref 70–99)
GLUCOSE SERPL-MCNC: 114 MG/DL — HIGH (ref 70–99)
HCT VFR BLD CALC: 35.5 % — SIGNIFICANT CHANGE UP (ref 34.5–45)
HGB BLD-MCNC: 11.8 G/DL — SIGNIFICANT CHANGE UP (ref 11.5–15.5)
IMM GRANULOCYTES NFR BLD AUTO: 4.4 % — HIGH (ref 0–0.9)
LYMPHOCYTES # BLD AUTO: 1.48 K/UL — SIGNIFICANT CHANGE UP (ref 1–3.3)
LYMPHOCYTES # BLD AUTO: 11.3 % — LOW (ref 13–44)
MCHC RBC-ENTMCNC: 32.8 PG — SIGNIFICANT CHANGE UP (ref 27–34)
MCHC RBC-ENTMCNC: 33.2 GM/DL — SIGNIFICANT CHANGE UP (ref 32–36)
MCV RBC AUTO: 98.6 FL — SIGNIFICANT CHANGE UP (ref 80–100)
MONOCYTES # BLD AUTO: 0.46 K/UL — SIGNIFICANT CHANGE UP (ref 0–0.9)
MONOCYTES NFR BLD AUTO: 3.5 % — SIGNIFICANT CHANGE UP (ref 2–14)
NEUTROPHILS # BLD AUTO: 10.49 K/UL — HIGH (ref 1.8–7.4)
NEUTROPHILS NFR BLD AUTO: 80.2 % — HIGH (ref 43–77)
PLATELET # BLD AUTO: 252 K/UL — SIGNIFICANT CHANGE UP (ref 150–400)
POTASSIUM SERPL-MCNC: 4.1 MMOL/L — SIGNIFICANT CHANGE UP (ref 3.5–5.3)
POTASSIUM SERPL-SCNC: 4.1 MMOL/L — SIGNIFICANT CHANGE UP (ref 3.5–5.3)
PROT SERPL-MCNC: 7.2 G/DL — SIGNIFICANT CHANGE UP (ref 6.6–8.7)
RBC # BLD: 3.6 M/UL — LOW (ref 3.8–5.2)
RBC # FLD: 14.6 % — HIGH (ref 10.3–14.5)
SODIUM SERPL-SCNC: 137 MMOL/L — SIGNIFICANT CHANGE UP (ref 135–145)
WBC # BLD: 13.09 K/UL — HIGH (ref 3.8–10.5)
WBC # FLD AUTO: 13.09 K/UL — HIGH (ref 3.8–10.5)

## 2022-12-05 PROCEDURE — 99232 SBSQ HOSP IP/OBS MODERATE 35: CPT

## 2022-12-05 PROCEDURE — 93010 ELECTROCARDIOGRAM REPORT: CPT

## 2022-12-05 PROCEDURE — 99233 SBSQ HOSP IP/OBS HIGH 50: CPT

## 2022-12-05 RX ORDER — FUROSEMIDE 40 MG
40 TABLET ORAL DAILY
Refills: 0 | Status: DISCONTINUED | OUTPATIENT
Start: 2022-12-05 | End: 2022-12-07

## 2022-12-05 RX ORDER — IPRATROPIUM/ALBUTEROL SULFATE 18-103MCG
3 AEROSOL WITH ADAPTER (GRAM) INHALATION ONCE
Refills: 0 | Status: COMPLETED | OUTPATIENT
Start: 2022-12-05 | End: 2022-12-05

## 2022-12-05 RX ADMIN — Medication 3 MILLILITER(S): at 01:50

## 2022-12-05 RX ADMIN — AZITHROMYCIN 255 MILLIGRAM(S): 500 TABLET, FILM COATED ORAL at 18:20

## 2022-12-05 RX ADMIN — Medication 40 MILLIGRAM(S): at 13:25

## 2022-12-05 RX ADMIN — ATORVASTATIN CALCIUM 40 MILLIGRAM(S): 80 TABLET, FILM COATED ORAL at 21:46

## 2022-12-05 RX ADMIN — Medication 250 MILLIGRAM(S): at 18:23

## 2022-12-05 RX ADMIN — Medication 3 MILLILITER(S): at 14:04

## 2022-12-05 RX ADMIN — Medication 1200 MILLIGRAM(S): at 18:20

## 2022-12-05 RX ADMIN — Medication 25 MILLIGRAM(S): at 21:46

## 2022-12-05 RX ADMIN — Medication 3 MILLILITER(S): at 02:01

## 2022-12-05 RX ADMIN — Medication 3 MILLILITER(S): at 20:20

## 2022-12-05 RX ADMIN — Medication 25 MILLIGRAM(S): at 13:26

## 2022-12-05 RX ADMIN — Medication 3 MILLILITER(S): at 08:18

## 2022-12-05 RX ADMIN — ENOXAPARIN SODIUM 30 MILLIGRAM(S): 100 INJECTION SUBCUTANEOUS at 18:20

## 2022-12-05 NOTE — PROGRESS NOTE ADULT - SUBJECTIVE AND OBJECTIVE BOX
INTERVAL EVENTS:  Follow up hypothyroid management  On high flow nasal cannula    ROS: Patient denies chest pain, SOB, abd pain.    MEDICATIONS  (STANDING):  albuterol/ipratropium for Nebulization 3 milliLiter(s) Nebulizer every 6 hours  albuterol/ipratropium for Nebulization. 3 milliLiter(s) Nebulizer once  albuterol/ipratropium for Nebulization. 3 milliLiter(s) Nebulizer once  atorvastatin 40 milliGRAM(s) Oral at bedtime  azithromycin  IVPB 500 milliGRAM(s) IV Intermittent every 24 hours  enoxaparin Injectable 30 milliGRAM(s) SubCutaneous every 24 hours  furosemide   Injectable 40 milliGRAM(s) IV Push daily  guaiFENesin ER 1200 milliGRAM(s) Oral every 12 hours  hydrALAZINE 25 milliGRAM(s) Oral three times a day  levothyroxine 50 MICROGram(s) Oral daily  metoprolol succinate ER 50 milliGRAM(s) Oral daily  pantoprazole    Tablet 40 milliGRAM(s) Oral before breakfast  predniSONE   Tablet 30 milliGRAM(s) Oral daily  saccharomyces boulardii 250 milliGRAM(s) Oral two times a day    MEDICATIONS  (PRN):  guaiFENesin Oral Liquid (Sugar-Free) 100 milliGRAM(s) Oral every 6 hours PRN Cough  ondansetron Injectable 4 milliGRAM(s) IV Push every 6 hours PRN Nausea and/or Vomiting    Allergies  codeine (Vomiting)  iodine (Unknown)      Vital Signs Last 24 Hrs  T(C): 37.1 (05 Dec 2022 11:30), Max: 37.1 (05 Dec 2022 11:30)  T(F): 98.8 (05 Dec 2022 11:30), Max: 98.8 (05 Dec 2022 11:30)  HR: 76 (05 Dec 2022 11:30) (65 - 90)  BP: 134/78 (05 Dec 2022 11:30) (106/73 - 206/102)  BP(mean): --  RR: 18 (05 Dec 2022 11:30) (18 - 20)  SpO2: 98% (05 Dec 2022 11:36) (79% - 100%)    Parameters below as of 05 Dec 2022 11:37  Patient On (Oxygen Delivery Method): nasal cannula, high flow  O2 Flow (L/min): 40  O2 Concentration (%): 40    PHYSICAL EXAM:  General: No apparent distress  HEENT: No thyromegaly, hoarse voice, periorbital abigail,a  Respiratory: Course bs bilaterally  Cardiac: +S1, S2, no m/r/g  GI: +BS, soft, non tender, non distended  Extremities: No peripheral edema, no pedal lesions  Neuro: A+O X3, no tremor  Pysch: Affect appropriate   Skin: No acanthosis       LABS:                        11.8   13.09 )-----------( 252      ( 05 Dec 2022 05:20 )             35.5     12-05    137  |  99  |  27.4<H>  ----------------------------<  114<H>  4.1   |  27.0  |  0.99    Ca    9.3      05 Dec 2022 05:20  Phos  2.2     12-04  Mg     2.1     12-04    TPro  7.2  /  Alb  4.1  /  TBili  0.4  /  DBili  x   /  AST  28  /  ALT  43<H>  /  AlkPhos  76  12-05    POCT Blood Glucose.: 108 mg/dL (12-05-22 @ 05:10)  POCT Blood Glucose.: 147 mg/dL (12-04-22 @ 22:10)    Triiodothyronine, Total (T3 Total): <40 ng/dL (11-30-22 @ 04:38)  Thyroid Stimulating Hormone, Serum: 37.97 uIU/mL (11-30-22 @ 04:38)

## 2022-12-05 NOTE — PROGRESS NOTE ADULT - ASSESSMENT
89 y/o F with PMH HTN, [place] s/p cochlear implants, COPD/emphysema, hypothyroidism woke up around 2 AM shaking and 30 mins later emesis and her daughter called EMS. Endocrine follows for hypothyroidism.    1. Hypothyroid - Elevated TSH/low T3/T4  - LT4 increased to 50mcg PO daily on 11/30  - TFTs ordered for 12/6  - Discussed with pt about importance of adherence with LT4 medication  - Follow up appointment: 1/13 at 2:00pm [facility] office     2. Acute hypoxic respiratory failure/COPD/CAP  - High flow nasal cannula  - Duonebs q6hr, on steroids  - Azithromycin     3. HTN  - On metoprolol, hydralazine

## 2022-12-05 NOTE — CHART NOTE - NSCHARTNOTEFT_GEN_A_CORE
PA NOTE-MEDICINE  2 Hr Rapid Response Follow Up     Pt resting Comfortably O2 NC in Place NAD No WOB     T(C): 36.7 (05 Dec 2022 01:30), Max: 36.7   T(F): 98 (05 Dec 2022 01:30), Max: 98.1   HR: 74 (05 Dec 2022 01:30) (65 - 90)  BP: 106/73 (05 Dec 2022 01:30) (106/73 - 206/102)  RR: 20 (05 Dec 2022 01:30) (18 - 20)  SpO2: 98% (05 Dec 2022 01:30) (79% - 98%)  High flow 40%     General: WD Obese female resting Comfortably NAD Able to Speak without difficulty No WOB     Cardiac: S1S2 + RRR  Lungs: + Sl Scattered Rhonchi B/L A-B  No Crackles   Abd: ND NT  No rigidity + BS x 4   Integument: No Pallor Warm/Dry     A/P Rapid Response 2 Hr Follow Up   Pt improved- back on Hi Flow only  Sat: 98 %   Duoneb ordered 2/2 Diffuse Rhonchi B/L   Continue to monitor Pt  Recall PA if any changes in Pt Status   Will sign out to AM Team to Follow up PA NOTE-MEDICINE  2 Hr Rapid Response Follow Up     Results:  EKG: Sinus @ 70 + PVC otherwise no Acute changes from Previous   CXR: + Pulm Edema B/L  (Unofficial)                         11.8   12.31 )-----------( 166      ( 04 Dec 2022 22:30 )             35.0     12-04    136  |  101  |  25.6<H>  ----------------------------<  137<H>  4.2   |  24.0  |  0.91    Ca    8.9      04 Dec 2022 22:30  Phos  2.2     12-04  Mg     2.1     12-04    TPro  6.7  /  Alb  3.6  /  TBili  0.3<L>  /  DBili  x   /  AST  27  /  ALT  50<H>  /  AlkPhos  71  12-04  LIVER FUNCTIONS - ( 04 Dec 2022 08:40 )  Alb: 3.6 g/dL / Pro: 6.7 g/dL / ALK PHOS: 71 U/L / ALT: 50 U/L / AST: 27 U/L / GGT: x       ABG - ( 04 Dec 2022 22:18 )  pH, Arterial: 7.450 pH, Blood: x     /  pCO2: 39    /  pO2: 110   / HCO3: 27    / Base Excess: 3.1   /  SaO2: 99.2    CARDIAC MARKERS ( 04 Dec 2022 22:30 )  x     / <0.01 ng/mL / 224 U/L / x     / 7.1 ng/mL  CARDIAC MARKERS ( 04 Dec 2022 17:45 )  x     / <0.01 ng/mL / 190 U/L / x     / 5.8 ng/mL    Pt resting Comfortably O2 NC in Place NAD No WOB     T(C): 36.7 (05 Dec 2022 01:30), Max: 36.7   T(F): 98 (05 Dec 2022 01:30), Max: 98.1   HR: 74 (05 Dec 2022 01:30) (65 - 90)  BP: 106/73 (05 Dec 2022 01:30) (106/73 - 206/102)  RR: 20 (05 Dec 2022 01:30) (18 - 20)  SpO2: 98% (05 Dec 2022 01:30) (79% - 98%)  High flow 40%     General: WD Obese female resting Comfortably NAD Able to Speak without difficulty No WOB     Cardiac: S1S2 + RRR  Lungs: + Sl Scattered Rhonchi B/L A-B  No Crackles   Abd: ND NT  No rigidity + BS x 4   Integument: No Pallor Warm/Dry     A/P Rapid Response 2 Hr Follow Up   Pt improved- back on Hi Flow only  Sat: 98 %   Duoneb ordered 2/2 Diffuse Rhonchi B/L   Continue to monitor Pt  Recall PA if any changes in Pt Status   Will sign out to AM Team to Follow up

## 2022-12-05 NOTE — PROGRESS NOTE ADULT - SUBJECTIVE AND OBJECTIVE BOX
HOSPITALIST PROGRESS NOTE    TERESA NESS  [id number]  89yFemale    Patient is a 89y old  Female who presents with a chief complaint of chills/ cough/ vomits (05 Dec 2022 12:07)      SUBJECTIVE:   Chart reviewed since last visit.  Patient seen and examined at bedside acute hypoxic respiratory failure.  Feels less dyspneic (though placed on HFNO overnight)  Cough with scant phlegm  Denies any chest pain, palpitations, fever, chills      OBJECTIVE:  Vital Signs Last 24 Hrs  T(C): 36.8 (05 Dec 2022 17:15), Max: 37.1 (05 Dec 2022 11:30)  T(F): 98.3 (05 Dec 2022 17:15), Max: 98.8 (05 Dec 2022 11:30)  HR: 77 (05 Dec 2022 20:24) (65 - 90)  BP: 106/63 (05 Dec 2022 17:15) (106/63 - 206/102)   RR: 18 (05 Dec 2022 17:15) (18 - 20)  SpO2: 95% (05 Dec 2022 20:24) (79% - 100%)    Parameters below as of 05 Dec 2022 20:24  Patient On (Oxygen Delivery Method): nasal cannula,5L/m        PHYSICAL EXAMINATION  General: Elderly female sitting up in bed  HEENT:  EOMI  NECK:  Supple  CVS: regular rate and rhythm S1 S2  RESP:  Bibasilar decreased breath sounds  GI:  Soft nondistended nontender BS+  : No suprapubic tenderness  MSK:  Moves all extremities  CNS:  AAOX3. No gross focal or global deficit appreciated  INTEG:  warm dry skin  PSYCH:  Fair mood    MONITOR:  CAPILLARY BLOOD GLUCOSE      POCT Blood Glucose.: 108 mg/dL (05 Dec 2022 05:10)  POCT Blood Glucose.: 147 mg/dL (04 Dec 2022 22:10)        I&O's Summary    04 Dec 2022 07:01  -  05 Dec 2022 07:00  --------------------------------------------------------  IN: 0 mL / OUT: 600 mL / NET: -600 mL    05 Dec 2022 07:01  -  05 Dec 2022 21:17  --------------------------------------------------------  IN: 0 mL / OUT: 500 mL / NET: -500 mL                            11.8   13.09 )-----------( 252      ( 05 Dec 2022 05:20 )             35.5       12-05    137  |  99  |  27.4<H>  ----------------------------<  114<H>  4.1   |  27.0  |  0.99    Ca    9.3      05 Dec 2022 05:20  Phos  2.2     12-04  Mg     2.1     12-04    TPro  7.2  /  Alb  4.1  /  TBili  0.4  /  DBili  x   /  AST  28  /  ALT  43<H>  /  AlkPhos  76  12-05    CARDIAC MARKERS ( 04 Dec 2022 22:30 )  x     / <0.01 ng/mL / 224 U/L / x     / 7.1 ng/mL  CARDIAC MARKERS ( 04 Dec 2022 17:45 )  x     / <0.01 ng/mL / 190 U/L / x     / 5.8 ng/mL          Culture:    TTE:    RADIOLOGY        MEDICATIONS  (STANDING):  albuterol/ipratropium for Nebulization 3 milliLiter(s) Nebulizer every 6 hours  albuterol/ipratropium for Nebulization. 3 milliLiter(s) Nebulizer once  albuterol/ipratropium for Nebulization. 3 milliLiter(s) Nebulizer once  atorvastatin 40 milliGRAM(s) Oral at bedtime  azithromycin  IVPB 500 milliGRAM(s) IV Intermittent every 24 hours  enoxaparin Injectable 30 milliGRAM(s) SubCutaneous every 24 hours  furosemide   Injectable 40 milliGRAM(s) IV Push daily  hydrALAZINE 25 milliGRAM(s) Oral three times a day  levothyroxine 50 MICROGram(s) Oral daily  metoprolol succinate ER 50 milliGRAM(s) Oral daily  pantoprazole    Tablet 40 milliGRAM(s) Oral before breakfast  predniSONE   Tablet 30 milliGRAM(s) Oral daily  saccharomyces boulardii 250 milliGRAM(s) Oral two times a day      MEDICATIONS  (PRN):  guaiFENesin Oral Liquid (Sugar-Free) 100 milliGRAM(s) Oral every 6 hours PRN Cough  ondansetron Injectable 4 milliGRAM(s) IV Push every 6 hours PRN Nausea and/or Vomiting

## 2022-12-05 NOTE — PROGRESS NOTE ADULT - ASSESSMENT
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 She has been having cough, without any change. Denies SOB, CP, fever, abd pain, constipation, N, sick contact. Admits to overeating last night.   In Triage . In ED noted O2 saturation 88- 91%.     Acute Hypoxic Resp failure/COPD/Pneumonia  SpO2 Ranging 91-96%  Duoneb prednisone taper- on 40->30 QD  Viral panel neg, improving leukocytosis  Rocephin + Zithromax  Now with likely fluid overload given received 5L IVF for KALPANA;  discontinue IVF, Start Furosemide 40mg IV daily    KALPANA- resolved- was probably prerenal   EF 65-70%  on IVF    Hypothyroid-  synthroid 50 QD    HTN    Metoprolol, Hydralazine      #Healthcare Maintenance  DVT PPx- Lovenox    Dispo - Likely Home w/ home oxygen    Discussed with RN, advised to mobilize OOB

## 2022-12-06 ENCOUNTER — TRANSCRIPTION ENCOUNTER (OUTPATIENT)
Age: 87
End: 2022-12-06

## 2022-12-06 LAB
ANION GAP SERPL CALC-SCNC: 11 MMOL/L — SIGNIFICANT CHANGE UP (ref 5–17)
BUN SERPL-MCNC: 32.3 MG/DL — HIGH (ref 8–20)
CALCIUM SERPL-MCNC: 9.3 MG/DL — SIGNIFICANT CHANGE UP (ref 8.4–10.5)
CHLORIDE SERPL-SCNC: 99 MMOL/L — SIGNIFICANT CHANGE UP (ref 96–108)
CO2 SERPL-SCNC: 28 MMOL/L — SIGNIFICANT CHANGE UP (ref 22–29)
CREAT SERPL-MCNC: 1.25 MG/DL — SIGNIFICANT CHANGE UP (ref 0.5–1.3)
EGFR: 41 ML/MIN/1.73M2 — LOW
GLUCOSE SERPL-MCNC: 101 MG/DL — HIGH (ref 70–99)
POTASSIUM SERPL-MCNC: 4.4 MMOL/L — SIGNIFICANT CHANGE UP (ref 3.5–5.3)
POTASSIUM SERPL-SCNC: 4.4 MMOL/L — SIGNIFICANT CHANGE UP (ref 3.5–5.3)
SODIUM SERPL-SCNC: 138 MMOL/L — SIGNIFICANT CHANGE UP (ref 135–145)
T3 SERPL-MCNC: <40 NG/DL — LOW (ref 80–200)
T4 AB SER-ACNC: 1.2 UG/DL — LOW (ref 4.5–12)
T4 FREE SERPL-MCNC: 0.2 NG/DL — LOW (ref 0.9–1.8)
TSH SERPL-MCNC: 53.33 UIU/ML — HIGH (ref 0.27–4.2)

## 2022-12-06 PROCEDURE — 99233 SBSQ HOSP IP/OBS HIGH 50: CPT

## 2022-12-06 PROCEDURE — 99232 SBSQ HOSP IP/OBS MODERATE 35: CPT

## 2022-12-06 RX ORDER — LEVOTHYROXINE SODIUM 125 MCG
100 TABLET ORAL DAILY
Refills: 0 | Status: DISCONTINUED | OUTPATIENT
Start: 2022-12-07 | End: 2022-12-20

## 2022-12-06 RX ADMIN — Medication 50 MICROGRAM(S): at 05:50

## 2022-12-06 RX ADMIN — ENOXAPARIN SODIUM 30 MILLIGRAM(S): 100 INJECTION SUBCUTANEOUS at 18:25

## 2022-12-06 RX ADMIN — Medication 50 MILLIGRAM(S): at 05:50

## 2022-12-06 RX ADMIN — ATORVASTATIN CALCIUM 40 MILLIGRAM(S): 80 TABLET, FILM COATED ORAL at 22:24

## 2022-12-06 RX ADMIN — Medication 30 MILLIGRAM(S): at 05:51

## 2022-12-06 RX ADMIN — Medication 25 MILLIGRAM(S): at 15:02

## 2022-12-06 RX ADMIN — Medication 3 MILLILITER(S): at 03:12

## 2022-12-06 RX ADMIN — Medication 3 MILLILITER(S): at 15:41

## 2022-12-06 RX ADMIN — Medication 3 MILLILITER(S): at 08:21

## 2022-12-06 RX ADMIN — Medication 25 MILLIGRAM(S): at 22:24

## 2022-12-06 RX ADMIN — Medication 40 MILLIGRAM(S): at 05:51

## 2022-12-06 RX ADMIN — Medication 25 MILLIGRAM(S): at 05:50

## 2022-12-06 RX ADMIN — PANTOPRAZOLE SODIUM 40 MILLIGRAM(S): 20 TABLET, DELAYED RELEASE ORAL at 05:50

## 2022-12-06 RX ADMIN — Medication 250 MILLIGRAM(S): at 05:50

## 2022-12-06 RX ADMIN — Medication 250 MILLIGRAM(S): at 18:25

## 2022-12-06 NOTE — PROGRESS NOTE ADULT - NS ATTEST RISK PROBLEM GEN_ALL_CORE FT
Severely low thyroid function and KALPANA
New KALPANA, acute hypoxic respiratory failure
KALPANA
Persistent respiratory failure, hypoxia requiring supplemental O2

## 2022-12-06 NOTE — PROGRESS NOTE ADULT - SUBJECTIVE AND OBJECTIVE BOX
HOSPITALIST PROGRESS NOTE    TERESA NESS  [id number]  89yFemale    Patient is a 89y old  Female who presents with a chief complaint of chills/ cough/ vomits (06 Dec 2022 11:50)      SUBJECTIVE:   Chart reviewed since last visit.  Patient seen and examined at bedside for respiratory failure  Felt better yesterday  SOb, weak; no fever or chills      OBJECTIVE:  Vital Signs Last 24 Hrs  T(C): 36.8 (06 Dec 2022 09:49), Max: 36.8 (05 Dec 2022 17:15)  T(F): 98.2 (06 Dec 2022 09:49), Max: 98.3 (05 Dec 2022 17:15)  HR: 72 (06 Dec 2022 15:43) (70 - 87)  BP: 143/83 (06 Dec 2022 09:49) (106/63 - 147/75)   RR: 19 (06 Dec 2022 05:26) (18 - 20)  SpO2: 97% (06 Dec 2022 15:43) (93% - 97%)    Parameters below as of 06 Dec 2022 15:43  Patient On (Oxygen Delivery Method): nasal cannula,4l        PHYSICAL EXAMINATION  General: Elderly female sitting up in bed, appears fatigued  HEENT:  EOMI  NECK:  Supple  CVS: regular rate and rhythm S1 S2  RESP:  Bibasilar decreased breath sounds  GI:  Soft nondistended nontender BS+  : No suprapubic tenderness  MSK:  Moves all extremities  CNS:  AAOX3. No gross focal or global deficit appreciated  INTEG:  warm dry skin  PSYCH:  Fair mood    MONITOR:  CAPILLARY BLOOD GLUCOSE            I&O's Summary    05 Dec 2022 07:01  -  06 Dec 2022 07:00  --------------------------------------------------------  IN: 0 mL / OUT: 500 mL / NET: -500 mL                            11.8   13.09 )-----------( 252      ( 05 Dec 2022 05:20 )             35.5       12-06    138  |  99  |  32.3<H>  ----------------------------<  101<H>  4.4   |  28.0  |  1.25    Ca    9.3      06 Dec 2022 06:17  Phos  2.2     12-04  Mg     2.1     12-04    TPro  7.2  /  Alb  4.1  /  TBili  0.4  /  DBili  x   /  AST  28  /  ALT  43<H>  /  AlkPhos  76  12-05    CARDIAC MARKERS ( 04 Dec 2022 22:30 )  x     / <0.01 ng/mL / 224 U/L / x     / 7.1 ng/mL  CARDIAC MARKERS ( 04 Dec 2022 17:45 )  x     / <0.01 ng/mL / 190 U/L / x     / 5.8 ng/mL          Culture:    TTE:    RADIOLOGY        MEDICATIONS  (STANDING):  albuterol/ipratropium for Nebulization 3 milliLiter(s) Nebulizer every 6 hours  albuterol/ipratropium for Nebulization. 3 milliLiter(s) Nebulizer once  albuterol/ipratropium for Nebulization. 3 milliLiter(s) Nebulizer once  atorvastatin 40 milliGRAM(s) Oral at bedtime  enoxaparin Injectable 30 milliGRAM(s) SubCutaneous every 24 hours  furosemide   Injectable 40 milliGRAM(s) IV Push daily  hydrALAZINE 25 milliGRAM(s) Oral three times a day  metoprolol succinate ER 50 milliGRAM(s) Oral daily  pantoprazole    Tablet 40 milliGRAM(s) Oral before breakfast  predniSONE   Tablet 30 milliGRAM(s) Oral daily  saccharomyces boulardii 250 milliGRAM(s) Oral two times a day      MEDICATIONS  (PRN):  guaiFENesin Oral Liquid (Sugar-Free) 100 milliGRAM(s) Oral every 6 hours PRN Cough  ondansetron Injectable 4 milliGRAM(s) IV Push every 6 hours PRN Nausea and/or Vomiting

## 2022-12-06 NOTE — PROGRESS NOTE ADULT - ASSESSMENT
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 She has been having cough, without any change. Denies SOB, CP, fever, abd pain, constipation, N, sick contact. Admits to overeating last night.   In Triage . In ED noted O2 saturation 88- 91%.     Acute Hypoxic Resp failure/COPD/Pneumonia  SpO2 Ranging 91-96%  Duoneb prednisone taper- on 40->30 QD  Viral panel neg, improving leukocytosis  Rocephin + Zithromax  Now with likely fluid overload given received 5L IVF for KALPANA;  discontinued IVF, Start Furosemide 40mg IV daily  Check CT Chest to evaluate for effusion    KALPANA- resolved- was probably prerenal   EF 65-70%  on IVF - discontinued    Hypothyroid-  LT4 increased to 100mcg    HTN    Metoprolol, Hydralazine      #Healthcare Maintenance  DVT PPx- Lovenox    Dispo -  Acutely ill    Discussed with RN, advised to mobilize OOB

## 2022-12-06 NOTE — PROGRESS NOTE ADULT - ASSESSMENT
89 y/o F with PMH HTN, [place] s/p cochlear implants, COPD/emphysema, hypothyroidism woke up around 2 AM shaking and 30 mins later emesis and her daughter called EMS. Endocrine follows for hypothyroidism.    1. Hypothyroid  - TFTs from today show elevated TSH/low T3 and T4  - Increase LT4 to 100mcg PO daily  - Discussed with pt about importance of adherence with LT4 medication  - Follow up appointment: 1/13 at 2:00pm [place] office     2. Acute hypoxic respiratory failure/COPD/CAP  - Duonebs q6hr, on steroids  - Azithromycin   - Supplemental oxygen as needed    3. HTN  - On metoprolol, hydralazine

## 2022-12-06 NOTE — DISCHARGE NOTE NURSING/CASE MANAGEMENT/SOCIAL WORK - NSDCPEFALRISK_GEN_ALL_CORE
For information on Fall & Injury Prevention, visit: [email or web address] OR  [email or web address] OR  https://www.cdc.gov/steadi/patient.html

## 2022-12-06 NOTE — DISCHARGE NOTE NURSING/CASE MANAGEMENT/SOCIAL WORK - PATIENT PORTAL LINK FT
You can access the FollowMyHealth Patient Portal offered by [facility] by registering at the following website: [email or web address]. By joining [facility]’s FollowMyHealth portal, you will also be able to view your health information using other applications (apps) compatible with our system.
negative...

## 2022-12-06 NOTE — DISCHARGE NOTE NURSING/CASE MANAGEMENT/SOCIAL WORK - NSDCFUADDAPPT_GEN_ALL_CORE_FT
You have a prescheduled appointment with your PCP, Dr. Sal, on 12/9/22 at 1:00pm. Please call the office [phone] if you need to reschedule. You have a prescheduled appointment with your PCP office on 12/16/22 at 10:00am. Please call the office [phone] if you need to reschedule.

## 2022-12-07 LAB
ALBUMIN SERPL ELPH-MCNC: 4.2 G/DL — SIGNIFICANT CHANGE UP (ref 3.3–5.2)
ALP SERPL-CCNC: 76 U/L — SIGNIFICANT CHANGE UP (ref 40–120)
ALT FLD-CCNC: 36 U/L — HIGH
ANION GAP SERPL CALC-SCNC: 14 MMOL/L — SIGNIFICANT CHANGE UP (ref 5–17)
AST SERPL-CCNC: 23 U/L — SIGNIFICANT CHANGE UP
BASOPHILS # BLD AUTO: 0.04 K/UL — SIGNIFICANT CHANGE UP (ref 0–0.2)
BASOPHILS NFR BLD AUTO: 0.4 % — SIGNIFICANT CHANGE UP (ref 0–2)
BILIRUB SERPL-MCNC: 0.6 MG/DL — SIGNIFICANT CHANGE UP (ref 0.4–2)
BUN SERPL-MCNC: 40 MG/DL — HIGH (ref 8–20)
CALCIUM SERPL-MCNC: 9.6 MG/DL — SIGNIFICANT CHANGE UP (ref 8.4–10.5)
CHLORIDE SERPL-SCNC: 101 MMOL/L — SIGNIFICANT CHANGE UP (ref 96–108)
CO2 SERPL-SCNC: 25 MMOL/L — SIGNIFICANT CHANGE UP (ref 22–29)
CREAT SERPL-MCNC: 1.17 MG/DL — SIGNIFICANT CHANGE UP (ref 0.5–1.3)
EGFR: 44 ML/MIN/1.73M2 — LOW
EOSINOPHIL # BLD AUTO: 0.02 K/UL — SIGNIFICANT CHANGE UP (ref 0–0.5)
EOSINOPHIL NFR BLD AUTO: 0.2 % — SIGNIFICANT CHANGE UP (ref 0–6)
GLUCOSE SERPL-MCNC: 111 MG/DL — HIGH (ref 70–99)
HCT VFR BLD CALC: 36.6 % — SIGNIFICANT CHANGE UP (ref 34.5–45)
HGB BLD-MCNC: 12 G/DL — SIGNIFICANT CHANGE UP (ref 11.5–15.5)
IMM GRANULOCYTES NFR BLD AUTO: 5.3 % — HIGH (ref 0–0.9)
LYMPHOCYTES # BLD AUTO: 1.51 K/UL — SIGNIFICANT CHANGE UP (ref 1–3.3)
LYMPHOCYTES # BLD AUTO: 13.8 % — SIGNIFICANT CHANGE UP (ref 13–44)
MCHC RBC-ENTMCNC: 32.8 GM/DL — SIGNIFICANT CHANGE UP (ref 32–36)
MCHC RBC-ENTMCNC: 33 PG — SIGNIFICANT CHANGE UP (ref 27–34)
MCV RBC AUTO: 100.5 FL — HIGH (ref 80–100)
MONOCYTES # BLD AUTO: 0.74 K/UL — SIGNIFICANT CHANGE UP (ref 0–0.9)
MONOCYTES NFR BLD AUTO: 6.7 % — SIGNIFICANT CHANGE UP (ref 2–14)
NEUTROPHILS # BLD AUTO: 8.09 K/UL — HIGH (ref 1.8–7.4)
NEUTROPHILS NFR BLD AUTO: 73.6 % — SIGNIFICANT CHANGE UP (ref 43–77)
NT-PROBNP SERPL-SCNC: 570 PG/ML — HIGH (ref 0–300)
PLATELET # BLD AUTO: 251 K/UL — SIGNIFICANT CHANGE UP (ref 150–400)
POTASSIUM SERPL-MCNC: 4.3 MMOL/L — SIGNIFICANT CHANGE UP (ref 3.5–5.3)
POTASSIUM SERPL-SCNC: 4.3 MMOL/L — SIGNIFICANT CHANGE UP (ref 3.5–5.3)
PROT SERPL-MCNC: 7.2 G/DL — SIGNIFICANT CHANGE UP (ref 6.6–8.7)
RAPID RVP RESULT: SIGNIFICANT CHANGE UP
RBC # BLD: 3.64 M/UL — LOW (ref 3.8–5.2)
RBC # FLD: 14.6 % — HIGH (ref 10.3–14.5)
SARS-COV-2 RNA SPEC QL NAA+PROBE: SIGNIFICANT CHANGE UP
SODIUM SERPL-SCNC: 140 MMOL/L — SIGNIFICANT CHANGE UP (ref 135–145)
WBC # BLD: 10.98 K/UL — HIGH (ref 3.8–10.5)
WBC # FLD AUTO: 10.98 K/UL — HIGH (ref 3.8–10.5)

## 2022-12-07 PROCEDURE — 99233 SBSQ HOSP IP/OBS HIGH 50: CPT

## 2022-12-07 PROCEDURE — 71250 CT THORAX DX C-: CPT | Mod: 26

## 2022-12-07 RX ORDER — HYDRALAZINE HCL 50 MG
25 TABLET ORAL
Refills: 0 | Status: DISCONTINUED | OUTPATIENT
Start: 2022-12-07 | End: 2022-12-14

## 2022-12-07 RX ORDER — FUROSEMIDE 40 MG
40 TABLET ORAL DAILY
Refills: 0 | Status: DISCONTINUED | OUTPATIENT
Start: 2022-12-07 | End: 2022-12-09

## 2022-12-07 RX ORDER — LEVOTHYROXINE SODIUM 125 MCG
1 TABLET ORAL
Qty: 30 | Refills: 0
Start: 2022-12-07 | End: 2023-01-05

## 2022-12-07 RX ORDER — LOSARTAN POTASSIUM 100 MG/1
1 TABLET, FILM COATED ORAL
Qty: 30 | Refills: 0
Start: 2022-12-07 | End: 2023-01-05

## 2022-12-07 RX ORDER — HYDRALAZINE HCL 50 MG
1 TABLET ORAL
Qty: 120 | Refills: 0
Start: 2022-12-07 | End: 2023-01-05

## 2022-12-07 RX ADMIN — Medication 25 MILLIGRAM(S): at 23:05

## 2022-12-07 RX ADMIN — Medication 3 MILLILITER(S): at 06:21

## 2022-12-07 RX ADMIN — Medication 25 MILLIGRAM(S): at 12:22

## 2022-12-07 RX ADMIN — PANTOPRAZOLE SODIUM 40 MILLIGRAM(S): 20 TABLET, DELAYED RELEASE ORAL at 06:19

## 2022-12-07 RX ADMIN — Medication 25 MILLIGRAM(S): at 06:23

## 2022-12-07 RX ADMIN — Medication 100 MICROGRAM(S): at 06:23

## 2022-12-07 RX ADMIN — ATORVASTATIN CALCIUM 40 MILLIGRAM(S): 80 TABLET, FILM COATED ORAL at 22:01

## 2022-12-07 RX ADMIN — Medication 3 MILLILITER(S): at 16:18

## 2022-12-07 RX ADMIN — Medication 25 MILLIGRAM(S): at 18:31

## 2022-12-07 RX ADMIN — Medication 40 MILLIGRAM(S): at 06:18

## 2022-12-07 RX ADMIN — Medication 250 MILLIGRAM(S): at 06:20

## 2022-12-07 RX ADMIN — ENOXAPARIN SODIUM 30 MILLIGRAM(S): 100 INJECTION SUBCUTANEOUS at 18:31

## 2022-12-07 RX ADMIN — Medication 30 MILLIGRAM(S): at 06:19

## 2022-12-07 RX ADMIN — Medication 50 MILLIGRAM(S): at 06:20

## 2022-12-07 NOTE — PROGRESS NOTE ADULT - ASSESSMENT
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 She has been having cough, without any change. Denies SOB, CP, fever, abd pain, constipation, N, sick contact. Admits to overeating last night.   In Triage . In ED noted O2 saturation 88- 91%.     Acute Hypoxic Resp failure/COPD/Pneumonia  SpO2 Ranging 91-96%- on 3 liters   Duoneb prednisone taper- on 40->30 QD  Viral panel neg, improving leukocytosis  Rocephin + Zithromax  possible fluid overload given received IVF for KALPANA;  discontinued IVF, Furosemide 40mg IV daily for one more day today   Pending- CT Chest to evaluate for effusion    KLAPANA- resolved- was probably prerenal   EF 65-70%  on IVF - discontinued    Hypothyroid-severe- no compliance with medications  LT4 increased to 100mcg      HTN    Metoprolol, Hydralazine      #Healthcare Maintenance  DVT PPx- Lovenox    Dispo -  Acutely ill    Discussed with

## 2022-12-07 NOTE — CONSULT NOTE ADULT - SUBJECTIVE AND OBJECTIVE BOX
formerly [facility], THE HEART CENTER                              [address] [place] [address]                                                 PHONE: [phone]                                                 FAX: [phone]  ------------------------------------------------------------------------------------------------    Chief complaint: nausea    89y Female with past medical history as under with shortness of breath and vomiting. Being treated for COPD, PNA and ARF. received iv fluids and developed worsening shortness of breath.  At the time of evaluation, pt reports feeling nausea. Breathing is improved and does not feel as congested.    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     Family history:   No pertinent family history in first degree relative of early CAD, sudden cardiac death or  congenital heart disease    Social History:  No smoking   No alcohol  No other drug use    Vital Signs Last 24 Hrs  T(C): 36.8 (07 Dec 2022 05:40), Max: 36.8 (07 Dec 2022 05:40)  T(F): 98.3 (07 Dec 2022 05:40), Max: 98.3 (07 Dec 2022 05:40)  HR: 68 (07 Dec 2022 05:40) (66 - 106)  BP: 161/79 (07 Dec 2022 05:40) (121/66 - 161/79)  BP(mean): --  RR: 18 (07 Dec 2022 05:40) (18 - 18)  SpO2: 93% (07 Dec 2022 05:40) (93% - 98%)    Parameters below as of 07 Dec 2022 05:40  Patient On (Oxygen Delivery Method): nasal cannula        PHYSICAL EXAM:  Constitutional: Elderly woman on O2  HEENT:     Head: Normocephalic and atraumatic  Eyes: Conjunctiva normal, No scleral icterus  Neck: Supple, No JVD  Mouth/Throat: Mucous membranes are normal. Mucosa are not pale or dry.  Cardiovascular: regular S1, S2 + ESM  Respiratory: Lungs clear to auscultation; no crepitations, no wheeze  Gastrointestinal:  Soft, Non-tender, + BS	  Musculoskeletal: Normal range of motion. No edema  Skin: Warm and dry. No cyanosis . No diaphoresis.  Neurologic: Alert oriented to time place and person. Normal strength and no tremor.  Psychiatric: Normal mood and affect, Speech is normal and behavior is normal.        LABS:                        12.0   10.98 )-----------( 251      ( 07 Dec 2022 06:53 )             36.6     12-07    140  |  101  |  40.0<H>  ----------------------------<  111<H>  4.3   |  25.0  |  1.17    Ca    9.6      07 Dec 2022 06:53    TPro  7.2  /  Alb  4.2  /  TBili  0.6  /  DBili  x   /  AST  23  /  ALT  36<H>  /  AlkPhos  76  12-07    RADIOLOGY & ADDITIONAL STUDIES: (reviewed)  CXR was independently visualized/reviewed and demonstrated: Patchy interstitial infiltrate/atelectasis at the right base, unchanged.    Mild pulmonary venous congestion/perihilar interstitial edema, unchanged      CARDIOLOGY TESTING:(reviewed)     ECG (Independent visualization): NSR with PVCs    ECHOCARDIOGRAM  (Independent visualization):  Summary:   1. Normal left ventricular internal cavity size.   2. Normal global left ventricular systolic function.   3. Left ventricular ejection fraction, by visual estimation, is 65 to   70%.   4. Normal right ventricular size and function.   5. The left atrium is normal in size.   6. The right atrium is normal in size.   7. Sclerotic aortic valve with normal opening.   8. Mild tricuspid regurgitation.   9. Normal pulmonary artery pressure.  10. Trace mitral valve regurgitation.  11. Mild pulmonic valve regurgitation.    MEDICATIONS:(reviewed)  Home Medications:    MEDICATIONS  (STANDING):  albuterol/ipratropium for Nebulization 3 milliLiter(s) Nebulizer every 6 hours  albuterol/ipratropium for Nebulization. 3 milliLiter(s) Nebulizer once  albuterol/ipratropium for Nebulization. 3 milliLiter(s) Nebulizer once  atorvastatin 40 milliGRAM(s) Oral at bedtime  enoxaparin Injectable 30 milliGRAM(s) SubCutaneous every 24 hours  furosemide   Injectable 40 milliGRAM(s) IV Push daily  hydrALAZINE 25 milliGRAM(s) Oral four times a day  levothyroxine 100 MICROGram(s) Oral daily  metoprolol succinate ER 50 milliGRAM(s) Oral daily  pantoprazole    Tablet 40 milliGRAM(s) Oral before breakfast  predniSONE   Tablet 30 milliGRAM(s) Oral daily    MEDICATIONS  (PRN):  guaiFENesin Oral Liquid (Sugar-Free) 100 milliGRAM(s) Oral every 6 hours PRN Cough  ondansetron Injectable 4 milliGRAM(s) IV Push every 6 hours PRN Nausea and/or Vomiting      ASSESSMENT AND PLAN:    89y Female with prior history of HTN, [place] s/p cochlear implants, 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 Being treated for COPD, PNA and ARF. received iv fluids and developed worsening shortness of breath.    shortness of breath  - related to volume overload with iv fluids for ARF  - volume status improved  - continue iv lasix. Will consider switching to po in AM  - echo with normal LV function and no AS    HTN  - Continue metoprolol with hydralazine

## 2022-12-07 NOTE — PROGRESS NOTE ADULT - SUBJECTIVE AND OBJECTIVE BOX
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 She has been having cough, without any change. Denies SOB, CP, fever, abd pain, constipation, N, sick contact. Admits to overeating last night. states she was on thyroid meds which her PMD stopped. and KCL was increased recently.  In Triage . In ED noted O2 saturation 88- 91%.     SH- smoke 1ppd or more since age 15 yrs to 50 yrs. Quit 40 yrs ago, denies other toxic habits  FH- states unaware of any medical condition (29 Nov 2022 09:15)    The patient was seen and evaluated sitting up in bed conversing offers no complaints   The patient is in no acute distress.  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              Vital Signs Last 24 Hrs  T(C): 36.8 (07 Dec 2022 05:40), Max: 36.8 (06 Dec 2022 09:49)  T(F): 98.3 (07 Dec 2022 05:40), Max: 98.3 (07 Dec 2022 05:40)  HR: 68 (07 Dec 2022 05:40) (66 - 106)  BP: 161/79 (07 Dec 2022 05:40) (121/66 - 161/79)  BP(mean): --  RR: 18 (07 Dec 2022 05:40) (18 - 18)  SpO2: 93% (07 Dec 2022 05:40) (93% - 98%)    Parameters below as of 07 Dec 2022 05:40  Patient On (Oxygen Delivery Method): nasal cannula    T(C): 36.8 (12-07-22 @ 05:40), Max: 36.8 (12-06-22 @ 09:49)  HR: 68 (12-07-22 @ 05:40) (66 - 106)  BP: 161/79 (12-07-22 @ 05:40) (121/66 - 161/79)  RR: 18 (12-07-22 @ 05:40) (18 - 18)  SpO2: 93% (12-07-22 @ 05:40) (93% - 98%)  Wt(kg): --    PHYSICAL EXAM:    GENERAL: NAD, elderly   HEAD:  Atraumatic, Normocephalic  EYES: EOMI, PERRL, conjunctiva and sclera clear  ENMT:  Moist mucous membranes,  No lesions  NECK: Supple, No JVD, Normal thyroid  NERVOUS SYSTEM:  Alert & Oriented X3,  Moves upper and lower extremities; CNS-II-XII  CHEST/LUNG: Clear to auscultation bilaterally; No rales, rhonchi, wheezing,   HEART: Regular rate and rhythm; No murmurs,   ABDOMEN: Soft, Nontender, Nondistended; Bowel sounds present  EXTREMITIES:  Peripheral Pulses, No  cyanosis, or edema  SKIN: No rashes or lesions  psychiatry- mood and affect appropriate     albuterol/ipratropium for Nebulization 3 milliLiter(s) Nebulizer every 6 hours  albuterol/ipratropium for Nebulization. 3 milliLiter(s) Nebulizer once  albuterol/ipratropium for Nebulization. 3 milliLiter(s) Nebulizer once  atorvastatin 40 milliGRAM(s) Oral at bedtime  enoxaparin Injectable 30 milliGRAM(s) SubCutaneous every 24 hours  furosemide   Injectable 40 milliGRAM(s) IV Push daily  guaiFENesin Oral Liquid (Sugar-Free) 100 milliGRAM(s) Oral every 6 hours PRN  hydrALAZINE 25 milliGRAM(s) Oral four times a day  levothyroxine 100 MICROGram(s) Oral daily  metoprolol succinate ER 50 milliGRAM(s) Oral daily  ondansetron Injectable 4 milliGRAM(s) IV Push every 6 hours PRN  pantoprazole    Tablet 40 milliGRAM(s) Oral before breakfast  predniSONE   Tablet 30 milliGRAM(s) Oral daily      LABS:                          12.0   10.98 )-----------( 251      ( 07 Dec 2022 06:53 )             36.6     12-07    140  |  101  |  40.0<H>  ----------------------------<  111<H>  4.3   |  25.0  |  1.17    Ca    9.6      07 Dec 2022 06:53    TPro  7.2  /  Alb  4.2  /  TBili  0.6  /  DBili  x   /  AST  23  /  ALT  36<H>  /  AlkPhos  76  12-07    LIVER FUNCTIONS - ( 07 Dec 2022 06:53 )  Alb: 4.2 g/dL / Pro: 7.2 g/dL / ALK PHOS: 76 U/L / ALT: 36 U/L / AST: 23 U/L / GGT: x                   CAPILLARY BLOOD GLUCOSE          RADIOLOGY & ADDITIONAL TESTS:      Consultant notes reviewed    Case discussed with consultant/provider/ family /patient

## 2022-12-08 LAB
ALBUMIN SERPL ELPH-MCNC: 3.6 G/DL — SIGNIFICANT CHANGE UP (ref 3.3–5.2)
ALP SERPL-CCNC: 64 U/L — SIGNIFICANT CHANGE UP (ref 40–120)
ALT FLD-CCNC: 28 U/L — SIGNIFICANT CHANGE UP
ANION GAP SERPL CALC-SCNC: 12 MMOL/L — SIGNIFICANT CHANGE UP (ref 5–17)
AST SERPL-CCNC: 20 U/L — SIGNIFICANT CHANGE UP
BASOPHILS # BLD AUTO: 0.02 K/UL — SIGNIFICANT CHANGE UP (ref 0–0.2)
BASOPHILS NFR BLD AUTO: 0.2 % — SIGNIFICANT CHANGE UP (ref 0–2)
BILIRUB SERPL-MCNC: 0.5 MG/DL — SIGNIFICANT CHANGE UP (ref 0.4–2)
BUN SERPL-MCNC: 48.5 MG/DL — HIGH (ref 8–20)
CALCIUM SERPL-MCNC: 9 MG/DL — SIGNIFICANT CHANGE UP (ref 8.4–10.5)
CHLORIDE SERPL-SCNC: 100 MMOL/L — SIGNIFICANT CHANGE UP (ref 96–108)
CO2 SERPL-SCNC: 26 MMOL/L — SIGNIFICANT CHANGE UP (ref 22–29)
CREAT SERPL-MCNC: 1.1 MG/DL — SIGNIFICANT CHANGE UP (ref 0.5–1.3)
EGFR: 48 ML/MIN/1.73M2 — LOW
EOSINOPHIL # BLD AUTO: 0.11 K/UL — SIGNIFICANT CHANGE UP (ref 0–0.5)
EOSINOPHIL NFR BLD AUTO: 0.9 % — SIGNIFICANT CHANGE UP (ref 0–6)
GLUCOSE SERPL-MCNC: 118 MG/DL — HIGH (ref 70–99)
HCT VFR BLD CALC: 34.1 % — LOW (ref 34.5–45)
HGB BLD-MCNC: 11.2 G/DL — LOW (ref 11.5–15.5)
IMM GRANULOCYTES NFR BLD AUTO: 3 % — HIGH (ref 0–0.9)
LYMPHOCYTES # BLD AUTO: 1.47 K/UL — SIGNIFICANT CHANGE UP (ref 1–3.3)
LYMPHOCYTES # BLD AUTO: 12.5 % — LOW (ref 13–44)
MCHC RBC-ENTMCNC: 32.8 GM/DL — SIGNIFICANT CHANGE UP (ref 32–36)
MCHC RBC-ENTMCNC: 33.2 PG — SIGNIFICANT CHANGE UP (ref 27–34)
MCV RBC AUTO: 101.2 FL — HIGH (ref 80–100)
MONOCYTES # BLD AUTO: 0.66 K/UL — SIGNIFICANT CHANGE UP (ref 0–0.9)
MONOCYTES NFR BLD AUTO: 5.6 % — SIGNIFICANT CHANGE UP (ref 2–14)
NEUTROPHILS # BLD AUTO: 9.17 K/UL — HIGH (ref 1.8–7.4)
NEUTROPHILS NFR BLD AUTO: 77.8 % — HIGH (ref 43–77)
PLATELET # BLD AUTO: 242 K/UL — SIGNIFICANT CHANGE UP (ref 150–400)
POTASSIUM SERPL-MCNC: 4.1 MMOL/L — SIGNIFICANT CHANGE UP (ref 3.5–5.3)
POTASSIUM SERPL-SCNC: 4.1 MMOL/L — SIGNIFICANT CHANGE UP (ref 3.5–5.3)
PROT SERPL-MCNC: 6.4 G/DL — LOW (ref 6.6–8.7)
RBC # BLD: 3.37 M/UL — LOW (ref 3.8–5.2)
RBC # FLD: 14.8 % — HIGH (ref 10.3–14.5)
SODIUM SERPL-SCNC: 138 MMOL/L — SIGNIFICANT CHANGE UP (ref 135–145)
WBC # BLD: 11.78 K/UL — HIGH (ref 3.8–10.5)
WBC # FLD AUTO: 11.78 K/UL — HIGH (ref 3.8–10.5)

## 2022-12-08 PROCEDURE — 71045 X-RAY EXAM CHEST 1 VIEW: CPT | Mod: 26

## 2022-12-08 PROCEDURE — 99223 1ST HOSP IP/OBS HIGH 75: CPT

## 2022-12-08 PROCEDURE — 99233 SBSQ HOSP IP/OBS HIGH 50: CPT

## 2022-12-08 RX ADMIN — Medication 25 MILLIGRAM(S): at 23:06

## 2022-12-08 RX ADMIN — Medication 25 MILLIGRAM(S): at 06:29

## 2022-12-08 RX ADMIN — Medication 40 MILLIGRAM(S): at 06:29

## 2022-12-08 RX ADMIN — Medication 25 MILLIGRAM(S): at 11:25

## 2022-12-08 RX ADMIN — ENOXAPARIN SODIUM 30 MILLIGRAM(S): 100 INJECTION SUBCUTANEOUS at 18:41

## 2022-12-08 RX ADMIN — Medication 3 MILLILITER(S): at 08:00

## 2022-12-08 RX ADMIN — ATORVASTATIN CALCIUM 40 MILLIGRAM(S): 80 TABLET, FILM COATED ORAL at 22:08

## 2022-12-08 RX ADMIN — PANTOPRAZOLE SODIUM 40 MILLIGRAM(S): 20 TABLET, DELAYED RELEASE ORAL at 06:29

## 2022-12-08 RX ADMIN — Medication 3 MILLILITER(S): at 15:46

## 2022-12-08 RX ADMIN — Medication 50 MILLIGRAM(S): at 06:30

## 2022-12-08 RX ADMIN — Medication 30 MILLIGRAM(S): at 06:29

## 2022-12-08 RX ADMIN — Medication 25 MILLIGRAM(S): at 17:47

## 2022-12-08 RX ADMIN — Medication 100 MICROGRAM(S): at 06:29

## 2022-12-08 NOTE — DIETITIAN NUTRITION RISK NOTIFICATION - ADDITIONAL COMMENTS/DIETITIAN RECOMMENDATIONS
Rec MVI, Vit C  Ensure TID to optimize po intake and provide an additional 350 kcal, 20g protein per serving

## 2022-12-08 NOTE — CONSULT NOTE ADULT - SUBJECTIVE AND OBJECTIVE BOX
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 She has been having cough, without any change. Denies SOB, CP, fever, abd pain, constipation, N, sick contact. Admits to overeating last night. states she was on thyroid meds which her PMD stopped. and KCL was increased recently.  In Triage . In ED noted O2 saturation 88- 91%.     Patient admitted to medicine for AHRF 2/2 pneumonia, and likely COPD exacerbation.  started on ceft and azithro.   endocrine was following due to worsening hypothyroidism   RRT on 12/4 due to hypertensive episode to 223/79, s/p lasix and hydralazine.  CXR concern for pulm edema likely from IVF, started lasix and on high flow. Eventually wean down to nasal canula.  echo with normal LV and no AS.      pulmonary consulted for optimization    patient reports feeling weak, but overall improved breathing.  currently on 5L saturating 95%.  And /83.      patient has been following with Dr. Cotto and Dr. Freitas,  her PFT is  normal, likely mild COPD base on symptoms.  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    Social History:    Review of Systems:  negative except as above     ALL OTHER REVIEW OF SYSTEMS EXCEPT PER HPI NEGATIVE.      Medications:    furosemide   Injectable 40 milliGRAM(s) IV Push daily  hydrALAZINE 25 milliGRAM(s) Oral four times a day  metoprolol succinate ER 50 milliGRAM(s) Oral daily    albuterol/ipratropium for Nebulization 3 milliLiter(s) Nebulizer every 6 hours  albuterol/ipratropium for Nebulization. 3 milliLiter(s) Nebulizer once  albuterol/ipratropium for Nebulization. 3 milliLiter(s) Nebulizer once  guaiFENesin Oral Liquid (Sugar-Free) 100 milliGRAM(s) Oral every 6 hours PRN    ondansetron Injectable 4 milliGRAM(s) IV Push every 6 hours PRN      enoxaparin Injectable 30 milliGRAM(s) SubCutaneous every 24 hours    pantoprazole    Tablet 40 milliGRAM(s) Oral before breakfast      atorvastatin 40 milliGRAM(s) Oral at bedtime  levothyroxine 100 MICROGram(s) Oral daily  predniSONE   Tablet 30 milliGRAM(s) Oral daily                Vital Signs Last 24 Hrs  T(C): 36.6 (08 Dec 2022 15:20), Max: 36.7 (07 Dec 2022 22:01)  T(F): 97.8 (08 Dec 2022 15:20), Max: 98 (07 Dec 2022 22:01)  HR: 71 (08 Dec 2022 15:50) (71 - 97)  BP: 137/83 (08 Dec 2022 15:20) (137/83 - 155/77)  BP(mean): --  RR: 20 (08 Dec 2022 11:35) (20 - 20)  SpO2: 93% (08 Dec 2022 15:50) (93% - 97%)    Parameters below as of 08 Dec 2022 15:50  Patient On (Oxygen Delivery Method): nasal cannula,4l            I&O's Detail    07 Dec 2022 07:01  -  08 Dec 2022 07:00  --------------------------------------------------------  IN:  Total IN: 0 mL    OUT:    Voided (mL): 1050 mL  Total OUT: 1050 mL    Total NET: -1050 mL            LABS:                        11.2   11.78 )-----------( 242      ( 08 Dec 2022 04:43 )             34.1     12-08    138  |  100  |  48.5<H>  ----------------------------<  118<H>  4.1   |  26.0  |  1.10    Ca    9.0      08 Dec 2022 04:43    TPro  6.4<L>  /  Alb  3.6  /  TBili  0.5  /  DBili  x   /  AST  20  /  ALT  28  /  AlkPhos  64  12-08          CAPILLARY BLOOD GLUCOSE            CULTURES:      Physical Examination:  GENERAL: In NAD   HEENT: NC/AT  NECK: Supple, trachea midline  PULM: bibasilar rales, no wheeze   CVS: +S1, S2, RRR  ABD: Soft, non-tender  EXTREMITIES: No pedal edema B/L  SKIN: No open wounds  NEURO: Grossly non-focal    DEVICES:     RADIOLOGY: reviewed

## 2022-12-08 NOTE — DIETITIAN NUTRITION RISK NOTIFICATION - TREATMENT: THE FOLLOWING DIET HAS BEEN RECOMMENDED
Diet, DASH/TLC:   Sodium & Cholesterol Restricted  Easy to Chew (EASYTOCHEW) (12-05-22 @ 11:35) [Active]

## 2022-12-08 NOTE — PROGRESS NOTE ADULT - SUBJECTIVE AND OBJECTIVE BOX
[facility], THE HEART [facility]ENTER                              [address] [place] [address]                                                 PHONE: [phone]                                                 FAX: [phone]  -----------------------------------------------------------------------------------------------  Pt seen and examined. FU for  shortness of breath       Overnight events/Complaints: Pt without complains.    Vital Signs Last 24 Hrs  T(C): 36.7 (07 Dec 2022 22:01), Max: 37 (07 Dec 2022 12:20)  T(F): 98 (07 Dec 2022 22:01), Max: 98.6 (07 Dec 2022 12:20)  HR: 74 (08 Dec 2022 08:01) (62 - 95)  BP: 155/77 (07 Dec 2022 22:01) (155/77 - 172/104)  BP(mean): --  RR: 20 (07 Dec 2022 22:01) (18 - 20)  SpO2: 95% (08 Dec 2022 08:01) (91% - 96%)    Parameters below as of 08 Dec 2022 08:01  Patient On (Oxygen Delivery Method): nasal cannula,4l      I&O's Summary    07 Dec 2022 07:01  -  08 Dec 2022 07:00  --------------------------------------------------------  IN: 0 mL / OUT: 1050 mL / NET: -1050 mL        PHYSICAL EXAM:  Constitutional: Elderly woman on O2  HEENT:     Head: Normocephalic and atraumatic  Eyes: Conjunctiva normal, No scleral icterus  Neck: Supple, No JVD  Mouth/Throat: Mucous membranes are normal. Mucosa are not pale or dry.  Cardiovascular: regular S1, S2 + ESM  Respiratory: Lungs clear to auscultation; no crepitations, no wheeze  Gastrointestinal:  Soft, Non-tender, + BS	  Musculoskeletal: Normal range of motion. No edema  Skin: Warm and dry. No cyanosis . No diaphoresis.  Neurologic: Alert oriented to time place and person. Normal strength and no tremor.  Psychiatric: Normal mood and affect, Speech is normal and behavior is normal.    LABS:                        11.2   11.78 )-----------( 242      ( 08 Dec 2022 04:43 )             34.1     12-08    138  |  100  |  48.5<H>  ----------------------------<  118<H>  4.1   |  26.0  |  1.10    Ca    9.0      08 Dec 2022 04:43    TPro  6.4<L>  /  Alb  3.6  /  TBili  0.5  /  DBili  x   /  AST  20  /  ALT  28  /  AlkPhos  64  12-08      RADIOLOGY & ADDITIONAL STUDIES: (reviewed)  CXR was independently visualized/reviewed and demonstrated: Patchy interstitial infiltrate/atelectasis at the right base, unchanged.    Mild pulmonary venous congestion/perihilar interstitial edema, unchanged      CARDIOLOGY TESTING:(reviewed)     ECG (Independent visualization): NSR with PVCs    ECHOCARDIOGRAM  (Independent visualization):  Summary:   1. Normal left ventricular internal cavity size.   2. Normal global left ventricular systolic function.   3. Left ventricular ejection fraction, by visual estimation, is 65 to   70%.   4. Normal right ventricular size and function.   5. The left atrium is normal in size.   6. The right atrium is normal in size.   7. Sclerotic aortic valve with normal opening.   8. Mild tricuspid regurgitation.   9. Normal pulmonary artery pressure.  10. Trace mitral valve regurgitation.  11. Mild pulmonic valve regurgitation.    MEDICATIONS:(reviewed)  MEDICATIONS  (STANDING):  albuterol/ipratropium for Nebulization 3 milliLiter(s) Nebulizer every 6 hours  albuterol/ipratropium for Nebulization. 3 milliLiter(s) Nebulizer once  albuterol/ipratropium for Nebulization. 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 Being treated for COPD, PNA and ARF. received iv fluids and developed worsening shortness of breath.    shortness of breath  - related to volume overload with iv fluids for ARF  - volume status improved  - switch to po lasix today  - echo with normal LV function and no AS    HTN  - Continue metoprolol with hydralazine    No further inpt cardiac work-up needed. Pls recall if any qns/concerns. Will arrange outpt FU post discharge.

## 2022-12-08 NOTE — PROGRESS NOTE ADULT - ASSESSMENT
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 She has been having cough, without any change. Denies SOB, CP, fever, abd pain, constipation, N, sick contact. Admits to overeating last night.   In Triage . In ED noted O2 saturation 88- 91%.     Acute Hypoxic Resp failure/COPD/Pneumonia- desaturating to 87 % on slightest ambulation with PT  will be a readmission from rehab if she desaturates trying too rehab - cont PT   rest SpO2 Ranging 91-96%- on 3liters   Duoneb prednisone taper- on 40->30 QD  Viral panel neg, improving leukocytosis  Rocephin + Zithromax  possible fluid overload given received IVF for KALPANA;  discontinued IVF, Furosemide 40mg IV daily for one more day today   Pending- CT Chest atelectasis and pneumonia -no fever 11=WBC -  discussed with Pulmonary Dr Martin- will observe off antibiotics- if saturations don't improve will consider antibiotics for HAP or if febrile   was volume overloaded for lasix- now seems euvolemic   KALPANA- resolved- was probably prerenal   EF 65-70%  on IVF - discontinued    Hypothyroid-severe- no compliance with medications  LT4 increased to 100mcg    HTN    Metoprolol, Hydralazine    #Healthcare Maintenance  DVT PPx- Lovenox    Dispo -  Acutely ill    Discussed with

## 2022-12-08 NOTE — DIETITIAN INITIAL EVALUATION ADULT - PERTINENT MEDS FT
MEDICATIONS  (STANDING):  albuterol/ipratropium for Nebulization 3 milliLiter(s) Nebulizer every 6 hours  albuterol/ipratropium for Nebulization. 3 milliLiter(s) Nebulizer once  albuterol/ipratropium for Nebulization. 3 milliLiter(s) Nebulizer once  atorvastatin 40 milliGRAM(s) Oral at bedtime  enoxaparin Injectable 30 milliGRAM(s) SubCutaneous every 24 hours  furosemide   Injectable 40 milliGRAM(s) IV Push daily  hydrALAZINE 25 milliGRAM(s) Oral four times a day  levothyroxine 100 MICROGram(s) Oral daily  metoprolol succinate ER 50 milliGRAM(s) Oral daily  pantoprazole    Tablet 40 milliGRAM(s) Oral before breakfast  predniSONE   Tablet 30 milliGRAM(s) Oral daily    MEDICATIONS  (PRN):  guaiFENesin Oral Liquid (Sugar-Free) 100 milliGRAM(s) Oral every 6 hours PRN Cough  ondansetron Injectable 4 milliGRAM(s) IV Push every 6 hours PRN Nausea and/or Vomiting

## 2022-12-08 NOTE — PROGRESS NOTE ADULT - SUBJECTIVE AND OBJECTIVE BOX
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 She has been having cough, without any change. Denies SOB, CP, fever, abd pain, constipation, N, sick contact. Admits to overeating last night. states she was on thyroid meds which her PMD stopped. and KCL was increased recently.  In Triage . In ED noted O2 saturation 88- 91%.     SH- smoke 1ppd or more since age 15 yrs to 50 yrs. Quit 40 yrs ago, denies other toxic habits  FH- states unaware of any medical condition (29 Nov 2022 09:15)    The patient was seen and evaluated - helped patient put battery in her cochlear hearing aid- she wanted cereal for breakfast - denied any SOB- but with PT got up and desat to 87 %  The patient is in no acute distress.  Denied any fever chest pain, palpitations, shortness of breath, abdominal pain, fever, dysuria, cough, edema       I&O's Summary    07 Dec 2022 07:01  -  08 Dec 2022 07:00  --------------------------------------------------------  IN: 0 mL / OUT: 1050 mL / NET: -1050 mL      Allergies    codeine (Vomiting)  iodine (Unknown)    Intolerances      HEALTH ISSUES - PROBLEM Dx:        PAST MEDICAL & SURGICAL HISTORY:  Essential hypertension      Chronic obstructive pulmonary disease, unspecified COPD type      Hypothyroidism, unspecified type      H/O exploratory laparotomy  for a benign colon mass 4/19/12              Vital Signs Last 24 Hrs  T(C): 36.6 (08 Dec 2022 15:20), Max: 36.7 (07 Dec 2022 22:01)  T(F): 97.8 (08 Dec 2022 15:20), Max: 98 (07 Dec 2022 22:01)  HR: 71 (08 Dec 2022 15:50) (71 - 97)  BP: 137/83 (08 Dec 2022 15:20) (137/83 - 155/77)  BP(mean): --  RR: 20 (08 Dec 2022 11:35) (20 - 20)  SpO2: 93% (08 Dec 2022 15:50) (93% - 97%)    Parameters below as of 08 Dec 2022 15:50  Patient On (Oxygen Delivery Method): nasal cannula,4l    T(C): 36.6 (12-08-22 @ 15:20), Max: 36.7 (12-07-22 @ 22:01)  HR: 71 (12-08-22 @ 15:50) (71 - 97)  BP: 137/83 (12-08-22 @ 15:20) (137/83 - 155/77)  RR: 20 (12-08-22 @ 11:35) (20 - 20)  SpO2: 93% (12-08-22 @ 15:50) (93% - 97%)  Wt(kg): --    PHYSICAL EXAM:    GENERAL: NAD, sitting up in bed- conversing pleasant , eating cereal , elderly  HEAD:  Atraumatic, Normocephalic  EYES: EOMI, PERRL, conjunctiva and sclera clear  ENMT:  Moist mucous membranes,  No lesions  NECK: Supple, No JVD, Normal thyroid  NERVOUS SYSTEM:  Alert & Oriented X3,  Moves upper and lower extremities; CNS-II-XII  CHEST/LUNG: Clear to auscultation bilaterally; No rales, rhonchi, wheezing,   HEART: Regular rate and rhythm; No murmurs,   ABDOMEN: Soft, Nontender, Nondistended; Bowel sounds present  EXTREMITIES:  Peripheral Pulses, No  cyanosis, or edema  SKIN: No rashes or lesions  psychiatry- mood and affect appropriate,     albuterol/ipratropium for Nebulization 3 milliLiter(s) Nebulizer every 6 hours  albuterol/ipratropium for Nebulization. 3 milliLiter(s) Nebulizer once  albuterol/ipratropium for Nebulization. 3 milliLiter(s) Nebulizer once  atorvastatin 40 milliGRAM(s) Oral at bedtime  enoxaparin Injectable 30 milliGRAM(s) SubCutaneous every 24 hours  furosemide   Injectable 40 milliGRAM(s) IV Push daily  guaiFENesin Oral Liquid (Sugar-Free) 100 milliGRAM(s) Oral every 6 hours PRN  hydrALAZINE 25 milliGRAM(s) Oral four times a day  levothyroxine 100 MICROGram(s) Oral daily  metoprolol succinate ER 50 milliGRAM(s) Oral daily  ondansetron Injectable 4 milliGRAM(s) IV Push every 6 hours PRN  pantoprazole    Tablet 40 milliGRAM(s) Oral before breakfast  predniSONE   Tablet 30 milliGRAM(s) Oral daily      LABS:                          11.2   11.78 )-----------( 242      ( 08 Dec 2022 04:43 )             34.1     12-08    138  |  100  |  48.5<H>  ----------------------------<  118<H>  4.1   |  26.0  |  1.10    Ca    9.0      08 Dec 2022 04:43    TPro  6.4<L>  /  Alb  3.6  /  TBili  0.5  /  DBili  x   /  AST  20  /  ALT  28  /  AlkPhos  64  12-08    LIVER FUNCTIONS - ( 08 Dec 2022 04:43 )  Alb: 3.6 g/dL / Pro: 6.4 g/dL / ALK PHOS: 64 U/L / ALT: 28 U/L / AST: 20 U/L / GGT: x                   CAPILLARY BLOOD GLUCOSE          RADIOLOGY & ADDITIONAL TESTS:      Consultant notes reviewed    Case discussed with consultant/provider/ family /patient

## 2022-12-08 NOTE — DIETITIAN INITIAL EVALUATION ADULT - OTHER INFO
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 She has been having cough, without any change.

## 2022-12-08 NOTE — DIETITIAN INITIAL EVALUATION ADULT - ADD RECOMMEND
Continue easy to chew  Rec MVI, Vit C  Ensure TID to optimize po intake and provide an additional 350 kcal, 20g protein per serving

## 2022-12-08 NOTE — CONSULT NOTE ADULT - ASSESSMENT
Acute hypoxemic respiratory failure, multiple etiologies  possible COPD exacerbation, complicated with CHF exacerbation with IVFR  sepsis 2/2 to multifocal pneumonia  bibasilar atelectasis   mild COPD   vertebral compression   hypothroidism     - patient does not appear in COPD exacerbaiton at this time  - will continue prednisone taper  - continue duoneb q6h standing, and PRN alb neb   - completed ceftriaxone and azithromycin course  - patient afebrile, however has persistent leukocytosis.  If patient spikes temperature or has worsening respiratory status, will broaden antibiotic to cover for possible HAP, and redo sepsis workup.   - blood culture negative, RVP negative  - agree with gentle diuresis per primary team/cardiology team  - Physical therapy  - OOB to chair   - aspiration precaution   - patient can follow up with outpatient pulmonary 2-4 weeks after discharge  - pulmonary will follow     Romeo Martin M.D.  Attending Physician  [facility]   Pulmonary & Critical Care Medicine

## 2022-12-08 NOTE — DIETITIAN INITIAL EVALUATION ADULT - PERTINENT LABORATORY DATA
12-08    138  |  100  |  48.5<H>  ----------------------------<  118<H>  4.1   |  26.0  |  1.10    Ca    9.0      08 Dec 2022 04:43    TPro  6.4<L>  /  Alb  3.6  /  TBili  0.5  /  DBili  x   /  AST  20  /  ALT  28  /  AlkPhos  64  12-08

## 2022-12-09 DIAGNOSIS — R06.02 SHORTNESS OF BREATH: ICD-10-CM

## 2022-12-09 DIAGNOSIS — J44.1 CHRONIC OBSTRUCTIVE PULMONARY DISEASE WITH (ACUTE) EXACERBATION: ICD-10-CM

## 2022-12-09 DIAGNOSIS — J96.01 ACUTE RESPIRATORY FAILURE WITH HYPOXIA: ICD-10-CM

## 2022-12-09 DIAGNOSIS — I50.9 HEART FAILURE, UNSPECIFIED: ICD-10-CM

## 2022-12-09 LAB
ALBUMIN SERPL ELPH-MCNC: 3.7 G/DL — SIGNIFICANT CHANGE UP (ref 3.3–5.2)
ALP SERPL-CCNC: 62 U/L — SIGNIFICANT CHANGE UP (ref 40–120)
ALT FLD-CCNC: 21 U/L — SIGNIFICANT CHANGE UP
ANION GAP SERPL CALC-SCNC: 13 MMOL/L — SIGNIFICANT CHANGE UP (ref 5–17)
AST SERPL-CCNC: 17 U/L — SIGNIFICANT CHANGE UP
BASOPHILS # BLD AUTO: 0.04 K/UL — SIGNIFICANT CHANGE UP (ref 0–0.2)
BASOPHILS NFR BLD AUTO: 0.3 % — SIGNIFICANT CHANGE UP (ref 0–2)
BILIRUB SERPL-MCNC: 0.6 MG/DL — SIGNIFICANT CHANGE UP (ref 0.4–2)
BUN SERPL-MCNC: 64.8 MG/DL — HIGH (ref 8–20)
CALCIUM SERPL-MCNC: 9.1 MG/DL — SIGNIFICANT CHANGE UP (ref 8.4–10.5)
CHLORIDE SERPL-SCNC: 99 MMOL/L — SIGNIFICANT CHANGE UP (ref 96–108)
CO2 SERPL-SCNC: 24 MMOL/L — SIGNIFICANT CHANGE UP (ref 22–29)
CREAT SERPL-MCNC: 1.18 MG/DL — SIGNIFICANT CHANGE UP (ref 0.5–1.3)
EGFR: 44 ML/MIN/1.73M2 — LOW
EOSINOPHIL # BLD AUTO: 0.01 K/UL — SIGNIFICANT CHANGE UP (ref 0–0.5)
EOSINOPHIL NFR BLD AUTO: 0.1 % — SIGNIFICANT CHANGE UP (ref 0–6)
GLUCOSE SERPL-MCNC: 174 MG/DL — HIGH (ref 70–99)
HCT VFR BLD CALC: 36.7 % — SIGNIFICANT CHANGE UP (ref 34.5–45)
HGB BLD-MCNC: 11.6 G/DL — SIGNIFICANT CHANGE UP (ref 11.5–15.5)
IMM GRANULOCYTES NFR BLD AUTO: 1.9 % — HIGH (ref 0–0.9)
LYMPHOCYTES # BLD AUTO: 0.87 K/UL — LOW (ref 1–3.3)
LYMPHOCYTES # BLD AUTO: 7.1 % — LOW (ref 13–44)
MCHC RBC-ENTMCNC: 31.6 GM/DL — LOW (ref 32–36)
MCHC RBC-ENTMCNC: 33.1 PG — SIGNIFICANT CHANGE UP (ref 27–34)
MCV RBC AUTO: 104.9 FL — HIGH (ref 80–100)
MONOCYTES # BLD AUTO: 0.52 K/UL — SIGNIFICANT CHANGE UP (ref 0–0.9)
MONOCYTES NFR BLD AUTO: 4.3 % — SIGNIFICANT CHANGE UP (ref 2–14)
NEUTROPHILS # BLD AUTO: 10.55 K/UL — HIGH (ref 1.8–7.4)
NEUTROPHILS NFR BLD AUTO: 86.3 % — HIGH (ref 43–77)
PLATELET # BLD AUTO: 238 K/UL — SIGNIFICANT CHANGE UP (ref 150–400)
POTASSIUM SERPL-MCNC: 4.1 MMOL/L — SIGNIFICANT CHANGE UP (ref 3.5–5.3)
POTASSIUM SERPL-SCNC: 4.1 MMOL/L — SIGNIFICANT CHANGE UP (ref 3.5–5.3)
PROT SERPL-MCNC: 6.5 G/DL — LOW (ref 6.6–8.7)
RBC # BLD: 3.5 M/UL — LOW (ref 3.8–5.2)
RBC # FLD: 14.7 % — HIGH (ref 10.3–14.5)
SODIUM SERPL-SCNC: 136 MMOL/L — SIGNIFICANT CHANGE UP (ref 135–145)
WBC # BLD: 12.22 K/UL — HIGH (ref 3.8–10.5)
WBC # FLD AUTO: 12.22 K/UL — HIGH (ref 3.8–10.5)

## 2022-12-09 PROCEDURE — 99233 SBSQ HOSP IP/OBS HIGH 50: CPT

## 2022-12-09 PROCEDURE — 99232 SBSQ HOSP IP/OBS MODERATE 35: CPT

## 2022-12-09 RX ORDER — FUROSEMIDE 40 MG
20 TABLET ORAL DAILY
Refills: 0 | Status: DISCONTINUED | OUTPATIENT
Start: 2022-12-09 | End: 2022-12-12

## 2022-12-09 RX ADMIN — Medication 30 MILLIGRAM(S): at 05:44

## 2022-12-09 RX ADMIN — Medication 25 MILLIGRAM(S): at 11:47

## 2022-12-09 RX ADMIN — Medication 40 MILLIGRAM(S): at 05:45

## 2022-12-09 RX ADMIN — Medication 25 MILLIGRAM(S): at 17:45

## 2022-12-09 RX ADMIN — ENOXAPARIN SODIUM 30 MILLIGRAM(S): 100 INJECTION SUBCUTANEOUS at 17:45

## 2022-12-09 RX ADMIN — Medication 3 MILLILITER(S): at 15:55

## 2022-12-09 RX ADMIN — Medication 50 MILLIGRAM(S): at 05:43

## 2022-12-09 RX ADMIN — Medication 25 MILLIGRAM(S): at 05:44

## 2022-12-09 RX ADMIN — ATORVASTATIN CALCIUM 40 MILLIGRAM(S): 80 TABLET, FILM COATED ORAL at 23:12

## 2022-12-09 RX ADMIN — Medication 100 MICROGRAM(S): at 05:45

## 2022-12-09 RX ADMIN — PANTOPRAZOLE SODIUM 40 MILLIGRAM(S): 20 TABLET, DELAYED RELEASE ORAL at 05:46

## 2022-12-09 RX ADMIN — Medication 3 MILLILITER(S): at 10:30

## 2022-12-09 RX ADMIN — Medication 25 MILLIGRAM(S): at 23:12

## 2022-12-09 NOTE — PROGRESS NOTE ADULT - ASSESSMENT
89 y/o F with PMH HTN, [place] s/p cochlear implants, COPD/emphysema, hypothyroidism woke up around 2 AM shaking and 30 mins later emesis and her daughter called EMS. Endocrine follows for hypothyroidism.    1. Hypothyroid - elevated TSH/low T3 and T4  - LT4 100mcg PO daily  - TFTs ordered for 12/10  - Discussed with pt about importance of adherence with LT4 medication  - Follow up appointment: 1/13 at 2:00pm [place] office     2. Acute hypoxic respiratory failure/COPD/CAP  - Duonebs  - Supplemental oxygen as needed    3. HTN  - On metoprolol, hydralazine

## 2022-12-09 NOTE — PROGRESS NOTE ADULT - SUBJECTIVE AND OBJECTIVE BOX
PULMONARY PROGRESS NOTE      TERESA NESS  MRN-[id number]    Patient is a 89y old  Female who presents with a chief complaint of chills/ cough/ vomits (08 Dec 2022 19:32)      INTERVAL HPI/OVERNIGHT EVENTS:    Pt is awake and alert  Weak  Tolerating 2.5 lpm NC    MEDICATIONS  (STANDING):  albuterol/ipratropium for Nebulization 3 milliLiter(s) Nebulizer every 6 hours  albuterol/ipratropium for Nebulization. 3 milliLiter(s) Nebulizer once  albuterol/ipratropium for Nebulization. 3 milliLiter(s) Nebulizer once  atorvastatin 40 milliGRAM(s) Oral at bedtime  enoxaparin Injectable 30 milliGRAM(s) SubCutaneous every 24 hours  furosemide   Injectable 40 milliGRAM(s) IV Push daily  hydrALAZINE 25 milliGRAM(s) Oral four times a day  levothyroxine 100 MICROGram(s) Oral daily  metoprolol succinate ER 50 milliGRAM(s) Oral daily  pantoprazole    Tablet 40 milliGRAM(s) Oral before breakfast  predniSONE   Tablet 30 milliGRAM(s) Oral daily      MEDICATIONS  (PRN):  guaiFENesin Oral Liquid (Sugar-Free) 100 milliGRAM(s) Oral every 6 hours PRN Cough  ondansetron Injectable 4 milliGRAM(s) IV Push every 6 hours PRN Nausea and/or Vomiting      Allergies    codeine (Vomiting)  iodine (Unknown)    Intolerances        PAST MEDICAL & SURGICAL HISTORY:  Essential hypertension      Chronic obstructive pulmonary disease, unspecified COPD type      Hypothyroidism, unspecified type      H/O exploratory laparotomy  for a benign colon mass 4/19/12            REVIEW OF SYSTEMS: Somewhat confused but without other complaints but with mild cough    Vital Signs Last 24 Hrs  T(C): 36.4 (09 Dec 2022 11:13), Max: 36.6 (08 Dec 2022 15:20)  T(F): 97.5 (09 Dec 2022 11:13), Max: 97.9 (08 Dec 2022 21:55)  HR: 61 (09 Dec 2022 11:13) (61 - 95)  BP: 118/65 (09 Dec 2022 11:13) (118/65 - 151/82)  BP(mean): --  RR: 18 (09 Dec 2022 11:13) (18 - 18)  SpO2: 96% (09 Dec 2022 11:13) (92% - 96%)    Parameters below as of 09 Dec 2022 11:13  Patient On (Oxygen Delivery Method): nasal cannula  O2 Flow (L/min): 5      PHYSICAL EXAMINATION:    GENERAL: The patient is awake and alert in no apparent distress.     HEENT: Head is normocephalic and atraumatic. Extraocular muscles are intact. Mucous membranes are moist.    NECK: Supple.    LUNGS: Clear to auscultation without wheezing, rales or rhonchi; respirations unlabored    HEART: Regular rate and rhythm without murmur.    ABDOMEN: Soft, nontender, and nondistended.      EXTREMITIES: Without any cyanosis, clubbing, or edema.    NEUROLOGIC: Confused.    LABS:                        11.6   12.22 )-----------( 238      ( 09 Dec 2022 10:32 )             36.7     12-09    136  |  99  |  64.8<H>  ----------------------------<  174<H>  4.1   |  24.0  |  1.18    Ca    9.1      09 Dec 2022 10:32    TPro  6.5<L>  /  Alb  3.7  /  TBili  0.6  /  DBili  x   /  AST  17  /  ALT  21  /  AlkPhos  62  12-09        Serum Pro-Brain Natriuretic Peptide: 570 pg/mL (12-07-22 @ 06:53)      MICROBIOLOGY:    Respiratory Viral Panel with COVID-19 by PHILIP (12.07.22 @ 06:35)    Rapid RVP Result: [facility]    SARS-CoV-2: [facility]: This Respiratory Panel uses polymerase chain reaction (PCR) to detect for  adenovirus; coronavirus (HKU1, NL63, 229E, OC43); human metapneumovirus  (hMPV); human enterovirus/rhinovirus (Entero/RV); influenza A; influenza  A/H1; influenza A/H3; influenza A/H1-2009; influenza B; parainfluenza  viruses 1, 2, 3, 4; respiratory syncytial virus; Mycoplasma pneumoniae;  Chlamydophila pneumoniae; and SARS-CoV-2.      Culture - Blood (11.29.22 @ 07:31)    Specimen Source: .Blood Blood    Culture Results:   No Growth Final      RADIOLOGY & ADDITIONAL STUDIES:    ACC: [id number] EXAM:  XR CHEST PORTABLE ROUTINE 1V                          PROCEDURE DATE:  12/08/2022          INTERPRETATION:  Portable chest radiograph    CLINICAL INFORMATION: Fluid overload    TECHNIQUE:  Portable  AP chest radiograph.    COMPARISON: 12/4/2022 chest x-ray and 12/7/2022 chest CT .    FINDINGS:  CATHETERS AND TUBES: None    PULMONARY: Residual LEFT lower lobe airspace consolidation/atelectasis   likely from mucous plug as seen on prior CT scan..   LEFT upper zone and RIGHT lung parenchyma grossly clear.  .   No pneumothorax.    HEART/VASCULAR: The  heart is enlarged in transverse diameter.    BONES: Visualized osseous structures are intact.    IMPRESSION:   Cardiomegaly.  Residual LEFT  lower zone  airspace consolidation..        ZELDA DA SILVA MD; Attending Radiologist  This document has been electronically signed. Dec  8 2022 12:25PM      ECHO:    Summary:   1. Normal left ventricular internal cavity size.   2. Normal global left ventricular systolic function.   3. Left ventricular ejection fraction, by visual estimation, is 65 to   70%.   4. Normal right ventricular size and function.   5. The left atrium is normal in size.   6. The right atrium is normal in size.   7. Sclerotic aortic valve with normal opening.   8. Mild tricuspid regurgitation.   9. Normal pulmonary artery pressure.  10. Trace mitral valve regurgitation.  11. Mild pulmonic valve regurgitation.    MD Henri Electronically signed on 11/29/2022 at 3:02:43 PM

## 2022-12-09 NOTE — PROGRESS NOTE ADULT - SUBJECTIVE AND OBJECTIVE BOX
INTERVAL EVENTS:  Follow up hypothyroid management    ROS: Patient denies chest pain, SOB, abd pain.    MEDICATIONS  (STANDING):  albuterol/ipratropium for Nebulization 3 milliLiter(s) Nebulizer every 6 hours  albuterol/ipratropium for Nebulization. 3 milliLiter(s) Nebulizer once  albuterol/ipratropium for Nebulization. 3 milliLiter(s) Nebulizer once  atorvastatin 40 milliGRAM(s) Oral at bedtime  enoxaparin Injectable 30 milliGRAM(s) SubCutaneous every 24 hours  furosemide   Injectable 40 milliGRAM(s) IV Push daily  hydrALAZINE 25 milliGRAM(s) Oral four times a day  levothyroxine 100 MICROGram(s) Oral daily  metoprolol succinate ER 50 milliGRAM(s) Oral daily  pantoprazole    Tablet 40 milliGRAM(s) Oral before breakfast  predniSONE   Tablet 30 milliGRAM(s) Oral daily    MEDICATIONS  (PRN):  guaiFENesin Oral Liquid (Sugar-Free) 100 milliGRAM(s) Oral every 6 hours PRN Cough  ondansetron Injectable 4 milliGRAM(s) IV Push every 6 hours PRN Nausea and/or Vomiting    Allergies  codeine (Vomiting)  iodine (Unknown)    Vital Signs Last 24 Hrs  T(C): 36.4 (09 Dec 2022 11:13), Max: 36.6 (08 Dec 2022 15:20)  T(F): 97.5 (09 Dec 2022 11:13), Max: 97.9 (08 Dec 2022 21:55)  HR: 61 (09 Dec 2022 11:13) (61 - 95)  BP: 118/65 (09 Dec 2022 11:13) (118/65 - 151/82)  BP(mean): --  RR: 18 (09 Dec 2022 11:13) (18 - 18)  SpO2: 96% (09 Dec 2022 11:13) (92% - 96%)    Parameters below as of 09 Dec 2022 11:13  Patient On (Oxygen Delivery Method): nasal cannula  O2 Flow (L/min): 5      PHYSICAL EXAM:  General: No apparent distress, raspy voice  HEENT: No thyromegaly, mild periorbital edema  Respiratory: Lungs clear bilaterally, normal rate, effort  Cardiac: +S1, S2, no m/r/g  GI: +BS, soft, non tender, non distended  Extremities: No peripheral edema, no pedal lesions  Neuro: A+O X3, no tremor  Pysch: Affect appropriate   Skin: No acanthosis         LABS:                        11.6   12.22 )-----------( 238      ( 09 Dec 2022 10:32 )             36.7     12-09    136  |  99  |  64.8<H>  ----------------------------<  174<H>  4.1   |  24.0  |  1.18    Ca    9.1      09 Dec 2022 10:32    TPro  6.5<L>  /  Alb  3.7  /  TBili  0.6  /  DBili  x   /  AST  17  /  ALT  21  /  AlkPhos  62  12-09        Thyroid Stimulating Hormone, Serum: 53.33 uIU/mL (12-06-22 @ 06:17)  Triiodothyronine, Total (T3 Total): <40 ng/dL (12-06-22 @ 06:17)  Free Thyroxine, Serum: 0.2 ng/dL (12-06-22 @ 06:17)  Triiodothyronine, Total (T3 Total): <40 ng/dL (11-30-22 @ 04:38)  Thyroid Stimulating Hormone, Serum: 37.97 uIU/mL (11-30-22 @ 04:38)

## 2022-12-09 NOTE — PROGRESS NOTE ADULT - SUBJECTIVE AND OBJECTIVE BOX
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 She has been having cough, without any change. Denies SOB, CP, fever, abd pain, constipation, N, sick contact. Admits to overeating last night. states she was on thyroid meds which her PMD stopped. and KCL was increased recently.  In Triage . In ED noted O2 saturation 88- 91%.     SH- smoke 1ppd or more since age 15 yrs to 50 yrs. Quit 40 yrs ago, denies other toxic habits  FH- states unaware of any medical condition (29 Nov 2022 09:15)    The patient was seen and evaluated sitting up in bed - no issues- better color - feels better  The patient is in no acute distress.  Denied any fever chest pain, palpitations, abdominal pain, fever, dysuria,  edema       I&O's Summary    Allergies    codeine (Vomiting)  iodine (Unknown)    Intolerances      HEALTH ISSUES - PROBLEM Dx:  COPD exacerbation    SOB (shortness of breath)    Acute respiratory failure with hypoxia    Congestive heart failure (CHF)          PAST MEDICAL & SURGICAL HISTORY:  Essential hypertension      Chronic obstructive pulmonary disease, unspecified COPD type      Hypothyroidism, unspecified type      H/O exploratory laparotomy  for a benign colon mass 4/19/12              Vital Signs Last 24 Hrs  T(C): 36.4 (09 Dec 2022 16:47), Max: 36.6 (08 Dec 2022 21:55)  T(F): 97.6 (09 Dec 2022 16:47), Max: 97.9 (08 Dec 2022 21:55)  HR: 73 (09 Dec 2022 16:47) (61 - 80)  BP: 129/73 (09 Dec 2022 16:47) (118/65 - 151/82)  BP(mean): --  RR: 18 (09 Dec 2022 16:47) (18 - 18)  SpO2: 92% (09 Dec 2022 16:47) (92% - 96%)    Parameters below as of 09 Dec 2022 16:47  Patient On (Oxygen Delivery Method): nasal cannula  O2 Flow (L/min): 5  T(C): 36.4 (12-09-22 @ 16:47), Max: 36.6 (12-08-22 @ 21:55)  HR: 73 (12-09-22 @ 16:47) (61 - 80)  BP: 129/73 (12-09-22 @ 16:47) (118/65 - 151/82)  RR: 18 (12-09-22 @ 16:47) (18 - 18)  SpO2: 92% (12-09-22 @ 16:47) (92% - 96%)  Wt(kg): --    PHYSICAL EXAM:    GENERAL: NAD  HEAD:  Atraumatic,   EYES: EOMI, PERRL, conjunctiva and sclera clear  ENMT:  Moist mucous membranes,  No lesions  NECK: Supple, No JVD, Normal thyroid  NERVOUS SYSTEM:  Alert & Oriented X3,  Moves upper and lower extremities; CNS-II-XII  CHEST/LUNG: Clear to auscultation bilaterally No rales, rhonchi, wheezing,   HEART: Regular rate and rhythm; No murmurs,   ABDOMEN: Soft, Nontender, Nondistended; Bowel sounds present  EXTREMITIES:  Peripheral Pulses, No  cyanosis, or edema  SKIN: No rashes or lesions  psychiatry- mood and affect appropriate, Insight and judgement intact     albuterol/ipratropium for Nebulization 3 milliLiter(s) Nebulizer every 6 hours  albuterol/ipratropium for Nebulization. 3 milliLiter(s) Nebulizer once  albuterol/ipratropium for Nebulization. 3 milliLiter(s) Nebulizer once  atorvastatin 40 milliGRAM(s) Oral at bedtime  enoxaparin Injectable 30 milliGRAM(s) SubCutaneous every 24 hours  furosemide   Injectable 40 milliGRAM(s) IV Push daily  guaiFENesin Oral Liquid (Sugar-Free) 100 milliGRAM(s) Oral every 6 hours PRN  hydrALAZINE 25 milliGRAM(s) Oral four times a day  levothyroxine 100 MICROGram(s) Oral daily  metoprolol succinate ER 50 milliGRAM(s) Oral daily  ondansetron Injectable 4 milliGRAM(s) IV Push every 6 hours PRN  pantoprazole    Tablet 40 milliGRAM(s) Oral before breakfast  predniSONE   Tablet 30 milliGRAM(s) Oral daily      LABS:                          11.6   12.22 )-----------( 238      ( 09 Dec 2022 10:32 )             36.7     12-09    136  |  99  |  64.8<H>  ----------------------------<  174<H>  4.1   |  24.0  |  1.18    Ca    9.1      09 Dec 2022 10:32    TPro  6.5<L>  /  Alb  3.7  /  TBili  0.6  /  DBili  x   /  AST  17  /  ALT  21  /  AlkPhos  62  12-09    LIVER FUNCTIONS - ( 09 Dec 2022 10:32 )  Alb: 3.7 g/dL / Pro: 6.5 g/dL / ALK PHOS: 62 U/L / ALT: 21 U/L / AST: 17 U/L / GGT: x                   CAPILLARY BLOOD GLUCOSE          RADIOLOGY & ADDITIONAL TESTS:      Consultant notes reviewed    Case discussed with consultant/provider/ family /patient

## 2022-12-09 NOTE — PROGRESS NOTE ADULT - NS ATTEND OPT1A GEN_ALL_CORE
Medical decision making
Exam/Medical decision making
History/Exam/Medical decision making
Medical decision making
Medical decision making

## 2022-12-09 NOTE — PROGRESS NOTE ADULT - ASSESSMENT
AECOPD  CHF  Pneumonia  Hypoxia due to above etiologies  Hypothyroid  Mild leukocytosis - improved    Rec:    Drug nebs  Resume outpt BD therapy on d/c  Prednisone taper  Completed abx  Titrate FiO2 as tolerates - evaluate for home O2 needs prior to d/c  Follow wbc  GI/DVT prophylaxis  OOB/PT/ambulate as tolerates  Aspiration precautions  Pulm f/u after d/c    d/w medicine

## 2022-12-09 NOTE — PROGRESS NOTE ADULT - ASSESSMENT
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 She has been having cough, without any change. Denies SOB, CP, fever, abd pain, constipation, N, sick contact. Admits to overeating last night.   In Triage . In ED noted O2 saturation 88- 91%.     Acute Hypoxic Resp failure/COPD/Pneumonia- desaturating to 87 % on slightest ambulation with PT  will be a readmission from rehab if she desaturates trying too rehab - cont PT   rest SpO2 Ranging 91-96%- on 3liters   Duoneb prednisone taper- on 40->30 QD  Viral panel neg, improving leukocytosis  Rocephin + Zithromax  possible fluid overload given received IVF for KALPANA;  discontinued IVF, Furosemide 40mg IV daily for one more day today   Pending- CT Chest atelectasis and pneumonia -no fever 11=WBC -  discussed with Pulmonary Dr Martin- will observe off antibiotics- if saturations don't improve will consider antibiotics for HAP or if febrile   was volume overloaded for lasix- now seems euvolemic   KALPANA- resolved- was probably prerenal   EF 65-70%  on IVF - discontinued    Hypothyroid-severe- no compliance with medications  LT4 increased to 100mcg    HTN    Metoprolol, Hydralazine    #Healthcare Maintenance  DVT PPx- Lovenox    Dispo -  Acutely ill    Discussed with icu     89 y/o F with PMH HTN, [place] s/p cochlear implants, 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 She has been having cough, without any change. Denies SOB, CP, fever, abd pain, constipation, N, sick contact. Admits to overeating last night.   In Triage . In ED noted O2 saturation 88- 91%.     Acute Hypoxic Resp failure/COPD/Pneumonia- desaturating to 87 % on slightest ambulation with PT  will be a readmission from rehab if she desaturates trying too rehab - cont PT   rest SpO2 Ranging 91-96%- on 3liters   Duoneb prednisone taper- on 40->30 QD  Viral panel neg, improving leukocytosis  Rocephin + Zithromax  possible fluid overload given received IVF for KALPANA;  discontinued IVF, Furosemide 40mg IV daily for one more day today   Pending- CT Chest atelectasis and pneumonia -no fever 11=WBC -  discussed with Pulmonary Dr Martin- will observe off antibiotics- if saturations don't improve will consider antibiotics for HAP or if febrile   was volume overloaded -IV lasix- now seems euvolemic - can lower Lasix to 20 QD  trend WBC and oxygen saturation improving now - can go to rehab with Lasix     KALPANA- resolved- was probably prerenal   EF 65-70%  on IVF - discontinued    Hypothyroid-severe- no compliance with medications  LT4 increased to 100mcg    HTN    Metoprolol, Hydralazine    #Healthcare Maintenance  DVT PPx- Lovenox    Dispo -  Acutely ill    Discussed with  pulmonary

## 2022-12-10 LAB
ALBUMIN SERPL ELPH-MCNC: 3.7 G/DL — SIGNIFICANT CHANGE UP (ref 3.3–5.2)
ALP SERPL-CCNC: 59 U/L — SIGNIFICANT CHANGE UP (ref 40–120)
ALT FLD-CCNC: 19 U/L — SIGNIFICANT CHANGE UP
ANION GAP SERPL CALC-SCNC: 18 MMOL/L — HIGH (ref 5–17)
AST SERPL-CCNC: 14 U/L — SIGNIFICANT CHANGE UP
BASOPHILS # BLD AUTO: 0.02 K/UL — SIGNIFICANT CHANGE UP (ref 0–0.2)
BASOPHILS NFR BLD AUTO: 0.2 % — SIGNIFICANT CHANGE UP (ref 0–2)
BILIRUB SERPL-MCNC: 0.6 MG/DL — SIGNIFICANT CHANGE UP (ref 0.4–2)
BUN SERPL-MCNC: 75.7 MG/DL — HIGH (ref 8–20)
CALCIUM SERPL-MCNC: 9.5 MG/DL — SIGNIFICANT CHANGE UP (ref 8.4–10.5)
CHLORIDE SERPL-SCNC: 99 MMOL/L — SIGNIFICANT CHANGE UP (ref 96–108)
CO2 SERPL-SCNC: 23 MMOL/L — SIGNIFICANT CHANGE UP (ref 22–29)
CREAT SERPL-MCNC: 1.38 MG/DL — HIGH (ref 0.5–1.3)
EGFR: 36 ML/MIN/1.73M2 — LOW
EOSINOPHIL # BLD AUTO: 0.01 K/UL — SIGNIFICANT CHANGE UP (ref 0–0.5)
EOSINOPHIL NFR BLD AUTO: 0.1 % — SIGNIFICANT CHANGE UP (ref 0–6)
GLUCOSE SERPL-MCNC: 142 MG/DL — HIGH (ref 70–99)
HCT VFR BLD CALC: 34.9 % — SIGNIFICANT CHANGE UP (ref 34.5–45)
HGB BLD-MCNC: 11.5 G/DL — SIGNIFICANT CHANGE UP (ref 11.5–15.5)
IMM GRANULOCYTES NFR BLD AUTO: 2 % — HIGH (ref 0–0.9)
LYMPHOCYTES # BLD AUTO: 1.34 K/UL — SIGNIFICANT CHANGE UP (ref 1–3.3)
LYMPHOCYTES # BLD AUTO: 12.5 % — LOW (ref 13–44)
MCHC RBC-ENTMCNC: 33 GM/DL — SIGNIFICANT CHANGE UP (ref 32–36)
MCHC RBC-ENTMCNC: 33.1 PG — SIGNIFICANT CHANGE UP (ref 27–34)
MCV RBC AUTO: 100.6 FL — HIGH (ref 80–100)
MONOCYTES # BLD AUTO: 0.62 K/UL — SIGNIFICANT CHANGE UP (ref 0–0.9)
MONOCYTES NFR BLD AUTO: 5.8 % — SIGNIFICANT CHANGE UP (ref 2–14)
NEUTROPHILS # BLD AUTO: 8.53 K/UL — HIGH (ref 1.8–7.4)
NEUTROPHILS NFR BLD AUTO: 79.4 % — HIGH (ref 43–77)
PLATELET # BLD AUTO: 286 K/UL — SIGNIFICANT CHANGE UP (ref 150–400)
POTASSIUM SERPL-MCNC: 4.2 MMOL/L — SIGNIFICANT CHANGE UP (ref 3.5–5.3)
POTASSIUM SERPL-SCNC: 4.2 MMOL/L — SIGNIFICANT CHANGE UP (ref 3.5–5.3)
PROCALCITONIN SERPL-MCNC: 0.19 NG/ML — HIGH (ref 0.02–0.1)
PROT SERPL-MCNC: 6.2 G/DL — LOW (ref 6.6–8.7)
RBC # BLD: 3.47 M/UL — LOW (ref 3.8–5.2)
RBC # FLD: 14.7 % — HIGH (ref 10.3–14.5)
SODIUM SERPL-SCNC: 140 MMOL/L — SIGNIFICANT CHANGE UP (ref 135–145)
T3 SERPL-MCNC: <40 NG/DL — LOW (ref 80–200)
T4 AB SER-ACNC: 3.8 UG/DL — LOW (ref 4.5–12)
T4 FREE SERPL-MCNC: 0.6 NG/DL — LOW (ref 0.9–1.8)
TSH SERPL-MCNC: 30.06 UIU/ML — HIGH (ref 0.27–4.2)
WBC # BLD: 10.73 K/UL — HIGH (ref 3.8–10.5)
WBC # FLD AUTO: 10.73 K/UL — HIGH (ref 3.8–10.5)

## 2022-12-10 PROCEDURE — 99231 SBSQ HOSP IP/OBS SF/LOW 25: CPT

## 2022-12-10 PROCEDURE — 99232 SBSQ HOSP IP/OBS MODERATE 35: CPT

## 2022-12-10 PROCEDURE — 99233 SBSQ HOSP IP/OBS HIGH 50: CPT

## 2022-12-10 RX ADMIN — ONDANSETRON 4 MILLIGRAM(S): 8 TABLET, FILM COATED ORAL at 09:25

## 2022-12-10 RX ADMIN — Medication 25 MILLIGRAM(S): at 18:29

## 2022-12-10 RX ADMIN — Medication 20 MILLIGRAM(S): at 06:05

## 2022-12-10 RX ADMIN — PANTOPRAZOLE SODIUM 40 MILLIGRAM(S): 20 TABLET, DELAYED RELEASE ORAL at 06:06

## 2022-12-10 RX ADMIN — Medication 50 MILLIGRAM(S): at 06:06

## 2022-12-10 RX ADMIN — Medication 25 MILLIGRAM(S): at 23:52

## 2022-12-10 RX ADMIN — Medication 100 MICROGRAM(S): at 06:08

## 2022-12-10 RX ADMIN — Medication 25 MILLIGRAM(S): at 06:08

## 2022-12-10 RX ADMIN — ATORVASTATIN CALCIUM 40 MILLIGRAM(S): 80 TABLET, FILM COATED ORAL at 20:28

## 2022-12-10 RX ADMIN — Medication 25 MILLIGRAM(S): at 11:22

## 2022-12-10 RX ADMIN — Medication 20 MILLIGRAM(S): at 06:09

## 2022-12-10 RX ADMIN — ENOXAPARIN SODIUM 30 MILLIGRAM(S): 100 INJECTION SUBCUTANEOUS at 18:29

## 2022-12-10 RX ADMIN — Medication 3 MILLILITER(S): at 08:42

## 2022-12-10 RX ADMIN — Medication 3 MILLILITER(S): at 14:49

## 2022-12-10 NOTE — PROGRESS NOTE ADULT - SUBJECTIVE AND OBJECTIVE BOX
INTERVAL HPI/OVERNIGHT EVENTS:  f/u hypothyroidism    MEDICATIONS  (STANDING):  albuterol/ipratropium for Nebulization 3 milliLiter(s) Nebulizer every 6 hours  albuterol/ipratropium for Nebulization. 3 milliLiter(s) Nebulizer once  albuterol/ipratropium for Nebulization. 3 milliLiter(s) Nebulizer once  atorvastatin 40 milliGRAM(s) Oral at bedtime  enoxaparin Injectable 30 milliGRAM(s) SubCutaneous every 24 hours  furosemide    Tablet 20 milliGRAM(s) Oral daily  hydrALAZINE 25 milliGRAM(s) Oral four times a day  levothyroxine 100 MICROGram(s) Oral daily  metoprolol succinate ER 50 milliGRAM(s) Oral daily  pantoprazole    Tablet 40 milliGRAM(s) Oral before breakfast  predniSONE   Tablet 20 milliGRAM(s) Oral daily    MEDICATIONS  (PRN):  guaiFENesin Oral Liquid (Sugar-Free) 100 milliGRAM(s) Oral every 6 hours PRN Cough  ondansetron Injectable 4 milliGRAM(s) IV Push every 6 hours PRN Nausea and/or Vomiting      Allergies    codeine (Vomiting)  iodine (Unknown)    Intolerances    Vital Signs Last 24 Hrs  T(C): 36.8 (10 Dec 2022 11:10), Max: 36.8 (09 Dec 2022 21:32)  T(F): 98.3 (10 Dec 2022 11:10), Max: 98.3 (10 Dec 2022 11:10)  HR: 106 (10 Dec 2022 14:43) (60 - 112)  BP: 117/75 (10 Dec 2022 11:10) (117/75 - 135/68)  BP(mean): 117 (10 Dec 2022 11:10) (117 - 117)  RR: 19 (10 Dec 2022 11:10) (18 - 19)  SpO2: 92% (10 Dec 2022 14:43) (92% - 97%)    Parameters below as of 10 Dec 2022 14:43  Patient On (Oxygen Delivery Method): nasal cannula          LABS:                        11.5   10.73 )-----------( 286      ( 10 Dec 2022 04:38 )             34.9     12-10    140  |  99  |  75.7<H>  ----------------------------<  142<H>  4.2   |  23.0  |  1.38<H>    Ca    9.5      10 Dec 2022 04:38    TPro  6.2<L>  /  Alb  3.7  /  TBili  0.6  /  DBili  x   /  AST  14  /  ALT  19  /  AlkPhos  59  12-10

## 2022-12-10 NOTE — PROGRESS NOTE ADULT - SUBJECTIVE AND OBJECTIVE BOX
[facility] Division of Hospital Medicine    Chief Complaint:  cough and vomiting    SUBJECTIVE / OVERNIGHT EVENTS: Patient seen and examined. Vomiting this morning. Denies nausea and abdominal pain. Shortness of breath unchanged. Hard of hearing.     Patient denies chest pain,  abd pain, N/V, fever, chills, dysuria or any other complaints. All remainder ROS negative.     MEDICATIONS  (STANDING):  albuterol/ipratropium for Nebulization 3 milliLiter(s) Nebulizer every 6 hours  albuterol/ipratropium for Nebulization. 3 milliLiter(s) Nebulizer once  albuterol/ipratropium for Nebulization. 3 milliLiter(s) Nebulizer once  atorvastatin 40 milliGRAM(s) Oral at bedtime  enoxaparin Injectable 30 milliGRAM(s) SubCutaneous every 24 hours  furosemide    Tablet 20 milliGRAM(s) Oral daily  hydrALAZINE 25 milliGRAM(s) Oral four times a day  levothyroxine 100 MICROGram(s) Oral daily  metoprolol succinate ER 50 milliGRAM(s) Oral daily  pantoprazole    Tablet 40 milliGRAM(s) Oral before breakfast  predniSONE   Tablet 20 milliGRAM(s) Oral daily    MEDICATIONS  (PRN):  guaiFENesin Oral Liquid (Sugar-Free) 100 milliGRAM(s) Oral every 6 hours PRN Cough  ondansetron Injectable 4 milliGRAM(s) IV Push every 6 hours PRN Nausea and/or Vomiting        I&O's Summary      PHYSICAL EXAM:  Vital Signs Last 24 Hrs  T(C): 36.5 (10 Dec 2022 05:13), Max: 36.8 (09 Dec 2022 21:32)  T(F): 97.7 (10 Dec 2022 05:13), Max: 98.2 (09 Dec 2022 21:32)  HR: 60 (10 Dec 2022 08:42) (60 - 76)  BP: 120/79 (10 Dec 2022 05:13) (120/79 - 135/68)  BP(mean): --  RR: 18 (10 Dec 2022 05:13) (18 - 18)  SpO2: 92% (10 Dec 2022 08:42) (92% - 97%)    Parameters below as of 10 Dec 2022 08:42  Patient On (Oxygen Delivery Method): nasal cannula        CONSTITUTIONAL: NAD, obese  ENMT: Moist oral mucosa, no pharyngeal injection or exudates;  RESPIRATORY: + rhonchi and coarse breath sounds through out  CARDIOVASCULAR: Regular rate and rhythm, normal S1 and S2, + lower extremity edema;  ABDOMEN: Nontender to palpation, normoactive bowel sounds, no rebound/guarding  MUSCLOSKELETAL:   no joint swelling or tenderness to palpation  PSYCH: A+O to person, place, and time; affect appropriate  NEUROLOGY: CN 2-12 are intact and symmetric; no gross sensory deficits;   SKIN: No rashes; no palpable lesions    LABS:                        11.5   10.73 )-----------( 286      ( 10 Dec 2022 04:38 )             34.9     12-10    140  |  99  |  75.7<H>  ----------------------------<  142<H>  4.2   |  23.0  |  1.38<H>    Ca    9.5      10 Dec 2022 04:38    TPro  6.2<L>  /  Alb  3.7  /  TBili  0.6  /  DBili  x   /  AST  14  /  ALT  19  /  AlkPhos  59  12-10              CAPILLARY BLOOD GLUCOSE            RADIOLOGY & ADDITIONAL TESTS:  Results Reviewed:   Imaging Personally Reviewed:  Electrocardiogram Personally Reviewed:

## 2022-12-10 NOTE — PROGRESS NOTE ADULT - ASSESSMENT
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 She is admitted for Respiratory failure.     Acute Hypoxic Resp failure due to COPD/Pneumonia.acute HFpEF- desaturating to 87 % on slightest ambulation with PT  will be a readmission from rehab if she desaturates trying too rehab - cont PT   Pending- CT Chest atelectasis and pneumonia -no fever 11=WBC -  rest SpO2 Ranging 91-96%- on 3liters   Duoneb and prednisone taper now on 20mg daily  Viral panel neg, improving leukocytosis  completed Rocephin + Zithromax  Furosemide 20mg PO daily  Send procalcitonin     KALPANA-  EF 65-70%  off ivfs   monitor cr    Hypothyroid-severe- no compliance with medications  LT4 increased to 100mcg  Endo following    HTN    Metoprolol, Hydralazine    Vomiting  no abdominal pain  - prn antiemetics    DVT PPx- Lovenox    Dispo -GUCCI When amble to ambulate without desaturation

## 2022-12-10 NOTE — PROGRESS NOTE ADULT - ASSESSMENT
AECOPD  CHF  Pneumonia  Hypoxia due to above etiologies  Tolerating NCO2  Hypothyroid  Mild leukocytosis - improved/near normal    Rec:    Drug nebs  Resume outpt BD therapy on d/c  Prednisone taper  Completed abx  Titrate FiO2 as tolerates - evaluate for home O2 needs prior to d/c  Follow wbc  GI/DVT prophylaxis  OOB/PT/ambulate as tolerates  Aspiration precautions  Pulm f/u after d/c

## 2022-12-10 NOTE — PROGRESS NOTE ADULT - SUBJECTIVE AND OBJECTIVE BOX
PULMONARY PROGRESS NOTE      TERESA NESS  MRN-[id number]    Patient is a 89y old  Female who presents with a chief complaint of chills/ cough/ vomits (09 Dec 2022 18:25)      INTERVAL HPI/OVERNIGHT EVENTS:    Pt is awake and alert  + congested cough    MEDICATIONS  (STANDING):  albuterol/ipratropium for Nebulization 3 milliLiter(s) Nebulizer every 6 hours  albuterol/ipratropium for Nebulization. 3 milliLiter(s) Nebulizer once  albuterol/ipratropium for Nebulization. 3 milliLiter(s) Nebulizer once  atorvastatin 40 milliGRAM(s) Oral at bedtime  enoxaparin Injectable 30 milliGRAM(s) SubCutaneous every 24 hours  furosemide    Tablet 20 milliGRAM(s) Oral daily  hydrALAZINE 25 milliGRAM(s) Oral four times a day  levothyroxine 100 MICROGram(s) Oral daily  metoprolol succinate ER 50 milliGRAM(s) Oral daily  pantoprazole    Tablet 40 milliGRAM(s) Oral before breakfast  predniSONE   Tablet 20 milliGRAM(s) Oral daily      MEDICATIONS  (PRN):  guaiFENesin Oral Liquid (Sugar-Free) 100 milliGRAM(s) Oral every 6 hours PRN Cough  ondansetron Injectable 4 milliGRAM(s) IV Push every 6 hours PRN Nausea and/or Vomiting      Allergies    codeine (Vomiting)  iodine (Unknown)    Intolerances        PAST MEDICAL & SURGICAL HISTORY:  Essential hypertension      Chronic obstructive pulmonary disease, unspecified COPD type      Hypothyroidism, unspecified type      H/O exploratory laparotomy  for a benign colon mass 4/19/12            REVIEW OF SYSTEMS: Congested cough otherwise without complaints        Vital Signs Last 24 Hrs  T(C): 36.5 (10 Dec 2022 05:13), Max: 36.8 (09 Dec 2022 21:32)  T(F): 97.7 (10 Dec 2022 05:13), Max: 98.2 (09 Dec 2022 21:32)  HR: 60 (10 Dec 2022 08:42) (60 - 76)  BP: 120/79 (10 Dec 2022 05:13) (120/79 - 135/68)  BP(mean): --  RR: 18 (10 Dec 2022 05:13) (18 - 18)  SpO2: 92% (10 Dec 2022 08:42) (92% - 97%)    Parameters below as of 10 Dec 2022 08:42  Patient On (Oxygen Delivery Method): nasal cannula        PHYSICAL EXAMINATION:    GENERAL: The patient is awake and alert in no apparent distress.     HEENT: Head is normocephalic and atraumatic. Extraocular muscles are intact. Mucous membranes are moist.    NECK: Supple.    LUNGS: Clear to auscultation without wheezing, rales with mild rhonchi; respirations unlabored    HEART: Regular rate and rhythm without murmur.    ABDOMEN: Soft, nontender, and nondistended.      EXTREMITIES: Without any cyanosis, clubbing, rash, lesions or edema.    NEUROLOGIC: Grossly intact.    LABS:                        11.5   10.73 )-----------( 286      ( 10 Dec 2022 04:38 )             34.9     12-10    140  |  99  |  75.7<H>  ----------------------------<  142<H>  4.2   |  23.0  |  1.38<H>    Ca    9.5      10 Dec 2022 04:38    TPro  6.2<L>  /  Alb  3.7  /  TBili  0.6  /  DBili  x   /  AST  14  /  ALT  19  /  AlkPhos  59  12-10        MICROBIOLOGY:    Respiratory Viral Panel with COVID-19 by PHILIP (12.07.22 @ 06:35)    Rapid RVP Result: [facility]    SARS-CoV-2: [facility]: This Respiratory Panel uses polymerase chain reaction (PCR) to detect for  adenovirus; coronavirus (HKU1, NL63, 229E, OC43); human metapneumovirus  (hMPV); human enterovirus/rhinovirus (Entero/RV); influenza A; influenza  A/H1; influenza A/H3; influenza A/H1-2009; influenza B; parainfluenza  viruses 1, 2, 3, 4; respiratory syncytial virus; Mycoplasma pneumoniae;  Chlamydophila pneumoniae; and SARS-CoV-2.        RADIOLOGY & ADDITIONAL STUDIES:    ACC: [id number] EXAM:  XR CHEST PORTABLE ROUTINE 1V                          PROCEDURE DATE:  12/08/2022          INTERPRETATION:  Portable chest radiograph    CLINICAL INFORMATION: Fluid overload    TECHNIQUE:  Portable  AP chest radiograph.    COMPARISON: 12/4/2022 chest x-ray and 12/7/2022 chest CT .    FINDINGS:  CATHETERS AND TUBES: None    PULMONARY: Residual LEFT lower lobe airspace consolidation/atelectasis   likely from mucous plug as seen on prior CT scan..   LEFT upper zone and RIGHT lung parenchyma grossly clear.  .   No pneumothorax.    HEART/VASCULAR: The  heart is enlarged in transverse diameter.    BONES: Visualized osseous structures are intact.    IMPRESSION:   Cardiomegaly.  Residual LEFT  lower zone  airspace consolidation..            ZELDA DA SILVA MD; Attending Radiologist  This document has been electronically signed. Dec  8 2022 12:25PM    ACC: [id number] EXAM:  CT CHEST                          PROCEDURE DATE:  12/07/2022          INTERPRETATION:  EXAMINATION: CT CHEST    CLINICAL INDICATION: Dyspnea.    TECHNIQUE: Noncontrast CT of the chest was obtained.    COMPARISON: Multiple CT, most recent 6/3/2021..    FINDINGS:    AIRWAYS AND LUNGS: Tracheobronchial secretions, extensive in the lower   lobes, greater on the left.  Partial atelectasis both lower lobes,   greater on the left. Patchy bilateral lung opacities.    MEDIASTINUM AND PLEURA: There are no enlarged mediastinal, hilar or   axillary lymph nodes. The visualized portion of the thyroid gland is   unremarkable. There are small bilateral pleural effusions.  There is no   pneumothorax.    HEART AND VESSELS: There is mildcardiomegaly.  There are atherosclerotic   calcifications of the aorta and coronary arteries.  There is a small   pericardial effusion.    UPPER ABDOMEN: Images of the upper abdomen demonstrate hiatal hernia with   debris in the esophagus.    BONES AND SOFT TISSUES: There are mild degenerative changes of the spine.    The soft tissues are unremarkable.    AI VERTEBRAL BODY ANALYSIS:  T4: Moderate compression fracture with 26% height loss.    IMPRESSION:  There is a small left pleural effusion. Tracheobronchial secretions,   extensive in the lower lobes, greater on the left.  Partial atelectasis   both lower lobes, greater on the left. Patchy bilateral lung opacities   may be infectious or inflammatory.    VERTEBRAL BODY ANALYSIS: Vertebral compression fractures as described,   consider further workup for osteoporosis.          DUSTY RANGEL MD; Attending Radiologist  This document has been electronically signed. Dec  7 2022 12:03PM    ECHO:    Summary:   1. Normal left ventricular internal cavity size.   2. Normal global left ventricular systolic function.   3. Left ventricular ejection fraction, by visual estimation, is 65 to   70%.   4. Normal right ventricular size and function.   5. The left atrium is normal in size.   6. The right atrium is normal in size.   7. Sclerotic aortic valve with normal opening.   8. Mild tricuspid regurgitation.   9. Normal pulmonary artery pressure.  10. Trace mitral valve regurgitation.  11. Mild pulmonic valve regurgitation.    MD Henri Electronically signed on 11/29/2022 at 3:02:43 PM

## 2022-12-10 NOTE — PROGRESS NOTE ADULT - ASSESSMENT
Repeat T4 3.8, TSH 30. Improving results on the current regimen. Further improvement in T4 level likely on the current regimen, with lagging clinical improvement in the hypothyroid clinical manifestations.

## 2022-12-11 LAB
ANION GAP SERPL CALC-SCNC: 15 MMOL/L — SIGNIFICANT CHANGE UP (ref 5–17)
BASOPHILS # BLD AUTO: 0.01 K/UL — SIGNIFICANT CHANGE UP (ref 0–0.2)
BASOPHILS NFR BLD AUTO: 0.1 % — SIGNIFICANT CHANGE UP (ref 0–2)
BUN SERPL-MCNC: 87 MG/DL — HIGH (ref 8–20)
CALCIUM SERPL-MCNC: 8.6 MG/DL — SIGNIFICANT CHANGE UP (ref 8.4–10.5)
CHLORIDE SERPL-SCNC: 97 MMOL/L — SIGNIFICANT CHANGE UP (ref 96–108)
CO2 SERPL-SCNC: 25 MMOL/L — SIGNIFICANT CHANGE UP (ref 22–29)
CREAT SERPL-MCNC: 1.36 MG/DL — HIGH (ref 0.5–1.3)
EGFR: 37 ML/MIN/1.73M2 — LOW
EOSINOPHIL # BLD AUTO: 0.01 K/UL — SIGNIFICANT CHANGE UP (ref 0–0.5)
EOSINOPHIL NFR BLD AUTO: 0.1 % — SIGNIFICANT CHANGE UP (ref 0–6)
GLUCOSE SERPL-MCNC: 138 MG/DL — HIGH (ref 70–99)
HCT VFR BLD CALC: 33.6 % — LOW (ref 34.5–45)
HGB BLD-MCNC: 11.3 G/DL — LOW (ref 11.5–15.5)
IMM GRANULOCYTES NFR BLD AUTO: 1.7 % — HIGH (ref 0–0.9)
LYMPHOCYTES # BLD AUTO: 1.11 K/UL — SIGNIFICANT CHANGE UP (ref 1–3.3)
LYMPHOCYTES # BLD AUTO: 10.2 % — LOW (ref 13–44)
MAGNESIUM SERPL-MCNC: 2.8 MG/DL — HIGH (ref 1.6–2.6)
MCHC RBC-ENTMCNC: 33.6 GM/DL — SIGNIFICANT CHANGE UP (ref 32–36)
MCHC RBC-ENTMCNC: 33.6 PG — SIGNIFICANT CHANGE UP (ref 27–34)
MCV RBC AUTO: 100 FL — SIGNIFICANT CHANGE UP (ref 80–100)
MONOCYTES # BLD AUTO: 0.61 K/UL — SIGNIFICANT CHANGE UP (ref 0–0.9)
MONOCYTES NFR BLD AUTO: 5.6 % — SIGNIFICANT CHANGE UP (ref 2–14)
NEUTROPHILS # BLD AUTO: 8.97 K/UL — HIGH (ref 1.8–7.4)
NEUTROPHILS NFR BLD AUTO: 82.3 % — HIGH (ref 43–77)
PLATELET # BLD AUTO: 278 K/UL — SIGNIFICANT CHANGE UP (ref 150–400)
POTASSIUM SERPL-MCNC: 3.6 MMOL/L — SIGNIFICANT CHANGE UP (ref 3.5–5.3)
POTASSIUM SERPL-SCNC: 3.6 MMOL/L — SIGNIFICANT CHANGE UP (ref 3.5–5.3)
RBC # BLD: 3.36 M/UL — LOW (ref 3.8–5.2)
RBC # FLD: 14.5 % — SIGNIFICANT CHANGE UP (ref 10.3–14.5)
SODIUM SERPL-SCNC: 137 MMOL/L — SIGNIFICANT CHANGE UP (ref 135–145)
WBC # BLD: 10.89 K/UL — HIGH (ref 3.8–10.5)
WBC # FLD AUTO: 10.89 K/UL — HIGH (ref 3.8–10.5)

## 2022-12-11 PROCEDURE — 99233 SBSQ HOSP IP/OBS HIGH 50: CPT

## 2022-12-11 RX ORDER — MORPHINE SULFATE 50 MG/1
0.5 CAPSULE, EXTENDED RELEASE ORAL ONCE
Refills: 0 | Status: DISCONTINUED | OUTPATIENT
Start: 2022-12-11 | End: 2022-12-11

## 2022-12-11 RX ORDER — POLYETHYLENE GLYCOL 3350 17 G/17G
17 POWDER, FOR SOLUTION ORAL DAILY
Refills: 0 | Status: DISCONTINUED | OUTPATIENT
Start: 2022-12-11 | End: 2022-12-22

## 2022-12-11 RX ORDER — SENNA PLUS 8.6 MG/1
2 TABLET ORAL DAILY
Refills: 0 | Status: DISCONTINUED | OUTPATIENT
Start: 2022-12-11 | End: 2022-12-22

## 2022-12-11 RX ADMIN — Medication 50 MILLIGRAM(S): at 06:08

## 2022-12-11 RX ADMIN — ENOXAPARIN SODIUM 30 MILLIGRAM(S): 100 INJECTION SUBCUTANEOUS at 18:00

## 2022-12-11 RX ADMIN — Medication 3 MILLILITER(S): at 14:18

## 2022-12-11 RX ADMIN — Medication 3 MILLILITER(S): at 21:10

## 2022-12-11 RX ADMIN — Medication 100 MICROGRAM(S): at 06:07

## 2022-12-11 RX ADMIN — PANTOPRAZOLE SODIUM 40 MILLIGRAM(S): 20 TABLET, DELAYED RELEASE ORAL at 06:07

## 2022-12-11 RX ADMIN — Medication 20 MILLIGRAM(S): at 06:07

## 2022-12-11 RX ADMIN — Medication 100 MILLIGRAM(S): at 06:08

## 2022-12-11 RX ADMIN — SENNA PLUS 2 TABLET(S): 8.6 TABLET ORAL at 23:50

## 2022-12-11 RX ADMIN — POLYETHYLENE GLYCOL 3350 17 GRAM(S): 17 POWDER, FOR SOLUTION ORAL at 12:43

## 2022-12-11 RX ADMIN — Medication 3 MILLILITER(S): at 08:08

## 2022-12-11 RX ADMIN — Medication 25 MILLIGRAM(S): at 06:08

## 2022-12-11 RX ADMIN — ATORVASTATIN CALCIUM 40 MILLIGRAM(S): 80 TABLET, FILM COATED ORAL at 23:50

## 2022-12-11 NOTE — PROGRESS NOTE ADULT - ASSESSMENT
AECOPD  CHF  Pneumonia  Hypoxia due to above etiologies  Refusing chest PT and expectorant therapy  Tolerating NCO2  Hypothyroid  Mild leukocytosis - improved/near normal  Weak/poor cough    Rec:    Drug nebs- continue on d/c  Pt unlikely to comply with inhaler therapy  Prednisone taper  Completed abx  Titrate FiO2 as tolerates - evaluate for home O2 needs prior to d/c  Follow CT for improvement after therapy as outpt  Follow wbc  GI/DVT prophylaxis  OOB/PT/ambulate as tolerates  Aspiration precautions  Pulm f/u after d/c  Consider advanced directives  Prognosis guarded/poor

## 2022-12-11 NOTE — PROGRESS NOTE ADULT - ASSESSMENT
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 She is admitted for Respiratory failure.     Acute Hypoxic Resp failure due to COPD/Pneumonia.acute HFpEF- desaturating to 87 % on slightest ambulation with PT  will be a readmission from rehab if she desaturates trying too rehab - cont PT   CT Chest atelectasis and pneumonia   rest SpO2 Ranging 91%  Duoneb and prednisone taper now on 20mg daily  Viral panel neg, improving leukocytosis  completed Rocephin + Zithromax  Furosemide 20mg PO daily  Send sputum sample  trend procal    KALPANA-  EF 65-70%  off ivfs   monitor cr    Hypothyroid-severe- no compliance with medications  LT4 increased to 100mcg  Endo following, labs noted    HTN    Metoprolol, Hydralazine    Vomiting, resolved was actually not vomiting but coughing up phelgm  no abdominal pain  - prn antiemetics    Constipation  - senna and Miralax    DVT PPx- Lovenox    Dispo -GUCCI When able to ambulate without desaturation

## 2022-12-11 NOTE — PROGRESS NOTE ADULT - SUBJECTIVE AND OBJECTIVE BOX
PULMONARY PROGRESS NOTE      TERESA NESS  MRN-[id number]    Patient is a 89y old  Female who presents with a chief complaint of chills/ cough/ vomits (11 Dec 2022 10:39)      INTERVAL HPI/OVERNIGHT EVENTS:    Pt comfortable  Persistent exertional SOB  Refusing chest PT/expectorants  Weak cough though can expectorate when mucous is excessive    MEDICATIONS  (STANDING):  albuterol/ipratropium for Nebulization 3 milliLiter(s) Nebulizer every 6 hours  albuterol/ipratropium for Nebulization. 3 milliLiter(s) Nebulizer once  albuterol/ipratropium for Nebulization. 3 milliLiter(s) Nebulizer once  atorvastatin 40 milliGRAM(s) Oral at bedtime  enoxaparin Injectable 30 milliGRAM(s) SubCutaneous every 24 hours  furosemide    Tablet 20 milliGRAM(s) Oral daily  hydrALAZINE 25 milliGRAM(s) Oral four times a day  levothyroxine 100 MICROGram(s) Oral daily  metoprolol succinate ER 50 milliGRAM(s) Oral daily  pantoprazole    Tablet 40 milliGRAM(s) Oral before breakfast  polyethylene glycol 3350 17 Gram(s) Oral daily  predniSONE   Tablet 20 milliGRAM(s) Oral daily  senna 2 Tablet(s) Oral daily      MEDICATIONS  (PRN):  guaiFENesin Oral Liquid (Sugar-Free) 100 milliGRAM(s) Oral every 6 hours PRN Cough  ondansetron Injectable 4 milliGRAM(s) IV Push every 6 hours PRN Nausea and/or Vomiting      Allergies    codeine (Vomiting)  iodine (Unknown)    Intolerances        PAST MEDICAL & SURGICAL HISTORY:  Essential hypertension      Chronic obstructive pulmonary disease, unspecified COPD type      Hypothyroidism, unspecified type      H/O exploratory laparotomy  for a benign colon mass 4/19/12            REVIEW OF SYSTEMS: Offers no new complaints    Vital Signs Last 24 Hrs  T(C): 36.7 (11 Dec 2022 11:34), Max: 36.8 (10 Dec 2022 16:57)  T(F): 98.1 (11 Dec 2022 11:34), Max: 98.2 (10 Dec 2022 16:57)  HR: 72 (11 Dec 2022 14:13) (61 - 72)  BP: 103/68 (11 Dec 2022 11:34) (103/68 - 130/76)  BP(mean): --  RR: 19 (11 Dec 2022 11:34) (19 - 20)  SpO2: 94% (11 Dec 2022 14:13) (91% - 98%)    Parameters below as of 11 Dec 2022 14:13  Patient On (Oxygen Delivery Method): nasal cannula        PHYSICAL EXAMINATION:    GENERAL: The patient is awake and alert in no apparent distress.     HEENT: Head is normocephalic and atraumatic. Extraocular muscles are intact. Mucous membranes are moist.    NECK: Supple.    LUNGS: Clear to auscultation without wheezing, rales but mild rhonchi; respirations unlabored    HEART: Regular rate and rhythm without murmur.    ABDOMEN: Soft, nontender, and nondistended.      EXTREMITIES: Without any cyanosis, clubbing, with mild edema.    NEUROLOGIC: Grossly intact.    LABS:                        11.3   10.89 )-----------( 278      ( 11 Dec 2022 07:16 )             33.6     12-11    137  |  97  |  87.0<H>  ----------------------------<  138<H>  3.6   |  25.0  |  1.36<H>    Ca    8.6      11 Dec 2022 07:16  Mg     2.8     12-11    TPro  6.2<L>  /  Alb  3.7  /  TBili  0.6  /  DBili  x   /  AST  14  /  ALT  19  /  AlkPhos  59  12-10      Procalcitonin, Serum: 0.19 ng/mL (12-10-22 @ 12:30)      MICROBIOLOGY:    Respiratory Viral Panel with COVID-19 by PHILIP (12.07.22 @ 06:35)    Rapid RVP Result: [facility]    SARS-CoV-2: [facility]: This Respiratory Panel uses polymerase chain reaction (PCR) to detect for  adenovirus; coronavirus (HKU1, NL63, 229E, OC43); human metapneumovirus  (hMPV); human enterovirus/rhinovirus (Entero/RV); influenza A; influenza  A/H1; influenza A/H3; influenza A/H1-2009; influenza B; parainfluenza  viruses 1, 2, 3, 4; respiratory syncytial virus; Mycoplasma pneumoniae;  Chlamydophila pneumoniae; and SARS-CoV-2.      RADIOLOGY & ADDITIONAL STUDIES:    ACC: [id number] EXAM:  XR CHEST PORTABLE ROUTINE 1V                          PROCEDURE DATE:  12/08/2022          INTERPRETATION:  Portable chest radiograph    CLINICAL INFORMATION: Fluid overload    TECHNIQUE:  Portable  AP chest radiograph.    COMPARISON: 12/4/2022 chest x-ray and 12/7/2022 chest CT .    FINDINGS:  CATHETERS AND TUBES: None    PULMONARY: Residual LEFT lower lobe airspace consolidation/atelectasis   likely from mucous plug as seen on prior CT scan..   LEFT upper zone and RIGHT lung parenchyma grossly clear.  .   No pneumothorax.    HEART/VASCULAR: The  heart is enlarged in transverse diameter.    BONES: Visualized osseous structures are intact.    IMPRESSION:   Cardiomegaly.  Residual LEFT  lower zone  airspace consolidation..          ZELDA DA SILVA MD; Attending Radiologist  This document has been electronically signed. Dec  8 2022 12:25PM        ACC: [id number] EXAM:  CT CHEST                          PROCEDURE DATE:  12/07/2022          INTERPRETATION:  EXAMINATION: CT CHEST    CLINICAL INDICATION: Dyspnea.    TECHNIQUE: Noncontrast CT of the chest was obtained.    COMPARISON: Multiple CT, most recent 6/3/2021..    FINDINGS:    AIRWAYS AND LUNGS: Tracheobronchial secretions, extensive in the lower   lobes, greater on the left.  Partial atelectasis both lower lobes,   greater on the left. Patchy bilateral lung opacities.    MEDIASTINUM AND PLEURA: There are no enlarged mediastinal, hilar or   axillary lymph nodes. The visualized portion of the thyroid gland is   unremarkable. There are small bilateral pleural effusions.  There is no   pneumothorax.    HEART AND VESSELS: There is mildcardiomegaly.  There are atherosclerotic   calcifications of the aorta and coronary arteries.  There is a small   pericardial effusion.    UPPER ABDOMEN: Images of the upper abdomen demonstrate hiatal hernia with   debris in the esophagus.    BONES AND SOFT TISSUES: There are mild degenerative changes of the spine.    The soft tissues are unremarkable.    AI VERTEBRAL BODY ANALYSIS:  T4: Moderate compression fracture with 26% height loss.    IMPRESSION:  There is a small left pleural effusion. Tracheobronchial secretions,   extensive in the lower lobes, greater on the left.  Partial atelectasis   both lower lobes, greater on the left. Patchy bilateral lung opacities   may be infectious or inflammatory.    VERTEBRAL BODY ANALYSIS: Vertebral compression fractures as described,   consider further workup for osteoporosis.          DUSTY RANGEL MD; Attending Radiologist  This document has been electronically signed. Dec  7 2022 12:03PM      ECHO:    Summary:   1. Normal left ventricular internal cavity size.   2. Normal global left ventricular systolic function.   3. Left ventricular ejection fraction, by visual estimation, is 65 to   70%.   4. Normal right ventricular size and function.   5. The left atrium is normal in size.   6. The right atrium is normal in size.   7. Sclerotic aortic valve with normal opening.   8. Mild tricuspid regurgitation.   9. Normal pulmonary artery pressure.  10. Trace mitral valve regurgitation.  11. Mild pulmonic valve regurgitation.    MD Henri Electronically signed on 11/29/2022 at 3:02:43 PM

## 2022-12-11 NOTE — PROGRESS NOTE ADULT - SUBJECTIVE AND OBJECTIVE BOX
[facility] Division of Hospital Medicine    Chief Complaint:  cough and vomiting    SUBJECTIVE / OVERNIGHT EVENTS: Patient seen and examined. Confirmed with nurse that patient was actually not vomiting yesterday and was coughing up mucous plugs. Patient reports she is still coughing today. She had lack of appetite. No vomiting. No BM. Still with Shortness of breath.     Patient denies chest pain,  abd pain, N/V, fever, chills, dysuria or any other complaints. All remainder ROS negative.     MEDICATIONS  (STANDING):  albuterol/ipratropium for Nebulization 3 milliLiter(s) Nebulizer every 6 hours  albuterol/ipratropium for Nebulization. 3 milliLiter(s) Nebulizer once  albuterol/ipratropium for Nebulization. 3 milliLiter(s) Nebulizer once  atorvastatin 40 milliGRAM(s) Oral at bedtime  enoxaparin Injectable 30 milliGRAM(s) SubCutaneous every 24 hours  furosemide    Tablet 20 milliGRAM(s) Oral daily  hydrALAZINE 25 milliGRAM(s) Oral four times a day  levothyroxine 100 MICROGram(s) Oral daily  metoprolol succinate ER 50 milliGRAM(s) Oral daily  pantoprazole    Tablet 40 milliGRAM(s) Oral before breakfast  polyethylene glycol 3350 17 Gram(s) Oral daily  predniSONE   Tablet 20 milliGRAM(s) Oral daily  senna 2 Tablet(s) Oral daily    MEDICATIONS  (PRN):  guaiFENesin Oral Liquid (Sugar-Free) 100 milliGRAM(s) Oral every 6 hours PRN Cough  ondansetron Injectable 4 milliGRAM(s) IV Push every 6 hours PRN Nausea and/or Vomiting        I&O's Summary    10 Dec 2022 07:01  -  11 Dec 2022 07:00  --------------------------------------------------------  IN: 0 mL / OUT: 1150 mL / NET: -1150 mL        PHYSICAL EXAM:  Vital Signs Last 24 Hrs  T(C): 36.4 (11 Dec 2022 04:46), Max: 36.8 (10 Dec 2022 11:10)  T(F): 97.5 (11 Dec 2022 04:46), Max: 98.3 (10 Dec 2022 11:10)  HR: 69 (11 Dec 2022 04:46) (61 - 112)  BP: 116/74 (11 Dec 2022 04:46) (116/70 - 130/76)  BP(mean): 117 (10 Dec 2022 11:10) (117 - 117)  RR: 20 (11 Dec 2022 04:46) (19 - 20)  SpO2: 91% (11 Dec 2022 04:46) (91% - 97%)    Parameters below as of 11 Dec 2022 04:46  Patient On (Oxygen Delivery Method): nasal cannula  O2 Flow (L/min): 3      CONSTITUTIONAL: NAD, obese  ENMT: Moist oral mucosa, no pharyngeal injection or exudates;  RESPIRATORY: + rhonchi and coarse breath sounds through out  CARDIOVASCULAR: Regular rate and rhythm, normal S1 and S2, + lower extremity edema;  ABDOMEN: Nontender to palpation, normoactive bowel sounds, no rebound/guarding  MUSCLOSKELETAL:   no joint swelling or tenderness to palpation  PSYCH: A+O to person, place, and time; affect appropriate  NEUROLOGY: CN 2-12 are intact and symmetric; no gross sensory deficits;   SKIN: No rashes; no palpable lesions  LABS:                        11.3   10.89 )-----------( 278      ( 11 Dec 2022 07:16 )             33.6     12-11    137  |  97  |  87.0<H>  ----------------------------<  138<H>  3.6   |  25.0  |  1.36<H>    Ca    8.6      11 Dec 2022 07:16  Mg     2.8     12-11    TPro  6.2<L>  /  Alb  3.7  /  TBili  0.6  /  DBili  x   /  AST  14  /  ALT  19  /  AlkPhos  59  12-10              CAPILLARY BLOOD GLUCOSE            RADIOLOGY & ADDITIONAL TESTS:  Results Reviewed:   Imaging Personally Reviewed:  Electrocardiogram Personally Reviewed:

## 2022-12-12 LAB
ALBUMIN SERPL ELPH-MCNC: 3.6 G/DL — SIGNIFICANT CHANGE UP (ref 3.3–5.2)
ALP SERPL-CCNC: 61 U/L — SIGNIFICANT CHANGE UP (ref 40–120)
ALT FLD-CCNC: 17 U/L — SIGNIFICANT CHANGE UP
ANION GAP SERPL CALC-SCNC: 19 MMOL/L — HIGH (ref 5–17)
AST SERPL-CCNC: 18 U/L — SIGNIFICANT CHANGE UP
BASOPHILS # BLD AUTO: 0.02 K/UL — SIGNIFICANT CHANGE UP (ref 0–0.2)
BASOPHILS NFR BLD AUTO: 0.2 % — SIGNIFICANT CHANGE UP (ref 0–2)
BILIRUB SERPL-MCNC: 0.6 MG/DL — SIGNIFICANT CHANGE UP (ref 0.4–2)
BUN SERPL-MCNC: 105.7 MG/DL — HIGH (ref 8–20)
CALCIUM SERPL-MCNC: 9.1 MG/DL — SIGNIFICANT CHANGE UP (ref 8.4–10.5)
CHLORIDE SERPL-SCNC: 94 MMOL/L — LOW (ref 96–108)
CO2 SERPL-SCNC: 23 MMOL/L — SIGNIFICANT CHANGE UP (ref 22–29)
CREAT SERPL-MCNC: 1.87 MG/DL — HIGH (ref 0.5–1.3)
EGFR: 25 ML/MIN/1.73M2 — LOW
EOSINOPHIL # BLD AUTO: 0 K/UL — SIGNIFICANT CHANGE UP (ref 0–0.5)
EOSINOPHIL NFR BLD AUTO: 0 % — SIGNIFICANT CHANGE UP (ref 0–6)
GLUCOSE SERPL-MCNC: 145 MG/DL — HIGH (ref 70–99)
HCT VFR BLD CALC: 35.5 % — SIGNIFICANT CHANGE UP (ref 34.5–45)
HGB BLD-MCNC: 11.6 G/DL — SIGNIFICANT CHANGE UP (ref 11.5–15.5)
IMM GRANULOCYTES NFR BLD AUTO: 1.5 % — HIGH (ref 0–0.9)
LYMPHOCYTES # BLD AUTO: 1.17 K/UL — SIGNIFICANT CHANGE UP (ref 1–3.3)
LYMPHOCYTES # BLD AUTO: 11.4 % — LOW (ref 13–44)
MAGNESIUM SERPL-MCNC: 2.9 MG/DL — HIGH (ref 1.6–2.6)
MCHC RBC-ENTMCNC: 32.7 GM/DL — SIGNIFICANT CHANGE UP (ref 32–36)
MCHC RBC-ENTMCNC: 33.5 PG — SIGNIFICANT CHANGE UP (ref 27–34)
MCV RBC AUTO: 102.6 FL — HIGH (ref 80–100)
MONOCYTES # BLD AUTO: 0.67 K/UL — SIGNIFICANT CHANGE UP (ref 0–0.9)
MONOCYTES NFR BLD AUTO: 6.5 % — SIGNIFICANT CHANGE UP (ref 2–14)
NEUTROPHILS # BLD AUTO: 8.27 K/UL — HIGH (ref 1.8–7.4)
NEUTROPHILS NFR BLD AUTO: 80.4 % — HIGH (ref 43–77)
PLATELET # BLD AUTO: 204 K/UL — SIGNIFICANT CHANGE UP (ref 150–400)
POTASSIUM SERPL-MCNC: 3.6 MMOL/L — SIGNIFICANT CHANGE UP (ref 3.5–5.3)
POTASSIUM SERPL-SCNC: 3.6 MMOL/L — SIGNIFICANT CHANGE UP (ref 3.5–5.3)
PROCALCITONIN SERPL-MCNC: 0.24 NG/ML — HIGH (ref 0.02–0.1)
PROT SERPL-MCNC: 6 G/DL — LOW (ref 6.6–8.7)
RBC # BLD: 3.46 M/UL — LOW (ref 3.8–5.2)
RBC # FLD: 14.4 % — SIGNIFICANT CHANGE UP (ref 10.3–14.5)
SODIUM SERPL-SCNC: 136 MMOL/L — SIGNIFICANT CHANGE UP (ref 135–145)
WBC # BLD: 10.28 K/UL — SIGNIFICANT CHANGE UP (ref 3.8–10.5)
WBC # FLD AUTO: 10.28 K/UL — SIGNIFICANT CHANGE UP (ref 3.8–10.5)

## 2022-12-12 PROCEDURE — 99497 ADVNCD CARE PLAN 30 MIN: CPT

## 2022-12-12 PROCEDURE — 99232 SBSQ HOSP IP/OBS MODERATE 35: CPT

## 2022-12-12 PROCEDURE — 99233 SBSQ HOSP IP/OBS HIGH 50: CPT

## 2022-12-12 RX ORDER — ONDANSETRON 8 MG/1
4 TABLET, FILM COATED ORAL ONCE
Refills: 0 | Status: COMPLETED | OUTPATIENT
Start: 2022-12-12 | End: 2022-12-12

## 2022-12-12 RX ADMIN — Medication 25 MILLIGRAM(S): at 18:21

## 2022-12-12 RX ADMIN — SENNA PLUS 2 TABLET(S): 8.6 TABLET ORAL at 23:29

## 2022-12-12 RX ADMIN — PANTOPRAZOLE SODIUM 40 MILLIGRAM(S): 20 TABLET, DELAYED RELEASE ORAL at 05:56

## 2022-12-12 RX ADMIN — Medication 20 MILLIGRAM(S): at 05:56

## 2022-12-12 RX ADMIN — Medication 3 MILLILITER(S): at 21:36

## 2022-12-12 RX ADMIN — Medication 3 MILLILITER(S): at 15:52

## 2022-12-12 RX ADMIN — POLYETHYLENE GLYCOL 3350 17 GRAM(S): 17 POWDER, FOR SOLUTION ORAL at 12:14

## 2022-12-12 RX ADMIN — Medication 3 MILLILITER(S): at 09:21

## 2022-12-12 RX ADMIN — Medication 25 MILLIGRAM(S): at 12:11

## 2022-12-12 RX ADMIN — Medication 100 MICROGRAM(S): at 05:56

## 2022-12-12 RX ADMIN — Medication 25 MILLIGRAM(S): at 23:29

## 2022-12-12 RX ADMIN — ENOXAPARIN SODIUM 30 MILLIGRAM(S): 100 INJECTION SUBCUTANEOUS at 18:19

## 2022-12-12 RX ADMIN — Medication 50 MILLIGRAM(S): at 05:56

## 2022-12-12 RX ADMIN — Medication 25 MILLIGRAM(S): at 05:56

## 2022-12-12 RX ADMIN — MORPHINE SULFATE 0.5 MILLIGRAM(S): 50 CAPSULE, EXTENDED RELEASE ORAL at 00:06

## 2022-12-12 RX ADMIN — MORPHINE SULFATE 0.5 MILLIGRAM(S): 50 CAPSULE, EXTENDED RELEASE ORAL at 00:36

## 2022-12-12 RX ADMIN — ATORVASTATIN CALCIUM 40 MILLIGRAM(S): 80 TABLET, FILM COATED ORAL at 23:29

## 2022-12-12 NOTE — PROGRESS NOTE ADULT - SUBJECTIVE AND OBJECTIVE BOX
[facility] Division of Hospital Medicine    Chief Complaint:  cough and vomiting    SUBJECTIVE / OVERNIGHT EVENTS: Patient seen and examined. Reports her breathing remains unchanged. Patient confirms she is dnr and dni.      Patient denies chest pain,  abd pain, N/V, fever, chills, dysuria or any other complaints. All remainder ROS negative.     MEDICATIONS  (STANDING):  albuterol/ipratropium for Nebulization 3 milliLiter(s) Nebulizer every 6 hours  albuterol/ipratropium for Nebulization. 3 milliLiter(s) Nebulizer once  albuterol/ipratropium for Nebulization. 3 milliLiter(s) Nebulizer once  atorvastatin 40 milliGRAM(s) Oral at bedtime  enoxaparin Injectable 30 milliGRAM(s) SubCutaneous every 24 hours  furosemide    Tablet 20 milliGRAM(s) Oral daily  hydrALAZINE 25 milliGRAM(s) Oral four times a day  levothyroxine 100 MICROGram(s) Oral daily  metoprolol succinate ER 50 milliGRAM(s) Oral daily  pantoprazole    Tablet 40 milliGRAM(s) Oral before breakfast  polyethylene glycol 3350 17 Gram(s) Oral daily  predniSONE   Tablet 20 milliGRAM(s) Oral daily  senna 2 Tablet(s) Oral daily    MEDICATIONS  (PRN):  guaiFENesin Oral Liquid (Sugar-Free) 100 milliGRAM(s) Oral every 6 hours PRN Cough  ondansetron Injectable 4 milliGRAM(s) IV Push every 6 hours PRN Nausea and/or Vomiting        I&O's Summary      PHYSICAL EXAM:  Vital Signs Last 24 Hrs  T(C): 36.2 (12 Dec 2022 08:02), Max: 36.4 (11 Dec 2022 16:50)  T(F): 97.2 (12 Dec 2022 08:02), Max: 97.6 (11 Dec 2022 16:50)  HR: 79 (12 Dec 2022 12:06) (66 - 79)  BP: 127/68 (12 Dec 2022 12:06) (100/72 - 127/79)  BP(mean): --  RR: 19 (12 Dec 2022 08:02) (19 - 19)  SpO2: 88% (12 Dec 2022 12:06) (88% - 96%)    Parameters below as of 12 Dec 2022 12:06  Patient On (Oxygen Delivery Method): nasal cannula  O2 Flow (L/min): 4    CONSTITUTIONAL: NAD, obese  ENMT: Moist oral mucosa, no pharyngeal injection or exudates;  RESPIRATORY: + rhonchi and coarse breath sounds through out  CARDIOVASCULAR: Regular rate and rhythm, normal S1 and S2, + lower extremity edema;  ABDOMEN: Nontender to palpation, normoactive bowel sounds, no rebound/guarding  MUSCLOSKELETAL:   no joint swelling or tenderness to palpation  PSYCH: A+O to person, place, and time; affect appropriate  NEUROLOGY: CN 2-12 are intact and symmetric; no gross sensory deficits;   SKIN: No rashes; no palpable lesions    LABS:                        11.6   10.28 )-----------( 204      ( 12 Dec 2022 07:43 )             35.5     12-12    136  |  94<L>  |  105.7<H>  ----------------------------<  145<H>  3.6   |  23.0  |  1.87<H>    Ca    9.1      12 Dec 2022 07:43  Mg     2.9     12-12    TPro  6.0<L>  /  Alb  3.6  /  TBili  0.6  /  DBili  x   /  AST  18  /  ALT  17  /  AlkPhos  61  12-12              CAPILLARY BLOOD GLUCOSE            RADIOLOGY & ADDITIONAL TESTS:  Results Reviewed:   Imaging Personally Reviewed:  Electrocardiogram Personally Reviewed:

## 2022-12-12 NOTE — PROGRESS NOTE ADULT - SUBJECTIVE AND OBJECTIVE BOX
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 Pt was started on CAP coverage and tx for COPD exacerbation. She was also receiving IVFs for KALPANA with concern for possible volume overload subsequently and was being diuresed. CT chest 12/7 with extensive tracheobronchial secretions with partial atelectasis b/l lower lobes.       INTERVAL HPI/OVERNIGHT EVENTS:  Pt still with recurrent episodes of desaturation transiently requiring ventimask but transitioned back to 4L NC. Pt has been reportedly refusing chest PT. Per primary team discussion with pt, she has opted to be DNR/DNI. Pt remains on prednisone being tapered.     MEDICATIONS  (STANDING):  albuterol/ipratropium for Nebulization 3 milliLiter(s) Nebulizer every 6 hours  albuterol/ipratropium for Nebulization. 3 milliLiter(s) Nebulizer once  albuterol/ipratropium for Nebulization. 3 milliLiter(s) Nebulizer once  atorvastatin 40 milliGRAM(s) Oral at bedtime  enoxaparin Injectable 30 milliGRAM(s) SubCutaneous every 24 hours  hydrALAZINE 25 milliGRAM(s) Oral four times a day  levothyroxine 100 MICROGram(s) Oral daily  metoprolol succinate ER 50 milliGRAM(s) Oral daily  pantoprazole    Tablet 40 milliGRAM(s) Oral before breakfast  polyethylene glycol 3350 17 Gram(s) Oral daily  predniSONE   Tablet 20 milliGRAM(s) Oral daily  senna 2 Tablet(s) Oral daily    MEDICATIONS  (PRN):  guaiFENesin Oral Liquid (Sugar-Free) 100 milliGRAM(s) Oral every 6 hours PRN Cough  ondansetron Injectable 4 milliGRAM(s) IV Push every 6 hours PRN Nausea and/or Vomiting      Allergies    codeine (Vomiting)  iodine (Unknown)    Intolerances      PAST MEDICAL & SURGICAL HISTORY:  Essential hypertension      Chronic obstructive pulmonary disease, unspecified COPD type      Hypothyroidism, unspecified type      H/O exploratory laparotomy  for a benign colon mass 4/19/12    REVIEW OF SYSTEMS: Negative except per HPI    ICU Vital Signs Last 24 Hrs  T(C): 36.3 (12 Dec 2022 23:30), Max: 36.4 (12 Dec 2022 05:29)  T(F): 97.3 (12 Dec 2022 23:30), Max: 97.5 (12 Dec 2022 05:29)  HR: 74 (12 Dec 2022 23:30) (67 - 97)  BP: 124/68 (12 Dec 2022 23:30) (113/63 - 127/79)  BP(mean): --  ABP: --  ABP(mean): --  RR: 19 (12 Dec 2022 23:30) (19 - 19)  SpO2: 95% (12 Dec 2022 23:30) (88% - 96%)    O2 Parameters below as of 12 Dec 2022 23:30  Patient On (Oxygen Delivery Method): nasal cannula  O2 Flow (L/min): 4    PHYSICAL EXAMINATION:    GENERAL: Alert, in NAD     HEENT: NC/AT, anicteric, MMM    NECK: Supple    LUNGS: bilateral rhonchi, no increased work of breathing    HEART: S1S2, RRR    ABDOMEN: Soft, nontender, and nondistended    EXTREMITIES: Without any cyanosis, clubbing; 1+ b/l edema    NEUROLOGIC: No focal deficits     LABS:                                              11.6   10.28 )-----------( 204      ( 12 Dec 2022 07:43 )             35.5     12-12    136  |  94<L>  |  105.7<H>  ----------------------------<  145<H>  3.6   |  23.0  |  1.87<H>    Ca    9.1      12 Dec 2022 07:43  Mg     2.9     12-12    TPro  6.0<L>  /  Alb  3.6  /  TBili  0.6  /  DBili  x   /  AST  18  /  ALT  17  /  AlkPhos  61  12-12          RADIOLOGY & ADDITIONAL STUDIES:    ACC: [id number] EXAM:  XR CHEST PORTABLE ROUTINE 1V                          PROCEDURE DATE:  12/08/2022          INTERPRETATION:  Portable chest radiograph    CLINICAL INFORMATION: Fluid overload    TECHNIQUE:  Portable  AP chest radiograph.    COMPARISON: 12/4/2022 chest x-ray and 12/7/2022 chest CT .    FINDINGS:  CATHETERS AND TUBES: None    PULMONARY: Residual LEFT lower lobe airspace consolidation/atelectasis   likely from mucous plug as seen on prior CT scan..   LEFT upper zone and RIGHT lung parenchyma grossly clear.  .   No pneumothorax.    HEART/VASCULAR: The  heart is enlarged in transverse diameter.    BONES: Visualized osseous structures are intact.    IMPRESSION:   Cardiomegaly.  Residual LEFT  lower zone  airspace consolidation..          ZELDA DA SILVA MD; Attending Radiologist  This document has been electronically signed. Dec  8 2022 12:25PM        ACC: [id number] EXAM:  CT CHEST                          PROCEDURE DATE:  12/07/2022          INTERPRETATION:  EXAMINATION: CT CHEST    CLINICAL INDICATION: Dyspnea.    TECHNIQUE: Noncontrast CT of the chest was obtained.    COMPARISON: Multiple CT, most recent 6/3/2021..    FINDINGS:    AIRWAYS AND LUNGS: Tracheobronchial secretions, extensive in the lower   lobes, greater on the left.  Partial atelectasis both lower lobes,   greater on the left. Patchy bilateral lung opacities.    MEDIASTINUM AND PLEURA: There are no enlarged mediastinal, hilar or   axillary lymph nodes. The visualized portion of the thyroid gland is   unremarkable. There are small bilateral pleural effusions.  There is no   pneumothorax.    HEART AND VESSELS: There is mildcardiomegaly.  There are atherosclerotic   calcifications of the aorta and coronary arteries.  There is a small   pericardial effusion.    UPPER ABDOMEN: Images of the upper abdomen demonstrate hiatal hernia with   debris in the esophagus.    BONES AND SOFT TISSUES: There are mild degenerative changes of the spine.    The soft tissues are unremarkable.    AI VERTEBRAL BODY ANALYSIS:  T4: Moderate compression fracture with 26% height loss.    IMPRESSION:  There is a small left pleural effusion. Tracheobronchial secretions,   extensive in the lower lobes, greater on the left.  Partial atelectasis   both lower lobes, greater on the left. Patchy bilateral lung opacities   may be infectious or inflammatory.    VERTEBRAL BODY ANALYSIS: Vertebral compression fractures as described,   consider further workup for osteoporosis.          DUSTY RANGEL MD; Attending Radiologist  This document has been electronically signed. Dec  7 2022 12:03PM      ECHO:    Summary:   1. Normal left ventricular internal cavity size.   2. Normal global left ventricular systolic function.   3. Left ventricular ejection fraction, by visual estimation, is 65 to   70%.   4. Normal right ventricular size and function.   5. The left atrium is normal in size.   6. The right atrium is normal in size.   7. Sclerotic aortic valve with normal opening.   8. Mild tricuspid regurgitation.   9. Normal pulmonary artery pressure.  10. Trace mitral valve regurgitation.  11. Mild pulmonic valve regurgitation.    MD Henri Electronically signed on 11/29/2022 at 3:02:43 PM

## 2022-12-12 NOTE — PROGRESS NOTE ADULT - SUBJECTIVE AND OBJECTIVE BOX
INTERVAL EVENTS:  Follow up hypothyroid management    ROS: Patient denies chest pain, SOB, abd pain.     MEDICATIONS  (STANDING):  albuterol/ipratropium for Nebulization 3 milliLiter(s) Nebulizer every 6 hours  albuterol/ipratropium for Nebulization. 3 milliLiter(s) Nebulizer once  albuterol/ipratropium for Nebulization. 3 milliLiter(s) Nebulizer once  atorvastatin 40 milliGRAM(s) Oral at bedtime  enoxaparin Injectable 30 milliGRAM(s) SubCutaneous every 24 hours  furosemide    Tablet 20 milliGRAM(s) Oral daily  hydrALAZINE 25 milliGRAM(s) Oral four times a day  levothyroxine 100 MICROGram(s) Oral daily  metoprolol succinate ER 50 milliGRAM(s) Oral daily  pantoprazole    Tablet 40 milliGRAM(s) Oral before breakfast  polyethylene glycol 3350 17 Gram(s) Oral daily  predniSONE   Tablet 20 milliGRAM(s) Oral daily  senna 2 Tablet(s) Oral daily    MEDICATIONS  (PRN):  guaiFENesin Oral Liquid (Sugar-Free) 100 milliGRAM(s) Oral every 6 hours PRN Cough  ondansetron Injectable 4 milliGRAM(s) IV Push every 6 hours PRN Nausea and/or Vomiting    Allergies  codeine (Vomiting)  iodine (Unknown)    Vital Signs Last 24 Hrs  T(C): 36.2 (12 Dec 2022 08:02), Max: 36.4 (11 Dec 2022 16:50)  T(F): 97.2 (12 Dec 2022 08:02), Max: 97.6 (11 Dec 2022 16:50)  HR: 79 (12 Dec 2022 12:06) (66 - 79)  BP: 127/68 (12 Dec 2022 12:06) (100/72 - 127/79)  BP(mean): --  RR: 19 (12 Dec 2022 08:02) (19 - 19)  SpO2: 88% (12 Dec 2022 12:06) (88% - 96%)    Parameters below as of 12 Dec 2022 12:06  Patient On (Oxygen Delivery Method): nasal cannula  O2 Flow (L/min): 4      PHYSICAL EXAM:  General: No apparent distress  HEENT: No thyromegaly, raspy voice  Respiratory: Lungs clear bilaterally, normal rate, effort  Cardiac: +S1, S2, no m/r/g  GI: +BS, soft, non tender, non distended  Extremities: No peripheral edema, no pedal lesions  Neuro: A+O X3, no tremor  Pysch: Affect appropriate   Skin: No acanthosis       LABS:                        11.6   10.28 )-----------( 204      ( 12 Dec 2022 07:43 )             35.5     12-12    136  |  94<L>  |  105.7<H>  ----------------------------<  145<H>  3.6   |  23.0  |  1.87<H>    Ca    9.1      12 Dec 2022 07:43  Mg     2.9     12-12    TPro  6.0<L>  /  Alb  3.6  /  TBili  0.6  /  DBili  x   /  AST  18  /  ALT  17  /  AlkPhos  61  12-12      Thyroid Stimulating Hormone, Serum: 30.06 uIU/mL (12-10-22 @ 04:38)  Free Thyroxine, Serum: 0.6 ng/dL (12-10-22 @ 04:38)  Triiodothyronine, Total (T3 Total): <40 ng/dL (12-10-22 @ 04:38)  Thyroid Stimulating Hormone, Serum: 53.33 uIU/mL (12-06-22 @ 06:17)  Triiodothyronine, Total (T3 Total): <40 ng/dL (12-06-22 @ 06:17)  Free Thyroxine, Serum: 0.2 ng/dL (12-06-22 @ 06:17)  Triiodothyronine, Total (T3 Total): <40 ng/dL (11-30-22 @ 04:38)  Thyroid Stimulating Hormone, Serum: 37.97 uIU/mL (11-30-22 @ 04:38)

## 2022-12-12 NOTE — PROGRESS NOTE ADULT - ASSESSMENT
89 y/o F with PMH HTN, [place] s/p cochlear implants, COPD/emphysema, hypothyroidism woke up around 2 AM shaking and 30 mins later emesis and her daughter called EMS. Endocrine follows for hypothyroidism.    1. Hypothyroid - Slowly improving  - Repeat T4 3.8, TSH 30  - LT4 100mcg PO daily  - TFTs ordered for 12/15  - Follow up appointment: 1/13 at 2:00pm [place] office     2. Acute hypoxic respiratory failure/COPD/CAP  - Duonebs  - Supplemental oxygen as needed    3. HTN  - On metoprolol, hydralazine      89 y/o F with PMH HTN, [place] s/p cochlear implants, COPD/emphysema, hypothyroidism woke up around 2 AM shaking and 30 mins later emesis and her daughter called EMS. Endocrine follows for hypothyroidism.    1. Hypothyroid - Slowly improving  - Repeat T4 3.8, TSH 30  - LT4 100mcg PO daily  - TFTs ordered for 12/14  - Follow up appointment: 1/13 at 2:00pm [place] office     2. Acute hypoxic respiratory failure/COPD/CAP  - Duonebs  - Supplemental oxygen as needed    3. HTN  - On metoprolol, hydralazine

## 2022-12-12 NOTE — CHART NOTE - NSCHARTNOTEFT_GEN_A_CORE
Pt's O2 sat dropped to 87% at rest on NC 3L as per pt's nurse.  Pt. denies increased SOB or dyspnea, states she has had these problems for 30 years. She couldn't remember what she was treated for during this admission.  She states she is very weak and started to be weak after her Synthroid was discontinued. Pt. is forgetful at times. Currently is on Synthroid, with Endocrinology on board.  Pt. seems to be at her baseline, talking in complete sentences.   On Duoneb, and steroid taper. CT chest last done on 12/7, completed Rocephin + Zithromax    Vital Signs Last 24 Hrs  HR: 79 (12 Dec 2022 12:06) (66 - 79)  BP: 127/68 (12 Dec 2022 12:06) (100/72 - 127/79)  RR: 19 (12 Dec 2022 08:02) (19 - 19)  SpO2: 88% (12 Dec 2022 12:06) (88% - 96%)  Parameters below as of 12 Dec 2022 12:06  Patient On (Oxygen Delivery Method): nasal cannula  O2 Flow (L/min): 4    sputum cs requested  Continue to monitor vitals closely  Aspiration precautions  will need a more reliable pulse ox check( pt's extremities/fingers very cold)  Will order pulmonary chest PT Vest to mobilize secretions  Endocrinology following  Pulmonology on board  Discussed above with Dr. Flores and agreed on the above plan.

## 2022-12-12 NOTE — PROGRESS NOTE ADULT - ASSESSMENT
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, hypothyroidism, HTN, [place] s/p cochlear implants presented 11/29 after an episode of emesis and was found to be hypoxic on presentation with concern for PNA/COPD exacerbation and component of volume overload    Acute hypoxic respiratory failure in the setting of likely PNA/COPD exacerbation   wean supplemental O2 as tolerated for goal >88-92%  if still with recurrent desaturations, can place on ventimask or high flow as needed   chest PT as tolerated   can trial short course of 3% NaCl nebs with albuterol if having difficulty expectorating   continue with DuoNeb q6h   continue tapering prednisone  aspiration precautions   maintain net even to slightly neg volume status  completed course of ceftriaxone/azithro   ongoing goals of care discussion  OOB/PT  outpt pulm f/u on discharge

## 2022-12-12 NOTE — PROGRESS NOTE ADULT - ASSESSMENT
89 y/o F with PMH HTN, [place] s/p cochlear implants, 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 She is admitted for Respiratory failure.     Acute Hypoxic Resp failure due to COPD/Pneumonia.acute HFpEF- desaturating to 87 % on slightest ambulation with PT  will be a readmission from rehab if she desaturates trying too rehab - cont PT   CT Chest atelectasis and pneumonia   Duoneb and prednisone taper now on 20mg daily  Viral panel neg, improving leukocytosis  completed Rocephin + Zithromax  stop Furosemide 20mg PO daily due to kalpana  Send sputum sample    KALPANA-worsening  EF 65-70%  off ivfs   monitor cr  stop lasix    Hypothyroid-severe- no compliance with medications  LT4 increased to 100mcg  Endo following, labs noted    HTN    Metoprolol, Hydralazine    Vomiting, resolved was actually not vomiting but coughing up phelgm  no abdominal pain  - prn antiemetics    Constipation  - senna and Miralax    DVT PPx- Lovenox    Dispo -[facility]I When able to ambulate without desaturation  code status DNR/DNI

## 2022-12-13 LAB
ALBUMIN SERPL ELPH-MCNC: 3.8 G/DL — SIGNIFICANT CHANGE UP (ref 3.3–5.2)
ALP SERPL-CCNC: 63 U/L — SIGNIFICANT CHANGE UP (ref 40–120)
ALT FLD-CCNC: 16 U/L — SIGNIFICANT CHANGE UP
ANION GAP SERPL CALC-SCNC: 17 MMOL/L — SIGNIFICANT CHANGE UP (ref 5–17)
AST SERPL-CCNC: 18 U/L — SIGNIFICANT CHANGE UP
BASOPHILS # BLD AUTO: 0.04 K/UL — SIGNIFICANT CHANGE UP (ref 0–0.2)
BASOPHILS NFR BLD AUTO: 0.4 % — SIGNIFICANT CHANGE UP (ref 0–2)
BILIRUB SERPL-MCNC: 0.6 MG/DL — SIGNIFICANT CHANGE UP (ref 0.4–2)
BUN SERPL-MCNC: 104.2 MG/DL — HIGH (ref 8–20)
CALCIUM SERPL-MCNC: 9.2 MG/DL — SIGNIFICANT CHANGE UP (ref 8.4–10.5)
CHLORIDE SERPL-SCNC: 98 MMOL/L — SIGNIFICANT CHANGE UP (ref 96–108)
CO2 SERPL-SCNC: 23 MMOL/L — SIGNIFICANT CHANGE UP (ref 22–29)
CREAT SERPL-MCNC: 1.67 MG/DL — HIGH (ref 0.5–1.3)
EGFR: 29 ML/MIN/1.73M2 — LOW
EOSINOPHIL # BLD AUTO: 0.05 K/UL — SIGNIFICANT CHANGE UP (ref 0–0.5)
EOSINOPHIL NFR BLD AUTO: 0.5 % — SIGNIFICANT CHANGE UP (ref 0–6)
GLUCOSE SERPL-MCNC: 133 MG/DL — HIGH (ref 70–99)
GRAM STN FLD: SIGNIFICANT CHANGE UP
HCT VFR BLD CALC: 36.3 % — SIGNIFICANT CHANGE UP (ref 34.5–45)
HGB BLD-MCNC: 11.7 G/DL — SIGNIFICANT CHANGE UP (ref 11.5–15.5)
IMM GRANULOCYTES NFR BLD AUTO: 1.9 % — HIGH (ref 0–0.9)
LYMPHOCYTES # BLD AUTO: 1.19 K/UL — SIGNIFICANT CHANGE UP (ref 1–3.3)
LYMPHOCYTES # BLD AUTO: 12.3 % — LOW (ref 13–44)
MAGNESIUM SERPL-MCNC: 2.9 MG/DL — HIGH (ref 1.6–2.6)
MCHC RBC-ENTMCNC: 32.2 GM/DL — SIGNIFICANT CHANGE UP (ref 32–36)
MCHC RBC-ENTMCNC: 33.1 PG — SIGNIFICANT CHANGE UP (ref 27–34)
MCV RBC AUTO: 102.5 FL — HIGH (ref 80–100)
MONOCYTES # BLD AUTO: 0.81 K/UL — SIGNIFICANT CHANGE UP (ref 0–0.9)
MONOCYTES NFR BLD AUTO: 8.3 % — SIGNIFICANT CHANGE UP (ref 2–14)
NEUTROPHILS # BLD AUTO: 7.44 K/UL — HIGH (ref 1.8–7.4)
NEUTROPHILS NFR BLD AUTO: 76.6 % — SIGNIFICANT CHANGE UP (ref 43–77)
PLATELET # BLD AUTO: 228 K/UL — SIGNIFICANT CHANGE UP (ref 150–400)
POTASSIUM SERPL-MCNC: 3.5 MMOL/L — SIGNIFICANT CHANGE UP (ref 3.5–5.3)
POTASSIUM SERPL-SCNC: 3.5 MMOL/L — SIGNIFICANT CHANGE UP (ref 3.5–5.3)
PROT SERPL-MCNC: 6.1 G/DL — LOW (ref 6.6–8.7)
RBC # BLD: 3.54 M/UL — LOW (ref 3.8–5.2)
RBC # FLD: 14.2 % — SIGNIFICANT CHANGE UP (ref 10.3–14.5)
SODIUM SERPL-SCNC: 138 MMOL/L — SIGNIFICANT CHANGE UP (ref 135–145)
SPECIMEN SOURCE: SIGNIFICANT CHANGE UP
WBC # BLD: 9.71 K/UL — SIGNIFICANT CHANGE UP (ref 3.8–10.5)
WBC # FLD AUTO: 9.71 K/UL — SIGNIFICANT CHANGE UP (ref 3.8–10.5)

## 2022-12-13 PROCEDURE — 99231 SBSQ HOSP IP/OBS SF/LOW 25: CPT

## 2022-12-13 PROCEDURE — 99232 SBSQ HOSP IP/OBS MODERATE 35: CPT

## 2022-12-13 RX ORDER — SODIUM CHLORIDE 9 MG/ML
4 INJECTION INTRAMUSCULAR; INTRAVENOUS; SUBCUTANEOUS EVERY 6 HOURS
Refills: 0 | Status: DISCONTINUED | OUTPATIENT
Start: 2022-12-13 | End: 2022-12-13

## 2022-12-13 RX ORDER — SODIUM CHLORIDE 9 MG/ML
4 INJECTION INTRAMUSCULAR; INTRAVENOUS; SUBCUTANEOUS EVERY 12 HOURS
Refills: 0 | Status: DISCONTINUED | OUTPATIENT
Start: 2022-12-13 | End: 2022-12-16

## 2022-12-13 RX ORDER — ALBUTEROL 90 UG/1
2.5 AEROSOL, METERED ORAL EVERY 12 HOURS
Refills: 0 | Status: DISCONTINUED | OUTPATIENT
Start: 2022-12-13 | End: 2022-12-13

## 2022-12-13 RX ADMIN — ATORVASTATIN CALCIUM 40 MILLIGRAM(S): 80 TABLET, FILM COATED ORAL at 22:53

## 2022-12-13 RX ADMIN — PANTOPRAZOLE SODIUM 40 MILLIGRAM(S): 20 TABLET, DELAYED RELEASE ORAL at 06:40

## 2022-12-13 RX ADMIN — SENNA PLUS 2 TABLET(S): 8.6 TABLET ORAL at 22:53

## 2022-12-13 RX ADMIN — ENOXAPARIN SODIUM 30 MILLIGRAM(S): 100 INJECTION SUBCUTANEOUS at 18:46

## 2022-12-13 RX ADMIN — Medication 25 MILLIGRAM(S): at 18:46

## 2022-12-13 RX ADMIN — Medication 100 MICROGRAM(S): at 06:40

## 2022-12-13 RX ADMIN — Medication 25 MILLIGRAM(S): at 06:40

## 2022-12-13 RX ADMIN — Medication 25 MILLIGRAM(S): at 12:00

## 2022-12-13 RX ADMIN — Medication 3 MILLILITER(S): at 15:50

## 2022-12-13 RX ADMIN — Medication 3 MILLILITER(S): at 20:19

## 2022-12-13 RX ADMIN — Medication 25 MILLIGRAM(S): at 22:53

## 2022-12-13 RX ADMIN — Medication 20 MILLIGRAM(S): at 06:40

## 2022-12-13 RX ADMIN — Medication 3 MILLILITER(S): at 09:25

## 2022-12-13 RX ADMIN — POLYETHYLENE GLYCOL 3350 17 GRAM(S): 17 POWDER, FOR SOLUTION ORAL at 12:00

## 2022-12-13 RX ADMIN — Medication 50 MILLIGRAM(S): at 06:40

## 2022-12-13 NOTE — PROGRESS NOTE ADULT - SUBJECTIVE AND OBJECTIVE BOX
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 Pt was started on CAP coverage and tx for COPD exacerbation. She was also receiving IVFs for KALPANA with concern for possible volume overload subsequently and was being diuresed. CT chest 12/7 with extensive tracheobronchial secretions with partial atelectasis b/l lower lobes.       INTERVAL HPI/OVERNIGHT EVENTS:  Pt remains on 4L NC with sats in the mid 90s. Pt reports her shortness of breath is overall improved. Pt reports she is not coughing as much but has still had difficulty expectorating. Pt denies any chest pain. Pt remains afebrile. Reports she still feels tired.       MEDICATIONS  (STANDING):  albuterol/ipratropium for Nebulization 3 milliLiter(s) Nebulizer every 6 hours  albuterol/ipratropium for Nebulization. 3 milliLiter(s) Nebulizer once  albuterol/ipratropium for Nebulization. 3 milliLiter(s) Nebulizer once  atorvastatin 40 milliGRAM(s) Oral at bedtime  enoxaparin Injectable 30 milliGRAM(s) SubCutaneous every 24 hours  hydrALAZINE 25 milliGRAM(s) Oral four times a day  levothyroxine 100 MICROGram(s) Oral daily  metoprolol succinate ER 50 milliGRAM(s) Oral daily  pantoprazole    Tablet 40 milliGRAM(s) Oral before breakfast  polyethylene glycol 3350 17 Gram(s) Oral daily  predniSONE   Tablet 20 milliGRAM(s) Oral daily  senna 2 Tablet(s) Oral daily    MEDICATIONS  (PRN):  guaiFENesin Oral Liquid (Sugar-Free) 100 milliGRAM(s) Oral every 6 hours PRN Cough  ondansetron Injectable 4 milliGRAM(s) IV Push every 6 hours PRN Nausea and/or Vomiting    Allergies  codeine (Vomiting)  iodine (Unknown)  Intolerances    PAST MEDICAL & SURGICAL HISTORY:  Essential hypertension  Chronic obstructive pulmonary disease, unspecified COPD type  Hypothyroidism, unspecified type  H/O exploratory laparotomy  for a benign colon mass 4/19/12    REVIEW OF SYSTEMS: Negative except per HPI    Vital Signs Last 24 Hrs  T(C): 36.7 (13 Dec 2022 11:50), Max: 36.7 (13 Dec 2022 11:50)  T(F): 98 (13 Dec 2022 11:50), Max: 98 (13 Dec 2022 11:50)  HR: 82 (13 Dec 2022 15:55) (64 - 97)  BP: 116/61 (13 Dec 2022 11:50) (109/64 - 127/77)  BP(mean): --  RR: 20 (13 Dec 2022 06:47) (19 - 20)  SpO2: 95% (13 Dec 2022 15:55) (95% - 96%)    Parameters below as of 13 Dec 2022 15:55  Patient On (Oxygen Delivery Method): nasal cannula,4LPM    I&O's Detail    12 Dec 2022 07:01  -  13 Dec 2022 07:00  --------------------------------------------------------  IN:  Total IN: 0 mL    OUT:    Voided (mL): 500 mL  Total OUT: 500 mL    Total NET: -500 mL          PHYSICAL EXAMINATION:    GENERAL: Alert, in NAD     HEENT: NC/AT, anicteric, MMM    NECK: Supple    LUNGS: bilateral rhonchi, no increased work of breathing    HEART: S1S2, RRR    ABDOMEN: Soft, nontender, and nondistended    EXTREMITIES: Without any cyanosis, clubbing; 1+ b/l edema    NEUROLOGIC: No focal deficits     LABS:                                   11.7   9.71  )-----------( 228      ( 13 Dec 2022 04:50 )             36.3       12-13    138  |  98  |  104.2<H>  ----------------------------<  133<H>  3.5   |  23.0  |  1.67<H>    Ca    9.2      13 Dec 2022 04:50  Mg     2.9     12-13    TPro  6.1<L>  /  Alb  3.8  /  TBili  0.6  /  DBili  x   /  AST  18  /  ALT  16  /  AlkPhos  63  12-13                             RADIOLOGY & ADDITIONAL STUDIES:    ACC: [id number] EXAM:  XR CHEST PORTABLE ROUTINE 1V                          PROCEDURE DATE:  12/08/2022          INTERPRETATION:  Portable chest radiograph    CLINICAL INFORMATION: Fluid overload    TECHNIQUE:  Portable  AP chest radiograph.    COMPARISON: 12/4/2022 chest x-ray and 12/7/2022 chest CT .    FINDINGS:  CATHETERS AND TUBES: None    PULMONARY: Residual LEFT lower lobe airspace consolidation/atelectasis   likely from mucous plug as seen on prior CT scan..   LEFT upper zone and RIGHT lung parenchyma grossly clear.  .   No pneumothorax.    HEART/VASCULAR: The  heart is enlarged in transverse diameter.    BONES: Visualized osseous structures are intact.    IMPRESSION:   Cardiomegaly.  Residual LEFT  lower zone  airspace consolidation..          ZELDA DA SILVA MD; Attending Radiologist  This document has been electronically signed. Dec  8 2022 12:25PM        ACC: [id number] EXAM:  CT CHEST                          PROCEDURE DATE:  12/07/2022          INTERPRETATION:  EXAMINATION: CT CHEST    CLINICAL INDICATION: Dyspnea.    TECHNIQUE: Noncontrast CT of the chest was obtained.    COMPARISON: Multiple CT, most recent 6/3/2021..    FINDINGS:    AIRWAYS AND LUNGS: Tracheobronchial secretions, extensive in the lower   lobes, greater on the left.  Partial atelectasis both lower lobes,   greater on the left. Patchy bilateral lung opacities.    MEDIASTINUM AND PLEURA: There are no enlarged mediastinal, hilar or   axillary lymph nodes. The visualized portion of the thyroid gland is   unremarkable. There are small bilateral pleural effusions.  There is no   pneumothorax.    HEART AND VESSELS: There is mildcardiomegaly.  There are atherosclerotic   calcifications of the aorta and coronary arteries.  There is a small   pericardial effusion.    UPPER ABDOMEN: Images of the upper abdomen demonstrate hiatal hernia with   debris in the esophagus.    BONES AND SOFT TISSUES: There are mild degenerative changes of the spine.    The soft tissues are unremarkable.    AI VERTEBRAL BODY ANALYSIS:  T4: Moderate compression fracture with 26% height loss.    IMPRESSION:  There is a small left pleural effusion. Tracheobronchial secretions,   extensive in the lower lobes, greater on the left.  Partial atelectasis   both lower lobes, greater on the left. Patchy bilateral lung opacities   may be infectious or inflammatory.    VERTEBRAL BODY ANALYSIS: Vertebral compression fractures as described,   consider further workup for osteoporosis.          DUSTY RANGEL MD; Attending Radiologist  This document has been electronically signed. Dec  7 2022 12:03PM      ECHO:    Summary:   1. Normal left ventricular internal cavity size.   2. Normal global left ventricular systolic function.   3. Left ventricular ejection fraction, by visual estimation, is 65 to   70%.   4. Normal right ventricular size and function.   5. The left atrium is normal in size.   6. The right atrium is normal in size.   7. Sclerotic aortic valve with normal opening.   8. Mild tricuspid regurgitation.   9. Normal pulmonary artery pressure.  10. Trace mitral valve regurgitation.  11. Mild pulmonic valve regurgitation.    MD Henri Electronically signed on 11/29/2022 at 3:02:43 PM

## 2022-12-13 NOTE — PROGRESS NOTE ADULT - ASSESSMENT
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, hypothyroidism, HTN, [place] s/p cochlear implants presented 11/29 after an episode of emesis and was found to be hypoxic on presentation with concern for PNA/COPD exacerbation and component of volume overload    Acute hypoxic respiratory failure in the setting of likely PNA/COPD exacerbation   wean supplemental O2 as tolerated for goal >88-92%  may require O2 supplementation on discharge   chest PT as tolerated   will start 3% hypertonic w/ albuterol for short course to help with expectoration   continue with duoneb q6h  continue prednisone taper   aspiration precautions   maintain net even to slightly neg volume status  completed course of ceftriaxone/azithro   f/u sputum cx   ongoing goals of care discussion now DNR/DNI  OOB/PT as tolerated   can repeat ct chest in 6-8 weeks for followup  outpt pulm f/u on discharge

## 2022-12-13 NOTE — PROGRESS NOTE ADULT - ASSESSMENT
89 y/o F with PMH HTN, [place] s/p cochlear implants, 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 She is admitted for Respiratory failure.     Acute Hypoxic Resp failure due to COPD/Pneumonia.acute HFpEF, not improving  - desaturating to 87 % on slightest ambulation with PT  CT Chest atelectasis and pneumonia   Duoneb and prednisone taper now on 20mg daily, will not lower yet as her resp status has not improved.   Viral panel neg, improving leukocytosis  completed Rocephin + Zithromax  stop Furosemide 20mg PO daily due to kalpana  sputum sample pedning  3% saline nebs for 48 hours  Chest percussion vest    KALPANA-improving  EF 65-70%  off ivfs   monitor cr  stop lasix    Hypothyroid-severe- no compliance with medications  LT4 increased to 100mcg  Endo following, labs noted    HTN    Metoprolol, Hydralazine    Vomiting, resolved was actually not vomiting but coughing up phelgm  no abdominal pain  - prn antiemetics    Constipation  - senna and Miralax    DVT PPx- Lovenox    Dispo -PT re-eval  code status DNR/DNI

## 2022-12-13 NOTE — PROGRESS NOTE ADULT - SUBJECTIVE AND OBJECTIVE BOX
[facility] Division of Hospital Medicine    Chief Complaint:  cough and vomiting    SUBJECTIVE / OVERNIGHT EVENTS: patient seen and examined. She reports she gets tired very easily and winded easily. Still with cough.     Patient denies  abd pain, N/V, fever, chills, dysuria or any other complaints. All remainder ROS negative.     MEDICATIONS  (STANDING):  albuterol/ipratropium for Nebulization 3 milliLiter(s) Nebulizer every 6 hours  albuterol/ipratropium for Nebulization. 3 milliLiter(s) Nebulizer once  albuterol/ipratropium for Nebulization. 3 milliLiter(s) Nebulizer once  atorvastatin 40 milliGRAM(s) Oral at bedtime  enoxaparin Injectable 30 milliGRAM(s) SubCutaneous every 24 hours  hydrALAZINE 25 milliGRAM(s) Oral four times a day  levothyroxine 100 MICROGram(s) Oral daily  metoprolol succinate ER 50 milliGRAM(s) Oral daily  pantoprazole    Tablet 40 milliGRAM(s) Oral before breakfast  polyethylene glycol 3350 17 Gram(s) Oral daily  predniSONE   Tablet 20 milliGRAM(s) Oral daily  senna 2 Tablet(s) Oral daily    MEDICATIONS  (PRN):  guaiFENesin Oral Liquid (Sugar-Free) 100 milliGRAM(s) Oral every 6 hours PRN Cough  ondansetron Injectable 4 milliGRAM(s) IV Push every 6 hours PRN Nausea and/or Vomiting        I&O's Summary    12 Dec 2022 07:01  -  13 Dec 2022 07:00  --------------------------------------------------------  IN: 0 mL / OUT: 500 mL / NET: -500 mL        PHYSICAL EXAM:  Vital Signs Last 24 Hrs  T(C): 36.7 (13 Dec 2022 11:50), Max: 36.7 (13 Dec 2022 11:50)  T(F): 98 (13 Dec 2022 11:50), Max: 98 (13 Dec 2022 11:50)  HR: 75 (13 Dec 2022 11:50) (64 - 97)  BP: 116/61 (13 Dec 2022 11:50) (109/64 - 127/77)  BP(mean): --  RR: 20 (13 Dec 2022 06:47) (19 - 20)  SpO2: 95% (13 Dec 2022 11:50) (94% - 96%)    Parameters below as of 13 Dec 2022 11:50  Patient On (Oxygen Delivery Method): nasal cannula  O2 Flow (L/min): 4      CONSTITUTIONAL: NAD, obese  ENMT: Moist oral mucosa, no pharyngeal injection or exudates;  RESPIRATORY: + rhonchi and coarse breath sounds through out  CARDIOVASCULAR: Regular rate and rhythm, normal S1 and S2, + lower extremity edema;  ABDOMEN: Nontender to palpation, normoactive bowel sounds, no rebound/guarding  MUSCLOSKELETAL:   no joint swelling or tenderness to palpation  PSYCH: A+O to person, place, and time; affect appropriate  NEUROLOGY: CN 2-12 are intact and symmetric; no gross sensory deficits;   SKIN: No rashes; no palpable lesions    LABS:                        11.7   9.71  )-----------( 228      ( 13 Dec 2022 04:50 )             36.3     12-13    138  |  98  |  104.2<H>  ----------------------------<  133<H>  3.5   |  23.0  |  1.67<H>    Ca    9.2      13 Dec 2022 04:50  Mg     2.9     12-13    TPro  6.1<L>  /  Alb  3.8  /  TBili  0.6  /  DBili  x   /  AST  18  /  ALT  16  /  AlkPhos  63  12-13              Culture - Sputum (collected 12 Dec 2022 18:20)  Source: .Sputum Sputum  Gram Stain (13 Dec 2022 03:32):    Few polymorphonuclear leukocytes per low power field    Few Squamous epithelial cells per low power field    Few Yeast like cells seen per oil power field    Rare Gram Variable Rods seen per oil power field    Few Gram positive cocci in pairs seen per oil power field      CAPILLARY BLOOD GLUCOSE            RADIOLOGY & ADDITIONAL TESTS:  Results Reviewed:   Imaging Personally Reviewed:  Electrocardiogram Personally Reviewed:

## 2022-12-14 DIAGNOSIS — R19.5 OTHER FECAL ABNORMALITIES: ICD-10-CM

## 2022-12-14 LAB
-  LEVOFLOXACIN: SIGNIFICANT CHANGE UP
-  MINOCYCLINE: 28 — SIGNIFICANT CHANGE UP
-  MINOCYCLINE: SIGNIFICANT CHANGE UP
-  TRIMETHOPRIM/SULFAMETHOXAZOLE: SIGNIFICANT CHANGE UP
ALBUMIN SERPL ELPH-MCNC: 3.5 G/DL — SIGNIFICANT CHANGE UP (ref 3.3–5.2)
ALBUMIN SERPL ELPH-MCNC: 3.7 G/DL — SIGNIFICANT CHANGE UP (ref 3.3–5.2)
ALP SERPL-CCNC: 65 U/L — SIGNIFICANT CHANGE UP (ref 40–120)
ALP SERPL-CCNC: 72 U/L — SIGNIFICANT CHANGE UP (ref 40–120)
ALT FLD-CCNC: 16 U/L — SIGNIFICANT CHANGE UP
ALT FLD-CCNC: 16 U/L — SIGNIFICANT CHANGE UP
ANION GAP SERPL CALC-SCNC: 12 MMOL/L — SIGNIFICANT CHANGE UP (ref 5–17)
ANION GAP SERPL CALC-SCNC: 14 MMOL/L — SIGNIFICANT CHANGE UP (ref 5–17)
AST SERPL-CCNC: 16 U/L — SIGNIFICANT CHANGE UP
AST SERPL-CCNC: 21 U/L — SIGNIFICANT CHANGE UP
BASE EXCESS BLDV CALC-SCNC: 1.1 MMOL/L — SIGNIFICANT CHANGE UP (ref -2–3)
BASOPHILS # BLD AUTO: 0.02 K/UL — SIGNIFICANT CHANGE UP (ref 0–0.2)
BASOPHILS # BLD AUTO: 0.03 K/UL — SIGNIFICANT CHANGE UP (ref 0–0.2)
BASOPHILS NFR BLD AUTO: 0.3 % — SIGNIFICANT CHANGE UP (ref 0–2)
BASOPHILS NFR BLD AUTO: 0.4 % — SIGNIFICANT CHANGE UP (ref 0–2)
BILIRUB SERPL-MCNC: 0.5 MG/DL — SIGNIFICANT CHANGE UP (ref 0.4–2)
BILIRUB SERPL-MCNC: 0.6 MG/DL — SIGNIFICANT CHANGE UP (ref 0.4–2)
BLD GP AB SCN SERPL QL: SIGNIFICANT CHANGE UP
BUN SERPL-MCNC: 100.5 MG/DL — HIGH (ref 8–20)
BUN SERPL-MCNC: 99.5 MG/DL — HIGH (ref 8–20)
CA-I SERPL-SCNC: 1.17 MMOL/L — SIGNIFICANT CHANGE UP (ref 1.15–1.33)
CALCIUM SERPL-MCNC: 9 MG/DL — SIGNIFICANT CHANGE UP (ref 8.4–10.5)
CALCIUM SERPL-MCNC: 9.7 MG/DL — SIGNIFICANT CHANGE UP (ref 8.4–10.5)
CHLORIDE BLDV-SCNC: 98 MMOL/L — SIGNIFICANT CHANGE UP (ref 96–108)
CHLORIDE SERPL-SCNC: 100 MMOL/L — SIGNIFICANT CHANGE UP (ref 96–108)
CHLORIDE SERPL-SCNC: 96 MMOL/L — SIGNIFICANT CHANGE UP (ref 96–108)
CO2 SERPL-SCNC: 24 MMOL/L — SIGNIFICANT CHANGE UP (ref 22–29)
CO2 SERPL-SCNC: 28 MMOL/L — SIGNIFICANT CHANGE UP (ref 22–29)
CREAT SERPL-MCNC: 1.42 MG/DL — HIGH (ref 0.5–1.3)
CREAT SERPL-MCNC: 1.62 MG/DL — HIGH (ref 0.5–1.3)
CULTURE RESULTS: SIGNIFICANT CHANGE UP
EGFR: 30 ML/MIN/1.73M2 — LOW
EGFR: 35 ML/MIN/1.73M2 — LOW
EOSINOPHIL # BLD AUTO: 0 K/UL — SIGNIFICANT CHANGE UP (ref 0–0.5)
EOSINOPHIL # BLD AUTO: 0 K/UL — SIGNIFICANT CHANGE UP (ref 0–0.5)
EOSINOPHIL NFR BLD AUTO: 0 % — SIGNIFICANT CHANGE UP (ref 0–6)
EOSINOPHIL NFR BLD AUTO: 0 % — SIGNIFICANT CHANGE UP (ref 0–6)
GAS PNL BLDA: SIGNIFICANT CHANGE UP
GAS PNL BLDV: 135 MMOL/L — LOW (ref 136–145)
GAS PNL BLDV: SIGNIFICANT CHANGE UP
GLUCOSE BLDC GLUCOMTR-MCNC: 275 MG/DL — HIGH (ref 70–99)
GLUCOSE BLDV-MCNC: 254 MG/DL — HIGH (ref 70–99)
GLUCOSE SERPL-MCNC: 145 MG/DL — HIGH (ref 70–99)
GLUCOSE SERPL-MCNC: 270 MG/DL — HIGH (ref 70–99)
HCO3 BLDV-SCNC: 25 MMOL/L — SIGNIFICANT CHANGE UP (ref 22–29)
HCT VFR BLD CALC: 29.1 % — LOW (ref 34.5–45)
HCT VFR BLD CALC: 30 % — LOW (ref 34.5–45)
HCT VFR BLD CALC: 32.3 % — LOW (ref 34.5–45)
HGB BLD-MCNC: 10 G/DL — LOW (ref 11.5–15.5)
HGB BLD-MCNC: 10.7 G/DL — LOW (ref 11.5–15.5)
HGB BLD-MCNC: 9.9 G/DL — LOW (ref 11.5–15.5)
IMM GRANULOCYTES NFR BLD AUTO: 1.6 % — HIGH (ref 0–0.9)
IMM GRANULOCYTES NFR BLD AUTO: 2 % — HIGH (ref 0–0.9)
INR BLD: 0.96 RATIO — SIGNIFICANT CHANGE UP (ref 0.88–1.16)
LACTATE BLDV-MCNC: 1.9 MMOL/L — SIGNIFICANT CHANGE UP (ref 0.5–2)
LYMPHOCYTES # BLD AUTO: 0.56 K/UL — LOW (ref 1–3.3)
LYMPHOCYTES # BLD AUTO: 0.91 K/UL — LOW (ref 1–3.3)
LYMPHOCYTES # BLD AUTO: 12.9 % — LOW (ref 13–44)
LYMPHOCYTES # BLD AUTO: 8 % — LOW (ref 13–44)
MAGNESIUM SERPL-MCNC: 3.1 MG/DL — HIGH (ref 1.6–2.6)
MCHC RBC-ENTMCNC: 33 GM/DL — SIGNIFICANT CHANGE UP (ref 32–36)
MCHC RBC-ENTMCNC: 33.1 GM/DL — SIGNIFICANT CHANGE UP (ref 32–36)
MCHC RBC-ENTMCNC: 33.3 PG — SIGNIFICANT CHANGE UP (ref 27–34)
MCHC RBC-ENTMCNC: 33.6 PG — SIGNIFICANT CHANGE UP (ref 27–34)
MCHC RBC-ENTMCNC: 33.6 PG — SIGNIFICANT CHANGE UP (ref 27–34)
MCHC RBC-ENTMCNC: 34.4 GM/DL — SIGNIFICANT CHANGE UP (ref 32–36)
MCV RBC AUTO: 100.6 FL — HIGH (ref 80–100)
MCV RBC AUTO: 101.7 FL — HIGH (ref 80–100)
MCV RBC AUTO: 97.7 FL — SIGNIFICANT CHANGE UP (ref 80–100)
METHOD TYPE: SIGNIFICANT CHANGE UP
METHOD TYPE: SIGNIFICANT CHANGE UP
MONOCYTES # BLD AUTO: 0.27 K/UL — SIGNIFICANT CHANGE UP (ref 0–0.9)
MONOCYTES # BLD AUTO: 0.53 K/UL — SIGNIFICANT CHANGE UP (ref 0–0.9)
MONOCYTES NFR BLD AUTO: 3.9 % — SIGNIFICANT CHANGE UP (ref 2–14)
MONOCYTES NFR BLD AUTO: 7.5 % — SIGNIFICANT CHANGE UP (ref 2–14)
NEUTROPHILS # BLD AUTO: 5.44 K/UL — SIGNIFICANT CHANGE UP (ref 1.8–7.4)
NEUTROPHILS # BLD AUTO: 6 K/UL — SIGNIFICANT CHANGE UP (ref 1.8–7.4)
NEUTROPHILS NFR BLD AUTO: 77.2 % — HIGH (ref 43–77)
NEUTROPHILS NFR BLD AUTO: 86.2 % — HIGH (ref 43–77)
OB PNL STL: POSITIVE
ORGANISM # SPEC MICROSCOPIC CNT: SIGNIFICANT CHANGE UP
PCO2 BLDV: 33 MMHG — LOW (ref 39–42)
PH BLDV: 7.48 — HIGH (ref 7.32–7.43)
PHOSPHATE SERPL-MCNC: 3.3 MG/DL — SIGNIFICANT CHANGE UP (ref 2.4–4.7)
PLATELET # BLD AUTO: 233 K/UL — SIGNIFICANT CHANGE UP (ref 150–400)
PLATELET # BLD AUTO: 242 K/UL — SIGNIFICANT CHANGE UP (ref 150–400)
PLATELET # BLD AUTO: 259 K/UL — SIGNIFICANT CHANGE UP (ref 150–400)
PO2 BLDV: 221 MMHG — HIGH (ref 25–45)
POTASSIUM BLDV-SCNC: 3.5 MMOL/L — SIGNIFICANT CHANGE UP (ref 3.5–5.1)
POTASSIUM SERPL-MCNC: 3.6 MMOL/L — SIGNIFICANT CHANGE UP (ref 3.5–5.3)
POTASSIUM SERPL-MCNC: 3.8 MMOL/L — SIGNIFICANT CHANGE UP (ref 3.5–5.3)
POTASSIUM SERPL-SCNC: 3.6 MMOL/L — SIGNIFICANT CHANGE UP (ref 3.5–5.3)
POTASSIUM SERPL-SCNC: 3.8 MMOL/L — SIGNIFICANT CHANGE UP (ref 3.5–5.3)
PROCALCITONIN SERPL-MCNC: 0.19 NG/ML — HIGH (ref 0.02–0.1)
PROLACTIN SERPL-MCNC: 8.1 NG/ML — SIGNIFICANT CHANGE UP (ref 3.4–24.1)
PROT SERPL-MCNC: 5.7 G/DL — LOW (ref 6.6–8.7)
PROT SERPL-MCNC: 5.9 G/DL — LOW (ref 6.6–8.7)
PROTHROM AB SERPL-ACNC: 11.1 SEC — SIGNIFICANT CHANGE UP (ref 10.5–13.4)
RBC # BLD: 2.95 M/UL — LOW (ref 3.8–5.2)
RBC # BLD: 2.98 M/UL — LOW (ref 3.8–5.2)
RBC # BLD: 3.21 M/UL — LOW (ref 3.8–5.2)
RBC # FLD: 14.2 % — SIGNIFICANT CHANGE UP (ref 10.3–14.5)
RBC # FLD: 14.2 % — SIGNIFICANT CHANGE UP (ref 10.3–14.5)
RBC # FLD: 14.3 % — SIGNIFICANT CHANGE UP (ref 10.3–14.5)
SAO2 % BLDV: 99.4 % — SIGNIFICANT CHANGE UP
SODIUM SERPL-SCNC: 136 MMOL/L — SIGNIFICANT CHANGE UP (ref 135–145)
SODIUM SERPL-SCNC: 137 MMOL/L — SIGNIFICANT CHANGE UP (ref 135–145)
SPECIMEN SOURCE: SIGNIFICANT CHANGE UP
T3 SERPL-MCNC: <40 NG/DL — LOW (ref 80–200)
T4 AB SER-ACNC: 6 UG/DL — SIGNIFICANT CHANGE UP (ref 4.5–12)
T4 FREE SERPL-MCNC: 1.2 NG/DL — SIGNIFICANT CHANGE UP (ref 0.9–1.8)
TROPONIN T SERPL-MCNC: 0.02 NG/ML — SIGNIFICANT CHANGE UP (ref 0–0.06)
TSH SERPL-MCNC: 10.4 UIU/ML — HIGH (ref 0.27–4.2)
WBC # BLD: 6.96 K/UL — SIGNIFICANT CHANGE UP (ref 3.8–10.5)
WBC # BLD: 7.05 K/UL — SIGNIFICANT CHANGE UP (ref 3.8–10.5)
WBC # BLD: 7.45 K/UL — SIGNIFICANT CHANGE UP (ref 3.8–10.5)
WBC # FLD AUTO: 6.96 K/UL — SIGNIFICANT CHANGE UP (ref 3.8–10.5)
WBC # FLD AUTO: 7.05 K/UL — SIGNIFICANT CHANGE UP (ref 3.8–10.5)
WBC # FLD AUTO: 7.45 K/UL — SIGNIFICANT CHANGE UP (ref 3.8–10.5)

## 2022-12-14 PROCEDURE — 93010 ELECTROCARDIOGRAM REPORT: CPT

## 2022-12-14 PROCEDURE — 99291 CRITICAL CARE FIRST HOUR: CPT

## 2022-12-14 PROCEDURE — 99233 SBSQ HOSP IP/OBS HIGH 50: CPT

## 2022-12-14 PROCEDURE — 93308 TTE F-UP OR LMTD: CPT | Mod: 26

## 2022-12-14 PROCEDURE — 99223 1ST HOSP IP/OBS HIGH 75: CPT

## 2022-12-14 PROCEDURE — 74019 RADEX ABDOMEN 2 VIEWS: CPT | Mod: 26

## 2022-12-14 PROCEDURE — 99231 SBSQ HOSP IP/OBS SF/LOW 25: CPT

## 2022-12-14 PROCEDURE — 71045 X-RAY EXAM CHEST 1 VIEW: CPT | Mod: 26

## 2022-12-14 RX ORDER — SODIUM CHLORIDE 9 MG/ML
1000 INJECTION INTRAMUSCULAR; INTRAVENOUS; SUBCUTANEOUS ONCE
Refills: 0 | Status: COMPLETED | OUTPATIENT
Start: 2022-12-14 | End: 2022-12-14

## 2022-12-14 RX ORDER — PIPERACILLIN AND TAZOBACTAM 4; .5 G/20ML; G/20ML
3.38 INJECTION, POWDER, LYOPHILIZED, FOR SOLUTION INTRAVENOUS ONCE
Refills: 0 | Status: DISCONTINUED | OUTPATIENT
Start: 2022-12-14 | End: 2022-12-15

## 2022-12-14 RX ORDER — PIPERACILLIN AND TAZOBACTAM 4; .5 G/20ML; G/20ML
3.38 INJECTION, POWDER, LYOPHILIZED, FOR SOLUTION INTRAVENOUS ONCE
Refills: 0 | Status: COMPLETED | OUTPATIENT
Start: 2022-12-14 | End: 2022-12-14

## 2022-12-14 RX ORDER — PANTOPRAZOLE SODIUM 20 MG/1
8 TABLET, DELAYED RELEASE ORAL
Qty: 80 | Refills: 0 | Status: DISCONTINUED | OUTPATIENT
Start: 2022-12-14 | End: 2022-12-15

## 2022-12-14 RX ORDER — PIPERACILLIN AND TAZOBACTAM 4; .5 G/20ML; G/20ML
3.38 INJECTION, POWDER, LYOPHILIZED, FOR SOLUTION INTRAVENOUS EVERY 8 HOURS
Refills: 0 | Status: DISCONTINUED | OUTPATIENT
Start: 2022-12-15 | End: 2022-12-15

## 2022-12-14 RX ORDER — SODIUM CHLORIDE 9 MG/ML
500 INJECTION INTRAMUSCULAR; INTRAVENOUS; SUBCUTANEOUS ONCE
Refills: 0 | Status: COMPLETED | OUTPATIENT
Start: 2022-12-14 | End: 2022-12-14

## 2022-12-14 RX ORDER — MIDODRINE HYDROCHLORIDE 2.5 MG/1
10 TABLET ORAL EVERY 8 HOURS
Refills: 0 | Status: DISCONTINUED | OUTPATIENT
Start: 2022-12-15 | End: 2022-12-16

## 2022-12-14 RX ORDER — ALBUMIN HUMAN 25 %
50 VIAL (ML) INTRAVENOUS ONCE
Refills: 0 | Status: COMPLETED | OUTPATIENT
Start: 2022-12-14 | End: 2022-12-14

## 2022-12-14 RX ORDER — METOPROLOL TARTRATE 50 MG
50 TABLET ORAL DAILY
Refills: 0 | Status: DISCONTINUED | OUTPATIENT
Start: 2022-12-14 | End: 2022-12-14

## 2022-12-14 RX ORDER — MIDODRINE HYDROCHLORIDE 2.5 MG/1
10 TABLET ORAL ONCE
Refills: 0 | Status: COMPLETED | OUTPATIENT
Start: 2022-12-14 | End: 2022-12-14

## 2022-12-14 RX ORDER — PIPERACILLIN AND TAZOBACTAM 4; .5 G/20ML; G/20ML
3.38 INJECTION, POWDER, LYOPHILIZED, FOR SOLUTION INTRAVENOUS ONCE
Refills: 0 | Status: COMPLETED | OUTPATIENT
Start: 2022-12-15 | End: 2022-12-15

## 2022-12-14 RX ADMIN — PANTOPRAZOLE SODIUM 10 MG/HR: 20 TABLET, DELAYED RELEASE ORAL at 18:25

## 2022-12-14 RX ADMIN — Medication 20 MILLIGRAM(S): at 05:32

## 2022-12-14 RX ADMIN — Medication 100 MICROGRAM(S): at 05:32

## 2022-12-14 RX ADMIN — SODIUM CHLORIDE 1000 MILLILITER(S): 9 INJECTION INTRAMUSCULAR; INTRAVENOUS; SUBCUTANEOUS at 20:48

## 2022-12-14 RX ADMIN — MIDODRINE HYDROCHLORIDE 10 MILLIGRAM(S): 2.5 TABLET ORAL at 16:05

## 2022-12-14 RX ADMIN — Medication 3 MILLILITER(S): at 10:23

## 2022-12-14 RX ADMIN — PANTOPRAZOLE SODIUM 40 MILLIGRAM(S): 20 TABLET, DELAYED RELEASE ORAL at 05:33

## 2022-12-14 RX ADMIN — SODIUM CHLORIDE 1000 MILLILITER(S): 9 INJECTION INTRAMUSCULAR; INTRAVENOUS; SUBCUTANEOUS at 14:13

## 2022-12-14 RX ADMIN — PIPERACILLIN AND TAZOBACTAM 200 GRAM(S): 4; .5 INJECTION, POWDER, LYOPHILIZED, FOR SOLUTION INTRAVENOUS at 14:12

## 2022-12-14 RX ADMIN — Medication 25 MILLIGRAM(S): at 11:39

## 2022-12-14 RX ADMIN — Medication 3 MILLILITER(S): at 03:42

## 2022-12-14 RX ADMIN — SODIUM CHLORIDE 4 MILLILITER(S): 9 INJECTION INTRAMUSCULAR; INTRAVENOUS; SUBCUTANEOUS at 21:09

## 2022-12-14 RX ADMIN — Medication 3 MILLILITER(S): at 17:30

## 2022-12-14 RX ADMIN — Medication 50 MILLILITER(S): at 16:04

## 2022-12-14 RX ADMIN — Medication 50 MILLIGRAM(S): at 05:32

## 2022-12-14 RX ADMIN — Medication 3 MILLILITER(S): at 21:08

## 2022-12-14 NOTE — RAPID RESPONSE TEAM SUMMARY - NSSITUATIONBACKGROUNDRRT_GEN_ALL_CORE
89 y/o F with PMH HTN, [place] s/p cochlear implants, 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 Pt is admitted for Respiratory failure. RRT called for LOC.
RR Called due to Hypoxia.  O2 sat decreased to 70's. 89 yo Female  PMHX: HTN, [place] s/p cochlear implants, COPD/emphysema (not on home O2), and hypothyroidism admitted due to SOB  Dx with PNA  Currently being Tx with: Rocephin/Zithro  Duonebs  HiFlow O2 and Increase in home dose of Prednisone.  Pt was worked up earlier today for C/O Slight chest Heaviness with Neg Pulm/Cardiac Findings.  Pt has been receiving IV Fluids x few days.  Pt Denies: CP Dizziness N/V  + SOB

## 2022-12-14 NOTE — CONSULT NOTE ADULT - SUBJECTIVE AND OBJECTIVE BOX
Patient is a 89y old  Female who presents with a chief complaint of chills/ cough/ vomits (14 Dec 2022 14:55)      HPI:  89 y/o F with PMH HTN, [place] s/p cochlear implants, COPD/emphysema (not on home O2),admitted with PNA, KALPANA.      Patient had rapid response this am after a syncopal episode. Noted to be hypotensive.  Complained to hospitalist of abdominal pain and feeling like stool was stuck.  She was disimpacted. As per nurse no blood was noted.  After disimpaction she had one loose stool on her own.  Abdominal xray  showed non specific gas pattern         SH- smoke 1ppd or more since age 15 yrs to 50 yrs. Quit 40 yrs ago, denies other toxic habits  FH- states unaware of any medical condition (29 Nov 2022 09:15)      REVIEW OF SYSTEMS:  Constitutional: No fever, weight loss or fatigue  ENMT:  No difficulty hearing, tinnitus, vertigo; No sinus or throat pain  Respiratory: No cough, wheezing, chills or hemoptysis  Cardiovascular: No chest pain, palpitations, dizziness or leg swelling  Gastrointestinal: No abdominal or epigastric pain. No nausea, vomiting or hematemesis; No diarrhea or constipation. No melena or hematochezia.  Skin: No itching, burning, rashes or lesions   Musculoskeletal: No joint pain or swelling; No muscle, back or extremity pain    PAST MEDICAL & SURGICAL HISTORY:  Essential hypertension      Chronic obstructive pulmonary disease, unspecified COPD type      Hypothyroidism, unspecified type      H/O exploratory laparotomy  for a benign colon mass 4/19/12          FAMILY HISTORY:  No pertinent family history in first degree relatives        SOCIAL HISTORY:  Smoking Status: [ ] Current, [ ] Former, [ ] Never  Pack Years:  [  ] EtOH-no  [  ] IVDA    MEDICATIONS:  MEDICATIONS  (STANDING):  albuterol/ipratropium for Nebulization 3 milliLiter(s) Nebulizer every 6 hours  albuterol/ipratropium for Nebulization. 3 milliLiter(s) Nebulizer once  albuterol/ipratropium for Nebulization. 3 milliLiter(s) Nebulizer once  atorvastatin 40 milliGRAM(s) Oral at bedtime  levothyroxine 100 MICROGram(s) Oral daily  pantoprazole    Tablet 40 milliGRAM(s) Oral before breakfast  pantoprazole Infusion 8 mG/Hr (10 mL/Hr) IV Continuous <Continuous>  piperacillin/tazobactam IVPB.- 3.375 Gram(s) IV Intermittent once  polyethylene glycol 3350 17 Gram(s) Oral daily  predniSONE   Tablet 20 milliGRAM(s) Oral daily  senna 2 Tablet(s) Oral daily  sodium chloride 3%  Inhalation 4 milliLiter(s) Inhalation every 12 hours    MEDICATIONS  (PRN):  guaiFENesin Oral Liquid (Sugar-Free) 100 milliGRAM(s) Oral every 6 hours PRN Cough  ondansetron Injectable 4 milliGRAM(s) IV Push every 6 hours PRN Nausea and/or Vomiting      Allergies    codeine (Vomiting)  iodine (Unknown)    Intolerances        Vital Signs Last 24 Hrs  T(C): 36.4 (14 Dec 2022 11:57), Max: 36.8 (13 Dec 2022 22:00)  T(F): 97.5 (14 Dec 2022 11:57), Max: 98.2 (13 Dec 2022 22:00)  HR: 62 (14 Dec 2022 17:59) (57 - 84)  BP: 119/71 (14 Dec 2022 17:58) (70/42 - 132/68)  BP(mean): --  RR: 18 (14 Dec 2022 17:59) (18 - 20)  SpO2: 95% (14 Dec 2022 17:59) (83% - 100%)    Parameters below as of 14 Dec 2022 17:59  Patient On (Oxygen Delivery Method): mask, Venturi  O2 Flow (L/min): 15  O2 Concentration (%): 50    12-13 @ 07:01  -  12-14 @ 07:00  --------------------------------------------------------  IN: 0 mL / OUT: 500 mL / NET: -500 mL          PHYSICAL EXAM:    General:   coughing up copious amounts of phlegm  HEENT: MMM, conjunctiva and sclera clear  H-RRR  L-CTA  Gastrointestinal: Soft,  mild tenderness on palpation  non-distended; Normal bowel sounds; No rebound or guarding  Extremities: Normal range of motion, No clubbing, cyanosis or edema  Neurological: Alert and oriented x3  Skin: Warm and dry. No obvious rash  rectal- loose brown stool .       LABS:                        9.9    7.45  )-----------( 233      ( 14 Dec 2022 17:10 )             30.0     14 Dec 2022 13:18    137    |  100    |  100.5  ----------------------------<  270    3.6     |  24.0   |  1.62     Ca    9.0        14 Dec 2022 13:18  Phos  3.3       14 Dec 2022 07:06  Mg     3.1       14 Dec 2022 07:06    TPro  5.7    /  Alb  3.5    /  TBili  0.5    /  DBili  x      /  AST  16     /  ALT  16     /  AlkPhos  72     / Amylase x      /Lipase x      14 Dec 2022 13:18              RADIOLOGY & ADDITIONAL STUDIES:     < from: Xray Abdomen 2 Views (12.14.22 @ 14:26) >  IMPRESSION:  Mild platelike atelectasis at the left base, markedly improved.    Nonobstructive bowel gas pattern    --- End of Report ---    < end of copied text >

## 2022-12-14 NOTE — PROGRESS NOTE ADULT - SUBJECTIVE AND OBJECTIVE BOX
INTERVAL EVENTS:  Follow up hypothyroid    ROS: Feels very tired and weak.    MEDICATIONS  (STANDING):  albuterol/ipratropium for Nebulization 3 milliLiter(s) Nebulizer every 6 hours  albuterol/ipratropium for Nebulization. 3 milliLiter(s) Nebulizer once  albuterol/ipratropium for Nebulization. 3 milliLiter(s) Nebulizer once  atorvastatin 40 milliGRAM(s) Oral at bedtime  enoxaparin Injectable 30 milliGRAM(s) SubCutaneous every 24 hours  hydrALAZINE 25 milliGRAM(s) Oral four times a day  levothyroxine 100 MICROGram(s) Oral daily  metoprolol succinate ER 50 milliGRAM(s) Oral daily  pantoprazole    Tablet 40 milliGRAM(s) Oral before breakfast  polyethylene glycol 3350 17 Gram(s) Oral daily  predniSONE   Tablet 20 milliGRAM(s) Oral daily  senna 2 Tablet(s) Oral daily  sodium chloride 3%  Inhalation 4 milliLiter(s) Inhalation every 12 hours    MEDICATIONS  (PRN):  guaiFENesin Oral Liquid (Sugar-Free) 100 milliGRAM(s) Oral every 6 hours PRN Cough  ondansetron Injectable 4 milliGRAM(s) IV Push every 6 hours PRN Nausea and/or Vomiting    Allergies  codeine (Vomiting)  iodine (Unknown)    Vital Signs Last 24 Hrs  T(C): 36.4 (14 Dec 2022 11:57), Max: 36.8 (13 Dec 2022 22:00)  T(F): 97.5 (14 Dec 2022 11:57), Max: 98.2 (13 Dec 2022 22:00)  HR: 76 (14 Dec 2022 11:57) (65 - 85)  BP: 107/69 (14 Dec 2022 11:57) (107/69 - 132/68)  BP(mean): --  RR: 18 (14 Dec 2022 11:57) (18 - 19)  SpO2: 96% (14 Dec 2022 11:57) (91% - 96%)    Parameters below as of 14 Dec 2022 11:57  Patient On (Oxygen Delivery Method): nasal cannula  O2 Flow (L/min): 4      PHYSICAL EXAM:  Neck: No thyromegaly  Respiratory: Bilateral rhonchi productive cough  Cardiac: +S1, S2, no m/r/g  GI: +BS, soft, non tender, non distended  Extremities: No peripheral edema, no pedal lesions  Neuro: A+O X3, no tremor    LABS:                        10.7   7.05  )-----------( 242      ( 14 Dec 2022 07:06 )             32.3     12-14    136  |  96  |  99.5<H>  ----------------------------<  145<H>  3.8   |  28.0  |  1.42<H>    Ca    9.7      14 Dec 2022 07:06  Phos  3.3     12-14  Mg     3.1     12-14    TPro  5.9<L>  /  Alb  3.7  /  TBili  0.6  /  DBili  x   /  AST  21  /  ALT  16  /  AlkPhos  65  12-14      Thyroid Stimulating Hormone, Serum: 10.40 uIU/mL (12-14-22 @ 07:06)  Triiodothyronine, Total (T3 Total): <40 ng/dL (12-14-22 @ 07:06)  Thyroid Stimulating Hormone, Serum: 30.06 uIU/mL (12-10-22 @ 04:38)  Free Thyroxine, Serum: 0.6 ng/dL (12-10-22 @ 04:38)  Triiodothyronine, Total (T3 Total): <40 ng/dL (12-10-22 @ 04:38)  Thyroid Stimulating Hormone, Serum: 53.33 uIU/mL (12-06-22 @ 06:17)  Triiodothyronine, Total (T3 Total): <40 ng/dL (12-06-22 @ 06:17)  Free Thyroxine, Serum: 0.2 ng/dL (12-06-22 @ 06:17)  Triiodothyronine, Total (T3 Total): <40 ng/dL (11-30-22 @ 04:38)  Thyroid Stimulating Hormone, Serum: 37.97 uIU/mL (11-30-22 @ 04:38)

## 2022-12-14 NOTE — CONSULT NOTE ADULT - ASSESSMENT
89 y/o F admitted on 11/29 for hypoxic respiratory failure treated for suspected PNA with CTX and azithromycin.     ID consulted for Stenotrophomonas in sputum culture.     Continue piperacillin-tazobactam for now  Possible colonization by [place]    d/w Dr. Flores  89 y/o F with PMH HTN, [place] s/p cochlear implants, COPD/emphysema (not on home O2), and hypothyroidism admitted to [facility] on 11/29 for hypoxic resp failure COPD exacerb/CAP treated with cetfriaxone (LD 12/4), azithromycin (LD 12/5), nebs, steroids. Also found to have uncontrolled hypothyroidism followed by endo. Hospital course notable for episodes of hypoxemia/ fluid overload responsive to diuresis.     ID consulted for Stenotrophomonas growing in sputum culture from 12/12. Culture was obtained after brief episode of desaturation. Otherwise patient has remained with stable oxygen requirements by NC, afebrile and w/o leukocytosis.     Started on piperacillin-tazobactam today 12/14 after syncopal event.     Impression:  Hypoxic respiratory failure  COPD  Sputum culture positive for Stenotrophomonas     Plan:  - Continue piperacillin-tazobactam for now  - Possible colonization by Stenotrophomonas as patient did not have increasing oxygen requirements, fever or leukocytosis; however, will monitor closely given frail respiratory status  - Check MRSA nasal swab   - 12/7 RVP neg   - Follow up sputum final culture  - 12/14 CXR appears better compared to 12/8 (official report pending)  - Repeat RVP if clinical condition changes  - CT chest and AP ordered today. Follow results   - Monitor temp curve  - Monitor WBC   - Monitor oxygen requirements   - Aspiration precautions     d/w Dr. Flores     Will follow

## 2022-12-14 NOTE — CONSULT NOTE ADULT - SUBJECTIVE AND OBJECTIVE BOX
Patient is a 89y old  Female who presents with a chief complaint of chills/ cough/ vomits (14 Dec 2022 18:09)      BRIEF HOSPITAL COURSE:   89 y/o F with hx of COPD, hypothyoidism, Hard of hearing s/p cochlear implants admitted to the hospital for possible COPD exacerbation 2/2 to pneumonia.  Over the hospital course patient was given IVF for KALPANA and antibiotics.  THen with worsening respiratory status, patient is then treated as HF exacerbation, fluid overload, with IV lasix.   Patient has negative negative over 4L over the hospital course.  Then patient was found to have stenotrophomonas in the sputum, started on zoysn.      on 12/14, RRT was called due to worsening mental status , possible dark BM concerning for melena.  BP also found to be 70 / 42.  Patient s/p albumin, 1L fluid, and midodrine.  MICU is then called.    On exam, patient is lethargic, but arousable, and found having wet cough. Saturating 89% on 4L nasal canula.   Hard of hearing, but follows commands.   Patient is DNI/DNR per patient's her own wishes, MOLST form was signed.       PAST MEDICAL & SURGICAL HISTORY:  Essential hypertension      Chronic obstructive pulmonary disease, unspecified COPD type      Hypothyroidism, unspecified type      H/O exploratory laparotomy  for a benign colon mass 4/19/12        Allergies    codeine (Vomiting)  iodine (Unknown)    Intolerances      FAMILY HISTORY:  No pertinent family history in first degree relatives        Family history otherwise noncontributory.    Social History:    Review of Systems:  negative except as above     ALL OTHER REVIEW OF SYSTEMS EXCEPT PER HPI NEGATIVE.      Medications:  piperacillin/tazobactam IVPB.- 3.375 Gram(s) IV Intermittent once      albuterol/ipratropium for Nebulization 3 milliLiter(s) Nebulizer every 6 hours  albuterol/ipratropium for Nebulization. 3 milliLiter(s) Nebulizer once  albuterol/ipratropium for Nebulization. 3 milliLiter(s) Nebulizer once  guaiFENesin Oral Liquid (Sugar-Free) 100 milliGRAM(s) Oral every 6 hours PRN  sodium chloride 3%  Inhalation 4 milliLiter(s) Inhalation every 12 hours    ondansetron Injectable 4 milliGRAM(s) IV Push every 6 hours PRN        pantoprazole    Tablet 40 milliGRAM(s) Oral before breakfast  pantoprazole Infusion 8 mG/Hr IV Continuous <Continuous>  polyethylene glycol 3350 17 Gram(s) Oral daily  senna 2 Tablet(s) Oral daily      atorvastatin 40 milliGRAM(s) Oral at bedtime  levothyroxine 100 MICROGram(s) Oral daily  predniSONE   Tablet 20 milliGRAM(s) Oral daily                  ICU Vital Signs Last 24 Hrs  T(C): 36.6 (14 Dec 2022 19:05), Max: 36.6 (14 Dec 2022 19:05)  T(F): 97.9 (14 Dec 2022 19:05), Max: 97.9 (14 Dec 2022 19:05)  HR: 50 (14 Dec 2022 22:00) (48 - 84)  BP: 92/41 (14 Dec 2022 22:00) (70/42 - 119/71)  BP(mean): 53 (14 Dec 2022 22:00) (53 - 76)  ABP: --  ABP(mean): --  RR: 19 (14 Dec 2022 22:00) (14 - 20)  SpO2: 100% (14 Dec 2022 22:00) (83% - 100%)    O2 Parameters below as of 14 Dec 2022 22:00  Patient On (Oxygen Delivery Method): nasal cannula, high flow            ABG - ( 14 Dec 2022 13:30 )  pH, Arterial: 7.470 pH, Blood: x     /  pCO2: 36    /  pO2: 83    / HCO3: 26    / Base Excess: 2.5   /  SaO2: 98.6                I&O's Detail    13 Dec 2022 07:01  -  14 Dec 2022 07:00  --------------------------------------------------------  IN:  Total IN: 0 mL    OUT:    Voided (mL): 500 mL  Total OUT: 500 mL    Total NET: -500 mL            LABS:                        9.9    7.45  )-----------( 233      ( 14 Dec 2022 17:10 )             30.0     12-14    137  |  100  |  100.5<H>  ----------------------------<  270<H>  3.6   |  24.0  |  1.62<H>    Ca    9.0      14 Dec 2022 13:18  Phos  3.3     12-14  Mg     3.1     12-14    TPro  5.7<L>  /  Alb  3.5  /  TBili  0.5  /  DBili  x   /  AST  16  /  ALT  16  /  AlkPhos  72  12-14      CARDIAC MARKERS ( 14 Dec 2022 13:18 )  x     / 0.02 ng/mL / x     / x     / x          CAPILLARY BLOOD GLUCOSE      POCT Blood Glucose.: 275 mg/dL (14 Dec 2022 13:26)    PT/INR - ( 14 Dec 2022 13:18 )   PT: 11.1 sec;   INR: 0.96 ratio             CULTURES:  Culture Results:   Moderate Stenotrophomonas maltophilia  Normal Respiratory Breanna present (12-12 @ 18:20)      Physical Examination:  GENERAL: lethargic, but NAD   HEENT: NC/AT  NECK: Supple, trachea midline  PULM: CTA b/l   CVS: +S1, S2, RRR  ABD: Soft, non-tender  EXTREMITIES: No pedal edema B/L  SKIN: No open wounds  NEURO: AAOx1, non focal findings     DEVICES:     RADIOLOGY: reviewed

## 2022-12-14 NOTE — PROGRESS NOTE ADULT - ASSESSMENT
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 She is admitted for Respiratory failure.     Acute Hypoxic Resp failure due to COPD/Pneumonia/acute HFpEF, not improving  CT Chest atelectasis and pneumonia, secretions    Duoneb and prednisone taper now on 20mg daily, will not lower yet as her resp status has not improved.   Viral panel neg, improving leukocytosis  completed Rocephin + Zithromax  stop Furosemide 20mg PO daily due to kalpana  sputum sample with Stenotrophomonas, ID consulted as this may just be colonization    3% saline nebs for 48 hours  Chest percussion vest  start Zosyn  repeat CXR  pulm following, deferring Bronch at this time.     Syncope likely vasovagal vs hypotension  1L IVF bolus  check Lactic acid  monitor on tele    KALPANA-improving  EF 65-70%  monitor cr  stop lasix  1L bolus today  strict os and os    Hypothyroid-severe- no compliance with medications  LT4 increased to 100mcg  Endo following, labs noted, Follow up appointment: 1/13 at 2:00pm [place] office     HTN  will hold metoprolol for today.   discontinue Hydralazine due to hypotension    Vomiting, resolved was actually not vomiting but coughing up phelgm  - prn antiemetics    Constipation  s/p manual disimpaction on 12/14  - senna and Miralax  - AXR    DVT PPx- Lovenox    Dispo -PT re-eval  code status DNR/DNI  Attempted to call Andressa [phone] but no answer, left Voicemail.

## 2022-12-14 NOTE — CONSULT NOTE ADULT - SUBJECTIVE AND OBJECTIVE BOX
[facility]  INFECTIOUS DISEASES at [place] and [place]  =====================================================                               Sebastián Allen MD*    Yue Mcclain MD*                             Polina Brar MD*     Uma De Leon MD*            Diplomates American Board of Internal Medicine & Infectious Diseases                * [place] Office - Appt - Tel  [phone] Fax [phone]                * [place] Office - Appt - Tel [phone] Fax [phone]                                  Hospital Consult line:  [phone]  =====================================================      [id number]  TERESA SPIESS        CC: Patient is a 89y old  Female who presents with a chief complaint of chills/ cough/ vomits (14 Dec 2022 12:35)      89y  Female     HPI:  89 y/o F with PMH HTN, [place] s/p cochlear implants, 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 She has been having cough, without any change. Denies SOB, CP, fever, abd pain, constipation, N, sick contact. Admits to overeating last night. states she was on thyroid meds which her PMD stopped. and KCL was increased recently.  In Triage . In ED noted O2 saturation 88- 91%.       ______________________________________________________  PAST MEDICAL & SURGICAL HISTORY:  Essential hypertension    Chronic obstructive pulmonary disease, unspecified COPD type    Hypothyroidism, unspecified type    H/O exploratory laparotomy  for a benign colon mass 4/19/12      Social History:  smoke 1ppd or more since age 15 yrs to 50 yrs. Quit 40 yrs ago, denies other toxic habits    FAMILY HISTORY:  No pertinent family history in first degree relatives        ______________________________________________________  Allergies    codeine (Vomiting)  iodine (Unknown)    Intolerances  ______________________________________________________  MEDICATIONS:  Antibiotics:  piperacillin/tazobactam IVPB.- 3.375 Gram(s) IV Intermittent once    Other medications:  albuterol/ipratropium for Nebulization 3 milliLiter(s) Nebulizer every 6 hours  albuterol/ipratropium for Nebulization. 3 milliLiter(s) Nebulizer once  albuterol/ipratropium for Nebulization. 3 milliLiter(s) Nebulizer once  atorvastatin 40 milliGRAM(s) Oral at bedtime  enoxaparin Injectable 30 milliGRAM(s) SubCutaneous every 24 hours  levothyroxine 100 MICROGram(s) Oral daily  pantoprazole    Tablet 40 milliGRAM(s) Oral before breakfast  polyethylene glycol 3350 17 Gram(s) Oral daily  predniSONE   Tablet 20 milliGRAM(s) Oral daily  senna 2 Tablet(s) Oral daily  sodium chloride 3%  Inhalation 4 milliLiter(s) Inhalation every 12 hours    ______________________________________________________  REVIEW OF SYSTEMS:  CONSTITUTIONAL:  No fever or chills  HEENT:  No diplopia or blurred vision.  No earache, sore throat or runny nose.  CARDIOVASCULAR:  No chest pain  RESPIRATORY:  No cough, shortness of breath  GASTROINTESTINAL:  No nausea, vomiting, abdominal pain or diarrhea.  GENITOURINARY:  No dysuria, frequency or urgency. No blood in urine  MUSCULOSKELETAL:  no joint aches, no muscle pain  SKIN:  No change in skin, hair or nails.  NEUROLOGIC:  No headaches, seizures  PSYCHIATRIC:  No disorder of thought or mood.  ENDOCRINE:  No heat or cold intolerance  HEMATOLOGICAL:  No easy bruising or bleeding.     _____________________________________________________  PHYSICAL EXAM:  GEN: elderly woman, chronically ill appearing but in NAD.    HEENT: normocephalic and atraumatic.  PERRL.  Anicteric sclerae. Moist mucous membranes. No mucosal lesions. [place]  NECK: Supple. No palpable neck masses or LN  LUNGS: unlabored beathing, poor inspiratory effort. Coarse BS b/l. On oxygen by NC   HEART: RRR, no m/r/g  ABDOMEN: Soft, NT, ND, no hepatosplenomegaly, no palpable masses.  +BS.    : no Espinal catheter  EXTREMITIES: well perfused, without  edema.  MSK: No joint deformity or swelling  LYMPH: no palpable cervical, supraclavicular lymph nodes  NEUROLOGIC: Grossly no focal deficits   PSYCHIATRIC: Appropriate affect and mood.  SKIN: No rash, wounds or jaundice  LINES: PIV    ______________________________________________________      Vitals:  T(F): 97.5 (14 Dec 2022 11:57), Max: 98.2 (13 Dec 2022 22:00)  HR: 76 (14 Dec 2022 11:57)  BP: 107/69 (14 Dec 2022 11:57)  RR: 18 (14 Dec 2022 11:57)  SpO2: 96% (14 Dec 2022 11:57) (91% - 96%)  temp max in last 48H T(F): , Max: 98.2 (12-13-22 @ 22:00)    Current Antibiotics:  piperacillin/tazobactam IVPB.- 3.375 Gram(s) IV Intermittent once    Other medications:  albuterol/ipratropium for Nebulization 3 milliLiter(s) Nebulizer every 6 hours  albuterol/ipratropium for Nebulization. 3 milliLiter(s) Nebulizer once  albuterol/ipratropium for Nebulization. 3 milliLiter(s) Nebulizer once  atorvastatin 40 milliGRAM(s) Oral at bedtime  enoxaparin Injectable 30 milliGRAM(s) SubCutaneous every 24 hours  levothyroxine 100 MICROGram(s) Oral daily  pantoprazole    Tablet 40 milliGRAM(s) Oral before breakfast  polyethylene glycol 3350 17 Gram(s) Oral daily  predniSONE   Tablet 20 milliGRAM(s) Oral daily  senna 2 Tablet(s) Oral daily  sodium chloride 3%  Inhalation 4 milliLiter(s) Inhalation every 12 hours                            10.0   6.96  )-----------( 259      ( 14 Dec 2022 13:18 )             29.1     12-14    137  |  100  |  100.5<H>  ----------------------------<  270<H>  3.6   |  24.0  |  1.62<H>    Ca    9.0      14 Dec 2022 13:18  Phos  3.3     12-14  Mg     3.1     12-14    TPro  5.7<L>  /  Alb  3.5  /  TBili  0.5  /  DBili  x   /  AST  16  /  ALT  16  /  AlkPhos  72  12-14    RECENT CULTURES:  12-12 @ 18:20 .Sputum Sputum     Few Stenotrophomonas maltophilia  Normal Respiratory Breanna present    Few polymorphonuclear leukocytes per low power field  Few Squamous epithelial cells per low power field  Few Yeast like cells seen per oil power field  Rare Gram Variable Rods seen per oil power field  Few Gram positive cocci in pairs seen per oil power field      12-07 @ 06:35    RVP with SARS-CoV-2   NotDetec      WBC Count: 6.96 K/uL (12-14-22 @ 13:18)  WBC Count: 7.05 K/uL (12-14-22 @ 07:06)  WBC Count: 9.71 K/uL (12-13-22 @ 04:50)  WBC Count: 10.28 K/uL (12-12-22 @ 07:43)  WBC Count: 10.89 K/uL (12-11-22 @ 07:16)  WBC Count: 10.73 K/uL (12-10-22 @ 04:38)    Creatinine, Serum: 1.62 mg/dL (12-14-22 @ 13:18)  Creatinine, Serum: 1.42 mg/dL (12-14-22 @ 07:06)  Creatinine, Serum: 1.67 mg/dL (12-13-22 @ 04:50)  Creatinine, Serum: 1.87 mg/dL (12-12-22 @ 07:43)  Creatinine, Serum: 1.36 mg/dL (12-11-22 @ 07:16)  Creatinine, Serum: 1.38 mg/dL (12-10-22 @ 04:38)    Procalcitonin, Serum: 0.24 ng/mL (12-12-22 @ 07:43)  Procalcitonin, Serum: 0.19 ng/mL (12-10-22 @ 12:30)     SARS-CoV-2: NotDetec (12-07-22 @ 06:35)  SARS-CoV-2 Result: NotDetec (11-29-22 @ 05:15)    ______________________________________________________  RADIOLOGY  < from: Xray Chest 1 View- PORTABLE-Routine (Xray Chest 1 View- PORTABLE-Routine in AM.) (12.08.22 @ 00:46) >  INTERPRETATION:  Portable chest radiograph    CLINICAL INFORMATION: Fluid overload    TECHNIQUE:  Portable  AP chest radiograph.    COMPARISON: 12/4/2022 chest x-ray and 12/7/2022 chest CT .    FINDINGS:  CATHETERS AND TUBES: None    PULMONARY: Residual LEFT lower lobe airspace consolidation/atelectasis   likely from mucous plug as seen on prior CT scan..   LEFT upper zone and RIGHT lung parenchyma grossly clear.  .   No pneumothorax.    HEART/VASCULAR: The  heart is enlarged in transverse diameter.    BONES: Visualized osseous structures are intact.    IMPRESSION:   Cardiomegaly.  Residual LEFT  lower zone  airspace consolidation..    < end of copied text >    < from: CT Chest No Cont (12.07.22 @ 11:46) >  FINDINGS:    AIRWAYS AND LUNGS: Tracheobronchial secretions, extensive in the lower   lobes, greater on the left.  Partial atelectasis both lower lobes,   greater on the left. Patchy bilateral lung opacities.    MEDIASTINUM AND PLEURA: There are no enlarged mediastinal, hilar or   axillary lymph nodes. The visualized portion of the thyroid gland is   unremarkable. There are small bilateral pleural effusions.  There is no   pneumothorax.    HEART AND VESSELS: There is mildcardiomegaly.  There are atherosclerotic   calcifications of the aorta and coronary arteries.  There is a small   pericardial effusion.    UPPER ABDOMEN: Images of the upper abdomen demonstrate hiatal hernia with   debris in the esophagus.    BONES AND SOFT TISSUES: There are mild degenerative changes of the spine.    The soft tissues are unremarkable.    AI VERTEBRAL BODY ANALYSIS:  T4: Moderate compression fracture with 26% height loss.    IMPRESSION:  There is a small left pleural effusion. Tracheobronchial secretions,   extensive in the lower lobes, greater on the left.  Partial atelectasis   both lower lobes, greater on the left. Patchy bilateral lung opacities   may be infectious or inflammatory.    < end of copied text >   [facility]  INFECTIOUS DISEASES at [place] and [place]  =====================================================                               Sebastián Allen MD*    Yue Mcclain MD*                             Polina Brar MD*     Uma De Leon MD*            Diplomates American Board of Internal Medicine & Infectious Diseases                * [place] Office - Appt - Tel  [phone] Fax [phone]                * [place] Office - Appt - Tel [phone] Fax [phone]                                  Hospital Consult line:  [phone]  =====================================================      N-[id number]  TERESA NESS        CC: Patient is a 89y old  Female who presents with a chief complaint of chills/ cough/ vomits (14 Dec 2022 12:35)      89y  Female     HPI:  89 y/o F with PMH HTN, [place] s/p cochlear implants, 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 on 11/29 for hypoxic resp failure COPD exacerb/CAP treated with cetfriaxone (LD 12/4), azithromycin (LD 12/5), nebs, steroids. Also found to have uncontrolled hypothyroidism followed by endo. Hospital course notable for episodes of hypoxemia/ fluid overload responsive to diuresis.     ID consulted for Stenotrophomonas growing in sputum culture from 12/12. Culture was obtained after brief episode of desaturation. Otherwise patient has remained with stable oxygen requirements by NC, afebrile and w/o leukocytosis.     ______________________________________________________  PAST MEDICAL & SURGICAL HISTORY:  Essential hypertension    Chronic obstructive pulmonary disease, unspecified COPD type    Hypothyroidism, unspecified type    H/O exploratory laparotomy  for a benign colon mass 4/19/12      Social History:  smoke 1ppd or more since age 15 yrs to 50 yrs. Quit 40 yrs ago, denies other toxic habits    FAMILY HISTORY:  No pertinent family history in first degree relatives        ______________________________________________________  Allergies    codeine (Vomiting)  iodine (Unknown)    Intolerances  ______________________________________________________  MEDICATIONS:  Antibiotics:  piperacillin/tazobactam IVPB.- 3.375 Gram(s) IV Intermittent once    Other medications:  albuterol/ipratropium for Nebulization 3 milliLiter(s) Nebulizer every 6 hours  albuterol/ipratropium for Nebulization. 3 milliLiter(s) Nebulizer once  albuterol/ipratropium for Nebulization. 3 milliLiter(s) Nebulizer once  atorvastatin 40 milliGRAM(s) Oral at bedtime  enoxaparin Injectable 30 milliGRAM(s) SubCutaneous every 24 hours  levothyroxine 100 MICROGram(s) Oral daily  pantoprazole    Tablet 40 milliGRAM(s) Oral before breakfast  polyethylene glycol 3350 17 Gram(s) Oral daily  predniSONE   Tablet 20 milliGRAM(s) Oral daily  senna 2 Tablet(s) Oral daily  sodium chloride 3%  Inhalation 4 milliLiter(s) Inhalation every 12 hours    ______________________________________________________  REVIEW OF SYSTEMS:  CONSTITUTIONAL:  No fever or chills  HEENT:  No diplopia or blurred vision.  No earache, sore throat or runny nose.  CARDIOVASCULAR:  No chest pain  RESPIRATORY:  No cough, shortness of breath  GASTROINTESTINAL:  No nausea, vomiting, abdominal pain or diarrhea.  GENITOURINARY:  No dysuria, frequency or urgency. No blood in urine  MUSCULOSKELETAL:  no joint aches, no muscle pain  SKIN:  No change in skin, hair or nails.  NEUROLOGIC:  No headaches, seizures  PSYCHIATRIC:  No disorder of thought or mood.  ENDOCRINE:  No heat or cold intolerance  HEMATOLOGICAL:  No easy bruising or bleeding.     _____________________________________________________  PHYSICAL EXAM:  GEN: elderly woman, chronically ill appearing but in NAD.    HEENT: normocephalic and atraumatic.  PERRL.  Anicteric sclerae. Moist mucous membranes. No mucosal lesions. [place]  NECK: Supple. No palpable neck masses or LN  LUNGS: unlabored beathing, poor inspiratory effort. Coarse BS b/l. On oxygen by NC   HEART: RRR, no m/r/g  ABDOMEN: Soft, NT, ND, no hepatosplenomegaly, no palpable masses.  +BS.    : no Espinal catheter  EXTREMITIES: well perfused, without  edema.  MSK: No joint deformity or swelling  LYMPH: no palpable cervical, supraclavicular lymph nodes  NEUROLOGIC: Grossly no focal deficits   PSYCHIATRIC: Appropriate affect and mood.  SKIN: No rash, wounds or jaundice  LINES: PIV    ______________________________________________________      Vitals:  T(F): 97.5 (14 Dec 2022 11:57), Max: 98.2 (13 Dec 2022 22:00)  HR: 76 (14 Dec 2022 11:57)  BP: 107/69 (14 Dec 2022 11:57)  RR: 18 (14 Dec 2022 11:57)  SpO2: 96% (14 Dec 2022 11:57) (91% - 96%)  temp max in last 48H T(F): , Max: 98.2 (12-13-22 @ 22:00)    Current Antibiotics:  piperacillin/tazobactam IVPB.- 3.375 Gram(s) IV Intermittent once    Other medications:  albuterol/ipratropium for Nebulization 3 milliLiter(s) Nebulizer every 6 hours  albuterol/ipratropium for Nebulization. 3 milliLiter(s) Nebulizer once  albuterol/ipratropium for Nebulization. 3 milliLiter(s) Nebulizer once  atorvastatin 40 milliGRAM(s) Oral at bedtime  enoxaparin Injectable 30 milliGRAM(s) SubCutaneous every 24 hours  levothyroxine 100 MICROGram(s) Oral daily  pantoprazole    Tablet 40 milliGRAM(s) Oral before breakfast  polyethylene glycol 3350 17 Gram(s) Oral daily  predniSONE   Tablet 20 milliGRAM(s) Oral daily  senna 2 Tablet(s) Oral daily  sodium chloride 3%  Inhalation 4 milliLiter(s) Inhalation every 12 hours                            10.0   6.96  )-----------( 259      ( 14 Dec 2022 13:18 )             29.1     12-14    137  |  100  |  100.5<H>  ----------------------------<  270<H>  3.6   |  24.0  |  1.62<H>    Ca    9.0      14 Dec 2022 13:18  Phos  3.3     12-14  Mg     3.1     12-14    TPro  5.7<L>  /  Alb  3.5  /  TBili  0.5  /  DBili  x   /  AST  16  /  ALT  16  /  AlkPhos  72  12-14    RECENT CULTURES:  12-12 @ 18:20 .Sputum Sputum     Few Stenotrophomonas maltophilia  Normal Respiratory Breanna present    Few polymorphonuclear leukocytes per low power field  Few Squamous epithelial cells per low power field  Few Yeast like cells seen per oil power field  Rare Gram Variable Rods seen per oil power field  Few Gram positive cocci in pairs seen per oil power field      12-07 @ 06:35    RVP with SARS-CoV-2   NotDetec      WBC Count: 6.96 K/uL (12-14-22 @ 13:18)  WBC Count: 7.05 K/uL (12-14-22 @ 07:06)  WBC Count: 9.71 K/uL (12-13-22 @ 04:50)  WBC Count: 10.28 K/uL (12-12-22 @ 07:43)  WBC Count: 10.89 K/uL (12-11-22 @ 07:16)  WBC Count: 10.73 K/uL (12-10-22 @ 04:38)    Creatinine, Serum: 1.62 mg/dL (12-14-22 @ 13:18)  Creatinine, Serum: 1.42 mg/dL (12-14-22 @ 07:06)  Creatinine, Serum: 1.67 mg/dL (12-13-22 @ 04:50)  Creatinine, Serum: 1.87 mg/dL (12-12-22 @ 07:43)  Creatinine, Serum: 1.36 mg/dL (12-11-22 @ 07:16)  Creatinine, Serum: 1.38 mg/dL (12-10-22 @ 04:38)    Procalcitonin, Serum: 0.24 ng/mL (12-12-22 @ 07:43)  Procalcitonin, Serum: 0.19 ng/mL (12-10-22 @ 12:30)     SARS-CoV-2: NotDetec (12-07-22 @ 06:35)  SARS-CoV-2 Result: NotDetec (11-29-22 @ 05:15)    ______________________________________________________  RADIOLOGY  < from: Xray Chest 1 View- PORTABLE-Routine (Xray Chest 1 View- PORTABLE-Routine in AM.) (12.08.22 @ 00:46) >  INTERPRETATION:  Portable chest radiograph    CLINICAL INFORMATION: Fluid overload    TECHNIQUE:  Portable  AP chest radiograph.    COMPARISON: 12/4/2022 chest x-ray and 12/7/2022 chest CT .    FINDINGS:  CATHETERS AND TUBES: None    PULMONARY: Residual LEFT lower lobe airspace consolidation/atelectasis   likely from mucous plug as seen on prior CT scan..   LEFT upper zone and RIGHT lung parenchyma grossly clear.  .   No pneumothorax.    HEART/VASCULAR: The  heart is enlarged in transverse diameter.    BONES: Visualized osseous structures are intact.    IMPRESSION:   Cardiomegaly.  Residual LEFT  lower zone  airspace consolidation..    < end of copied text >    < from: CT Chest No Cont (12.07.22 @ 11:46) >  FINDINGS:    AIRWAYS AND LUNGS: Tracheobronchial secretions, extensive in the lower   lobes, greater on the left.  Partial atelectasis both lower lobes,   greater on the left. Patchy bilateral lung opacities.    MEDIASTINUM AND PLEURA: There are no enlarged mediastinal, hilar or   axillary lymph nodes. The visualized portion of the thyroid gland is   unremarkable. There are small bilateral pleural effusions.  There is no   pneumothorax.    HEART AND VESSELS: There is mildcardiomegaly.  There are atherosclerotic   calcifications of the aorta and coronary arteries.  There is a small   pericardial effusion.    UPPER ABDOMEN: Images of the upper abdomen demonstrate hiatal hernia with   debris in the esophagus.    BONES AND SOFT TISSUES: There are mild degenerative changes of the spine.    The soft tissues are unremarkable.    AI VERTEBRAL BODY ANALYSIS:  T4: Moderate compression fracture with 26% height loss.    IMPRESSION:  There is a small left pleural effusion. Tracheobronchial secretions,   extensive in the lower lobes, greater on the left.  Partial atelectasis   both lower lobes, greater on the left. Patchy bilateral lung opacities   may be infectious or inflammatory.    < end of copied text >

## 2022-12-14 NOTE — RAPID RESPONSE TEAM SUMMARY - NSADDTLFINDINGSRRT_GEN_ALL_CORE
Pt went to commode and had  Pt stood up and pivoted to commode for a BM. Pt then became unresponsive and RRT called. VS: BP 85/55, HR 71, RR 24, SpO2 4LNC. Tea labs ordered STAT, ABG, CXR, Abdominal Xray, EKG, 1L NS, Zosyn STAT. VS: /62, RR 66, RR 18, SpO2 91%. Pt currently A&O and pt c/o abdominal pain. Pt manually disimpacted at the bedside and reports relief.

## 2022-12-14 NOTE — CONSULT NOTE ADULT - ASSESSMENT
#hypotension likely multifactorial, hypovolemia from diuresis, and in the setting of sepsis from pneumonia?  #syncopal event 12/14 from RRT  #occult blood positive  #KALPANA  #acute hypoxic respiratory failure, possible aspiration pneumonia   #possible copd exacerbation on steorid taper  #Stenotrophomonas pneumonia     - echo finding show IVC 0.7 cm with greater than 50% collapsibility, suggestive possible benefit from fluid boluses  - will give additional 1 L of LR bolus  - continue midodrine 10 mg q8h  - please obtain UA  - can switch to high flow  - appreciate ID rec, will contiue zosyn  - appreciate GI rec, no indication for EGD/colonoscopy at this time   - pulm following for AHRF  - will transfer to stepdown   - please call if patient's clinical status change    Romeo Martin M.D.  Attending Physician  [facility]   Pulmonary & Critical Care Medicine   #hypotension likely multifactorial, hypovolemia from diuresis, and in the setting of sepsis from pneumonia?  #syncopal event 12/14 from RRT  #occult blood positive  #KAL[place]NA  #acute hypoxic respiratory failure, possible aspiration pneumonia   #possible copd exacerbation on steorid taper  #Stenotrophomonas pneumonia     - echo finding show IVC 0.7 cm with greater than 50% collapsibility, suggestive possible benefit from fluid boluses  - will give additional 1 L of LR bolus  - continue midodrine 10 mg q8h  - please obtain UA  - can switch to high flow  - appreciate ID rec, will contiue zosyn  - appreciate GI rec, no indication for EGD/colonoscopy at this time   - pulm following for AHRF  - please obtain palliative consult  - will transfer to stepdown   - please call if patient's clinical status change    Romeo Martin M.D.  Attending Physician  [facility]   Pulmonary & Critical Care Medicine

## 2022-12-14 NOTE — CHART NOTE - NSCHARTNOTEFT_GEN_A_CORE
2 Hour RRT Follow Up (Late Entry)    Pt's BP 70/42 and now with a dark liquid BM  Pt states she doesn't feel well  Unable to answer ROS 2/2 lethargy    Vital Signs Last 24 Hrs  T(C): 36.4 (14 Dec 2022 11:57), Max: 36.8 (13 Dec 2022 22:00)  T(F): 97.5 (14 Dec 2022 11:57), Max: 98.2 (13 Dec 2022 22:00)  HR: 62 (14 Dec 2022 17:59) (57 - 84)  BP: 119/71 (14 Dec 2022 17:58) (70/42 - 132/68)  BP(mean): --  RR: 18 (14 Dec 2022 17:59) (18 - 20)  SpO2: 95% (14 Dec 2022 17:59) (83% - 100%)    Parameters below as of 14 Dec 2022 17:59  Patient On (Oxygen Delivery Method): mask, Venturi  O2 Flow (L/min): 15  O2 Concentration (%): 50    PHYSICAL EXAM:  GENERAL: Pt lying in bed lethargic  HEAD:  Atraumatic   CHEST/LUNG: B/l Crackles  HEART: Regular rate and rhythm  ABDOMEN: Soft, Nontender, Nondistended. 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 Pt admitted to [facility] for respiratory failure. S/p RRT for unresponsiveness and hypotension.  Hypotension   - S/p 2L NS, Midodrine 10mg and Albumin with improvement  - C/w Zosyn and ordered Bcx  - Lovenox DC'd 2/2 suspected GIB; NPO for now   - FOBT positive, H&H downtrending - H&H Q6 hrs. Blood Consent signed if H&H continues to drop.  - Will hold off on blood transfusion for now unless Hgb <8 or pt becomes hypotensive again  - Attempted to call Andressa with an update, unfortunately no answer   - TTE ordered to r/o pericardial effusion/ tamponade revealing small pericardial effusion (0.6 cm) adjacent to the RV with fibrinous thickening, no evidence of tamponade.  - ICU was consulted - not a candidate at this time  - GI was consulted and states this is not a GIB   - Upgrade to SDU  - Palliative consulted for AM  - RN advised to notify PA for any further changes  - Continue to monitor 2 Hour RRT Follow Up (Late Entry)    Pt's BP 70/42 and now with a dark liquid BM  Pt states she doesn't feel well  Unable to answer ROS 2/2 lethargy    Vital Signs Last 24 Hrs  T(C): 36.4 (14 Dec 2022 11:57), Max: 36.8 (13 Dec 2022 22:00)  T(F): 97.5 (14 Dec 2022 11:57), Max: 98.2 (13 Dec 2022 22:00)  HR: 62 (14 Dec 2022 17:59) (57 - 84)  BP: 119/71 (14 Dec 2022 17:58) (70/42 - 132/68)  BP(mean): --  RR: 18 (14 Dec 2022 17:59) (18 - 20)  SpO2: 95% (14 Dec 2022 17:59) (83% - 100%)    PHYSICAL EXAM:  GENERAL: Pt lying in bed lethargic  HEAD:  Atraumatic   CHEST/LUNG: B/l Crackles  HEART: Regular rate and rhythm  ABDOMEN: Soft, Nontender, Nondistended. 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 Pt admitted to [facility] for respiratory failure. S/p RRT for unresponsiveness and hypotension.  Hypotension   - S/p 2L NS, Midodrine 10mg and Albumin with improvement  - C/w Zosyn and ordered Bcx  - Lovenox DC'd 2/2 suspected GIB; NPO for now   - FOBT positive, H&H downtrending - H&H Q6 hrs. Blood Consent signed if H&H continues to drop.  - Will hold off on blood transfusion for now unless Hgb <8 or pt becomes hypotensive again  - Attempted to call Andressa with an update, unfortunately no answer   - TTE ordered to r/o pericardial effusion/ tamponade revealing small pericardial effusion (0.6 cm) adjacent to the RV with fibrinous thickening, no evidence of tamponade.  - ICU was consulted - not a candidate at this time  - GI was consulted and states this is not a GIB   - Upgrade to SDU  - Palliative consulted for AM  - RN advised to notify PA for any further changes  - Continue to monitor

## 2022-12-14 NOTE — PROGRESS NOTE ADULT - ASSESSMENT
89 y/o F with PMH HTN, [place] s/p cochlear implants, COPD/emphysema, hypothyroidism woke up around 2 AM shaking and 30 mins later emesis and her daughter called EMS. Endocrine follows for hypothyroidism.    1. Hypothyroid - Slowly improving  - Repeat T4 6, TSH 10, FT4 pending  - LT4 100mcg PO daily  - Follow up appointment: 1/13 at 2:00pm [place] office     2. Acute hypoxic respiratory failure/COPD/CAP  - Duonebs, prednisone taper  - Supplemental oxygen as needed    3. HTN  - On metoprolol, hydralazine

## 2022-12-14 NOTE — PROGRESS NOTE ADULT - SUBJECTIVE AND OBJECTIVE BOX
[facility] Division of Hospital Medicine    Chief Complaint:  cough and vomiting    SUBJECTIVE / OVERNIGHT EVENTS: patient seen and examined this morning. still with shortness of breath and cough. Decreased oral intake. Seen again during rapid response which was called for vasovagal syncope while on the commode. Patient was hypotensive and complaints of abdominal discomfort saying stool was stuck. Asking for help to get stool out. Patient manually dissempacted by me at bedside.     Patient denies chest pain, SOB, abd pain, N/V, fever, chills, dysuria or any other complaints. All remainder ROS negative.     MEDICATIONS  (STANDING):  albuterol/ipratropium for Nebulization 3 milliLiter(s) Nebulizer every 6 hours  albuterol/ipratropium for Nebulization. 3 milliLiter(s) Nebulizer once  albuterol/ipratropium for Nebulization. 3 milliLiter(s) Nebulizer once  atorvastatin 40 milliGRAM(s) Oral at bedtime  enoxaparin Injectable 30 milliGRAM(s) SubCutaneous every 24 hours  levothyroxine 100 MICROGram(s) Oral daily  metoprolol succinate ER 50 milliGRAM(s) Oral daily  pantoprazole    Tablet 40 milliGRAM(s) Oral before breakfast  piperacillin/tazobactam IVPB.- 3.375 Gram(s) IV Intermittent once  polyethylene glycol 3350 17 Gram(s) Oral daily  predniSONE   Tablet 20 milliGRAM(s) Oral daily  senna 2 Tablet(s) Oral daily  sodium chloride 3%  Inhalation 4 milliLiter(s) Inhalation every 12 hours    MEDICATIONS  (PRN):  guaiFENesin Oral Liquid (Sugar-Free) 100 milliGRAM(s) Oral every 6 hours PRN Cough  ondansetron Injectable 4 milliGRAM(s) IV Push every 6 hours PRN Nausea and/or Vomiting        I&O's Summary    13 Dec 2022 07:01  -  14 Dec 2022 07:00  --------------------------------------------------------  IN: 0 mL / OUT: 500 mL / NET: -500 mL        PHYSICAL EXAM:  Vital Signs Last 24 Hrs  T(C): 36.4 (14 Dec 2022 11:57), Max: 36.8 (13 Dec 2022 22:00)  T(F): 97.5 (14 Dec 2022 11:57), Max: 98.2 (13 Dec 2022 22:00)  HR: 76 (14 Dec 2022 11:57) (65 - 85)  BP: 107/69 (14 Dec 2022 11:57) (107/69 - 132/68)  BP(mean): --  RR: 18 (14 Dec 2022 11:57) (18 - 19)  SpO2: 96% (14 Dec 2022 11:57) (91% - 96%)    Parameters below as of 14 Dec 2022 11:57  Patient On (Oxygen Delivery Method): nasal cannula  O2 Flow (L/min): 4    CONSTITUTIONAL: NAD, obese  ENMT: Moist oral mucosa, no pharyngeal injection or exudates;  RESPIRATORY: + rhonchi and coarse breath sounds through out  CARDIOVASCULAR: Regular rate and rhythm, normal S1 and S2, trace lower extremity edema;  ABDOMEN: Nontender to palpation, normoactive bowel sounds, no rebound/guarding  MUSCLOSKELETAL:   no joint swelling or tenderness to palpation  PSYCH: A+O to person, place, and time; affect appropriate  NEUROLOGY: CN 2-12 are intact and symmetric; no gross sensory deficits;   SKIN: No rashes; no palpable lesions    LABS:                        10.0   6.96  )-----------( 259      ( 14 Dec 2022 13:18 )             29.1     12-14    137  |  100  |  100.5<H>  ----------------------------<  270<H>  3.6   |  24.0  |  1.62<H>    Ca    9.0      14 Dec 2022 13:18  Phos  3.3     12-14  Mg     3.1     12-14    TPro  5.7<L>  /  Alb  3.5  /  TBili  0.5  /  DBili  x   /  AST  16  /  ALT  16  /  AlkPhos  72  12-14    PT/INR - ( 14 Dec 2022 13:18 )   PT: 11.1 sec;   INR: 0.96 ratio           CARDIAC MARKERS ( 14 Dec 2022 13:18 )  x     / 0.02 ng/mL / x     / x     / x              Culture - Sputum (collected 12 Dec 2022 18:20)  Source: .Sputum Sputum  Gram Stain (13 Dec 2022 03:32):    Few polymorphonuclear leukocytes per low power field    Few Squamous epithelial cells per low power field    Few Yeast like cells seen per oil power field    Rare Gram Variable Rods seen per oil power field    Few Gram positive cocci in pairs seen per oil power field  Preliminary Report (13 Dec 2022 21:27):    Few Stenotrophomonas maltophilia    Normal Respiratory Breanna present      CAPILLARY BLOOD GLUCOSE      POCT Blood Glucose.: 275 mg/dL (14 Dec 2022 13:26)        RADIOLOGY & ADDITIONAL TESTS:  Results Reviewed:   Imaging Personally Reviewed:  Electrocardiogram Personally Reviewed:

## 2022-12-14 NOTE — PROGRESS NOTE ADULT - ASSESSMENT
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, hypothyroidism, HTN, [place] s/p cochlear implants presented 11/29 after an episode of emesis and was found to be hypoxic on presentation with concern for PNA/COPD exacerbation and component of volume overload    Acute hypoxic respiratory failure in the setting of likely PNA/COPD exacerbation   wean supplemental O2 as tolerated for goal >88-92%  likely will require O2 supplementation on discharge   chest PT/percussion vest as tolerated   c/w 3% hypertonic w/ albuterol for short course to help with expectoration   continue with duoneb q6h  continue prednisone taper   aspiration precautions   swallow evaluation   maintain net even volume status as tolerated by renal function  completed course of ceftriaxone/azithro; started on empiric zosyn per primary team   sputum cx w/ Stenotrophomonas; possible colonization, no current fevers/leukocytosis     ongoing goals of care discussion now DNR/DNI  OOB/PT as tolerated   can repeat ct chest in 6-8 weeks for followup  outpt pulm f/u on discharge

## 2022-12-14 NOTE — CONSULT NOTE ADULT - PROBLEM SELECTOR RECOMMENDATION 9
Patient has brown stool on rectal exam. She does not have hematemesis  guiac + stool is not significant after disimpaction   hemoglobin is stable  over the last 4 hours from 10 to 9.9    Patient's syncope and hypotension are not from a GI bleed.   No EGD, colonoscopy needed   If concerned about constipation, can increase miralax to bid  If pt complains of abdominal pain, or pain worse, then consider abdominal CT

## 2022-12-14 NOTE — PROGRESS NOTE ADULT - SUBJECTIVE AND OBJECTIVE BOX
PULMONARY PROGRESS NOTE    TERESA NESS  MRN-[id number]    Patient is a 89y old  Female who presents with a chief complaint of chills/ cough/ vomits (11 Dec 2022 10:39)    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, hypothyroidism, HTN, [place] s/p cochlear implants presented 11/29 after an episode of emesis and was found to be hypoxic on presentation. Pt was started on CAP coverage and tx for COPD exacerbation. She was also receiving IVFs for KALPANA with concern for possible volume overload subsequently and was being diuresed. CT chest 12/7 with extensive tracheobronchial secretions with partial atelectasis b/l lower lobes.       INTERVAL HPI/OVERNIGHT EVENTS:  Per nursing, pt's has been better able to expectorate overnight. Pt remains afebrile. She remains on 4L NC. She still endorses ongoing fatigue. She does endorse intermittent sensation of food getting stuck with eating. Percussion vest was ordered by primary team but to large for patient. She denies any LE pain.  Sputum cx with Stenotrophomonas. Started on empiric zosyn per primary team. Lasix held in the setting of KALPANA.     MEDICATIONS  (STANDING):  albuterol/ipratropium for Nebulization 3 milliLiter(s) Nebulizer every 6 hours  albuterol/ipratropium for Nebulization. 3 milliLiter(s) Nebulizer once  albuterol/ipratropium for Nebulization. 3 milliLiter(s) Nebulizer once  atorvastatin 40 milliGRAM(s) Oral at bedtime  enoxaparin Injectable 30 milliGRAM(s) SubCutaneous every 24 hours  levothyroxine 100 MICROGram(s) Oral daily  pantoprazole    Tablet 40 milliGRAM(s) Oral before breakfast  piperacillin/tazobactam IVPB.- 3.375 Gram(s) IV Intermittent once  polyethylene glycol 3350 17 Gram(s) Oral daily  predniSONE   Tablet 20 milliGRAM(s) Oral daily  senna 2 Tablet(s) Oral daily  sodium chloride 3%  Inhalation 4 milliLiter(s) Inhalation every 12 hours    MEDICATIONS  (PRN):  guaiFENesin Oral Liquid (Sugar-Free) 100 milliGRAM(s) Oral every 6 hours PRN Cough  ondansetron Injectable 4 milliGRAM(s) IV Push every 6 hours PRN Nausea and/or Vomiting    Allergies  codeine (Vomiting)  iodine (Unknown)  Intolerances    PAST MEDICAL & SURGICAL HISTORY:  Essential hypertension  Chronic obstructive pulmonary disease, unspecified COPD type  Hypothyroidism, unspecified type  H/O exploratory laparotomy  for a benign colon mass 4/19/12    REVIEW OF SYSTEMS: Negative except per HPI    Vital Signs Last 24 Hrs  T(C): 36.4 (14 Dec 2022 11:57), Max: 36.8 (13 Dec 2022 22:00)  T(F): 97.5 (14 Dec 2022 11:57), Max: 98.2 (13 Dec 2022 22:00)  HR: 76 (14 Dec 2022 11:57) (65 - 85)  BP: 107/69 (14 Dec 2022 11:57) (107/69 - 132/68)  BP(mean): --  RR: 18 (14 Dec 2022 11:57) (18 - 19)  SpO2: 96% (14 Dec 2022 11:57) (91% - 96%)    Parameters below as of 14 Dec 2022 11:57  Patient On (Oxygen Delivery Method): nasal cannula  O2 Flow (L/min): 4      I&O's Detail    13 Dec 2022 07:01  -  14 Dec 2022 07:00  --------------------------------------------------------  IN:  Total IN: 0 mL    OUT:    Voided (mL): 500 mL  Total OUT: 500 mL    Total NET: -500 mL      PHYSICAL EXAMINATION:    GENERAL: Alert, in NAD     HEENT: NC/AT, anicteric, MMM    NECK: Supple    LUNGS: bilateral rhonchi, mildly tachypneic no accessory muscle use, on nasal cannula    HEART: S1S2, RRR    ABDOMEN: Soft, nontender, and nondistended    EXTREMITIES: Without any cyanosis, clubbing; 1+ b/l edema    NEUROLOGIC: No focal deficits     LABS:                                       10.0   6.96  )-----------( 259      ( 14 Dec 2022 13:18 )             29.1         12-13    138  |  98  |  104.2<H>  ----------------------------<  133<H>  3.5   |  23.0  |  1.67<H>    Ca    9.2      13 Dec 2022 04:50  Mg     2.9     12-13    TPro  6.1<L>  /  Alb  3.8  /  TBili  0.6  /  DBili  x   /  AST  18  /  ALT  16  /  AlkPhos  63  12-13                             RADIOLOGY & ADDITIONAL STUDIES:    ACC: [id number] EXAM:  XR CHEST PORTABLE ROUTINE 1V                          PROCEDURE DATE:  12/08/2022          INTERPRETATION:  Portable chest radiograph    CLINICAL INFORMATION: Fluid overload    TECHNIQUE:  Portable  AP chest radiograph.    COMPARISON: 12/4/2022 chest x-ray and 12/7/2022 chest CT .    FINDINGS:  CATHETERS AND TUBES: None    PULMONARY: Residual LEFT lower lobe airspace consolidation/atelectasis   likely from mucous plug as seen on prior CT scan..   LEFT upper zone and RIGHT lung parenchyma grossly clear.  .   No pneumothorax.    HEART/VASCULAR: The  heart is enlarged in transverse diameter.    BONES: Visualized osseous structures are intact.    IMPRESSION:   Cardiomegaly.  Residual LEFT  lower zone  airspace consolidation..      ZELDA DA SILVA MD; Attending Radiologist  This document has been electronically signed. Dec  8 2022 12:25PM        ACC: [id number] EXAM:  CT CHEST                          PROCEDURE DATE:  12/07/2022          INTERPRETATION:  EXAMINATION: CT CHEST    CLINICAL INDICATION: Dyspnea.    TECHNIQUE: Noncontrast CT of the chest was obtained.    COMPARISON: Multiple CT, most recent 6/3/2021..    FINDINGS:    AIRWAYS AND LUNGS: Tracheobronchial secretions, extensive in the lower   lobes, greater on the left.  Partial atelectasis both lower lobes,   greater on the left. Patchy bilateral lung opacities.    MEDIASTINUM AND PLEURA: There are no enlarged mediastinal, hilar or   axillary lymph nodes. The visualized portion of the thyroid gland is   unremarkable. There are small bilateral pleural effusions.  There is no   pneumothorax.    HEART AND VESSELS: There is mildcardiomegaly.  There are atherosclerotic   calcifications of the aorta and coronary arteries.  There is a small   pericardial effusion.    UPPER ABDOMEN: Images of the upper abdomen demonstrate hiatal hernia with   debris in the esophagus.    BONES AND SOFT TISSUES: There are mild degenerative changes of the spine.    The soft tissues are unremarkable.    AI VERTEBRAL BODY ANALYSIS:  T4: Moderate compression fracture with 26% height loss.    IMPRESSION:  There is a small left pleural effusion. Tracheobronchial secretions,   extensive in the lower lobes, greater on the left.  Partial atelectasis   both lower lobes, greater on the left. Patchy bilateral lung opacities   may be infectious or inflammatory.    VERTEBRAL BODY ANALYSIS: Vertebral compression fractures as described,   consider further workup for osteoporosis.      DUSTY RANGEL MD; Attending Radiologist  This document has been electronically signed. Dec  7 2022 12:03PM      ECHO:    Summary:   1. Normal left ventricular internal cavity size.   2. Normal global left ventricular systolic function.   3. Left ventricular ejection fraction, by visual estimation, is 65 to   70%.   4. Normal right ventricular size and function.   5. The left atrium is normal in size.   6. The right atrium is normal in size.   7. Sclerotic aortic valve with normal opening.   8. Mild tricuspid regurgitation.   9. Normal pulmonary artery pressure.  10. Trace mitral valve regurgitation.  11. Mild pulmonic valve regurgitation.    MD Henri Electronically signed on 11/29/2022 at 3:02:43 PM

## 2022-12-14 NOTE — CONSULT NOTE ADULT - TIME BILLING
greater than 50% of time spent reviewing labs, notes, orders and radiographs, coordinating care  discussed with nursing, primary team.
I reviewed X ray , labs , notes

## 2022-12-15 LAB
-  MINOCYCLINE: SIGNIFICANT CHANGE UP
ABO RH CONFIRMATION: SIGNIFICANT CHANGE UP
ALBUMIN SERPL ELPH-MCNC: 3.1 G/DL — LOW (ref 3.3–5.2)
ALP SERPL-CCNC: 52 U/L — SIGNIFICANT CHANGE UP (ref 40–120)
ALT FLD-CCNC: 13 U/L — SIGNIFICANT CHANGE UP
ANION GAP SERPL CALC-SCNC: 11 MMOL/L — SIGNIFICANT CHANGE UP (ref 5–17)
ANION GAP SERPL CALC-SCNC: 14 MMOL/L — SIGNIFICANT CHANGE UP (ref 5–17)
ANISOCYTOSIS BLD QL: SLIGHT — SIGNIFICANT CHANGE UP
AST SERPL-CCNC: 15 U/L — SIGNIFICANT CHANGE UP
BASOPHILS # BLD AUTO: 0 K/UL — SIGNIFICANT CHANGE UP (ref 0–0.2)
BASOPHILS NFR BLD AUTO: 0 % — SIGNIFICANT CHANGE UP (ref 0–2)
BILIRUB SERPL-MCNC: 0.5 MG/DL — SIGNIFICANT CHANGE UP (ref 0.4–2)
BUN SERPL-MCNC: 83.1 MG/DL — HIGH (ref 8–20)
BUN SERPL-MCNC: 95.4 MG/DL — HIGH (ref 8–20)
CALCIUM SERPL-MCNC: 8.4 MG/DL — SIGNIFICANT CHANGE UP (ref 8.4–10.5)
CALCIUM SERPL-MCNC: 8.7 MG/DL — SIGNIFICANT CHANGE UP (ref 8.4–10.5)
CHLORIDE SERPL-SCNC: 108 MMOL/L — SIGNIFICANT CHANGE UP (ref 96–108)
CHLORIDE SERPL-SCNC: 109 MMOL/L — HIGH (ref 96–108)
CO2 SERPL-SCNC: 23 MMOL/L — SIGNIFICANT CHANGE UP (ref 22–29)
CO2 SERPL-SCNC: 23 MMOL/L — SIGNIFICANT CHANGE UP (ref 22–29)
CREAT SERPL-MCNC: 1.54 MG/DL — HIGH (ref 0.5–1.3)
CREAT SERPL-MCNC: 1.65 MG/DL — HIGH (ref 0.5–1.3)
EGFR: 29 ML/MIN/1.73M2 — LOW
EGFR: 32 ML/MIN/1.73M2 — LOW
EOSINOPHIL # BLD AUTO: 0 K/UL — SIGNIFICANT CHANGE UP (ref 0–0.5)
EOSINOPHIL NFR BLD AUTO: 0 % — SIGNIFICANT CHANGE UP (ref 0–6)
GIANT PLATELETS BLD QL SMEAR: PRESENT — SIGNIFICANT CHANGE UP
GLUCOSE SERPL-MCNC: 140 MG/DL — HIGH (ref 70–99)
GLUCOSE SERPL-MCNC: 161 MG/DL — HIGH (ref 70–99)
HCT VFR BLD CALC: 26.9 % — LOW (ref 34.5–45)
HCT VFR BLD CALC: 27.3 % — LOW (ref 34.5–45)
HCT VFR BLD CALC: 28.7 % — LOW (ref 34.5–45)
HGB BLD-MCNC: 8.8 G/DL — LOW (ref 11.5–15.5)
HGB BLD-MCNC: 8.8 G/DL — LOW (ref 11.5–15.5)
HGB BLD-MCNC: 9.5 G/DL — LOW (ref 11.5–15.5)
LYMPHOCYTES # BLD AUTO: 1.07 K/UL — SIGNIFICANT CHANGE UP (ref 1–3.3)
LYMPHOCYTES # BLD AUTO: 15.7 % — SIGNIFICANT CHANGE UP (ref 13–44)
MACROCYTES BLD QL: SLIGHT — SIGNIFICANT CHANGE UP
MAGNESIUM SERPL-MCNC: 2.7 MG/DL — HIGH (ref 1.8–2.6)
MAGNESIUM SERPL-MCNC: 2.9 MG/DL — HIGH (ref 1.6–2.6)
MANUAL SMEAR VERIFICATION: SIGNIFICANT CHANGE UP
MCHC RBC-ENTMCNC: 32.2 GM/DL — SIGNIFICANT CHANGE UP (ref 32–36)
MCHC RBC-ENTMCNC: 32.8 PG — SIGNIFICANT CHANGE UP (ref 27–34)
MCV RBC AUTO: 101.9 FL — HIGH (ref 80–100)
MONOCYTES # BLD AUTO: 0.12 K/UL — SIGNIFICANT CHANGE UP (ref 0–0.9)
MONOCYTES NFR BLD AUTO: 1.7 % — LOW (ref 2–14)
NEUTROPHILS # BLD AUTO: 5.61 K/UL — SIGNIFICANT CHANGE UP (ref 1.8–7.4)
NEUTROPHILS NFR BLD AUTO: 79.1 % — HIGH (ref 43–77)
NEUTS BAND # BLD: 3.5 % — SIGNIFICANT CHANGE UP (ref 0–8)
OVALOCYTES BLD QL SMEAR: SLIGHT — SIGNIFICANT CHANGE UP
PHOSPHATE SERPL-MCNC: 3.1 MG/DL — SIGNIFICANT CHANGE UP (ref 2.4–4.7)
PLAT MORPH BLD: NORMAL — SIGNIFICANT CHANGE UP
PLATELET # BLD AUTO: 191 K/UL — SIGNIFICANT CHANGE UP (ref 150–400)
POIKILOCYTOSIS BLD QL AUTO: SLIGHT — SIGNIFICANT CHANGE UP
POTASSIUM SERPL-MCNC: 2.9 MMOL/L — CRITICAL LOW (ref 3.5–5.3)
POTASSIUM SERPL-MCNC: 3.9 MMOL/L — SIGNIFICANT CHANGE UP (ref 3.5–5.3)
POTASSIUM SERPL-SCNC: 2.9 MMOL/L — CRITICAL LOW (ref 3.5–5.3)
POTASSIUM SERPL-SCNC: 3.9 MMOL/L — SIGNIFICANT CHANGE UP (ref 3.5–5.3)
PROT SERPL-MCNC: 5 G/DL — LOW (ref 6.6–8.7)
RBC # BLD: 2.68 M/UL — LOW (ref 3.8–5.2)
RBC # FLD: 14.6 % — HIGH (ref 10.3–14.5)
RBC BLD AUTO: ABNORMAL
SODIUM SERPL-SCNC: 143 MMOL/L — SIGNIFICANT CHANGE UP (ref 135–145)
SODIUM SERPL-SCNC: 144 MMOL/L — SIGNIFICANT CHANGE UP (ref 135–145)
TROPONIN T SERPL-MCNC: 0.02 NG/ML — SIGNIFICANT CHANGE UP (ref 0–0.06)
WBC # BLD: 6.79 K/UL — SIGNIFICANT CHANGE UP (ref 3.8–10.5)
WBC # FLD AUTO: 6.79 K/UL — SIGNIFICANT CHANGE UP (ref 3.8–10.5)

## 2022-12-15 PROCEDURE — 99497 ADVNCD CARE PLAN 30 MIN: CPT | Mod: 25

## 2022-12-15 PROCEDURE — 99233 SBSQ HOSP IP/OBS HIGH 50: CPT

## 2022-12-15 PROCEDURE — 99222 1ST HOSP IP/OBS MODERATE 55: CPT

## 2022-12-15 PROCEDURE — 74176 CT ABD & PELVIS W/O CONTRAST: CPT | Mod: 26

## 2022-12-15 PROCEDURE — 71250 CT THORAX DX C-: CPT | Mod: 26

## 2022-12-15 PROCEDURE — 99231 SBSQ HOSP IP/OBS SF/LOW 25: CPT

## 2022-12-15 PROCEDURE — 93010 ELECTROCARDIOGRAM REPORT: CPT

## 2022-12-15 RX ORDER — DEXTROSE MONOHYDRATE, SODIUM CHLORIDE, AND POTASSIUM CHLORIDE 50; .745; 4.5 G/1000ML; G/1000ML; G/1000ML
1000 INJECTION, SOLUTION INTRAVENOUS
Refills: 0 | Status: DISCONTINUED | OUTPATIENT
Start: 2022-12-15 | End: 2022-12-16

## 2022-12-15 RX ORDER — SODIUM CHLORIDE 9 MG/ML
250 INJECTION INTRAMUSCULAR; INTRAVENOUS; SUBCUTANEOUS ONCE
Refills: 0 | Status: COMPLETED | OUTPATIENT
Start: 2022-12-15 | End: 2022-12-15

## 2022-12-15 RX ORDER — POTASSIUM CHLORIDE 20 MEQ
10 PACKET (EA) ORAL
Refills: 0 | Status: COMPLETED | OUTPATIENT
Start: 2022-12-15 | End: 2022-12-15

## 2022-12-15 RX ORDER — ALBUMIN HUMAN 25 %
100 VIAL (ML) INTRAVENOUS ONCE
Refills: 0 | Status: DISCONTINUED | OUTPATIENT
Start: 2022-12-15 | End: 2022-12-15

## 2022-12-15 RX ADMIN — SODIUM CHLORIDE 4 MILLILITER(S): 9 INJECTION INTRAMUSCULAR; INTRAVENOUS; SUBCUTANEOUS at 20:30

## 2022-12-15 RX ADMIN — PIPERACILLIN AND TAZOBACTAM 25 GRAM(S): 4; .5 INJECTION, POWDER, LYOPHILIZED, FOR SOLUTION INTRAVENOUS at 17:37

## 2022-12-15 RX ADMIN — PIPERACILLIN AND TAZOBACTAM 25 GRAM(S): 4; .5 INJECTION, POWDER, LYOPHILIZED, FOR SOLUTION INTRAVENOUS at 01:09

## 2022-12-15 RX ADMIN — PANTOPRAZOLE SODIUM 40 MILLIGRAM(S): 20 TABLET, DELAYED RELEASE ORAL at 07:26

## 2022-12-15 RX ADMIN — SODIUM CHLORIDE 500 MILLILITER(S): 9 INJECTION INTRAMUSCULAR; INTRAVENOUS; SUBCUTANEOUS at 06:24

## 2022-12-15 RX ADMIN — DEXTROSE MONOHYDRATE, SODIUM CHLORIDE, AND POTASSIUM CHLORIDE 60 MILLILITER(S): 50; .745; 4.5 INJECTION, SOLUTION INTRAVENOUS at 17:39

## 2022-12-15 RX ADMIN — PIPERACILLIN AND TAZOBACTAM 25 GRAM(S): 4; .5 INJECTION, POWDER, LYOPHILIZED, FOR SOLUTION INTRAVENOUS at 10:52

## 2022-12-15 RX ADMIN — Medication 3 MILLILITER(S): at 09:28

## 2022-12-15 RX ADMIN — POLYETHYLENE GLYCOL 3350 17 GRAM(S): 17 POWDER, FOR SOLUTION ORAL at 17:39

## 2022-12-15 RX ADMIN — ATORVASTATIN CALCIUM 40 MILLIGRAM(S): 80 TABLET, FILM COATED ORAL at 21:49

## 2022-12-15 RX ADMIN — MIDODRINE HYDROCHLORIDE 10 MILLIGRAM(S): 2.5 TABLET ORAL at 07:26

## 2022-12-15 RX ADMIN — Medication 100 MILLIEQUIVALENT(S): at 09:51

## 2022-12-15 RX ADMIN — Medication 100 MILLIEQUIVALENT(S): at 10:52

## 2022-12-15 RX ADMIN — SODIUM CHLORIDE 4 MILLILITER(S): 9 INJECTION INTRAMUSCULAR; INTRAVENOUS; SUBCUTANEOUS at 09:28

## 2022-12-15 RX ADMIN — MIDODRINE HYDROCHLORIDE 10 MILLIGRAM(S): 2.5 TABLET ORAL at 01:09

## 2022-12-15 RX ADMIN — SODIUM CHLORIDE 500 MILLILITER(S): 9 INJECTION INTRAMUSCULAR; INTRAVENOUS; SUBCUTANEOUS at 01:08

## 2022-12-15 RX ADMIN — Medication 20 MILLIGRAM(S): at 07:26

## 2022-12-15 RX ADMIN — Medication 100 MILLIEQUIVALENT(S): at 08:48

## 2022-12-15 RX ADMIN — MIDODRINE HYDROCHLORIDE 10 MILLIGRAM(S): 2.5 TABLET ORAL at 17:37

## 2022-12-15 RX ADMIN — Medication 1 TABLET(S): at 17:37

## 2022-12-15 RX ADMIN — Medication 100 MICROGRAM(S): at 07:26

## 2022-12-15 RX ADMIN — Medication 3 MILLILITER(S): at 20:30

## 2022-12-15 RX ADMIN — Medication 3 MILLILITER(S): at 15:50

## 2022-12-15 RX ADMIN — SENNA PLUS 2 TABLET(S): 8.6 TABLET ORAL at 21:49

## 2022-12-15 RX ADMIN — Medication 3 MILLILITER(S): at 04:34

## 2022-12-15 NOTE — PROGRESS NOTE ADULT - SUBJECTIVE AND OBJECTIVE BOX
[facility] Division of Hospital Medicine    Chief Complaint:   cough and vomiting    SUBJECTIVE / OVERNIGHT EVENTS: Patient seen and examined. She is feeling better today, she is awake and alert, oriented by 4. States she is less weak today. Still with some abdominal pain, but had multiple bowel movements yesterday. Breathing is about the same.     Patient denies chest pain,  N/V, fever, chills, dysuria or any other complaints. All remainder ROS negative.     MEDICATIONS  (STANDING):  albuterol/ipratropium for Nebulization 3 milliLiter(s) Nebulizer every 6 hours  albuterol/ipratropium for Nebulization. 3 milliLiter(s) Nebulizer once  albuterol/ipratropium for Nebulization. 3 milliLiter(s) Nebulizer once  atorvastatin 40 milliGRAM(s) Oral at bedtime  levothyroxine 100 MICROGram(s) Oral daily  midodrine 10 milliGRAM(s) Oral every 8 hours  pantoprazole    Tablet 40 milliGRAM(s) Oral before breakfast  piperacillin/tazobactam IVPB.- 3.375 Gram(s) IV Intermittent once  piperacillin/tazobactam IVPB.. 3.375 Gram(s) IV Intermittent every 8 hours  polyethylene glycol 3350 17 Gram(s) Oral daily  predniSONE   Tablet 20 milliGRAM(s) Oral daily  senna 2 Tablet(s) Oral daily  sodium chloride 0.9% with potassium chloride 20 mEq/L 1000 milliLiter(s) (60 mL/Hr) IV Continuous <Continuous>  sodium chloride 3%  Inhalation 4 milliLiter(s) Inhalation every 12 hours    MEDICATIONS  (PRN):  guaiFENesin Oral Liquid (Sugar-Free) 100 milliGRAM(s) Oral every 6 hours PRN Cough  ondansetron Injectable 4 milliGRAM(s) IV Push every 6 hours PRN Nausea and/or Vomiting        I&O's Summary    14 Dec 2022 07:01  -  15 Dec 2022 07:00  --------------------------------------------------------  IN: 860 mL / OUT: 0 mL / NET: 860 mL        PHYSICAL EXAM:  Vital Signs Last 24 Hrs  T(C): 36.4 (15 Dec 2022 07:50), Max: 36.6 (14 Dec 2022 19:05)  T(F): 97.5 (15 Dec 2022 07:50), Max: 97.9 (14 Dec 2022 19:05)  HR: 55 (15 Dec 2022 12:12) (42 - 74)  BP: 106/45 (15 Dec 2022 12:12) (70/42 - 120/40)  BP(mean): 58 (15 Dec 2022 10:00) (38 - 76)  RR: 18 (15 Dec 2022 12:12) (14 - 20)  SpO2: 98% (15 Dec 2022 12:37) (83% - 100%)    Parameters below as of 15 Dec 2022 12:37  Patient On (Oxygen Delivery Method): nasal cannula  O2 Flow (L/min): 3    CONSTITUTIONAL: NAD, obese  ENMT: Moist oral mucosa, no pharyngeal injection or exudates;  RESPIRATORY: + rhonchi and coarse breath sounds through out  CARDIOVASCULAR: Regular rate and rhythm, normal S1 and S2, trace lower extremity edema;  ABDOMEN: Nontender to palpation, normoactive bowel sounds, no rebound/guarding  MUSCLOSKELETAL:   no joint swelling or tenderness to palpation  PSYCH: A+O to person, place, and time; affect appropriate  NEUROLOGY: CN 2-12 are intact and symmetric; no gross sensory deficits;   SKIN: No rashes; no palpable lesions    LABS:                        8.8    6.79  )-----------( 191      ( 15 Dec 2022 05:35 )             27.3     12-15    144  |  108  |  95.4<H>  ----------------------------<  140<H>  2.9<LL>   |  23.0  |  1.65<H>    Ca    8.4      15 Dec 2022 05:35  Phos  3.3     12-14  Mg     2.9     12-15    TPro  5.0<L>  /  Alb  3.1<L>  /  TBili  0.5  /  DBili  x   /  AST  15  /  ALT  13  /  AlkPhos  52  12-15    PT/INR - ( 14 Dec 2022 13:18 )   PT: 11.1 sec;   INR: 0.96 ratio           CARDIAC MARKERS ( 15 Dec 2022 00:10 )  x     / 0.02 ng/mL / x     / x     / x      CARDIAC MARKERS ( 14 Dec 2022 13:18 )  x     / 0.02 ng/mL / x     / x     / x              Culture - Sputum (collected 12 Dec 2022 18:20)  Source: .Sputum Sputum  Gram Stain (13 Dec 2022 03:32):    Few polymorphonuclear leukocytes per low power field    Few Squamous epithelial cells per low power field    Few Yeast like cells seen per oil power field    Rare Gram Variable Rods seen per oil power field    Few Gram positive cocci in pairs seen per oil power field  Final Report (14 Dec 2022 16:42):    Moderate Stenotrophomonas maltophilia    Normal Respiratory Breanna present  Organism: Stenotrophomonas maltophilia (15 Dec 2022 00:10)  Organism: Stenotrophomonas maltophilia (15 Dec 2022 00:10)  Organism: Stenotrophomonas maltophilia (14 Dec 2022 16:42)      CAPILLARY BLOOD GLUCOSE      POCT Blood Glucose.: 275 mg/dL (14 Dec 2022 13:26)        RADIOLOGY & ADDITIONAL TESTS:  Results Reviewed:   Imaging Personally Reviewed:  Electrocardiogram Personally Reviewed:                                           [facility] Division of Hospital Medicine    Chief Complaint:   cough and vomiting    SUBJECTIVE / OVERNIGHT EVENTS: Patient seen and examined. She is feeling better today, she is awake and alert, oriented by 4. States she is less weak today. Still with some abdominal pain, but had multiple bowel movements yesterday. Breathing is about the same.     Patient denies chest pain,  N/V, fever, chills, dysuria or any other complaints. All remainder ROS negative.     MEDICATIONS  (STANDING):  albuterol/ipratropium for Nebulization 3 milliLiter(s) Nebulizer every 6 hours  albuterol/ipratropium for Nebulization. 3 milliLiter(s) Nebulizer once  albuterol/ipratropium for Nebulization. 3 milliLiter(s) Nebulizer once  atorvastatin 40 milliGRAM(s) Oral at bedtime  levothyroxine 100 MICROGram(s) Oral daily  midodrine 10 milliGRAM(s) Oral every 8 hours  pantoprazole    Tablet 40 milliGRAM(s) Oral before breakfast  piperacillin/tazobactam IVPB.- 3.375 Gram(s) IV Intermittent once  piperacillin/tazobactam IVPB.. 3.375 Gram(s) IV Intermittent every 8 hours  polyethylene glycol 3350 17 Gram(s) Oral daily  predniSONE   Tablet 20 milliGRAM(s) Oral daily  senna 2 Tablet(s) Oral daily  sodium chloride 0.9% with potassium chloride 20 mEq/L 1000 milliLiter(s) (60 mL/Hr) IV Continuous <Continuous>  sodium chloride 3%  Inhalation 4 milliLiter(s) Inhalation every 12 hours    MEDICATIONS  (PRN):  guaiFENesin Oral Liquid (Sugar-Free) 100 milliGRAM(s) Oral every 6 hours PRN Cough  ondansetron Injectable 4 milliGRAM(s) IV Push every 6 hours PRN Nausea and/or Vomiting        I&O's Summary    14 Dec 2022 07:01  -  15 Dec 2022 07:00  --------------------------------------------------------  IN: 860 mL / OUT: 0 mL / NET: 860 mL        PHYSICAL EXAM:  Vital Signs Last 24 Hrs  T(C): 36.4 (15 Dec 2022 07:50), Max: 36.6 (14 Dec 2022 19:05)  T(F): 97.5 (15 Dec 2022 07:50), Max: 97.9 (14 Dec 2022 19:05)  HR: 55 (15 Dec 2022 12:12) (42 - 74)  BP: 106/45 (15 Dec 2022 12:12) (70/42 - 120/40)  BP(mean): 58 (15 Dec 2022 10:00) (38 - 76)  RR: 18 (15 Dec 2022 12:12) (14 - 20)  SpO2: 98% (15 Dec 2022 12:37) (83% - 100%)    Parameters below as of 15 Dec 2022 12:37  Patient On (Oxygen Delivery Method): nasal cannula  O2 Flow (L/min): 3    CONSTITUTIONAL: NAD, obese  ENMT: Moist oral mucosa, no pharyngeal injection or exudates;  RESPIRATORY: + rhonchi and coarse breath sounds through out, high flow NC 30L 30%  CARDIOVASCULAR: Regular rate and rhythm, normal S1 and S2, trace lower extremity edema;  ABDOMEN: Nontender to palpation, normoactive bowel sounds, no rebound/guarding  MUSCLOSKELETAL:   no joint swelling or tenderness to palpation  PSYCH: A+O to person, place, and time; affect appropriate  NEUROLOGY: CN 2-12 are intact and symmetric; no gross sensory deficits;   SKIN: No rashes; no palpable lesions    LABS:                        8.8    6.79  )-----------( 191      ( 15 Dec 2022 05:35 )             27.3     12-15    144  |  108  |  95.4<H>  ----------------------------<  140<H>  2.9<LL>   |  23.0  |  1.65<H>    Ca    8.4      15 Dec 2022 05:35  Phos  3.3     12-14  Mg     2.9     12-15    TPro  5.0<L>  /  Alb  3.1<L>  /  TBili  0.5  /  DBili  x   /  AST  15  /  ALT  13  /  AlkPhos  52  12-15    PT/INR - ( 14 Dec 2022 13:18 )   PT: 11.1 sec;   INR: 0.96 ratio           CARDIAC MARKERS ( 15 Dec 2022 00:10 )  x     / 0.02 ng/mL / x     / x     / x      CARDIAC MARKERS ( 14 Dec 2022 13:18 )  x     / 0.02 ng/mL / x     / x     / x              Culture - Sputum (collected 12 Dec 2022 18:20)  Source: .Sputum Sputum  Gram Stain (13 Dec 2022 03:32):    Few polymorphonuclear leukocytes per low power field    Few Squamous epithelial cells per low power field    Few Yeast like cells seen per oil power field    Rare Gram Variable Rods seen per oil power field    Few Gram positive cocci in pairs seen per oil power field  Final Report (14 Dec 2022 16:42):    Moderate Stenotrophomonas maltophilia    Normal Respiratory Breanna present  Organism: Stenotrophomonas maltophilia (15 Dec 2022 00:10)  Organism: Stenotrophomonas maltophilia (15 Dec 2022 00:10)  Organism: Stenotrophomonas maltophilia (14 Dec 2022 16:42)      CAPILLARY BLOOD GLUCOSE      POCT Blood Glucose.: 275 mg/dL (14 Dec 2022 13:26)        RADIOLOGY & ADDITIONAL TESTS:  Results Reviewed:   Imaging Personally Reviewed:  Electrocardiogram Personally Reviewed:

## 2022-12-15 NOTE — PROGRESS NOTE ADULT - ASSESSMENT
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 Also found to have uncontrolled hypothyroidism followed by jimy. Hospital course notable for episodes of hypoxemia/ fluid overload responsive to diuresis.     ID consulted for Stenotrophomonas growing in sputum culture from 12/12. Culture was obtained after brief episode of desaturation. Otherwise patient has remained with stable oxygen requirements by NC, afebrile and w/o leukocytosis.     Started on piperacillin-tazobactam today 12/14 after syncopal event.     Impression:  Hypoxic respiratory failure  COPD  Sputum culture positive for Stenotrophomonas     Plan:  - Given frail resp status, not unreasonable to treat Stenotrophomonas in sputum, specially with patient requiring more supplemental oxygen.   - Start TMP-SMX 1 DS tab once, followed by 1 SS tab q12h (adjusted to GFR)  - Would treat for 5 days  - CT chest noted   - Check MRSA nasal swab   - 12/7 RVP neg   - 12/14 CXR improved compared to 12/8   - Repeat RVP if clinical condition changes  - Monitor temp curve  - Monitor WBC   - Monitor oxygen requirements   - Aspiration precautions     d/w Dr. Flores     Will follow

## 2022-12-15 NOTE — PROGRESS NOTE ADULT - ASSESSMENT
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 She is admitted for Respiratory failure.     Acute Hypoxic Resp failure due to COPD/Pneumonia/acute HFpEF, not improving  CT Chest atelectasis and pneumonia, secretions    Duoneb and prednisone taper now on 20mg daily, will not lower yet as her resp status has not improved.   Viral panel neg, improving leukocytosis  completed Rocephin + Zithromax  cxr improving  stop Furosemide 20mg PO daily due to kalpana  sputum sample with Stenotrophomonas, ID consulted as this may just be colonization    3% saline nebs for 48 hours  Chest percussion vest  continue Zosyn for now will reach out to ID about switching to Levaquin or Bactrim due to sputum sample  pulm following, deferring Bronch at this time.     Syncope likely hypovolemia  s/p IVFs  holding all anti-hypertensives  monitor on tele  continue IVFs  wean midodrine    KALPANA  EF 65-70%  monitor cr  stop lasix  IVFs  strict os and os    Hypokalemia  - replaced and will recheck this afternoon    Bradycardia  - Cards consult appreciated  - hold metoprolol.     Hypothyroid-severe- no compliance with medications  LT4 increased to 100mcg  Endo following, labs noted, Follow up appointment: 1/13 at 2:00pm [place] office     Vomiting, resolved was actually not vomiting but coughing up phlegm  - prn antiemetics  - CT a/p    Anemia  - brown stool but stool occult  - appreciate GI eval    Constipation  s/p manual disimpaction on 12/14  - senna and Miralax  - CT a/p    Urinary Retention  - Espinal to be placed.     DVT PPx- Lovenox    Dispo -PT re-eval  code status DNR/DNI, Palliative care consulted.

## 2022-12-15 NOTE — SWALLOW BEDSIDE ASSESSMENT ADULT - SWALLOW EVAL: DIAGNOSIS
Mild oral dysphagia for regular solids, other wise WFL. Pharyngeal stage of swallow clinically unremarkable post intake of assessed PO with no overt s/s aspiration observed
Mild oral phase dysphagia marked by reduced oral grading from tsp with puree, suspected posterior spillage with moderately thick liquid via. Suspected pharyngeal phase dysphagia marked by reduced hyolaryngeal excursion via digital palpation with all trials, multiple swallows with puree (4-5x/bite), immediate cough post-swallow with moderately thick liquid

## 2022-12-15 NOTE — PROGRESS NOTE ADULT - ASSESSMENT
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, hypothyroidism, HTN, [place] s/p cochlear implants presented 11/29 after an episode of emesis and was found to be hypoxic on presentation with concern for PNA/COPD exacerbation and component of volume overload    Acute hypoxic respiratory failure in the setting of likely PNA/ COPD exacerbation   Hypotension likely in setting of volume depletion   Concern for GI bleed     wean supplemental O2 as tolerated for goal >88-92%  likely will require O2 supplementation on discharge   chest PT/percussion vest as tolerated   c/w 3% hypertonic w/ albuterol as tolerated to help with expectoration   continue with duoneb q6h  wean prednisone down    aspiration precautions   f/u swallow evaluation   maintain net even volume status as tolerated by renal function/blood pressure   monitor volume status closely while on standing fluids   trend chem 8; replete K   trend H/H   completed course of ceftriaxone/azithro; continue with empiric zosyn pending cultures   f/u ID recs   sputum cx w/ Stenotrophomonas; possible colonization, no current fevers/leukocytosis     continued goals of care discussion given she is at high risk for further decline   OOB/PT as tolerated   can repeat ct chest in 6-8 weeks for followup  wean midodrine as tolerated as may be contributory to bradycardia   outpt pulm f/u on discharge

## 2022-12-15 NOTE — SWALLOW BEDSIDE ASSESSMENT ADULT - COMMENTS
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 She is admitted for Respiratory failure."

## 2022-12-15 NOTE — CONSULT NOTE ADULT - PROVIDER SPECIALTY LIST ADULT
Gastroenterology
Infectious Disease
Palliative Care
Cardiology
Endocrinology
Pulmonology
Critical Care

## 2022-12-15 NOTE — PROVIDER CONTACT NOTE (OTHER) - SITUATION
Pt transferred to step down unit for upgrade in care - will require new orders for PT. Please reconsult when appropriate to participate.

## 2022-12-15 NOTE — CONSULT NOTE ADULT - ASSESSMENT
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  Noted to be febrile in the ED: 102, hypoxemic: 88-91%.  Admitted with acute hypoxemic respiratory failure due to a possible COPD exacerbation 2/2 PNA.    Course notable for KALPANA, worsening respiratory status, w/fluid overload, HF exacerbation, stenotrophomonas in the sputum, RRT on 12/14 was called 2/2 worsening mental status, possible dark BM concerning for melena, also hypotensive.  Recovery in mental status today.  Palliative care consulted for goals of care.    # acute hypoxemic respiratory failure  - multifactorial: COPD/ PNA/acute HFpEF/ ? asp PNA: on presentation, thought to be due to a possible COPD exacerbation, 2/2 PNA. +stenotrophomonas in the sputum. New acute HFpEF on this admission.  RRT called on 12/14, concern for possible aspiration pneumonia.  - followed by pulm, also evaluated by MICU in the setting of RRT on 12/14  - CT chest 12/7: small left pleural effusion. Tracheobronchial secretions, extensive in the lower lobes, greater on the left.  Partial atelectasis   both lower lobes, greater on the left. Patchy bilateral lung opacities   - CT chest pending for today: visit with patient somewhat truncated, as pt was being called to go down for CT C/A/P during consult  - rocephin/zithromax completed.  - on zosyn, nebs, prednisone taper  - oxygen: weaned off of high flow: now on NC.  Continue to wean as clinical condition allow.  - diuresed.  Lasix stopped 2/2 KALPANA.  - chest percussion vest  -  NPO at the this time, given concern for aspiration,  SLP mekaal    # melena  - + FOBT  - seen by GI.  No interventions planned at this time  - monitor h/h, avoid nsaids  - CT A/P pending    # decreased creatinine clearance  - monitor bun/creatinine  - avoid nephrotoxins  - no need for opioids at this point for symptomatic management.  Would avoid morphine, given renal function.    # abdominal pain  - seen by GI, + FOBT  - CT A/P pending  - monitor abdominal exam, h/h    # debility  - supportive care  - PT as/when feasible    # palliative care encounter  - see GOC note  - attempted to reach patient's HCP without success.   - palliative care will continue to follow.    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  Noted to be febrile in the ED: 102, hypoxemic: 88-91%.  Admitted with acute hypoxemic respiratory failure due to a possible COPD exacerbation 2/2 PNA.    Course notable for KALPANA, worsening respiratory status, w/fluid overload, HF exacerbation, stenotrophomonas in the sputum, RRT on 12/14 was called 2/2 worsening mental status, possible dark BM concerning for melena, also hypotensive.  Recovery in mental status today.  Palliative care consulted for goals of care.    # acute hypoxemic respiratory failure  - multifactorial: COPD/ PNA/acute HFpEF/ ? asp PNA: on presentation, thought to be due to a possible COPD exacerbation, 2/2 PNA. +stenotrophomonas in the sputum. New acute HFpEF on this admission.  RRT called on 12/14, concern for possible aspiration pneumonia.  - followed by pulm, also evaluated by MICU in the setting of RRT on 12/14  - CT chest 12/7: small left pleural effusion. Tracheobronchial secretions, extensive in the lower lobes, greater on the left.  Partial atelectasis   both lower lobes, greater on the left. Patchy bilateral lung opacities   - CT chest pending for today: visit with patient somewhat truncated, as pt was being called to go down for CT C/A/P during consult  - rocephin/zithromax completed.  - on zosyn, nebs, prednisone taper  - oxygen: weaned off of high flow: now on NC.  Continue to wean as clinical condition allows.  - diuresed.  Lasix stopped 2/2 KALPANA.  - chest percussion vest  -  NPO at the this time, given concern for aspiration,  SLP ilzett    # melena  - + FOBT  - seen by GI.  No interventions planned at this time  - monitor h/h, avoid nsaids  - CT A/P pending    # decreased creatinine clearance  - monitor bun/creatinine  - avoid nephrotoxins  - no need for opioids at this point for symptomatic management.  If they are, would avoid morphine, given patient's renal function.    # abdominal pain  - seen by GI, + FOBT  - CT A/P pending  - monitor abdominal exam, h/h    # debility  - supportive care  - PT as/when feasible    # palliative care encounter  - see GOC note  - attempted to reach patient's HCP without success.   - palliative care will continue to follow.     Patient reviewed with attending, RN.

## 2022-12-15 NOTE — CONSULT NOTE ADULT - CONSULT REASON
Shortness of breath, nausea
goals of care
Stenotrophomonas in sputum
hypoxia
high TSH
guiac + stools
hypotension

## 2022-12-15 NOTE — PROGRESS NOTE ADULT - SUBJECTIVE AND OBJECTIVE BOX
[facility]  INFECTIOUS DISEASES at [place] and [place]  ===============================================================                               Sebastián Allen MD*     Yue Mcclain MD*                         Polina Brar MD*       Uma De Leon MD*            Diplomates American Board of Internal Medicine & Infectious Diseases                * [place] Office - Appt - Tel  [phone] Fax [phone]                * [place] Office - Appt - Tel [phone] Fax [phone]                                  Hospital Consult line:  [phone]  ==============================================================    TERESA NESS [id number]    Follow up: Stenotrophomonas in sputum     Seen in AM  Transferred to SDU for closer monitoring  Appears more alert on my exam   She was on HFNC, later weaned to NC 3 L/min    I have personally reviewed the labs and data; pertinent labs and data are listed in this note; please see below.     _______________________________________________________________  REVIEW OF SYSTEMS  [place] but feeling well. Wants to put on her dentures.   ________________________________________________________________  Allergies:  codeine (Vomiting)  iodine (Unknown)    ________________________________________________________________  PHYSICAL EXAM  GEN: elderly woman in NAD, lying in bed.   HEENT:  Moist mucous membranes. No mucosal lesions.   NECK: Supple. Mid-line trachea. No palpable masses or LN  LUNGS: eupneic. Coarse BS b/l   HEART: RRR, no m/r/g  ABDOMEN: Soft, NT, ND, +BS.    EXTREMITIES: well perfused, without  edema.  NEUROLOGIC: Grossly no focal deficits   PSYCHIATRIC: Appropriate affect and mood  LINES: PIV  ________________________________________________________________  Vitals:  T(F): 97.6 (15 Dec 2022 15:20), Max: 97.9 (14 Dec 2022 19:05)  HR: 64 (15 Dec 2022 15:51)  BP: 129/67 (15 Dec 2022 14:00)  RR: 18 (15 Dec 2022 12:12)  SpO2: 98% (15 Dec 2022 15:51) (83% - 100%)  temp max in last 48H T(F): , Max: 98.2 (12-13-22 @ 22:00)    Current Antibiotics:  piperacillin/tazobactam IVPB.. 3.375 Gram(s) IV Intermittent every 8 hours  trimethoprim   80 mG/sulfamethoxazole 400 mG 1 Tablet(s) Oral every 12 hours  trimethoprim  160 mG/sulfamethoxazole 800 mG 1 Tablet(s) Oral once    Other medications:  albuterol/ipratropium for Nebulization 3 milliLiter(s) Nebulizer every 6 hours  albuterol/ipratropium for Nebulization. 3 milliLiter(s) Nebulizer once  albuterol/ipratropium for Nebulization. 3 milliLiter(s) Nebulizer once  atorvastatin 40 milliGRAM(s) Oral at bedtime  levothyroxine 100 MICROGram(s) Oral daily  midodrine 10 milliGRAM(s) Oral every 8 hours  pantoprazole    Tablet 40 milliGRAM(s) Oral before breakfast  polyethylene glycol 3350 17 Gram(s) Oral daily  predniSONE   Tablet 20 milliGRAM(s) Oral daily  senna 2 Tablet(s) Oral daily  sodium chloride 0.9% with potassium chloride 20 mEq/L 1000 milliLiter(s) IV Continuous <Continuous>  sodium chloride 3%  Inhalation 4 milliLiter(s) Inhalation every 12 hours                            8.8    6.79  )-----------( 191      ( 15 Dec 2022 05:35 )             27.3     12-15    144  |  108  |  95.4<H>  ----------------------------<  140<H>  2.9<LL>   |  23.0  |  1.65<H>    Ca    8.4      15 Dec 2022 05:35  Phos  3.3     12-14  Mg     2.9     12-15    TPro  5.0<L>  /  Alb  3.1<L>  /  TBili  0.5  /  DBili  x   /  AST  15  /  ALT  13  /  AlkPhos  52  12-15    RECENT CULTURES:  12-12 @ 18:20 .Sputum Sputum Stenotrophomonas maltophilia    Moderate Stenotrophomonas maltophilia  Normal Respiratory Breanna present    Culture - Sputum . (12.12.22 @ 18:20)    -  Minocycline: S    -  Levofloxacin: S 1    -  Trimethoprim/Sulfamethoxazole: S <=0.5/9.5    Few polymorphonuclear leukocytes per low power field  Few Squamous epithelial cells per low power field  Few Yeast like cells seen per oil power field  Rare Gram Variable Rods seen per oil power field  Few Gram positive cocci in pairs seen per oil power field      12-07 @ 06:35    RVP with SARS-CoV-2   NotDetec      WBC Count: 6.79 K/uL (12-15-22 @ 05:35)  WBC Count: 7.45 K/uL (12-14-22 @ 17:10)  WBC Count: 6.96 K/uL (12-14-22 @ 13:18)  WBC Count: 7.05 K/uL (12-14-22 @ 07:06)  WBC Count: 9.71 K/uL (12-13-22 @ 04:50)  WBC Count: 10.28 K/uL (12-12-22 @ 07:43)  WBC Count: 10.89 K/uL (12-11-22 @ 07:16)    Creatinine, Serum: 1.65 mg/dL (12-15-22 @ 05:35)  Creatinine, Serum: 1.62 mg/dL (12-14-22 @ 13:18)  Creatinine, Serum: 1.42 mg/dL (12-14-22 @ 07:06)  Creatinine, Serum: 1.67 mg/dL (12-13-22 @ 04:50)  Creatinine, Serum: 1.87 mg/dL (12-12-22 @ 07:43)  Creatinine, Serum: 1.36 mg/dL (12-11-22 @ 07:16)  Procalcitonin, Serum: 0.19 ng/mL (12-14-22 @ 17:10)  Procalcitonin, Serum: 0.24 ng/mL (12-12-22 @ 07:43)  Procalcitonin, Serum: 0.19 ng/mL (12-10-22 @ 12:30)     SARS-CoV-2: NotDetec (12-07-22 @ 06:35)  SARS-CoV-2 Result: NotDetec (11-29-22 @ 05:15)    ________________________________________________________________  RADIOLOGY  < from: CT Abdomen and Pelvis No Cont (12.15.22 @ 13:28) >  FINDINGS:  CHEST:  LUNGS AND LARGE AIRWAYS: Patent central airways. Patchy bilateral   groundglass opacities, left lung greater than right. Compressive   atelectasis in the left lower lobe adjacent to a hiatal hernia..  PLEURA: Trace left pleural effusion.  VESSELS: Atherosclerotic changes of the aorta and coronary arteries.  HEART: Cardiomegaly. No pericardial effusion.  MEDIASTINUM AND MAXIMINO: No lymphadenopathy. Moderate to large hiatal   hernia. Patulous esophagus containing debris  CHEST WALL AND LOWER NECK: Within normal limits.    ABDOMEN AND PELVIS:  LIVER: Within normal limits.  BILE DUCTS: Normal caliber.  GALLBLADDER: Cholelithiasis.  SPLEEN: Within normal limits.  PANCREAS: Within normal limits.  ADRENALS: Within normal limits.  KIDNEYS/URETERS: Within normal limits.    BLADDER: Marked urinary bladder distention above the level of the   umbilicus.  REPRODUCTIVE ORGANS: Fibroid uterus.    BOWEL: Stable lipoma of the ascending colon. Ileocolic anastomosis. No   bowel obstruction. Circumferential long segment thickening of the   descending colon with pericolonic inflammation. Diverticulosis of the   descending and sigmoid colon.  PERITONEUM: No ascites.  VESSELS: Atherosclerotic changes.  RETROPERITONEUM/LYMPH NODES: No lymphadenopathy.  ABDOMINAL WALL: Within normal limits.  BONES: Degenerative changes of the spine and glenohumeral joints. Mild   lumbar levoscoliosis.  AI VERTEBRAL BODY ANALYSIS:  T4: Moderate compression fracture with 28% height loss.    IMPRESSION:  Moderate to large hiatal hernia with a patulous esophagus containing   debris. Correlate for symptoms of reflux.  Patchy bilateral groundglass opacities, left lung greater than right.   Trace left pleural effusion.  Long segment thickening of the descending colon compatible with colitis.  T4: Moderate compression fracture with 28% height loss.    < end of copied text >  < from: Xray Chest 1 View- PORTABLE-Urgent (Xray Chest 1 View- PORTABLE-Urgent .) (12.14.22 @ 14:27) >    IMPRESSION:  Mild platelike atelectasis at the left base, markedly improved.    Nonobstructive bowel gas pattern    < end of copied text >

## 2022-12-15 NOTE — CONSULT NOTE ADULT - SUBJECTIVE AND OBJECTIVE BOX
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 She has been having cough, without any change. Denies SOB, CP, fever, abd pain, constipation, N, sick contact. Admits to overeating last night. states she was on thyroid meds which her PMD stopped. and KCL was increased recently.  In Triage . In ED noted O2 saturation 88- 91%.     SH- smoke 1ppd or more since age 15 yrs to 50 yrs. 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  Noted cough, without any recent change.  Admits to overeating night prior to presentation.  Denies SOB, CP, fever, abd pain, constipation, nausea, sick contact on presentation.  Reports that patient was on thyroid medicine that her PMD stopped.  KCl was increased recently.  Noted to be febrile in the ED: 102, hypoxemic: 88-91%.  Admitted with acute hypoxemic respiratory failure, COPD exacerbation 2/2 PNA.    Course notable for KALPANA, worsening respiratory status, w/fluid overload, HF exacerbation, received IV Lasix.  Patient was found with stenotrophomonas in the sputum, started on zoysn.  On 12/14, RRT was called 2/2 worsening mental status, possible dark BM concerning for melena.  Found to be hypotensive: 70 / 42, received albumin, 1L fluid, and midodrine.  MICU called.  Found hypoxemic at that time (89% on 4 liters via NC).  Per patient's own wishes, a MOLST was completed, reflecting DNR/DNI orders. Patient also named her daughter, Andressa, as her health care proxy.  Palliative care consulted for goals of care.    PERTINENT PMH REVIEWED: Yes No    PAST MEDICAL & SURGICAL HISTORY:  Essential hypertension      Chronic obstructive pulmonary disease, unspecified COPD type      Hypothyroidism, unspecified type      H/O exploratory laparotomy  for a benign colon mass 4/19/12          SOCIAL HISTORY:                                     Admitted from:  home  SNF  [facility]     Surrogate/HCP/Guardian: Andressa Nichols Phone#: [phone]    FAMILY HISTORY:  No pertinent family history in first degree relatives        Baseline ADLs (prior to admission):  Independent/ Dependent      Allergies    codeine (Vomiting)  iodine (Unknown)    Intolerances        Present Symptoms:     Dyspnea: 0 1 2 3   Nausea/Vomiting: Yes No  Anxiety:  Yes No  Depression: Yes No  Fatigue: Yes No  Loss of appetite: Yes No    Pain:             Character-            Duration-            Effect-            Factors-            Frequency-            Location-            Severity-    Review of Systems: Reviewed                     Negative:                     Positive:  Unable to obtain due to poor mentation   All others negative    MEDICATIONS  (STANDING):  albuterol/ipratropium for Nebulization 3 milliLiter(s) Nebulizer every 6 hours  albuterol/ipratropium for Nebulization. 3 milliLiter(s) Nebulizer once  albuterol/ipratropium for Nebulization. 3 milliLiter(s) Nebulizer once  atorvastatin 40 milliGRAM(s) Oral at bedtime  levothyroxine 100 MICROGram(s) Oral daily  midodrine 10 milliGRAM(s) Oral every 8 hours  pantoprazole    Tablet 40 milliGRAM(s) Oral before breakfast  piperacillin/tazobactam IVPB.- 3.375 Gram(s) IV Intermittent once  piperacillin/tazobactam IVPB.. 3.375 Gram(s) IV Intermittent every 8 hours  polyethylene glycol 3350 17 Gram(s) Oral daily  potassium chloride  10 mEq/100 mL IVPB 10 milliEquivalent(s) IV Intermittent every 1 hour  predniSONE   Tablet 20 milliGRAM(s) Oral daily  senna 2 Tablet(s) Oral daily  sodium chloride 0.9% with potassium chloride 20 mEq/L 1000 milliLiter(s) (60 mL/Hr) IV Continuous <Continuous>  sodium chloride 3%  Inhalation 4 milliLiter(s) Inhalation every 12 hours    MEDICATIONS  (PRN):  guaiFENesin Oral Liquid (Sugar-Free) 100 milliGRAM(s) Oral every 6 hours PRN Cough  ondansetron Injectable 4 milliGRAM(s) IV Push every 6 hours PRN Nausea and/or Vomiting      PHYSICAL EXAM:    Vital Signs Last 24 Hrs  T(C): 36.4 (15 Dec 2022 07:50), Max: 36.6 (14 Dec 2022 19:05)  T(F): 97.5 (15 Dec 2022 07:50), Max: 97.9 (14 Dec 2022 19:05)  HR: 55 (15 Dec 2022 08:00) (48 - 82)  BP: 102/40 (15 Dec 2022 08:00) (70/42 - 119/71)  BP(mean): 56 (15 Dec 2022 08:00) (38 - 76)  RR: 18 (15 Dec 2022 08:00) (14 - 20)  SpO2: 98% (15 Dec 2022 08:00) (83% - 100%)    Parameters below as of 15 Dec 2022 08:00  Patient On (Oxygen Delivery Method): nasal cannula, high flow  O2 Flow (L/min): 30  O2 Concentration (%): 30    General: alert  oriented x ____ lethargic agitated                  cachexia  nonverbal  coma    Karnofsky:  %    HEENT: normal  dry mouth  ET tube/trach    Lungs: comfortable tachypnea/labored breathing  excessive secretions    CV: normal  tachycardia    GI: normal  distended  tender  no BS               PEG/NG/OG tube  constipation  last BM:     : normal  incontinent  oliguria/anuria  henson    MSK: normal  weakness  edema             ambulatory  bedbound/wheelchair bound    Skin: normal  pressure ulcers- Stage_____  no rash    LABS:                        8.8    6.79  )-----------( 191      ( 15 Dec 2022 05:35 )             27.3     12-15    144  |  108  |  95.4<H>  ----------------------------<  140<H>  2.9<LL>   |  23.0  |  1.65<H>    Ca    8.4      15 Dec 2022 05:35  Phos  3.3     12-14  Mg     2.9     12-15    TPro  5.0<L>  /  Alb  3.1<L>  /  TBili  0.5  /  DBili  x   /  AST  15  /  ALT  13  /  AlkPhos  52  12-15    PT/INR - ( 14 Dec 2022 13:18 )   PT: 11.1 sec;   INR: 0.96 ratio             I&O's Summary    14 Dec 2022 07:01  -  15 Dec 2022 07:00  --------------------------------------------------------  IN: 860 mL / OUT: 0 mL / NET: 860 mL        RADIOLOGY & ADDITIONAL STUDIES:    ADVANCE DIRECTIVES:   DNR YES NO  Completed on:                     MOLST  YES NO   Completed on:  Living Will  YES NO   Completed on:       HPI:  89 y/o F with PMH HTN, [place] s/p cochlear implants, 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 She has been having cough, without any change. Denies SOB, CP, fever, abd pain, constipation, N, sick contact. Admits to overeating last night. states she was on thyroid meds which her PMD stopped. and KCL was increased recently.  In Triage . In ED noted O2 saturation 88- 91%.     SH- smoke 1ppd or more since age 15 yrs to 50 yrs. 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  Noted cough, without any recent change.  Admits to overeating night prior to presentation.  Denies SOB, CP, fever, abd pain, constipation, nausea, sick contact on presentation.  Reports that patient was on thyroid medicine that her PMD stopped.  KCl was increased recently.  Noted to be febrile in the ED: 102, hypoxemic: 88-91%.  Admitted with acute hypoxemic respiratory failure due to a possible COPD exacerbation 2/2 PNA.    Course notable for KAL[place]NA, worsening respiratory status, w/fluid overload, HF exacerbation, received IV Lasix.  Patient was found with stenotrophomonas in the sputum, started on zoysn.  On 12/14, RRT was called 2/2 worsening mental status, possible dark BM concerning for melena.  Found to be hypotensive: 70 / 42, received albumin, 1L fluid, and midodrine.  MICU called.  Found hypoxemic at that time, with a wet cough (89% on 4 liters via NC), concern for aspiration pneumonia.  Per patient's own wishes, a MOLST had been completed, reflecting DNR/DNI orders. Patient also identified her daughter, Andressa, as her health care proxy.  Palliative care consulted for goals of care.      Patient seen and examined at bedside.  Limited HPI, ROS from patient.  Endorses fatigue, also reports that she is extremely [place], requiring written communication.    - Reports that she came to the hospital, after she woke up in the middle of the night, and soon after vomited.  Shares that she felt generally unwell, prompting her daughter to call 911.    - Reports that she knows that she is being treated for pneumonia.  Reports that she has been spitting out phlegm, and that with coughing, she can feel herself short of breath.  No shortness of breath at this time.  - Share that she has had some new mild abdominal pain on the left side.  Unable to identify when it began, as well as its quality.  States that it is worse when touched.  Unable to identify what makes it feel better, states it feels worse when touched.  Does not need analgesia at this time.     PERTINENT PMH REVIEWED: Yes     PAST MEDICAL & SURGICAL HISTORY:  Essential hypertension    Chronic obstructive pulmonary disease, unspecified COPD type      Hypothyroidism, unspecified type      H/O exploratory laparotomy  for a benign colon mass 4/19/12      SOCIAL HISTORY:  from home    Surrogate/HCP/Guardian: Andressa Nichols Phone#: [phone]    FAMILY HISTORY:  unable to obtain from patient at time of interview: [place], fatigue        Baseline ADLs (prior to admission): unable to obtain at this time, given [place], fatigue     Allergies    codeine (Vomiting)  iodine (Unknown)    Intolerances        Present Symptoms:     Dyspnea: with coughing  Nausea/Vomiting: No  Anxiety:  No  Depression: No  Fatigue: Yes  Loss of appetite: NPO    Pain: abd pain as above    Review of Systems: Reviewed: limited 2/2 fatigue, [place].  When asked if there is anything else bothering her, apart from positive ROS identified, patient says no.                     Positive: general weakness      MEDICATIONS  (STANDING):  albuterol/ipratropium for Nebulization 3 milliLiter(s) Nebulizer every 6 hours  albuterol/ipratropium for Nebulization. 3 milliLiter(s) Nebulizer once  albuterol/ipratropium for Nebulization. 3 milliLiter(s) Nebulizer once  atorvastatin 40 milliGRAM(s) Oral at bedtime  levothyroxine 100 MICROGram(s) Oral daily  midodrine 10 milliGRAM(s) Oral every 8 hours  pantoprazole    Tablet 40 milliGRAM(s) Oral before breakfast  piperacillin/tazobactam IVPB.- 3.375 Gram(s) IV Intermittent once  piperacillin/tazobactam IVPB.. 3.375 Gram(s) IV Intermittent every 8 hours  polyethylene glycol 3350 17 Gram(s) Oral daily  potassium chloride  10 mEq/100 mL IVPB 10 milliEquivalent(s) IV Intermittent every 1 hour  predniSONE   Tablet 20 milliGRAM(s) Oral daily  senna 2 Tablet(s) Oral daily  sodium chloride 0.9% with potassium chloride 20 mEq/L 1000 milliLiter(s) (60 mL/Hr) IV Continuous <Continuous>  sodium chloride 3%  Inhalation 4 milliLiter(s) Inhalation every 12 hours    MEDICATIONS  (PRN):  guaiFENesin Oral Liquid (Sugar-Free) 100 milliGRAM(s) Oral every 6 hours PRN Cough  ondansetron Injectable 4 milliGRAM(s) IV Push every 6 hours PRN Nausea and/or Vomiting      PHYSICAL EXAM:    Vital Signs Last 24 Hrs  T(C): 36.4 (15 Dec 2022 07:50), Max: 36.6 (14 Dec 2022 19:05)  T(F): 97.5 (15 Dec 2022 07:50), Max: 97.9 (14 Dec 2022 19:05)  HR: 55 (15 Dec 2022 08:00) (48 - 82)  BP: 102/40 (15 Dec 2022 08:00) (70/42 - 119/71)  BP(mean): 56 (15 Dec 2022 08:00) (38 - 76)  RR: 18 (15 Dec 2022 08:00) (14 - 20)  SpO2: 98% (15 Dec 2022 08:00) (83% - 100%)    Parameters below as of 15 Dec 2022 08:00  Patient On (Oxygen Delivery Method): nasal cannula, high flow  O2 Flow (L/min): 30  O2 Concentration (%): 30    Karnofsky: 30 %  Gen: In NAD. No pain behaviors or accessory muscle use noted  Neuro: alert and oriented x 3, communicates needs  Head: NC/AT  Eyes: sclerae non-icteric  ENT: moist oral mucosa, no lip ulcerations  Resp: few, faint wheezes, on NC  CV: S1, S2  Abd: soft, mild TTP on light palpation: LUQ, no guarding + bowels sounds  : no henson in place  Peripheral Vasc: no pedal edema  Int: warm and dry  Psych: calm, no psychomotor agitation      LABS:                        8.8    6.79  )-----------( 191      ( 15 Dec 2022 05:35 )             27.3     12-15    144  |  108  |  95.4<H>  ----------------------------<  140<H>  2.9<LL>   |  23.0  |  1.65<H>    Ca    8.4      15 Dec 2022 05:35  Phos  3.3     12-14  Mg     2.9     12-15    TPro  5.0<L>  /  Alb  3.1<L>  /  TBili  0.5  /  DBili  x   /  AST  15  /  ALT  13  /  AlkPhos  52  12-15    PT/INR - ( 14 Dec 2022 13:18 )   PT: 11.1 sec;   INR: 0.96 ratio             I&O's Summary    14 Dec 2022 07:01  -  15 Dec 2022 07:00  --------------------------------------------------------  IN: 860 mL / OUT: 0 mL / NET: 860 mL        RADIOLOGY & ADDITIONAL STUDIES: reviweed    ADVANCE DIRECTIVES:   - HCP form completed with this writer  - RANDI form on file: DNR/I         HPI:  89 y/o F with PMH HTN, [place] s/p cochlear implants, 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 She has been having cough, without any change. Denies SOB, CP, fever, abd pain, constipation, N, sick contact. Admits to overeating last night. states she was on thyroid meds which her PMD stopped. and KCL was increased recently.  In Triage . In ED noted O2 saturation 88- 91%.     SH- smoke 1ppd or more since age 15 yrs to 50 yrs. 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  Noted cough, without any recent change.  Admits to overeating night prior to presentation.  Denies SOB, CP, fever, abd pain, constipation, nausea, sick contact on presentation.  Reports that patient was on thyroid medicine that her PMD stopped.  KCl was increased recently.  Noted to be febrile in the ED: 102, hypoxemic: 88-91%.  Admitted with acute hypoxemic respiratory failure due to a possible COPD exacerbation 2/2 PNA.    Course notable for [place], worsening respiratory status, w/fluid overload, HF exacerbation, received IV Lasix.  Patient was found with stenotrophomonas in the sputum, started on zoysn.  On 12/14, RRT was called 2/2 worsening mental status, possible dark BM concerning for melena.  Found to be hypotensive: 70 / 42, received albumin, 1L fluid, and midodrine.  MICU called.  Found hypoxemic at that time, with a wet cough (89% on 4 liters via NC), concern for aspiration pneumonia.  Per patient's own wishes, a MOLST had been completed, reflecting DNR/DNI orders. Patient also identified her daughter, Andressa, as her health care proxy.  Palliative care consulted for goals of care.      Patient seen and examined at bedside.  Limited HPI, ROS from patient.  Endorses fatigue, also reports that she is extremely [place], requiring written communication.    - Reports that she came to the hospital, after she woke up in the middle of the night, and soon after vomited.  Shares that she felt generally unwell, prompting her daughter to call 911.    - Reports that she knows that she is being treated for pneumonia.  Reports that she has been spitting out phlegm, and that with coughing, she can feel herself short of breath.  No shortness of breath at this time.  - Share that she has had some new mild abdominal pain on the left side.  Unable to identify when it began, as well as its quality.  Unable to identify what makes it feel better, states it feels worse when touched.  Does not need analgesia at this time.     PERTINENT PMH REVIEWED: Yes     PAST MEDICAL & SURGICAL HISTORY:  Essential hypertension    Chronic obstructive pulmonary disease, unspecified COPD type      Hypothyroidism, unspecified type      H/O exploratory laparotomy  for a benign colon mass 4/19/12      SOCIAL HISTORY:  from home    Surrogate/HCP/Guardian: Andressa Nichols Phone#: [phone]    FAMILY HISTORY:  unable to obtain from patient at time of interview: [place], fatigue        Baseline ADLs (prior to admission): unable to obtain at this time, given [place], fatigue     Allergies    codeine (Vomiting)  iodine (Unknown)    Intolerances        Present Symptoms:     Dyspnea: with coughing  Nausea/Vomiting: No  Anxiety:  No  Depression: No  Fatigue: Yes  Loss of appetite: NPO    Pain: abd pain as above    Review of Systems: Reviewed: limited 2/2 fatigue, [place].  When asked if there is anything else bothering her, apart from positive ROS identified, patient says no.                     Positive: general weakness      MEDICATIONS  (STANDING):  albuterol/ipratropium for Nebulization 3 milliLiter(s) Nebulizer every 6 hours  albuterol/ipratropium for Nebulization. 3 milliLiter(s) Nebulizer once  albuterol/ipratropium for Nebulization. 3 milliLiter(s) Nebulizer once  atorvastatin 40 milliGRAM(s) Oral at bedtime  levothyroxine 100 MICROGram(s) Oral daily  midodrine 10 milliGRAM(s) Oral every 8 hours  pantoprazole    Tablet 40 milliGRAM(s) Oral before breakfast  piperacillin/tazobactam IVPB.- 3.375 Gram(s) IV Intermittent once  piperacillin/tazobactam IVPB.. 3.375 Gram(s) IV Intermittent every 8 hours  polyethylene glycol 3350 17 Gram(s) Oral daily  potassium chloride  10 mEq/100 mL IVPB 10 milliEquivalent(s) IV Intermittent every 1 hour  predniSONE   Tablet 20 milliGRAM(s) Oral daily  senna 2 Tablet(s) Oral daily  sodium chloride 0.9% with potassium chloride 20 mEq/L 1000 milliLiter(s) (60 mL/Hr) IV Continuous <Continuous>  sodium chloride 3%  Inhalation 4 milliLiter(s) Inhalation every 12 hours    MEDICATIONS  (PRN):  guaiFENesin Oral Liquid (Sugar-Free) 100 milliGRAM(s) Oral every 6 hours PRN Cough  ondansetron Injectable 4 milliGRAM(s) IV Push every 6 hours PRN Nausea and/or Vomiting      PHYSICAL EXAM:    Vital Signs Last 24 Hrs  T(C): 36.4 (15 Dec 2022 07:50), Max: 36.6 (14 Dec 2022 19:05)  T(F): 97.5 (15 Dec 2022 07:50), Max: 97.9 (14 Dec 2022 19:05)  HR: 55 (15 Dec 2022 08:00) (48 - 82)  BP: 102/40 (15 Dec 2022 08:00) (70/42 - 119/71)  BP(mean): 56 (15 Dec 2022 08:00) (38 - 76)  RR: 18 (15 Dec 2022 08:00) (14 - 20)  SpO2: 98% (15 Dec 2022 08:00) (83% - 100%)    Parameters below as of 15 Dec 2022 08:00  Patient On (Oxygen Delivery Method): nasal cannula, high flow  O2 Flow (L/min): 30  O2 Concentration (%): 30    Karnofsky: 30 %  Gen: In NAD. No pain behaviors or accessory muscle use noted  Neuro: alert and oriented x 3, communicates needs  Head: NC/AT  Eyes: sclerae non-icteric  ENT: moist oral mucosa, no lip ulcerations  Resp: few, faint wheezes, on NC  CV: S1, S2  Abd: soft, mild TTP on light palpation: LUQ, no guarding + bowels sounds  : no henson in place  Peripheral Vasc: no pedal edema  Int: warm and dry  Psych: calm, no psychomotor agitation      LABS:                        8.8    6.79  )-----------( 191      ( 15 Dec 2022 05:35 )             27.3     12-15    144  |  108  |  95.4<H>  ----------------------------<  140<H>  2.9<LL>   |  23.0  |  1.65<H>    Ca    8.4      15 Dec 2022 05:35  Phos  3.3     12-14  Mg     2.9     12-15    TPro  5.0<L>  /  Alb  3.1<L>  /  TBili  0.5  /  DBili  x   /  AST  15  /  ALT  13  /  AlkPhos  52  12-15    PT/INR - ( 14 Dec 2022 13:18 )   PT: 11.1 sec;   INR: 0.96 ratio             I&O's Summary    14 Dec 2022 07:01  -  15 Dec 2022 07:00  --------------------------------------------------------  IN: 860 mL / OUT: 0 mL / NET: 860 mL        RADIOLOGY & ADDITIONAL STUDIES: reviwarchana    ADVANCE DIRECTIVES:   - HCP form completed with this writer  - RANDI form on file: DNR/I

## 2022-12-15 NOTE — SWALLOW BEDSIDE ASSESSMENT ADULT - SLP GENERAL OBSERVATIONS
Pt received upright in bed, awake/alert, Ox3, [place] with R-side cochlear implant, O2 40% via HFNC 95-97 throughout, 0/10 pain pre & post assessment
Pt received & seen seated upright in bed, awake/alert, 02 NC, reduced hearing acuity, daughter present, baseline cough, 0/10 pain pre/post

## 2022-12-15 NOTE — PROGRESS NOTE ADULT - SUBJECTIVE AND OBJECTIVE BOX
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 Pt was started on CAP coverage and tx for COPD exacerbation. She was also receiving IVFs for [place] with concern for possible volume overload subsequently and was being diuresed. CT chest 12/7 with extensive tracheobronchial secretions with partial atelectasis b/l lower lobes.       INTERVAL HPI/OVERNIGHT EVENTS:  Pt had an RRT yesterday in the setting of hypotension potentially related to overdiuresis. There was concern for GI bleed given dark stools noted yesterday with slight downtrend in Hgb but per nursing, pt had brown stool overnight. Pt received fluid bolus, albumin and was started on midodrine with improvement in BP. She was lethargic in the setting of this but mental status improved this AM. Pt very hard of hearing but reports she feels somewhat improved and reports she has not been coughing much. Pt has had some episodes of bradycardia but improves with activity. Pt was on high flow 30% 30L and sating high 90s and transitioned to nasal cannula.    MEDICATIONS  (STANDING):  albuterol/ipratropium for Nebulization 3 milliLiter(s) Nebulizer every 6 hours  albuterol/ipratropium for Nebulization. 3 milliLiter(s) Nebulizer once  albuterol/ipratropium for Nebulization. 3 milliLiter(s) Nebulizer once  atorvastatin 40 milliGRAM(s) Oral at bedtime  levothyroxine 100 MICROGram(s) Oral daily  midodrine 10 milliGRAM(s) Oral every 8 hours  pantoprazole    Tablet 40 milliGRAM(s) Oral before breakfast  piperacillin/tazobactam IVPB.- 3.375 Gram(s) IV Intermittent once  piperacillin/tazobactam IVPB.. 3.375 Gram(s) IV Intermittent every 8 hours  polyethylene glycol 3350 17 Gram(s) Oral daily  predniSONE   Tablet 20 milliGRAM(s) Oral daily  senna 2 Tablet(s) Oral daily  sodium chloride 0.9% with potassium chloride 20 mEq/L 1000 milliLiter(s) (60 mL/Hr) IV Continuous <Continuous>  sodium chloride 3%  Inhalation 4 milliLiter(s) Inhalation every 12 hours    MEDICATIONS  (PRN):  guaiFENesin Oral Liquid (Sugar-Free) 100 milliGRAM(s) Oral every 6 hours PRN Cough  ondansetron Injectable 4 milliGRAM(s) IV Push every 6 hours PRN Nausea and/or Vomiting    Allergies  codeine (Vomiting)  iodine (Unknown)  Intolerances    PAST MEDICAL & SURGICAL HISTORY:  Essential hypertension  Chronic obstructive pulmonary disease, unspecified COPD type  Hypothyroidism, unspecified type  H/O exploratory laparotomy  for a benign colon mass 4/19/12    REVIEW OF SYSTEMS: Negative except per HPI    Vital Signs Last 24 Hrs  T(C): 36.4 (15 Dec 2022 07:50), Max: 36.6 (14 Dec 2022 19:05)  T(F): 97.5 (15 Dec 2022 07:50), Max: 97.9 (14 Dec 2022 19:05)  HR: 68 (15 Dec 2022 14:00) (42 - 74)  BP: 129/67 (15 Dec 2022 14:00) (70/42 - 129/67)  BP(mean): 58 (15 Dec 2022 10:00) (38 - 76)  RR: 18 (15 Dec 2022 12:12) (14 - 20)  SpO2: 98% (15 Dec 2022 14:00) (83% - 100%)    Parameters below as of 15 Dec 2022 14:00  Patient On (Oxygen Delivery Method): nasal cannula  O2 Flow (L/min): 3    I&O's Detail    14 Dec 2022 07:01  -  15 Dec 2022 07:00  --------------------------------------------------------  IN:    Pantoprazole: 110 mL    Sodium Chloride 0.9% Bolus: 750 mL  Total IN: 860 mL    OUT:  Total OUT: 0 mL    Total NET: 860 mL      15 Dec 2022 07:01  -  15 Dec 2022 15:10  --------------------------------------------------------  IN:  Total IN: 0 mL    OUT:    Indwelling Catheter - Urethral (mL): 2500 mL  Total OUT: 2500 mL    Total NET: -2500 mL      PHYSICAL EXAMINATION:    GENERAL: Alert, in NAD     HEENT: NC/AT, anicteric, MMM    NECK: Supple    LUNGS: bilateral rhonchi, mildly tachypneic no accessory muscle use, on nasal cannula    HEART: S1S2, RRR    ABDOMEN: Soft, nontender, and nondistended    EXTREMITIES: Without any cyanosis, clubbing; 1+ b/l edema    NEUROLOGIC: No focal deficits     LABS:                                    8.8    6.79  )-----------( 191      ( 15 Dec 2022 05:35 )             27.3                             12-15    144  |  108  |  95.4<H>  ----------------------------<  140<H>  2.9<LL>   |  23.0  |  1.65<H>    Ca    8.4      15 Dec 2022 05:35  Phos  3.3     12-14  Mg     2.9     12-15    TPro  5.0<L>  /  Alb  3.1<L>  /  TBili  0.5  /  DBili  x   /  AST  15  /  ALT  13  /  AlkPhos  52  12-15                            RADIOLOGY & ADDITIONAL STUDIES:    ACC: [id number] EXAM:  XR CHEST PORTABLE ROUTINE 1V                          PROCEDURE DATE:  12/08/2022          INTERPRETATION:  Portable chest radiograph    CLINICAL INFORMATION: Fluid overload    TECHNIQUE:  Portable  AP chest radiograph.    COMPARISON: 12/4/2022 chest x-ray and 12/7/2022 chest CT .    FINDINGS:  CATHETERS AND TUBES: None    PULMONARY: Residual LEFT lower lobe airspace consolidation/atelectasis   likely from mucous plug as seen on prior CT scan..   LEFT upper zone and RIGHT lung parenchyma grossly clear.  .   No pneumothorax.    HEART/VASCULAR: The  heart is enlarged in transverse diameter.    BONES: Visualized osseous structures are intact.    IMPRESSION:   Cardiomegaly.  Residual LEFT  lower zone  airspace consolidation..      ZELDA DA SILVA MD; Attending Radiologist  This document has been electronically signed. Dec  8 2022 12:25PM        ACC: [id number] EXAM:  CT CHEST                          PROCEDURE DATE:  12/07/2022          INTERPRETATION:  EXAMINATION: CT CHEST    CLINICAL INDICATION: Dyspnea.    TECHNIQUE: Noncontrast CT of the chest was obtained.    COMPARISON: Multiple CT, most recent 6/3/2021..    FINDINGS:    AIRWAYS AND LUNGS: Tracheobronchial secretions, extensive in the lower   lobes, greater on the left.  Partial atelectasis both lower lobes,   greater on the left. Patchy bilateral lung opacities.    MEDIASTINUM AND PLEURA: There are no enlarged mediastinal, hilar or   axillary lymph nodes. The visualized portion of the thyroid gland is   unremarkable. There are small bilateral pleural effusions.  There is no   pneumothorax.    HEART AND VESSELS: There is mildcardiomegaly.  There are atherosclerotic   calcifications of the aorta and coronary arteries.  There is a small   pericardial effusion.    UPPER ABDOMEN: Images of the upper abdomen demonstrate hiatal hernia with   debris in the esophagus.    BONES AND SOFT TISSUES: There are mild degenerative changes of the spine.    The soft tissues are unremarkable.    AI VERTEBRAL BODY ANALYSIS:  T4: Moderate compression fracture with 26% height loss.    IMPRESSION:  There is a small left pleural effusion. Tracheobronchial secretions,   extensive in the lower lobes, greater on the left.  Partial atelectasis   both lower lobes, greater on the left. Patchy bilateral lung opacities   may be infectious or inflammatory.    VERTEBRAL BODY ANALYSIS: Vertebral compression fractures as described,   consider further workup for osteoporosis.      DUSTY RANGEL MD; Attending Radiologist  This document has been electronically signed. Dec  7 2022 12:03PM    CT chest/abdomen:  IMPRESSION:  Moderate to large hiatal hernia with a patulous esophagus containing debris. Correlate for symptoms of reflux.  Patchy bilateral groundglass opacities, left lung greater than right. Trace left pleural effusion.  Long segment thickening of the descending colon compatible with colitis.  T4: Moderate compression fracture with 28% height loss.    ECHO:    Summary:   1. Normal left ventricular internal cavity size.   2. Normal global left ventricular systolic function.   3. Left ventricular ejection fraction, by visual estimation, is 65 to   70%.   4. Normal right ventricular size and function.   5. The left atrium is normal in size.   6. The right atrium is normal in size.   7. Sclerotic aortic valve with normal opening.   8. Mild tricuspid regurgitation.   9. Normal pulmonary artery pressure.  10. Trace mitral valve regurgitation.  11. Mild pulmonic valve regurgitation.    MD Henri Electronically signed on 11/29/2022 at 3:02:43 PM

## 2022-12-15 NOTE — CONSULT NOTE ADULT - REASON FOR ADMISSION
chills/ cough/ vomits

## 2022-12-15 NOTE — GOALS OF CARE CONVERSATION - ADVANCED CARE PLANNING - CONVERSATION DETAILS
Discussed code status with patient. She expressed wishes for no resuscitation in the event of cardio-pulmoary arrest. She confirms DNR and DNI status. MOLST done.
Patient states she wants her Daughter Andressa to help her make medical decisions. She appoints Andressa as HCP
Spoke to Andressa letting her know mom has low blood pressure and we suspect a GI bleed. Explained that we have given IVFs and her blood pressure remains low. Asked if patient would want aggressive measures to bring up her blood pressure like pressors which would require a central line in the neck. Daughter seems overwhelmed with these question but states to do what we can to try to make her better.   ICU called,   CT c/a/p ordered  repeat cbc and type and screen  stool occult sent  ppi drip ordered  consent for blood transfusion obtained  albumin and additional fluid ordered.
0

## 2022-12-15 NOTE — PROGRESS NOTE ADULT - SUBJECTIVE AND OBJECTIVE BOX
Chief Complaint:  Patient is a 89y old  Female who presents with a chief complaint of chills/ cough/ vomits (15 Dec 2022 09:23)      Interval Events / Subjective: Patient seen and examined at bedside. Hypotensive overnight. She feels well and denies acute complaints. HGB 8.8   BUN/Cr elevated    REVIEW OF SYSTEMS:   General: Negative  HEENT: Negative  CV: Negative  Respiratory: Negative  GI: See HPI  : Negative  MSK: Negative  Hematologic: Negative  Skin: Negative    MEDICATIONS:   MEDICATIONS  (STANDING):  albuterol/ipratropium for Nebulization 3 milliLiter(s) Nebulizer every 6 hours  albuterol/ipratropium for Nebulization. 3 milliLiter(s) Nebulizer once  albuterol/ipratropium for Nebulization. 3 milliLiter(s) Nebulizer once  atorvastatin 40 milliGRAM(s) Oral at bedtime  levothyroxine 100 MICROGram(s) Oral daily  midodrine 10 milliGRAM(s) Oral every 8 hours  pantoprazole    Tablet 40 milliGRAM(s) Oral before breakfast  piperacillin/tazobactam IVPB.- 3.375 Gram(s) IV Intermittent once  piperacillin/tazobactam IVPB.. 3.375 Gram(s) IV Intermittent every 8 hours  polyethylene glycol 3350 17 Gram(s) Oral daily  potassium chloride  10 mEq/100 mL IVPB 10 milliEquivalent(s) IV Intermittent every 1 hour  predniSONE   Tablet 20 milliGRAM(s) Oral daily  senna 2 Tablet(s) Oral daily  sodium chloride 0.9% with potassium chloride 20 mEq/L 1000 milliLiter(s) (60 mL/Hr) IV Continuous <Continuous>  sodium chloride 3%  Inhalation 4 milliLiter(s) Inhalation every 12 hours    MEDICATIONS  (PRN):  guaiFENesin Oral Liquid (Sugar-Free) 100 milliGRAM(s) Oral every 6 hours PRN Cough  ondansetron Injectable 4 milliGRAM(s) IV Push every 6 hours PRN Nausea and/or Vomiting      ALLERGIES:   Allergies    codeine (Vomiting)  iodine (Unknown)    Intolerances        VITAL SIGNS:   Vital Signs Last 24 Hrs  T(C): 36.4 (15 Dec 2022 07:50), Max: 36.6 (14 Dec 2022 19:05)  T(F): 97.5 (15 Dec 2022 07:50), Max: 97.9 (14 Dec 2022 19:05)  HR: 42 (15 Dec 2022 09:31) (42 - 82)  BP: 102/40 (15 Dec 2022 08:00) (70/42 - 119/71)  BP(mean): 56 (15 Dec 2022 08:00) (38 - 76)  RR: 18 (15 Dec 2022 08:00) (14 - 20)  SpO2: 100% (15 Dec 2022 09:31) (83% - 100%)    Parameters below as of 15 Dec 2022 09:31  Patient On (Oxygen Delivery Method): nasal cannula, high flow      I&O's Summary    14 Dec 2022 07:01  -  15 Dec 2022 07:00  --------------------------------------------------------  IN: 860 mL / OUT: 0 mL / NET: 860 mL        PHYSICAL EXAM:   GENERAL:  No acute distress  HEENT:  NC/AT,  conjunctivae clear, sclera -anicteric hard of hearing   CHEST:  Full & symmetric excursion, no increased effort, breath sounds clear  HEART:  Regular rhythm, bradycardic no edema  ABDOMEN:  Soft, non-tender, non-distended, normoactive bowel sounds,  no rebound or guarding  EXTREMITIES: No cyanosis, clubbing or edema  SKIN:  warm/dry  NEURO:  calm, cooperative    LABS:  CBC Full  -  ( 15 Dec 2022 05:35 )  WBC Count : 6.79 K/uL  RBC Count : 2.68 M/uL  Hemoglobin : 8.8 g/dL  Hematocrit : 27.3 %  Platelet Count - Automated : 191 K/uL  Mean Cell Volume : 101.9 fl  Mean Cell Hemoglobin : 32.8 pg  Mean Cell Hemoglobin Concentration : 32.2 gm/dL  Auto Neutrophil # : 5.61 K/uL  Auto Lymphocyte # : 1.07 K/uL  Auto Monocyte # : 0.12 K/uL  Auto Eosinophil # : 0.00 K/uL  Auto Basophil # : 0.00 K/uL  Auto Neutrophil % : 79.1 %  Auto Lymphocyte % : 15.7 %  Auto Monocyte % : 1.7 %  Auto Eosinophil % : 0.0 %  Auto Basophil % : 0.0 %    12-15    144  |  108  |  95.4<H>  ----------------------------<  140<H>  2.9<LL>   |  23.0  |  1.65<H>    Ca    8.4      15 Dec 2022 05:35  Phos  3.3     12-14  Mg     2.9     12-15    TPro  5.0<L>  /  Alb  3.1<L>  /  TBili  0.5  /  DBili  x   /  AST  15  /  ALT  13  /  AlkPhos  52  12-15    LIVER FUNCTIONS - ( 15 Dec 2022 05:35 )  Alb: 3.1 g/dL / Pro: 5.0 g/dL / ALK PHOS: 52 U/L / ALT: 13 U/L / AST: 15 U/L / GGT: x           PT/INR - ( 14 Dec 2022 13:18 )   PT: 11.1 sec;   INR: 0.96 ratio               Culture - Sputum (collected 12 Dec 2022 18:20)  Source: .Sputum Sputum  Gram Stain (13 Dec 2022 03:32):    Few polymorphonuclear leukocytes per low power field    Few Squamous epithelial cells per low power field    Few Yeast like cells seen per oil power field    Rare Gram Variable Rods seen per oil power field    Few Gram positive cocci in pairs seen per oil power field  Final Report (14 Dec 2022 16:42):    Moderate Stenotrophomonas maltophilia    Normal Respiratory Breanna present  Organism: Stenotrophomonas maltophilia (15 Dec 2022 00:10)  Organism: Stenotrophomonas maltophilia (15 Dec 2022 00:10)  Organism: Stenotrophomonas maltophilia (14 Dec 2022 16:42)        RADIOLOGY & ADDITIONAL STUDIES (The following images were personally reviewed):

## 2022-12-15 NOTE — SWALLOW BEDSIDE ASSESSMENT ADULT - SLP PERTINENT HISTORY OF CURRENT PROBLEM
Pt known to this dept & was seen at bedside this admission. Last seen 12/14 with RX for easy to chew solids, thin fluids. Pt now with change in level of care, now NPO s/p rapid response. Swallow re-eval requested Pt known to this dept & was seen at bedside this admission. Last seen 12/5 with RX for easy to chew solids, thin fluids. Pt now with change in level of care, now NPO s/p rapid response. Swallow re-eval requested

## 2022-12-15 NOTE — SWALLOW BEDSIDE ASSESSMENT ADULT - ASR SWALLOW ASPIRATION MONITOR
change of breathing pattern/oral hygiene/position upright (89Y)/cough/gurgly voice/pneumonia/throat clearing/upper respiratory infection
change of breathing pattern/oral hygiene/position upright (89Y)/cough/gurgly voice/fever/pneumonia/throat clearing/upper respiratory infection

## 2022-12-15 NOTE — PROGRESS NOTE ADULT - SUBJECTIVE AND OBJECTIVE BOX
[facility]                              [address] [place] [address]                                                 PHONE: [phone]                                                 FAX: [phone]  -----------------------------------------------------------------------------------------------  Pt seen and examined. FU for bradycardia    Overnight events/Complaints: Pt without complains. Extremely [place]. Appears comfortable in bed on O2. Had episode of bradycardia [36] with resting HR in 60's    Vital Signs Last 24 Hrs  T(C): 36.4 (15 Dec 2022 07:50), Max: 36.6 (14 Dec 2022 19:05)  T(F): 97.5 (15 Dec 2022 07:50), Max: 97.9 (14 Dec 2022 19:05)  HR: 50 (15 Dec 2022 10:00) (42 - 76)  BP: 120/40 (15 Dec 2022 10:00) (70/42 - 120/40)  BP(mean): 58 (15 Dec 2022 10:00) (38 - 76)  RR: 18 (15 Dec 2022 10:00) (14 - 20)  SpO2: 98% (15 Dec 2022 10:00) (83% - 100%)    Parameters below as of 15 Dec 2022 10:00  Patient On (Oxygen Delivery Method): nasal cannula, high flow  O2 Flow (L/min): 30  O2 Concentration (%): 30  I&O's Summary    14 Dec 2022 07:01  -  15 Dec 2022 07:00  --------------------------------------------------------  IN: 860 mL / OUT: 0 mL / NET: 860 mL        PHYSICAL EXAM:  Constitutional: Appears well; alert and co-operative  HEENT:     Head: Normocephalic and atraumatic  Neck: supple. No JVD  Cardiovascular: regular S1 S2  Respiratory: Lungs clear to auscultation; no crepitations, no wheeze  Gastrointestinal:  Soft, Non-tender, + BS	  Musculoskeletal: Normal range of motion. No edema  Skin: Warm and dry. No cyanosis . No diaphoresis.  Neurologic: No tremor.        LABS:                        8.8    6.79  )-----------( 191      ( 15 Dec 2022 05:35 )             27.3     12-15    144  |  108  |  95.4<H>  ----------------------------<  140<H>  2.9<LL>   |  23.0  |  1.65<H>    Ca    8.4      15 Dec 2022 05:35  Phos  3.3     12-14  Mg     2.9     12-15    TPro  5.0<L>  /  Alb  3.1<L>  /  TBili  0.5  /  DBili  x   /  AST  15  /  ALT  13  /  AlkPhos  52  12-15    CARDIAC MARKERS ( 15 Dec 2022 00:10 )  x     / 0.02 ng/mL / x     / x     / x      CARDIAC MARKERS ( 14 Dec 2022 13:18 )  x     / 0.02 ng/mL / x     / x     / x          PT/INR - ( 14 Dec 2022 13:18 )   PT: 11.1 sec;   INR: 0.96 ratio        RADIOLOGY & ADDITIONAL STUDIES: (reviewed)  CXR was independently visualized/reviewed  and demonstrated: Mild platelike atelectasis at the left base, markedly improved.    Nonobstructive bowel gas pattern    CARDIOLOGY TESTING:(reviewed)     ECG (Independent visualization): NSR with PVCs    ECHOCARDIOGRAM  (Independent visualization):  Summary:   1. Normal left ventricular internal cavity size.   2. Normal global left ventricular systolic function.   3. Left ventricular ejection fraction, by visual estimation, is 65 to   70%.   4. Normal right ventricular size and function.   5. The left atrium is normal in size.   6. The right atrium is normal in size.   7. Sclerotic aortic valve with normal opening.   8. Mild tricuspid regurgitation.   9. Normal pulmonary artery pressure.  10. Trace mitral valve regurgitation.  11. Mild pulmonic valve regurgitation.    TELEMETRY independently visualized/reviewed and demonstrated : sinus bradycardia with no obvious heart block    MEDICATIONS:(reviewed)  MEDICATIONS  (STANDING):  albuterol/ipratropium for Nebulization 3 milliLiter(s) Nebulizer every 6 hours  albuterol/ipratropium for Nebulization. 3 milliLiter(s) Nebulizer once  albuterol/ipratropium for Nebulization. 3 milliLiter(s) Nebulizer once  atorvastatin 40 milliGRAM(s) Oral at bedtime  levothyroxine 100 MICROGram(s) Oral daily  midodrine 10 milliGRAM(s) Oral every 8 hours  pantoprazole    Tablet 40 milliGRAM(s) Oral before breakfast  piperacillin/tazobactam IVPB.- 3.375 Gram(s) IV Intermittent once  piperacillin/tazobactam IVPB.. 3.375 Gram(s) IV Intermittent every 8 hours  polyethylene glycol 3350 17 Gram(s) Oral daily  predniSONE   Tablet 20 milliGRAM(s) Oral daily  senna 2 Tablet(s) Oral daily  sodium chloride 0.9% with potassium chloride 20 mEq/L 1000 milliLiter(s) (60 mL/Hr) IV Continuous <Continuous>  sodium chloride 3%  Inhalation 4 milliLiter(s) Inhalation every 12 hours      ASSESSMENT AND PLAN:    89y Female with prior history of HTN, [place] s/p cochlear implants, 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 Being treated for COPD, PNA and ARF.  Had episode of bradycardia [36] with resting HR in 60's    Bradycardia  - resting bradycardia but no heart block ? worsened with vagal tone  - Not on AV blockers  - Midodrine added for low BP-stable  - monitor H/H closely  - MOLST was completed, reflecting DNR/DNI orders  - No further intervention warranted. No need for additional telemetry monitoring    Plan d/w Dr. Flores

## 2022-12-15 NOTE — SWALLOW BEDSIDE ASSESSMENT ADULT - SWALLOW EVAL: RECOMMENDED DIET
NPO; consider short term non-oral means of hydration/nutrition as per pt/family wishes
easy to chew solids, thin fluids

## 2022-12-15 NOTE — PROGRESS NOTE ADULT - ASSESSMENT
89 year old female with a history of COPD here with COPD exacerbation and PNA. GI consulted for occult positive stool found after disimpaction.       Occult Positive Anemia  Continue pantoprazole 40mg daily particularly is she is to remain on steroids  Monitor HGB and maintain >7-8  Avoid nsaids  Advance diet as tolerated  No plans for endoscopic intervention at this time, if she develops hemodynamically significant GI bleeding consider CTA  She is high risk for sedation give current respiratory status     Macrocytic anemia  Check B12, folate, tsh   Monitor HGB and maintain >7-8    Constipation   Continue bowel regimen for constipation

## 2022-12-16 DIAGNOSIS — K59.00 CONSTIPATION, UNSPECIFIED: ICD-10-CM

## 2022-12-16 DIAGNOSIS — D53.9 NUTRITIONAL ANEMIA, UNSPECIFIED: ICD-10-CM

## 2022-12-16 LAB
ALBUMIN SERPL ELPH-MCNC: 3 G/DL — LOW (ref 3.3–5.2)
ALP SERPL-CCNC: 53 U/L — SIGNIFICANT CHANGE UP (ref 40–120)
ALT FLD-CCNC: 16 U/L — SIGNIFICANT CHANGE UP
ANION GAP SERPL CALC-SCNC: 10 MMOL/L — SIGNIFICANT CHANGE UP (ref 5–17)
AST SERPL-CCNC: 23 U/L — SIGNIFICANT CHANGE UP
BASOPHILS # BLD AUTO: 0 K/UL — SIGNIFICANT CHANGE UP (ref 0–0.2)
BASOPHILS NFR BLD AUTO: 0 % — SIGNIFICANT CHANGE UP (ref 0–2)
BILIRUB SERPL-MCNC: 0.4 MG/DL — SIGNIFICANT CHANGE UP (ref 0.4–2)
BUN SERPL-MCNC: 68.6 MG/DL — HIGH (ref 8–20)
CALCIUM SERPL-MCNC: 8.6 MG/DL — SIGNIFICANT CHANGE UP (ref 8.4–10.5)
CHLORIDE SERPL-SCNC: 110 MMOL/L — HIGH (ref 96–108)
CO2 SERPL-SCNC: 24 MMOL/L — SIGNIFICANT CHANGE UP (ref 22–29)
CREAT SERPL-MCNC: 1.49 MG/DL — HIGH (ref 0.5–1.3)
EGFR: 33 ML/MIN/1.73M2 — LOW
EOSINOPHIL # BLD AUTO: 0 K/UL — SIGNIFICANT CHANGE UP (ref 0–0.5)
EOSINOPHIL NFR BLD AUTO: 0 % — SIGNIFICANT CHANGE UP (ref 0–6)
FERRITIN SERPL-MCNC: 252 NG/ML — HIGH (ref 15–150)
FOLATE SERPL-MCNC: 12.1 NG/ML — SIGNIFICANT CHANGE UP
GLUCOSE BLDC GLUCOMTR-MCNC: 170 MG/DL — HIGH (ref 70–99)
GLUCOSE SERPL-MCNC: 113 MG/DL — HIGH (ref 70–99)
HCT VFR BLD CALC: 26.2 % — LOW (ref 34.5–45)
HGB BLD-MCNC: 8.6 G/DL — LOW (ref 11.5–15.5)
IRON SATN MFR SERPL: 12 % — LOW (ref 14–50)
IRON SATN MFR SERPL: 33 UG/DL — LOW (ref 37–145)
LYMPHOCYTES # BLD AUTO: 0.7 K/UL — LOW (ref 1–3.3)
LYMPHOCYTES # BLD AUTO: 10.4 % — LOW (ref 13–44)
MAGNESIUM SERPL-MCNC: 2.7 MG/DL — HIGH (ref 1.6–2.6)
MANUAL SMEAR VERIFICATION: SIGNIFICANT CHANGE UP
MCHC RBC-ENTMCNC: 32.8 GM/DL — SIGNIFICANT CHANGE UP (ref 32–36)
MCHC RBC-ENTMCNC: 33.6 PG — SIGNIFICANT CHANGE UP (ref 27–34)
MCV RBC AUTO: 102.3 FL — HIGH (ref 80–100)
METAMYELOCYTES # FLD: 0.9 % — HIGH (ref 0–0)
MONOCYTES # BLD AUTO: 0.11 K/UL — SIGNIFICANT CHANGE UP (ref 0–0.9)
MONOCYTES NFR BLD AUTO: 1.7 % — LOW (ref 2–14)
MYELOCYTES NFR BLD: 0.9 % — HIGH (ref 0–0)
NEUTROPHILS # BLD AUTO: 5.68 K/UL — SIGNIFICANT CHANGE UP (ref 1.8–7.4)
NEUTROPHILS NFR BLD AUTO: 80 % — HIGH (ref 43–77)
NEUTS BAND # BLD: 4.4 % — SIGNIFICANT CHANGE UP (ref 0–8)
OVALOCYTES BLD QL SMEAR: SLIGHT — SIGNIFICANT CHANGE UP
PHOSPHATE SERPL-MCNC: 2.4 MG/DL — SIGNIFICANT CHANGE UP (ref 2.4–4.7)
PLAT MORPH BLD: NORMAL — SIGNIFICANT CHANGE UP
PLATELET # BLD AUTO: 186 K/UL — SIGNIFICANT CHANGE UP (ref 150–400)
POLYCHROMASIA BLD QL SMEAR: SLIGHT — SIGNIFICANT CHANGE UP
POTASSIUM SERPL-MCNC: 3.5 MMOL/L — SIGNIFICANT CHANGE UP (ref 3.5–5.3)
POTASSIUM SERPL-SCNC: 3.5 MMOL/L — SIGNIFICANT CHANGE UP (ref 3.5–5.3)
PROMYELOCYTES # FLD: 1.7 % — HIGH (ref 0–0)
PROT SERPL-MCNC: 5.2 G/DL — LOW (ref 6.6–8.7)
RBC # BLD: 2.56 M/UL — LOW (ref 3.8–5.2)
RBC # FLD: 15 % — HIGH (ref 10.3–14.5)
RBC BLD AUTO: ABNORMAL
SODIUM SERPL-SCNC: 144 MMOL/L — SIGNIFICANT CHANGE UP (ref 135–145)
TIBC SERPL-MCNC: 270 UG/DL — SIGNIFICANT CHANGE UP (ref 220–430)
TRANSFERRIN SERPL-MCNC: 189 MG/DL — LOW (ref 192–382)
VIT B12 SERPL-MCNC: 765 PG/ML — SIGNIFICANT CHANGE UP (ref 232–1245)
WBC # BLD: 6.73 K/UL — SIGNIFICANT CHANGE UP (ref 3.8–10.5)
WBC # FLD AUTO: 6.73 K/UL — SIGNIFICANT CHANGE UP (ref 3.8–10.5)

## 2022-12-16 PROCEDURE — 99233 SBSQ HOSP IP/OBS HIGH 50: CPT

## 2022-12-16 PROCEDURE — 99232 SBSQ HOSP IP/OBS MODERATE 35: CPT

## 2022-12-16 RX ORDER — MIDODRINE HYDROCHLORIDE 2.5 MG/1
5 TABLET ORAL EVERY 8 HOURS
Refills: 0 | Status: DISCONTINUED | OUTPATIENT
Start: 2022-12-16 | End: 2022-12-18

## 2022-12-16 RX ORDER — ENOXAPARIN SODIUM 100 MG/ML
30 INJECTION SUBCUTANEOUS EVERY 24 HOURS
Refills: 0 | Status: DISCONTINUED | OUTPATIENT
Start: 2022-12-16 | End: 2022-12-22

## 2022-12-16 RX ORDER — NYSTATIN 500MM UNIT
500000 POWDER (EA) MISCELLANEOUS
Refills: 0 | Status: DISCONTINUED | OUTPATIENT
Start: 2022-12-16 | End: 2022-12-22

## 2022-12-16 RX ADMIN — ATORVASTATIN CALCIUM 40 MILLIGRAM(S): 80 TABLET, FILM COATED ORAL at 21:04

## 2022-12-16 RX ADMIN — SENNA PLUS 2 TABLET(S): 8.6 TABLET ORAL at 21:04

## 2022-12-16 RX ADMIN — Medication 1 TABLET(S): at 05:51

## 2022-12-16 RX ADMIN — Medication 500000 UNIT(S): at 11:32

## 2022-12-16 RX ADMIN — MIDODRINE HYDROCHLORIDE 10 MILLIGRAM(S): 2.5 TABLET ORAL at 02:09

## 2022-12-16 RX ADMIN — Medication 3 MILLILITER(S): at 14:59

## 2022-12-16 RX ADMIN — Medication 1 TABLET(S): at 17:28

## 2022-12-16 RX ADMIN — POLYETHYLENE GLYCOL 3350 17 GRAM(S): 17 POWDER, FOR SOLUTION ORAL at 11:32

## 2022-12-16 RX ADMIN — Medication 500000 UNIT(S): at 17:28

## 2022-12-16 RX ADMIN — ENOXAPARIN SODIUM 30 MILLIGRAM(S): 100 INJECTION SUBCUTANEOUS at 17:27

## 2022-12-16 RX ADMIN — Medication 3 MILLILITER(S): at 08:30

## 2022-12-16 RX ADMIN — Medication 3 MILLILITER(S): at 21:03

## 2022-12-16 RX ADMIN — PANTOPRAZOLE SODIUM 40 MILLIGRAM(S): 20 TABLET, DELAYED RELEASE ORAL at 05:52

## 2022-12-16 RX ADMIN — Medication 100 MICROGRAM(S): at 05:51

## 2022-12-16 RX ADMIN — Medication 3 MILLILITER(S): at 04:00

## 2022-12-16 RX ADMIN — Medication 10 MILLIGRAM(S): at 05:52

## 2022-12-16 NOTE — PROGRESS NOTE ADULT - PROBLEM SELECTOR PLAN 2
CT 12/15- Patulous esophagus containing debris. Circumferential long segment thickening of the descending colon with pericolonic inflammation. Diverticulosis of the descending and sigmoid colon. Marked urinary bladder distention above the level of the umbilicus. S/p henson placement     - Continue bowel regimen  - High fiber diet   - Maintain head of bed elevation and aspiration precautions   - Diet recommendations per speech  _________________________________________________________________  Assessment and recommendations are final when note is signed by the attending physician. CT 12/15- Patulous esophagus containing debris. Circumferential long segment thickening of the descending colon with pericolonic inflammation. Diverticulosis of the descending and sigmoid colon. Marked urinary bladder distention above the level of the umbilicus. S/p henson placement     - Continue bowel regimen  - High fiber diet   - Maintain head of bed elevation and aspiration precautions   - Diet recommendations per speech  _________________________________________________________________

## 2022-12-16 NOTE — PROGRESS NOTE ADULT - ASSESSMENT
89 y/o F with PMHx COPD here with COPD exacerbation and PNA. GI consulted for occult positive stool found after disimpaction.

## 2022-12-16 NOTE — PROGRESS NOTE ADULT - SUBJECTIVE AND OBJECTIVE BOX
[facility]  INFECTIOUS DISEASES at [place] and [place]  ===============================================================                               Sebastián Allen MD*     Yue Mcclain MD*                         Polina Brar MD*       Uma De Leon MD*            Diplomates American Board of Internal Medicine & Infectious Diseases                * [place] Office - Appt - Tel  [phone] Fax [phone]                * [place] Office - Appt - Tel [phone] Fax [phone]                                  Hospital Consult line:  [phone]  ==============================================================    TERESA NESS [id number]    Follow up: Stenotrophomonas    Seen in AM   Doing better  No acute events  VSS   Afebrile  Weaned down to NC     I have personally reviewed the labs and data; pertinent labs and data are listed in this note; please see below.     _______________________________________________________________  REVIEW OF SYSTEMS  Limited by [place] but admits to feeling better except for soreness in her mouth   ________________________________________________________________  Allergies:  codeine (Vomiting)  iodine (Unknown)  ________________________________________________________________  PHYSICAL EXAM  GEN: elderly woman n NAD, lying in bed.   HEENT: Anicteric sclerae, +thrush  [place]  LUNGS: Unlabored breathing, coarse BS b/l   HEART: RRR, no m/r/g  ABDOMEN: Soft, NT, ND, no HSM  : Espinal  EXTREMITIES: well perfused, without  edema.  NEUROLOGIC: Grossly no focal deficits   PSYCHIATRIC: Appropriate affect and mood  LINES: PIV  ________________________________________________________________  Vitals:  T(F): 97.7 (16 Dec 2022 07:43), Max: 98 (15 Dec 2022 20:00)  HR: 80 (16 Dec 2022 14:00)  BP: 141/57 (16 Dec 2022 14:00)  RR: 16 (16 Dec 2022 14:00)  SpO2: 99% (16 Dec 2022 14:00) (95% - 100%)  temp max in last 48H T(F): , Max: 98 (12-15-22 @ 20:00)    Current Antibiotics:  nystatin    Suspension 869078 Unit(s) Swish and Swallow four times a day  trimethoprim   80 mG/sulfamethoxazole 400 mG 1 Tablet(s) Oral every 12 hours    Other medications:  albuterol/ipratropium for Nebulization 3 milliLiter(s) Nebulizer every 6 hours  albuterol/ipratropium for Nebulization. 3 milliLiter(s) Nebulizer once  albuterol/ipratropium for Nebulization. 3 milliLiter(s) Nebulizer once  atorvastatin 40 milliGRAM(s) Oral at bedtime  enoxaparin Injectable 30 milliGRAM(s) SubCutaneous every 24 hours  levothyroxine 100 MICROGram(s) Oral daily  midodrine 5 milliGRAM(s) Oral every 8 hours  pantoprazole    Tablet 40 milliGRAM(s) Oral before breakfast  polyethylene glycol 3350 17 Gram(s) Oral daily  predniSONE   Tablet 10 milliGRAM(s) Oral daily  senna 2 Tablet(s) Oral daily                            8.6    6.73  )-----------( 186      ( 16 Dec 2022 06:13 )             26.2     12-16    144  |  110<H>  |  68.6<H>  ----------------------------<  113<H>  3.5   |  24.0  |  1.49<H>    Ca    8.6      16 Dec 2022 06:13  Phos  2.4     12-16  Mg     2.7     12-16    TPro  5.2<L>  /  Alb  3.0<L>  /  TBili  0.4  /  DBili  x   /  AST  23  /  ALT  16  /  AlkPhos  53  12-16    RECENT CULTURES:  12-14 @ 17:10 .Blood Blood     No growth to date.    12-12 @ 18:20 .Sputum Sputum Stenotrophomonas maltophilia    Moderate Stenotrophomonas maltophilia  S to TMP/SMX. minocycline and levo   Normal Respiratory [place] present    Few polymorphonuclear leukocytes per low power field  Few Squamous epithelial cells per low power field  Few Yeast like cells seen per oil power field  Rare Gram Variable Rods seen per oil power field  Few Gram positive cocci in pairs seen per oil power field      12-07 @ 06:35    RVP with SARS-CoV-2   NotDetec      WBC Count: 6.73 K/uL (12-16-22 @ 06:13)  WBC Count: 6.79 K/uL (12-15-22 @ 05:35)  WBC Count: 7.45 K/uL (12-14-22 @ 17:10)  WBC Count: 6.96 K/uL (12-14-22 @ 13:18)  WBC Count: 7.05 K/uL (12-14-22 @ 07:06)  WBC Count: 9.71 K/uL (12-13-22 @ 04:50)  WBC Count: 10.28 K/uL (12-12-22 @ 07:43)    Creatinine, Serum: 1.49 mg/dL (12-16-22 @ 06:13)  Creatinine, Serum: 1.54 mg/dL (12-15-22 @ 17:05)  Creatinine, Serum: 1.65 mg/dL (12-15-22 @ 05:35)  Creatinine, Serum: 1.62 mg/dL (12-14-22 @ 13:18)  Creatinine, Serum: 1.42 mg/dL (12-14-22 @ 07:06)  Creatinine, Serum: 1.67 mg/dL (12-13-22 @ 04:50)  Creatinine, Serum: 1.87 mg/dL (12-12-22 @ 07:43)    Procalcitonin, Serum: 0.19 ng/mL (12-14-22 @ 17:10)  Procalcitonin, Serum: 0.24 ng/mL (12-12-22 @ 07:43)  Procalcitonin, Serum: 0.19 ng/mL (12-10-22 @ 12:30)     SARS-CoV-2: NotDetec (12-07-22 @ 06:35)  SARS-CoV-2 Result: NotDetec (11-29-22 @ 05:15)    ________________________________________________________________  RADIOLOGY  < from: CT Abdomen and Pelvis No Cont (12.15.22 @ 13:28) >  FINDINGS:  CHEST:  LUNGS AND LARGE AIRWAYS: Patent central airways. Patchy bilateral   groundglass opacities, left lung greater than right. Compressive   atelectasis in the left lower lobe adjacent to a hiatal hernia..  PLEURA: Trace left pleural effusion.  VESSELS: Atherosclerotic changes of the aorta and coronary arteries.  HEART: Cardiomegaly. No pericardial effusion.  MEDIASTINUM AND MAXIMINO: No lymphadenopathy. Moderate to large hiatal   hernia. Patulous esophagus containing debris  CHEST WALL AND LOWER NECK: Within normal limits.    ABDOMEN AND PELVIS:  LIVER: Within normal limits.  BILE DUCTS: Normal caliber.  GALLBLADDER: Cholelithiasis.  SPLEEN: Within normal limits.  PANCREAS: Within normal limits.  ADRENALS: Within normal limits.  KIDNEYS/URETERS: Within normal limits.    BLADDER: Marked urinary bladder distention above the level of the   umbilicus.  REPRODUCTIVE ORGANS: Fibroid uterus.    BOWEL: Stable lipoma of the ascending colon. Ileocolic anastomosis. No   bowel obstruction. Circumferential long segment thickening of the   descending colon with pericolonic inflammation. Diverticulosis of the   descending and sigmoid colon.  PERITONEUM: No ascites.  VESSELS: Atherosclerotic changes.  RETROPERITONEUM/LYMPH NODES: No lymphadenopathy.  ABDOMINAL WALL: Within normal limits.  BONES: Degenerative changes of the spine and glenohumeral joints. Mild   lumbar levoscoliosis.  AI VERTEBRAL BODY ANALYSIS:  T4: Moderate compression fracture with 28% height loss.    IMPRESSION:  Moderate to large hiatal hernia with a patulous esophagus containing   debris. Correlate for symptoms of reflux.  Patchy bilateral groundglass opacities, left lung greater than right.   Trace left pleural effusion.  Long segment thickening of the descending colon compatible with colitis.  T4: Moderate compression fracture with 28% height loss.    < end of copied text >

## 2022-12-16 NOTE — PROGRESS NOTE ADULT - ASSESSMENT
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 Also found to have uncontrolled hypothyroidism followed by endo. Hospital course notable for episodes of hypoxemia/ fluid overload responsive to diuresis.     ID consulted for Stenotrophomonas growing in sputum culture from 12/12. Culture was obtained after brief episode of desaturation. Otherwise patient has remained with stable oxygen requirements by NC, afebrile and w/o leukocytosis.     Started on piperacillin-tazobactam today 12/14 after syncopal event.     Impression:  Hypoxic respiratory failure  COPD  Sputum culture positive for Stenotrophomonas     Plan:  - Continue Bactrim at current dose   - Would treat for 5 days, provided continued clinical improvement   - Add Nystatin for oral thrush   - CT chest noted   - 12/7 RVP neg   - 12/14 CXR improved compared to 12/8   - Monitor temp curve  - Monitor WBC   - Monitor oxygen requirements   - Aspiration precautions     d/w Dr. Flores

## 2022-12-16 NOTE — PROGRESS NOTE ADULT - SUBJECTIVE AND OBJECTIVE BOX
[facility] Division of Hospital Medicine    Chief Complaint: cough and vomiting    SUBJECTIVE / OVERNIGHT EVENTS: Patient seen and examined, hearing aid in and she is alert and oriented by 3. Notes she is feeling better. Less sputum production.  Eating ok. Per RN had BM. Denies abdominal pain    Patient denies chest pain, abd pain, N/V, fever, chills, dysuria or any other complaints. All remainder ROS negative.     MEDICATIONS  (STANDING):  albuterol/ipratropium for Nebulization 3 milliLiter(s) Nebulizer every 6 hours  albuterol/ipratropium for Nebulization. 3 milliLiter(s) Nebulizer once  albuterol/ipratropium for Nebulization. 3 milliLiter(s) Nebulizer once  atorvastatin 40 milliGRAM(s) Oral at bedtime  levothyroxine 100 MICROGram(s) Oral daily  midodrine 5 milliGRAM(s) Oral every 8 hours  nystatin    Suspension 060457 Unit(s) Swish and Swallow four times a day  pantoprazole    Tablet 40 milliGRAM(s) Oral before breakfast  polyethylene glycol 3350 17 Gram(s) Oral daily  predniSONE   Tablet 10 milliGRAM(s) Oral daily  senna 2 Tablet(s) Oral daily  sodium chloride 0.9% with potassium chloride 20 mEq/L 1000 milliLiter(s) (60 mL/Hr) IV Continuous <Continuous>  sodium chloride 3%  Inhalation 4 milliLiter(s) Inhalation every 12 hours  trimethoprim   80 mG/sulfamethoxazole 400 mG 1 Tablet(s) Oral every 12 hours    MEDICATIONS  (PRN):  guaiFENesin Oral Liquid (Sugar-Free) 100 milliGRAM(s) Oral every 6 hours PRN Cough  ondansetron Injectable 4 milliGRAM(s) IV Push every 6 hours PRN Nausea and/or Vomiting        I&O's Summary    15 Dec 2022 07:01  -  16 Dec 2022 07:00  --------------------------------------------------------  IN: 720 mL / OUT: 3350 mL / NET: -2630 mL        PHYSICAL EXAM:  Vital Signs Last 24 Hrs  T(C): 36.5 (16 Dec 2022 07:43), Max: 36.7 (15 Dec 2022 20:00)  T(F): 97.7 (16 Dec 2022 07:43), Max: 98 (15 Dec 2022 20:00)  HR: 54 (16 Dec 2022 08:31) (54 - 78)  BP: 124/46 (16 Dec 2022 08:00) (106/45 - 132/47)  BP(mean): 67 (16 Dec 2022 08:00) (60 - 71)  RR: 16 (16 Dec 2022 08:00) (14 - 20)  SpO2: 98% (16 Dec 2022 08:31) (98% - 100%)    Parameters below as of 16 Dec 2022 08:31  Patient On (Oxygen Delivery Method): nasal cannula,2L      CONSTITUTIONAL: NAD, obese  ENMT: Moist oral mucosa, + thrush, no pharyngeal injection or exudates;  RESPIRATORY: scattered rhonchi much less then yesterday.   NC 3L  CARDIOVASCULAR: Regular rate and rhythm, normal S1 and S2, trace lower extremity edema;  ABDOMEN: Nontender to palpation, normoactive bowel sounds, no rebound/guarding  MUSCLOSKELETAL:   no joint swelling or tenderness to palpation  PSYCH: A+O to person, place, and time; affect appropriate  NEUROLOGY: CN 2-12 are intact and symmetric; no gross sensory deficits;   SKIN: No rashes; no palpable lesions    LABS:                        8.6    6.73  )-----------( 186      ( 16 Dec 2022 06:13 )             26.2     12-16    144  |  110<H>  |  68.6<H>  ----------------------------<  113<H>  3.5   |  24.0  |  1.49<H>    Ca    8.6      16 Dec 2022 06:13  Phos  2.4     12-16  Mg     2.7     12-16    TPro  5.2<L>  /  Alb  3.0<L>  /  TBili  0.4  /  DBili  x   /  AST  23  /  ALT  16  /  AlkPhos  53  12-16    PT/INR - ( 14 Dec 2022 13:18 )   PT: 11.1 sec;   INR: 0.96 ratio           CARDIAC MARKERS ( 15 Dec 2022 00:10 )  x     / 0.02 ng/mL / x     / x     / x      CARDIAC MARKERS ( 14 Dec 2022 13:18 )  x     / 0.02 ng/mL / x     / x     / x              Culture - Blood (collected 14 Dec 2022 17:10)  Source: .Blood Blood  Preliminary Report (15 Dec 2022 22:02):    No growth to date.      CAPILLARY BLOOD GLUCOSE      POCT Blood Glucose.: 170 mg/dL (16 Dec 2022 09:37)        RADIOLOGY & ADDITIONAL TESTS:  Results Reviewed:   Imaging Personally Reviewed:  Electrocardiogram Personally Reviewed:

## 2022-12-16 NOTE — PROGRESS NOTE ADULT - ASSESSMENT
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 She is admitted for Respiratory failure.     Acute Hypoxic Resp failure due to COPD/Pneumonia/acute HFpEF, improving  CT Chest atelectasis and pneumonia, secretions    Duoneb and prednisone taper now on 20mg daily, will not lower yet as her resp status has not improved.   Viral panel neg, improving leukocytosis  completed Rocephin + Zithromax  cxr improving  stop Furosemide 20mg PO daily due to kalpana  sputum sample with Stenotrophomonas, ID consulted now on PO Bactrim, stop after 5 days  Chest percussion vest  pulm following, deferring Bronch at this time.     Syncope likely hypovolemia and hypotension  holding all anti-hypertensives  monitor on tele  stop IVFs to avoid fluid overload  wean midodrine  monitor vs q2hr, if bp stable off ivfs can downgrade tomorrow    KALPANA, improving  EF 65-70%  monitor cr  stop lasix  IVFs  strict os and os    Hypokalemia, resolved  - monitor K    Bradycardia  - Cards consult appreciated  - hold metoprolol.     Hypothyroid-severe- no compliance with medications  LT4 increased to 100mcg  Endo following, labs noted, Follow up appointment: 1/13 at 2:00pm [place] office     Vomiting, resolved was actually not vomiting but coughing up phlegm  - prn antiemetics  - CT a/p  - aspiration precautions    Thrush  - Nystatin    Anemia  - brown stool but stool occult  - appreciate GI eval, no intervention    Constipation  s/p manual disimpaction on 12/14  - senna and Miralax    Urinary Retention  - Espinal in place, tov when up out of bed    DVT PPx- Lovenox    Dispo -PT re-eval now that bp stable.   code status DNR/DNI, Palliative care consulted.

## 2022-12-16 NOTE — PROGRESS NOTE ADULT - SUBJECTIVE AND OBJECTIVE BOX
INTERVAL EVENTS:  Follow up hypothyroid management    ROS: Feeling better, denies chest pain/SOB.     MEDICATIONS  (STANDING):  albuterol/ipratropium for Nebulization 3 milliLiter(s) Nebulizer every 6 hours  albuterol/ipratropium for Nebulization. 3 milliLiter(s) Nebulizer once  albuterol/ipratropium for Nebulization. 3 milliLiter(s) Nebulizer once  atorvastatin 40 milliGRAM(s) Oral at bedtime  enoxaparin Injectable 30 milliGRAM(s) SubCutaneous every 24 hours  levothyroxine 100 MICROGram(s) Oral daily  midodrine 5 milliGRAM(s) Oral every 8 hours  nystatin    Suspension 012044 Unit(s) Swish and Swallow four times a day  pantoprazole    Tablet 40 milliGRAM(s) Oral before breakfast  polyethylene glycol 3350 17 Gram(s) Oral daily  predniSONE   Tablet 10 milliGRAM(s) Oral daily  senna 2 Tablet(s) Oral daily  trimethoprim   80 mG/sulfamethoxazole 400 mG 1 Tablet(s) Oral every 12 hours    MEDICATIONS  (PRN):  guaiFENesin Oral Liquid (Sugar-Free) 100 milliGRAM(s) Oral every 6 hours PRN Cough  ondansetron Injectable 4 milliGRAM(s) IV Push every 6 hours PRN Nausea and/or Vomiting    Allergies  codeine (Vomiting)  iodine (Unknown)    Vital Signs Last 24 Hrs  T(C): 36.5 (16 Dec 2022 07:43), Max: 36.7 (15 Dec 2022 20:00)  T(F): 97.7 (16 Dec 2022 07:43), Max: 98 (15 Dec 2022 20:00)  HR: 54 (16 Dec 2022 08:31) (54 - 78)  BP: 124/46 (16 Dec 2022 08:00) (107/48 - 132/47)  BP(mean): 67 (16 Dec 2022 08:00) (60 - 71)  RR: 16 (16 Dec 2022 08:00) (14 - 20)  SpO2: 98% (16 Dec 2022 08:31) (98% - 100%)    Parameters below as of 16 Dec 2022 08:31  Patient On (Oxygen Delivery Method): nasal cannula,2L      PHYSICAL EXAM:  General: No apparent distress, [place]   Neck: Supple, trachea midline, no thyromegaly  Respiratory: Bilateral rhonci  Cardiac: +S1, S2, no m/r/g  GI: +BS, soft, non tender, non distended  Extremities: No peripheral edema, no pedal lesions  Neuro: A+O    LABS:                        8.6    6.73  )-----------( 186      ( 16 Dec 2022 06:13 )             26.2     12-16    144  |  110<H>  |  68.6<H>  ----------------------------<  113<H>  3.5   |  24.0  |  1.49<H>    Ca    8.6      16 Dec 2022 06:13  Phos  2.4     12-16  Mg     2.7     12-16    TPro  5.2<L>  /  Alb  3.0<L>  /  TBili  0.4  /  DBili  x   /  AST  23  /  ALT  16  /  AlkPhos  53  12-16          POCT Blood Glucose.: 170 mg/dL (12-16-22 @ 09:37)        Thyroid Stimulating Hormone, Serum: 10.40 uIU/mL (12-14-22 @ 07:06)  Free Thyroxine, Serum: 1.2 ng/dL (12-14-22 @ 07:06)  Triiodothyronine, Total (T3 Total): <40 ng/dL (12-14-22 @ 07:06)  Thyroid Stimulating Hormone, Serum: 30.06 uIU/mL (12-10-22 @ 04:38)  Free Thyroxine, Serum: 0.6 ng/dL (12-10-22 @ 04:38)  Triiodothyronine, Total (T3 Total): <40 ng/dL (12-10-22 @ 04:38)  Thyroid Stimulating Hormone, Serum: 53.33 uIU/mL (12-06-22 @ 06:17)  Triiodothyronine, Total (T3 Total): <40 ng/dL (12-06-22 @ 06:17)  Free Thyroxine, Serum: 0.2 ng/dL (12-06-22 @ 06:17)  Triiodothyronine, Total (T3 Total): <40 ng/dL (11-30-22 @ 04:38)  Thyroid Stimulating Hormone, Serum: 37.97 uIU/mL (11-30-22 @ 04:38)

## 2022-12-16 NOTE — PROGRESS NOTE ADULT - ASSESSMENT
89 y/o F with PMH HTN, [place] s/p cochlear implants, COPD/emphysema, hypothyroidism woke up around 2 AM shaking and 30 mins later emesis and her daughter called EMS. Endocrine follows for hypothyroidism.    1. Hypothyroid - Slowly improving  - TFTs ordered for 12/20  - LT4 100mcg PO daily  - Follow up appointment: 1/13 at 2:00pm [place] office     2. Acute hypoxic respiratory failure/COPD/CAP  - Duoneb, prednisone taper  - Completed Rocephin and Zithromax  - Supplemental oxygen     3. HTN  - On metoprolol, hydralazine

## 2022-12-16 NOTE — PROGRESS NOTE ADULT - SUBJECTIVE AND OBJECTIVE BOX
Chief Complaint:  Patient is a 89y old  Female who presents with a chief complaint of chills/ cough/ vomits (15 Dec 2022 15:29)      HPI/ 24 hr events: Patient seen and examined at bedside. She reports no abdominal discomfort. Per RN her bladder was extremely distended yesterday and a henson was placed. BM yesterday. Vitals are overall stable, on 3L nasal cannula. Hgb 8.6 today. BUN/Cr remains elevated though slightly improved from yesterday.       REVIEW OF SYSTEMS:   General: Negative  HEENT: Negative  CV: Negative  Respiratory: Negative  GI: See HPI  : Negative  MSK: Negative  Hematologic: Negative  Skin: Negative    MEDICATIONS:   MEDICATIONS  (STANDING):  albuterol/ipratropium for Nebulization 3 milliLiter(s) Nebulizer every 6 hours  albuterol/ipratropium for Nebulization. 3 milliLiter(s) Nebulizer once  albuterol/ipratropium for Nebulization. 3 milliLiter(s) Nebulizer once  atorvastatin 40 milliGRAM(s) Oral at bedtime  levothyroxine 100 MICROGram(s) Oral daily  midodrine 5 milliGRAM(s) Oral every 8 hours  pantoprazole    Tablet 40 milliGRAM(s) Oral before breakfast  polyethylene glycol 3350 17 Gram(s) Oral daily  predniSONE   Tablet 10 milliGRAM(s) Oral daily  senna 2 Tablet(s) Oral daily  sodium chloride 0.9% with potassium chloride 20 mEq/L 1000 milliLiter(s) (60 mL/Hr) IV Continuous <Continuous>  sodium chloride 3%  Inhalation 4 milliLiter(s) Inhalation every 12 hours  trimethoprim   80 mG/sulfamethoxazole 400 mG 1 Tablet(s) Oral every 12 hours    MEDICATIONS  (PRN):  guaiFENesin Oral Liquid (Sugar-Free) 100 milliGRAM(s) Oral every 6 hours PRN Cough  ondansetron Injectable 4 milliGRAM(s) IV Push every 6 hours PRN Nausea and/or Vomiting      ALLERGIES:   Allergies    codeine (Vomiting)  iodine (Unknown)    Intolerances        VITAL SIGNS:   Vital Signs Last 24 Hrs  T(C): 36.5 (16 Dec 2022 07:43), Max: 36.7 (15 Dec 2022 20:00)  T(F): 97.7 (16 Dec 2022 07:43), Max: 98 (15 Dec 2022 20:00)  HR: 54 (16 Dec 2022 08:31) (42 - 78)  BP: 124/46 (16 Dec 2022 08:00) (106/45 - 132/47)  BP(mean): 67 (16 Dec 2022 08:00) (58 - 71)  RR: 16 (16 Dec 2022 08:00) (14 - 20)  SpO2: 98% (16 Dec 2022 08:31) (98% - 100%)    Parameters below as of 16 Dec 2022 08:31  Patient On (Oxygen Delivery Method): nasal cannula,2L      I&O's Summary    15 Dec 2022 07:01  -  16 Dec 2022 07:00  --------------------------------------------------------  IN: 720 mL / OUT: 3350 mL / NET: -2630 mL        PHYSICAL EXAM:   GENERAL:  No acute distress  HEENT:  NC/AT, conjunctiva clear, sclera anicteric, hard of hearing   CHEST:  No increased effort, breath sounds clear  HEART:  Regular rhythm  ABDOMEN:  Soft, non-tender, non-distended, normoactive bowel sounds, no rebound or guarding  EXTREMITIES: No edema  SKIN:  Warm, dry  NEURO:  Calm, cooperative    LABS:                        8.6    6.73  )-----------( 186      ( 16 Dec 2022 06:13 )             26.2     12-16    144  |  110<H>  |  68.6<H>  ----------------------------<  113<H>  3.5   |  24.0  |  1.49<H>    Ca    8.6      16 Dec 2022 06:13  Phos  2.4     12-16  Mg     2.7     12-16    TPro  5.2<L>  /  Alb  3.0<L>  /  TBili  0.4  /  DBili  x   /  AST  23  /  ALT  16  /  AlkPhos  53  12-16    LIVER FUNCTIONS - ( 16 Dec 2022 06:13 )  Alb: 3.0 g/dL / Pro: 5.2 g/dL / ALK PHOS: 53 U/L / ALT: 16 U/L / AST: 23 U/L / GGT: x           PT/INR - ( 14 Dec 2022 13:18 )   PT: 11.1 sec;   INR: 0.96 ratio               Culture - Blood (collected 14 Dec 2022 17:10)  Source: .Blood Blood  Preliminary Report (15 Dec 2022 22:02):    No growth to date.                                    RADIOLOGY & ADDITIONAL STUDIES:      ACC: [id number] EXAM:  CT ABDOMEN AND PELVIS                        ACC: [id number] EXAM:  CT CHEST                          PROCEDURE DATE:  12/15/2022          INTERPRETATION:  CLINICAL INFORMATION: Dilated esophagus    COMPARISON: CT chest 12/7/2022and CT chest abdomen pelvis 6/3/2021    CONTRAST/COMPLICATIONS:  IV Contrast: NONE  Oral Contrast: NONE  Complications: None reported at time of study completion    PROCEDURE:  CT of the Chest, Abdomen and Pelvis was performed.  Sagittal and coronal reformats were performed.    FINDINGS:  CHEST:  LUNGS AND LARGE AIRWAYS: Patent central airways. Patchy bilateral   groundglass opacities, left lung greater than right. Compressive   atelectasis in the left lower lobe adjacent to a hiatal hernia..  PLEURA: Trace left pleural effusion.  VESSELS: Atherosclerotic changes of the aorta and coronary arteries.  HEART: Cardiomegaly. No pericardial effusion.  MEDIASTINUM AND MAXIMINO: No lymphadenopathy. Moderate to large hiatal   hernia. Patulous esophagus containing debris  CHEST WALL AND LOWER NECK: Within normal limits.    ABDOMEN AND PELVIS:  LIVER: Within normal limits.  BILE DUCTS: Normal caliber.  GALLBLADDER: Cholelithiasis.  SPLEEN: Within normal limits.  PANCREAS: Within normal limits.  ADRENALS: Within normal limits.  KIDNEYS/URETERS: Within normal limits.    BLADDER: Marked urinary bladder distention above the level of the   umbilicus.  REPRODUCTIVE ORGANS: Fibroid uterus.    BOWEL: Stable lipoma of the ascending colon. Ileocolic anastomosis. No   bowel obstruction. Circumferential long segment thickening of the   descending colon with pericolonic inflammation. Diverticulosis of the   descending and sigmoid colon.  PERITONEUM: No ascites.  VESSELS: Atherosclerotic changes.  RETROPERITONEUM/LYMPH NODES: No lymphadenopathy.  ABDOMINAL WALL: Within normal limits.  BONES: Degenerative changes of the spine and glenohumeral joints. Mild   lumbar levoscoliosis.  AI VERTEBRAL BODY ANALYSIS:  T4: Moderate compression fracture with 28% height loss.    IMPRESSION:  Moderate to large hiatal hernia with a patulous esophagus containing   debris. Correlate for symptoms of reflux.  Patchy bilateral groundglass opacities, left lung greater than right.   Trace left pleural effusion.  Long segment thickening of the descending colon compatible with colitis.  T4: Moderate compression fracture with 28% height loss.    --- End of Report ---            JOHN HUANG MD; Attending Radiologist  This document has been electronically signed. Dec 15 2022  1:47PM  12-15-22 @ 13:28

## 2022-12-16 NOTE — PROGRESS NOTE ADULT - PROBLEM SELECTOR PLAN 1
Occult positive stool after manual disimpaction     - Trend CBC, transfuse as needed. Monitor for signs of bleeding. Avoid NSAIDs  - Continue pantoprazole 40mg daily particularly is she is to remain on steroids  - No plans for endoscopic intervention at this time, if she develops hemodynamically significant GI bleeding consider CTA  - She remains high risk for sedation give current respiratory status

## 2022-12-17 LAB
ALBUMIN SERPL ELPH-MCNC: 3.3 G/DL — SIGNIFICANT CHANGE UP (ref 3.3–5.2)
ALP SERPL-CCNC: 61 U/L — SIGNIFICANT CHANGE UP (ref 40–120)
ALT FLD-CCNC: 16 U/L — SIGNIFICANT CHANGE UP
ANION GAP SERPL CALC-SCNC: 10 MMOL/L — SIGNIFICANT CHANGE UP (ref 5–17)
AST SERPL-CCNC: 32 U/L — HIGH
BILIRUB SERPL-MCNC: 0.5 MG/DL — SIGNIFICANT CHANGE UP (ref 0.4–2)
BUN SERPL-MCNC: 46.3 MG/DL — HIGH (ref 8–20)
CALCIUM SERPL-MCNC: 8.8 MG/DL — SIGNIFICANT CHANGE UP (ref 8.4–10.5)
CHLORIDE SERPL-SCNC: 109 MMOL/L — HIGH (ref 96–108)
CO2 SERPL-SCNC: 24 MMOL/L — SIGNIFICANT CHANGE UP (ref 22–29)
CREAT SERPL-MCNC: 1.26 MG/DL — SIGNIFICANT CHANGE UP (ref 0.5–1.3)
EGFR: 41 ML/MIN/1.73M2 — LOW
GLUCOSE SERPL-MCNC: 102 MG/DL — HIGH (ref 70–99)
HCT VFR BLD CALC: 28.3 % — LOW (ref 34.5–45)
HGB BLD-MCNC: 9.2 G/DL — LOW (ref 11.5–15.5)
MAGNESIUM SERPL-MCNC: 2.7 MG/DL — HIGH (ref 1.6–2.6)
MCHC RBC-ENTMCNC: 32.5 GM/DL — SIGNIFICANT CHANGE UP (ref 32–36)
MCHC RBC-ENTMCNC: 33.2 PG — SIGNIFICANT CHANGE UP (ref 27–34)
MCV RBC AUTO: 102.2 FL — HIGH (ref 80–100)
PLATELET # BLD AUTO: 186 K/UL — SIGNIFICANT CHANGE UP (ref 150–400)
POTASSIUM SERPL-MCNC: 4.1 MMOL/L — SIGNIFICANT CHANGE UP (ref 3.5–5.3)
POTASSIUM SERPL-SCNC: 4.1 MMOL/L — SIGNIFICANT CHANGE UP (ref 3.5–5.3)
PROT SERPL-MCNC: 5.7 G/DL — LOW (ref 6.6–8.7)
RBC # BLD: 2.77 M/UL — LOW (ref 3.8–5.2)
RBC # FLD: 14.9 % — HIGH (ref 10.3–14.5)
SODIUM SERPL-SCNC: 143 MMOL/L — SIGNIFICANT CHANGE UP (ref 135–145)
WBC # BLD: 7.06 K/UL — SIGNIFICANT CHANGE UP (ref 3.8–10.5)
WBC # FLD AUTO: 7.06 K/UL — SIGNIFICANT CHANGE UP (ref 3.8–10.5)

## 2022-12-17 PROCEDURE — 99233 SBSQ HOSP IP/OBS HIGH 50: CPT

## 2022-12-17 PROCEDURE — 93010 ELECTROCARDIOGRAM REPORT: CPT

## 2022-12-17 PROCEDURE — 99231 SBSQ HOSP IP/OBS SF/LOW 25: CPT

## 2022-12-17 RX ORDER — HYDRALAZINE HCL 50 MG
5 TABLET ORAL ONCE
Refills: 0 | Status: COMPLETED | OUTPATIENT
Start: 2022-12-17 | End: 2022-12-17

## 2022-12-17 RX ORDER — LEVALBUTEROL 1.25 MG/.5ML
1.25 SOLUTION, CONCENTRATE RESPIRATORY (INHALATION) EVERY 6 HOURS
Refills: 0 | Status: DISCONTINUED | OUTPATIENT
Start: 2022-12-17 | End: 2022-12-22

## 2022-12-17 RX ORDER — DILTIAZEM HCL 120 MG
10 CAPSULE, EXT RELEASE 24 HR ORAL
Refills: 0 | Status: DISCONTINUED | OUTPATIENT
Start: 2022-12-17 | End: 2022-12-18

## 2022-12-17 RX ORDER — SODIUM CHLORIDE 9 MG/ML
1000 INJECTION INTRAMUSCULAR; INTRAVENOUS; SUBCUTANEOUS
Refills: 0 | Status: DISCONTINUED | OUTPATIENT
Start: 2022-12-17 | End: 2022-12-18

## 2022-12-17 RX ORDER — DIGOXIN 250 MCG
500 TABLET ORAL ONCE
Refills: 0 | Status: COMPLETED | OUTPATIENT
Start: 2022-12-17 | End: 2022-12-17

## 2022-12-17 RX ORDER — SODIUM CHLORIDE 9 MG/ML
500 INJECTION INTRAMUSCULAR; INTRAVENOUS; SUBCUTANEOUS ONCE
Refills: 0 | Status: COMPLETED | OUTPATIENT
Start: 2022-12-17 | End: 2022-12-17

## 2022-12-17 RX ORDER — DIGOXIN 250 MCG
250 TABLET ORAL EVERY 8 HOURS
Refills: 0 | Status: COMPLETED | OUTPATIENT
Start: 2022-12-17 | End: 2022-12-18

## 2022-12-17 RX ADMIN — Medication 100 MICROGRAM(S): at 05:51

## 2022-12-17 RX ADMIN — ATORVASTATIN CALCIUM 40 MILLIGRAM(S): 80 TABLET, FILM COATED ORAL at 22:31

## 2022-12-17 RX ADMIN — MIDODRINE HYDROCHLORIDE 5 MILLIGRAM(S): 2.5 TABLET ORAL at 11:36

## 2022-12-17 RX ADMIN — Medication 1 TABLET(S): at 17:38

## 2022-12-17 RX ADMIN — Medication 208 MICROGRAM(S): at 14:48

## 2022-12-17 RX ADMIN — Medication 500000 UNIT(S): at 05:51

## 2022-12-17 RX ADMIN — Medication 1 TABLET(S): at 05:51

## 2022-12-17 RX ADMIN — Medication 5 MILLIGRAM(S): at 10:00

## 2022-12-17 RX ADMIN — Medication 250 MICROGRAM(S): at 22:33

## 2022-12-17 RX ADMIN — SODIUM CHLORIDE 75 MILLILITER(S): 9 INJECTION INTRAMUSCULAR; INTRAVENOUS; SUBCUTANEOUS at 15:59

## 2022-12-17 RX ADMIN — PANTOPRAZOLE SODIUM 40 MILLIGRAM(S): 20 TABLET, DELAYED RELEASE ORAL at 05:51

## 2022-12-17 RX ADMIN — Medication 10 MILLIGRAM(S): at 05:52

## 2022-12-17 RX ADMIN — SENNA PLUS 2 TABLET(S): 8.6 TABLET ORAL at 22:31

## 2022-12-17 RX ADMIN — ENOXAPARIN SODIUM 30 MILLIGRAM(S): 100 INJECTION SUBCUTANEOUS at 17:38

## 2022-12-17 RX ADMIN — SODIUM CHLORIDE 500 MILLILITER(S): 9 INJECTION INTRAMUSCULAR; INTRAVENOUS; SUBCUTANEOUS at 12:00

## 2022-12-17 NOTE — PROGRESS NOTE ADULT - SUBJECTIVE AND OBJECTIVE BOX
[facility] - [facility], THE HEART CENTER                                   [address] [place] [address]                                                      PHONE: [phone]                                                         FAX: [phone]  [email or web address]  ---------------------------------------------------------------------------------------------------------------------------------    Overnight events/patient complaints:  pAF with RVR  hypotensive     PAST MEDICAL & SURGICAL HISTORY:  Essential hypertension      Chronic obstructive pulmonary disease, unspecified COPD type      Hypothyroidism, unspecified type      H/O exploratory laparotomy  for a benign colon mass 4/19/12          codeine (Vomiting)  iodine (Unknown)    MEDICATIONS  (STANDING):  albuterol/ipratropium for Nebulization 3 milliLiter(s) Nebulizer every 6 hours  albuterol/ipratropium for Nebulization. 3 milliLiter(s) Nebulizer once  albuterol/ipratropium for Nebulization. 3 milliLiter(s) Nebulizer once  atorvastatin 40 milliGRAM(s) Oral at bedtime  digoxin  Injectable 250 MICROGram(s) IV Push every 8 hours  digoxin  IVPB 500 MICROGram(s) IV Intermittent once  enoxaparin Injectable 30 milliGRAM(s) SubCutaneous every 24 hours  levothyroxine 100 MICROGram(s) Oral daily  midodrine 5 milliGRAM(s) Oral every 8 hours  nystatin    Suspension 379907 Unit(s) Swish and Swallow four times a day  pantoprazole    Tablet 40 milliGRAM(s) Oral before breakfast  polyethylene glycol 3350 17 Gram(s) Oral daily  predniSONE   Tablet 10 milliGRAM(s) Oral daily  senna 2 Tablet(s) Oral daily  trimethoprim   80 mG/sulfamethoxazole 400 mG 1 Tablet(s) Oral every 12 hours    MEDICATIONS  (PRN):  guaiFENesin Oral Liquid (Sugar-Free) 100 milliGRAM(s) Oral every 6 hours PRN Cough  ondansetron Injectable 4 milliGRAM(s) IV Push every 6 hours PRN Nausea and/or Vomiting      Vital Signs Last 24 Hrs  T(C): 36.8 (17 Dec 2022 12:31), Max: 36.8 (17 Dec 2022 00:00)  T(F): 98.2 (17 Dec 2022 12:31), Max: 98.3 (17 Dec 2022 00:00)  HR: 133 (17 Dec 2022 14:02) (62 - 137)  BP: 108/65 (17 Dec 2022 14:02) (86/64 - 148/64)  BP(mean): 107 (17 Dec 2022 12:44) (60 - 107)  RR: 12 (17 Dec 2022 12:00) (12 - 21)  SpO2: 100% (17 Dec 2022 12:44) (94% - 100%)    Parameters below as of 17 Dec 2022 12:44  Patient On (Oxygen Delivery Method): nasal cannula  O2 Flow (L/min): 2    ICU Vital Signs Last 24 Hrs  TERESA NESS  I&O's Detail    I&O's Summary    Drug Dosing Weight  TERESA NESS      PHYSICAL EXAM:  General: Appears well developed, well nourished alert and cooperative.  HEENT: Head; normocephalic, atraumatic.  Eyes: Pupils reactive, cornea wnl.  Neck: Supple, no nodes adenopathy, no NVD or carotid bruit or thyromegaly.  CARDIOVASCULAR: Normal S1 and S2, No murmur, rub, gallop or lift.   LUNGS: No rales, rhonchi or wheeze. Normal breath sounds bilaterally.  ABDOMEN: Soft, nontender without mass or organomegaly. bowel sounds normoactive.  EXTREMITIES: No clubbing, cyanosis or edema. Distal pulses wnl.   SKIN: warm and dry with normal turgor.  NEURO: Alert/oriented x 3/normal motor exam. No pathologic reflexes.    PSYCH: normal affect.        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 Being treated for COPD, PNA and ARF.   Cardio initially consulted for bradycardia.  Pt now with AF with RVR.     AF with rvr  -dig load pt for rate control  -pt was hypotensive earlier today which prohibited use of BB/CCB  -pt not a candidate for AC at this time given hx of ANemia and guic + stool   -tele   -DNR/DNI  -palliative care consulted     Thank you for allowing [facility] to participate in the care of this patient.  Please feel free to call with any questions or concerns.                  [facility] - [facility], [facility]                                   [address] [place] [address]                                                      PHONE: [phone]                                                         FAX: [phone]  [email or web address]  ---------------------------------------------------------------------------------------------------------------------------------    Overnight events/patient complaints:  pAF with RVR  hypotensive     PAST MEDICAL & SURGICAL HISTORY:  Essential hypertension      Chronic obstructive pulmonary disease, unspecified COPD type      Hypothyroidism, unspecified type      H/O exploratory laparotomy  for a benign colon mass 4/19/12          codeine (Vomiting)  iodine (Unknown)    MEDICATIONS  (STANDING):  albuterol/ipratropium for Nebulization 3 milliLiter(s) Nebulizer every 6 hours  albuterol/ipratropium for Nebulization. 3 milliLiter(s) Nebulizer once  albuterol/ipratropium for Nebulization. 3 milliLiter(s) Nebulizer once  atorvastatin 40 milliGRAM(s) Oral at bedtime  digoxin  Injectable 250 MICROGram(s) IV Push every 8 hours  digoxin  IVPB 500 MICROGram(s) IV Intermittent once  enoxaparin Injectable 30 milliGRAM(s) SubCutaneous every 24 hours  levothyroxine 100 MICROGram(s) Oral daily  midodrine 5 milliGRAM(s) Oral every 8 hours  nystatin    Suspension [id number] Unit(s) Swish and Swallow four times a day  pantoprazole    Tablet 40 milliGRAM(s) Oral before breakfast  polyethylene glycol 3350 17 Gram(s) Oral daily  predniSONE   Tablet 10 milliGRAM(s) Oral daily  senna 2 Tablet(s) Oral daily  trimethoprim   80 mG/sulfamethoxazole 400 mG 1 Tablet(s) Oral every 12 hours    MEDICATIONS  (PRN):  guaiFENesin Oral Liquid (Sugar-Free) 100 milliGRAM(s) Oral every 6 hours PRN Cough  ondansetron Injectable 4 milliGRAM(s) IV Push every 6 hours PRN Nausea and/or Vomiting      Vital Signs Last 24 Hrs  T(C): 36.8 (17 Dec 2022 12:31), Max: 36.8 (17 Dec 2022 00:00)  T(F): 98.2 (17 Dec 2022 12:31), Max: 98.3 (17 Dec 2022 00:00)  HR: 133 (17 Dec 2022 14:02) (62 - 137)  BP: 108/65 (17 Dec 2022 14:02) (86/64 - 148/64)  BP(mean): 107 (17 Dec 2022 12:44) (60 - 107)  RR: 12 (17 Dec 2022 12:00) (12 - 21)  SpO2: 100% (17 Dec 2022 12:44) (94% - 100%)    Parameters below as of 17 Dec 2022 12:44  Patient On (Oxygen Delivery Method): nasal cannula  O2 Flow (L/min): 2    ICU Vital Signs Last 24 Hrs  TERESA NESS  I&O's Detail    I&O's Summary    Drug Dosing Weight  TERESA NESS      PHYSICAL EXAM:  General: Appears well developed, well nourished alert and cooperative.  HEENT: Head; normocephalic, atraumatic.  Eyes: Pupils reactive, cornea wnl.  Neck: Supple, no nodes adenopathy, no NVD or carotid bruit or thyromegaly.  CARDIOVASCULAR:tachy, iorreg irreg   LUNGS: No rales, rhonchi or wheeze. Normal breath sounds bilaterally.  ABDOMEN: Soft, nontender without mass or organomegaly. bowel sounds normoactive.  EXTREMITIES: No clubbing, cyanosis or edema. Distal pulses wnl.   SKIN: warm and dry with normal turgor.  NEURO: Alert/oriented x 3/normal motor exam. No pathologic reflexes.    PSYCH: normal affect.        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 Being treated for COPD, PNA and ARF.   Cardio initially consulted for bradycardia.  Pt now with AF with RVR.     AF with rvr  -dig load pt for rate control  -cardizem 10 mg iv q2 hrs prn for >110 bpm   -pt was hypotensive earlier today which prohibited use of BB/CCB  -pt not a candidate for AC at this time given hx of Anemia and guic + stool   -tele   -DNR/DNI  -palliative care consulted     Thank you for allowing [facility] to participate in the care of this patient.  Please feel free to call with any questions or concerns.

## 2022-12-17 NOTE — PROCEDURE NOTE - ADDITIONAL PROCEDURE DETAILS
Tourniquet removed, bed lowered, sharps disposed of appropriately. Ultrasound utilized. Patient tolerated well.
Patient resting comfortably in NAD. Ultrasound utilized. Tourniquet removed and all sharps disposed of at completion of procedure. Patient left with bed rails up and bed lowered to original position.
Patient resting comfortably in NAD. Ultrasound utilized. Tourniquet removed and all sharps disposed of at completion of procedure. Patient left with bed rails up and bed lowered to original position.

## 2022-12-17 NOTE — PROCEDURE NOTE - NSINDICATIONS_GEN_A_CORE
fluid administration
antibiotic therapy/emergency venous access
medication administration/fluid administration/other

## 2022-12-17 NOTE — PROGRESS NOTE ADULT - SUBJECTIVE AND OBJECTIVE BOX
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 Pt was started on CAP coverage and tx for COPD exacerbation. She was also receiving IVFs for [place] with concern for possible volume overload subsequently and was being diuresed. CT chest 12/7 with extensive tracheobronchial secretions with partial atelectasis b/l lower lobes.       INTERVAL HPI/OVERNIGHT EVENTS:  Pt with hypotension to MAP 60 this AM with AF RVR but mentating at baseline when seen and just received midodrine and was planned for bolus. Pt reports no problem with her breathing this morning. Remains on 2L NC with sats in the high 90s. She has been expectorating some sputum. Remains on bactrim. To be dig loaded per cardiology. Hgb stable.     MEDICATIONS  (STANDING):  atorvastatin 40 milliGRAM(s) Oral at bedtime  digoxin  Injectable 250 MICROGram(s) IV Push every 8 hours  enoxaparin Injectable 30 milliGRAM(s) SubCutaneous every 24 hours  levothyroxine 100 MICROGram(s) Oral daily  midodrine 5 milliGRAM(s) Oral every 8 hours  nystatin    Suspension 658426 Unit(s) Swish and Swallow four times a day  pantoprazole    Tablet 40 milliGRAM(s) Oral before breakfast  polyethylene glycol 3350 17 Gram(s) Oral daily  predniSONE   Tablet 10 milliGRAM(s) Oral daily  senna 2 Tablet(s) Oral daily  sodium chloride 0.9%. 1000 milliLiter(s) (75 mL/Hr) IV Continuous <Continuous>  trimethoprim   80 mG/sulfamethoxazole 400 mG 1 Tablet(s) Oral every 12 hours    MEDICATIONS  (PRN):  diltiazem Injectable 10 milliGRAM(s) IV Push every 2 hours PRN for HR > 110 bpm  guaiFENesin Oral Liquid (Sugar-Free) 100 milliGRAM(s) Oral every 6 hours PRN Cough  levalbuterol Inhalation 1.25 milliGRAM(s) Inhalation every 6 hours PRN shortness of breath and wheezing  ondansetron Injectable 4 milliGRAM(s) IV Push every 6 hours PRN Nausea and/or Vomiting    Allergies  codeine (Vomiting)  iodine (Unknown)  Intolerances    PAST MEDICAL & SURGICAL HISTORY:  Essential hypertension  Chronic obstructive pulmonary disease, unspecified COPD type  Hypothyroidism, unspecified type  H/O exploratory laparotomy  for a benign colon mass 4/19/12    REVIEW OF SYSTEMS: Negative except per HPI    Vital Signs Last 24 Hrs  T(C): 36.7 (17 Dec 2022 16:20), Max: 36.8 (17 Dec 2022 00:00)  T(F): 98.1 (17 Dec 2022 16:20), Max: 98.3 (17 Dec 2022 00:00)  HR: 127 (17 Dec 2022 16:00) (62 - 137)  BP: 106/68 (17 Dec 2022 16:00) (86/64 - 148/64)  BP(mean): 77 (17 Dec 2022 16:00) (60 - 107)  RR: 16 (17 Dec 2022 16:00) (12 - 21)  SpO2: 100% (17 Dec 2022 16:00) (98% - 100%)    Parameters below as of 17 Dec 2022 16:00  Patient On (Oxygen Delivery Method): nasal cannula  O2 Flow (L/min): 2    I&O's Detail        PHYSICAL EXAMINATION:    GENERAL: Alert, in NAD, sitting comfortably in bed     HEENT: NC/AT, anicteric, MMM    NECK: Supple    LUNGS: bilateral scattered rhonchi, no increased work of breathing, on nasal cannula    HEART: S1S2, RRR    ABDOMEN: Soft, nontender, and nondistended    EXTREMITIES: Without any cyanosis, clubbing; 1+ b/l edema    NEUROLOGIC: No focal deficits     LABS:                                   9.2    7.06  )-----------( 186      ( 17 Dec 2022 07:10 )             28.3                         12-17    143  |  109<H>  |  46.3<H>  ----------------------------<  102<H>  4.1   |  24.0  |  1.26    Ca    8.8      17 Dec 2022 07:10  Phos  2.4     12-16  Mg     2.7     12-17    TPro  5.7<L>  /  Alb  3.3  /  TBili  0.5  /  DBili  x   /  AST  32<H>  /  ALT  16  /  AlkPhos  61  12-17                            RADIOLOGY & ADDITIONAL STUDIES:    ACC: [id number] EXAM:  XR CHEST PORTABLE ROUTINE 1V                          PROCEDURE DATE:  12/08/2022          INTERPRETATION:  Portable chest radiograph    CLINICAL INFORMATION: Fluid overload    TECHNIQUE:  Portable  AP chest radiograph.    COMPARISON: 12/4/2022 chest x-ray and 12/7/2022 chest CT .    FINDINGS:  CATHETERS AND TUBES: None    PULMONARY: Residual LEFT lower lobe airspace consolidation/atelectasis   likely from mucous plug as seen on prior CT scan..   LEFT upper zone and RIGHT lung parenchyma grossly clear.  .   No pneumothorax.    HEART/VASCULAR: The  heart is enlarged in transverse diameter.    BONES: Visualized osseous structures are intact.    IMPRESSION:   Cardiomegaly.  Residual LEFT  lower zone  airspace consolidation..      ZELDA DA SILVA MD; Attending Radiologist  This document has been electronically signed. Dec  8 2022 12:25PM        ACC: [id number] EXAM:  CT CHEST                          PROCEDURE DATE:  12/07/2022          INTERPRETATION:  EXAMINATION: CT CHEST    CLINICAL INDICATION: Dyspnea.    TECHNIQUE: Noncontrast CT of the chest was obtained.    COMPARISON: Multiple CT, most recent 6/3/2021..    FINDINGS:    AIRWAYS AND LUNGS: Tracheobronchial secretions, extensive in the lower   lobes, greater on the left.  Partial atelectasis both lower lobes,   greater on the left. Patchy bilateral lung opacities.    MEDIASTINUM AND PLEURA: There are no enlarged mediastinal, hilar or   axillary lymph nodes. The visualized portion of the thyroid gland is   unremarkable. There are small bilateral pleural effusions.  There is no   pneumothorax.    HEART AND VESSELS: There is mildcardiomegaly.  There are atherosclerotic   calcifications of the aorta and coronary arteries.  There is a small   pericardial effusion.    UPPER ABDOMEN: Images of the upper abdomen demonstrate hiatal hernia with   debris in the esophagus.    BONES AND SOFT TISSUES: There are mild degenerative changes of the spine.    The soft tissues are unremarkable.    AI VERTEBRAL BODY ANALYSIS:  T4: Moderate compression fracture with 26% height loss.    IMPRESSION:  There is a small left pleural effusion. Tracheobronchial secretions,   extensive in the lower lobes, greater on the left.  Partial atelectasis   both lower lobes, greater on the left. Patchy bilateral lung opacities   may be infectious or inflammatory.    VERTEBRAL BODY ANALYSIS: Vertebral compression fractures as described,   consider further workup for osteoporosis.      DUSTY RANGEL MD; Attending Radiologist  This document has been electronically signed. Dec  7 2022 12:03PM    CT chest/abdomen:  IMPRESSION:  Moderate to large hiatal hernia with a patulous esophagus containing debris. Correlate for symptoms of reflux.  Patchy bilateral groundglass opacities, left lung greater than right. Trace left pleural effusion.  Long segment thickening of the descending colon compatible with colitis.  T4: Moderate compression fracture with 28% height loss.    ECHO:    Summary:   1. Normal left ventricular internal cavity size.   2. Normal global left ventricular systolic function.   3. Left ventricular ejection fraction, by visual estimation, is 65 to   70%.   4. Normal right ventricular size and function.   5. The left atrium is normal in size.   6. The right atrium is normal in size.   7. Sclerotic aortic valve with normal opening.   8. Mild tricuspid regurgitation.   9. Normal pulmonary artery pressure.  10. Trace mitral valve regurgitation.  11. Mild pulmonic valve regurgitation.    MD Henri Electronically signed on 11/29/2022 at 3:02:43 PM

## 2022-12-17 NOTE — PROGRESS NOTE ADULT - SUBJECTIVE AND OBJECTIVE BOX
[facility] Division of Hospital Medicine    Chief Complaint: cough and vomiting      SUBJECTIVE / OVERNIGHT EVENTS: No acute events overnight. Patient persistently tachy to 130s today morning. Subsequently, patient hypotensive and required  cc bolus with subsequent improvement. Continues to require NC 2L.     Patient denies chest pain, SOB, abd pain, N/V, fever, chills, dysuria or any other complaints. All remainder ROS negative.     MEDICATIONS  (STANDING):  albuterol/ipratropium for Nebulization 3 milliLiter(s) Nebulizer every 6 hours  albuterol/ipratropium for Nebulization. 3 milliLiter(s) Nebulizer once  albuterol/ipratropium for Nebulization. 3 milliLiter(s) Nebulizer once  atorvastatin 40 milliGRAM(s) Oral at bedtime  enoxaparin Injectable 30 milliGRAM(s) SubCutaneous every 24 hours  levothyroxine 100 MICROGram(s) Oral daily  midodrine 5 milliGRAM(s) Oral every 8 hours  nystatin    Suspension 535556 Unit(s) Swish and Swallow four times a day  pantoprazole    Tablet 40 milliGRAM(s) Oral before breakfast  polyethylene glycol 3350 17 Gram(s) Oral daily  predniSONE   Tablet 10 milliGRAM(s) Oral daily  senna 2 Tablet(s) Oral daily  trimethoprim   80 mG/sulfamethoxazole 400 mG 1 Tablet(s) Oral every 12 hours    MEDICATIONS  (PRN):  guaiFENesin Oral Liquid (Sugar-Free) 100 milliGRAM(s) Oral every 6 hours PRN Cough  ondansetron Injectable 4 milliGRAM(s) IV Push every 6 hours PRN Nausea and/or Vomiting        I&O's Summary      PHYSICAL EXAM:  Vital Signs Last 24 Hrs  T(C): 36.8 (17 Dec 2022 12:31), Max: 36.8 (17 Dec 2022 00:00)  T(F): 98.2 (17 Dec 2022 12:31), Max: 98.3 (17 Dec 2022 00:00)  HR: 132 (17 Dec 2022 12:44) (62 - 137)  BP: 124/104 (17 Dec 2022 12:44) (86/64 - 148/64)  BP(mean): 107 (17 Dec 2022 12:44) (60 - 107)  RR: 12 (17 Dec 2022 12:00) (12 - 21)  SpO2: 100% (17 Dec 2022 12:44) (94% - 100%)    Parameters below as of 17 Dec 2022 12:44  Patient On (Oxygen Delivery Method): nasal cannula  O2 Flow (L/min): 2    CONSTITUTIONAL: NAD, generalized weakness  RESPIRATORY: scattered rhonchi much less then yesterday.  NC 2L  CARDIOVASCULAR: tachycardic and regular rhythm, normal S1 and S2, trace lower extremity edema;  ABDOMEN: Nontender to palpation, normoactive bowel sounds  PSYCH: A+O to person, place, and time; affect appropriate  NEUROLOGY: CN 2-12 are intact and symmetric; no gross sensory deficits;   SKIN: No rashes; no palpable lesions    LABS:                        9.2    7.06  )-----------( 186      ( 17 Dec 2022 07:10 )             28.3     12-17    143  |  109<H>  |  46.3<H>  ----------------------------<  102<H>  4.1   |  24.0  |  1.26    Ca    8.8      17 Dec 2022 07:10  Phos  2.4     12-16  Mg     2.7     12-17    TPro  5.7<L>  /  Alb  3.3  /  TBili  0.5  /  DBili  x   /  AST  32<H>  /  ALT  16  /  AlkPhos  61  12-17              Culture - Blood (collected 14 Dec 2022 17:10)  Source: .Blood Blood  Preliminary Report (15 Dec 2022 22:02):    No growth to date.      CAPILLARY BLOOD GLUCOSE            RADIOLOGY & ADDITIONAL TESTS:  Results Reviewed:   Imaging Personally Reviewed:  Electrocardiogram Personally Reviewed:

## 2022-12-17 NOTE — PROCEDURE NOTE - NSPROCDETAILS_GEN_ALL_CORE
blood seen on insertion/dressing applied/flushes easily/secured in place/sterile technique, catheter placed

## 2022-12-17 NOTE — PROGRESS NOTE ADULT - ASSESSMENT
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, hypothyroidism, HTN, [place] s/p cochlear implants presented 11/29 after an episode of emesis and was found to be hypoxic on presentation with concern for PNA/COPD exacerbation and component of volume overload    Acute hypoxic respiratory failure in the setting of likely PNA/ COPD exacerbation now improving  Hypotension likely in setting of volume depletion/Afib RVR     wean supplemental O2 as tolerated for goal >88-92%  likely will require O2 supplementation on discharge   chest PT/percussion vest as tolerated   changed to Xopenex given Afib RVR  can d/c prednisone in AM  aspiration precautions   monitor I/Os and keep net even   trend H/H   completed course of ceftriaxone/azithro and brief empiric zosyn; now on bactrim for noted Stenotrophomonas in sputum   f/u ID recs     colitis noted on imaging but no reported diarrhea; may need broadening of abx  continued goals of care discussion given she is at high risk for further decline   OOB/PT as tolerated   can repeat ct chest in 6-8 weeks for followup  rate control as per cardiology; Afib RVR may be contributory to hypotension but would be cautious with aggressive control given prior bradycardia   if persistent hypotension, would check repeat lactate, cultures, procal  outpt pulm f/u on discharge        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, hypothyroidism, HTN, [place] s/p cochlear implants presented 11/29 after an episode of emesis and was found to be hypoxic on presentation with concern for PNA/COPD exacerbation and component of volume overload    Acute hypoxic respiratory failure in the setting of likely PNA/ COPD exacerbation now improving  Hypotension likely in setting of volume depletion/Afib RVR     wean supplemental O2 as tolerated for goal >88-92%  likely will require O2 supplementation on discharge   chest PT/percussion vest as tolerated   changed to Xopenex given Afib RVR  can d/c prednisone in AM  aspiration precautions   monitor I/Os and keep net even   trend H/H   completed course of ceftriaxone/azithro and brief empiric zosyn; now on bactrim for noted Stenotrophomonas in sputum   f/u ID recs     colitis noted on imaging but no reported diarrhea; may need broadening of abx  OOB/PT as tolerated   can repeat ct chest in 6-8 weeks for followup  rate control as per cardiology; Afib RVR may be contributory to hypotension but would be cautious with aggressive control given prior bradycardia   if persistent hypotension, would check repeat lactate, cultures, procal  outpt pulm f/u on discharge   continued goals of care discussion given she is at high risk for further decline; very guarded prognosis

## 2022-12-17 NOTE — PROGRESS NOTE ADULT - ASSESSMENT
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 She is admitted for Respiratory failure.     Acute Hypoxic Resp failure due to COPD/Pneumonia/acute HFpEF, improving  CT Chest atelectasis and pneumonia, secretions    Duoneb and prednisone taper now on 20mg daily, will not lower yet as her resp status has not improved.   Viral panel neg, improving leukocytosis  completed Rocephin + Zithromax  sputum sample with Stenotrophomonas, ID  consulted now on PO Bactrim, stop after 5 days  Chest percussion vest  pulm following, deferring Bronch at this time.     Sinus tachycardia  - Holding BB given bradycardia  - Discussed with cardiology, will start digoxin    Syncope likely hypovolemia and hypotension  holding all anti-hypertensives  monitor on tele  stop IVFs to avoid fluid overload  wean midodrine  - Holding lasix given hypotension    KALPANA, improved  EF 65-70%  monitor cr  stop lasix  IVFs  strict os and os    Hypokalemia, resolved  - monitor K    Bradycardia  - Cards consult appreciated  - hold metoprolol.     Hypothyroid-severe- no compliance with medications  LT4 increased to 100mcg  Endo following, labs noted, Follow up appointment: 1/13 at 2:00pm [place] office     Vomiting, resolved was actually not vomiting but coughing up phlegm  - prn antiemetics  - CT a/p  - aspiration precautions    Thrush  - Nystatin    Anemia  - brown stool but stool occult  - appreciate GI eval, no intervention    Constipation  s/p manual disimpaction on 12/14  - senna and Miralax    Urinary Retention  - Espinal in place, tov when up out of bed    DVT PPx- Lovenox    Dispo -PT re-eval pending  code status DNR/DNI, Palliative care consulted.

## 2022-12-17 NOTE — PROCEDURE NOTE - NSICDXPROCEDURE_GEN_ALL_CORE_FT
PROCEDURES:  Insertion, catheter, intravenous 17-Dec-2022 12:13:16  Nancie Mtz  US guided vascular access 17-Dec-2022 12:13:37  Nancie Mtz  
PROCEDURES:  US guided needle placement 02-Dec-2022 11:54:28  Mary Carrillo  Insertion, needle, vein 02-Dec-2022 11:54:32  Mary Carrillo  
PROCEDURES:  Insertion, catheter, intravenous 17-Dec-2022 12:13:16  Nancie Mtz  US guided vascular access 17-Dec-2022 12:13:37  Nancie Mtz

## 2022-12-18 LAB
ALBUMIN SERPL ELPH-MCNC: 3.2 G/DL — LOW (ref 3.3–5.2)
ALP SERPL-CCNC: 61 U/L — SIGNIFICANT CHANGE UP (ref 40–120)
ALT FLD-CCNC: 19 U/L — SIGNIFICANT CHANGE UP
ANION GAP SERPL CALC-SCNC: 10 MMOL/L — SIGNIFICANT CHANGE UP (ref 5–17)
AST SERPL-CCNC: 44 U/L — HIGH
BILIRUB SERPL-MCNC: 0.4 MG/DL — SIGNIFICANT CHANGE UP (ref 0.4–2)
BUN SERPL-MCNC: 34.1 MG/DL — HIGH (ref 8–20)
CALCIUM SERPL-MCNC: 8.5 MG/DL — SIGNIFICANT CHANGE UP (ref 8.4–10.5)
CHLORIDE SERPL-SCNC: 110 MMOL/L — HIGH (ref 96–108)
CO2 SERPL-SCNC: 21 MMOL/L — LOW (ref 22–29)
CREAT SERPL-MCNC: 1.14 MG/DL — SIGNIFICANT CHANGE UP (ref 0.5–1.3)
D DIMER BLD IA.RAPID-MCNC: 406 NG/ML DDU — HIGH
EGFR: 46 ML/MIN/1.73M2 — LOW
GLUCOSE SERPL-MCNC: 87 MG/DL — SIGNIFICANT CHANGE UP (ref 70–99)
HCT VFR BLD CALC: 30 % — LOW (ref 34.5–45)
HGB BLD-MCNC: 9.2 G/DL — LOW (ref 11.5–15.5)
MCHC RBC-ENTMCNC: 30.7 GM/DL — LOW (ref 32–36)
MCHC RBC-ENTMCNC: 33.5 PG — SIGNIFICANT CHANGE UP (ref 27–34)
MCV RBC AUTO: 109.1 FL — HIGH (ref 80–100)
NT-PROBNP SERPL-SCNC: 3107 PG/ML — HIGH (ref 0–300)
PLATELET # BLD AUTO: 135 K/UL — LOW (ref 150–400)
POTASSIUM SERPL-MCNC: 4.6 MMOL/L — SIGNIFICANT CHANGE UP (ref 3.5–5.3)
POTASSIUM SERPL-SCNC: 4.6 MMOL/L — SIGNIFICANT CHANGE UP (ref 3.5–5.3)
PROT SERPL-MCNC: 5.3 G/DL — LOW (ref 6.6–8.7)
RBC # BLD: 2.75 M/UL — LOW (ref 3.8–5.2)
RBC # FLD: 14.8 % — HIGH (ref 10.3–14.5)
SODIUM SERPL-SCNC: 141 MMOL/L — SIGNIFICANT CHANGE UP (ref 135–145)
WBC # BLD: 8.92 K/UL — SIGNIFICANT CHANGE UP (ref 3.8–10.5)
WBC # FLD AUTO: 8.92 K/UL — SIGNIFICANT CHANGE UP (ref 3.8–10.5)

## 2022-12-18 PROCEDURE — 99231 SBSQ HOSP IP/OBS SF/LOW 25: CPT

## 2022-12-18 PROCEDURE — 93010 ELECTROCARDIOGRAM REPORT: CPT

## 2022-12-18 PROCEDURE — 99233 SBSQ HOSP IP/OBS HIGH 50: CPT

## 2022-12-18 PROCEDURE — 93970 EXTREMITY STUDY: CPT | Mod: 26

## 2022-12-18 RX ORDER — MIDODRINE HYDROCHLORIDE 2.5 MG/1
5 TABLET ORAL EVERY 8 HOURS
Refills: 0 | Status: DISCONTINUED | OUTPATIENT
Start: 2022-12-18 | End: 2022-12-18

## 2022-12-18 RX ORDER — TAMSULOSIN HYDROCHLORIDE 0.4 MG/1
0.4 CAPSULE ORAL AT BEDTIME
Refills: 0 | Status: DISCONTINUED | OUTPATIENT
Start: 2022-12-18 | End: 2022-12-22

## 2022-12-18 RX ORDER — METOPROLOL TARTRATE 50 MG
25 TABLET ORAL DAILY
Refills: 0 | Status: DISCONTINUED | OUTPATIENT
Start: 2022-12-18 | End: 2022-12-22

## 2022-12-18 RX ADMIN — TAMSULOSIN HYDROCHLORIDE 0.4 MILLIGRAM(S): 0.4 CAPSULE ORAL at 21:01

## 2022-12-18 RX ADMIN — SENNA PLUS 2 TABLET(S): 8.6 TABLET ORAL at 21:01

## 2022-12-18 RX ADMIN — Medication 250 MICROGRAM(S): at 05:38

## 2022-12-18 RX ADMIN — PANTOPRAZOLE SODIUM 40 MILLIGRAM(S): 20 TABLET, DELAYED RELEASE ORAL at 05:15

## 2022-12-18 RX ADMIN — Medication 100 MICROGRAM(S): at 05:15

## 2022-12-18 RX ADMIN — ATORVASTATIN CALCIUM 40 MILLIGRAM(S): 80 TABLET, FILM COATED ORAL at 21:01

## 2022-12-18 RX ADMIN — Medication 1 TABLET(S): at 18:24

## 2022-12-18 RX ADMIN — Medication 25 MILLIGRAM(S): at 12:57

## 2022-12-18 RX ADMIN — Medication 10 MILLIGRAM(S): at 05:15

## 2022-12-18 RX ADMIN — Medication 1 TABLET(S): at 05:15

## 2022-12-18 RX ADMIN — SODIUM CHLORIDE 75 MILLILITER(S): 9 INJECTION INTRAMUSCULAR; INTRAVENOUS; SUBCUTANEOUS at 05:14

## 2022-12-18 RX ADMIN — ENOXAPARIN SODIUM 30 MILLIGRAM(S): 100 INJECTION SUBCUTANEOUS at 18:26

## 2022-12-18 NOTE — PROGRESS NOTE ADULT - SUBJECTIVE AND OBJECTIVE BOX
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 Pt was started on CAP coverage and tx for COPD exacerbation. She was also receiving IVFs for KALPANA with concern for possible volume overload subsequently and was being diuresed. CT chest 12/7 with extensive tracheobronchial secretions with partial atelectasis b/l lower lobes.       INTERVAL HPI/OVERNIGHT EVENTS:  Pt appears more awake this AM and more comfortable appearing. Pt reports she does not feel any issues with her breathing. Pt has been better able to expectorate. BP has improved. Pt denies any chest pain. Denies any LE pain. Sating 100% on 2L NC and weaned to 1L NC.     MEDICATIONS  (STANDING):  atorvastatin 40 milliGRAM(s) Oral at bedtime  enoxaparin Injectable 30 milliGRAM(s) SubCutaneous every 24 hours  levothyroxine 100 MICROGram(s) Oral daily  metoprolol succinate ER 25 milliGRAM(s) Oral daily  nystatin    Suspension 471846 Unit(s) Swish and Swallow four times a day  pantoprazole    Tablet 40 milliGRAM(s) Oral before breakfast  polyethylene glycol 3350 17 Gram(s) Oral daily  senna 2 Tablet(s) Oral daily  tamsulosin 0.4 milliGRAM(s) Oral at bedtime  trimethoprim   80 mG/sulfamethoxazole 400 mG 1 Tablet(s) Oral every 12 hours    MEDICATIONS  (PRN):  guaiFENesin Oral Liquid (Sugar-Free) 100 milliGRAM(s) Oral every 6 hours PRN Cough  levalbuterol Inhalation 1.25 milliGRAM(s) Inhalation every 6 hours PRN shortness of breath and wheezing  ondansetron Injectable 4 milliGRAM(s) IV Push every 6 hours PRN Nausea and/or Vomiting    Allergies  codeine (Vomiting)  iodine (Unknown)  Intolerances    PAST MEDICAL & SURGICAL HISTORY:  Essential hypertension  Chronic obstructive pulmonary disease, unspecified COPD type  Hypothyroidism, unspecified type  H/O exploratory laparotomy  for a benign colon mass 4/19/12    REVIEW OF SYSTEMS: Negative except per HPI    PHYSICAL EXAMINATION:    GENERAL: Alert, in NAD, sitting comfortably in bed     HEENT: NC/AT, anicteric, MMM    NECK: Supple    LUNGS: bilateral base crackles, no increased work of breathing, on nasal cannula    HEART: S1S2, RRR    ABDOMEN: Soft, nontender, and nondistended    EXTREMITIES: Without any cyanosis, clubbing; 1+ b/l edema    NEUROLOGIC: No focal deficits       Vital Signs Last 24 Hrs  T(C): 36.8 (18 Dec 2022 15:48), Max: 36.8 (18 Dec 2022 07:14)  T(F): 98.2 (18 Dec 2022 15:48), Max: 98.2 (18 Dec 2022 07:14)  HR: 84 (18 Dec 2022 18:18) (60 - 96)  BP: 133/96 (18 Dec 2022 18:18) (117/81 - 173/78)  BP(mean): 103 (18 Dec 2022 18:18) (69 - 104)  RR: 18 (18 Dec 2022 18:18) (16 - 18)  SpO2: 100% (18 Dec 2022 18:18) (99% - 100%)    Parameters below as of 18 Dec 2022 18:18  Patient On (Oxygen Delivery Method): nasal cannula  O2 Flow (L/min): 1      I&O's Detail    17 Dec 2022 07:01  -  18 Dec 2022 07:00  --------------------------------------------------------  IN:    Oral Fluid: 300 mL    sodium chloride 0.9%: 825 mL  Total IN: 1125 mL    OUT:    Indwelling Catheter - Urethral (mL): 750 mL  Total OUT: 750 mL    Total NET: 375 mL      18 Dec 2022 07:01  -  18 Dec 2022 18:55  --------------------------------------------------------  IN:    Oral Fluid: 480 mL  Total IN: 480 mL    OUT:    Indwelling Catheter - Urethral (mL): 450 mL    Intermittent Catheterization - Urethral (mL): 450 mL  Total OUT: 900 mL    Total NET: -420 mL      LABS:                                                   9.2    8.92  )-----------( 135      ( 18 Dec 2022 08:50 )             30.0                     12-18    141  |  110<H>  |  34.1<H>  ----------------------------<  87  4.6   |  21.0<L>  |  1.14    Ca    8.5      18 Dec 2022 08:50  Mg     2.7     12-17    TPro  5.3<L>  /  Alb  3.2<L>  /  TBili  0.4  /  DBili  x   /  AST  44<H>  /  ALT  19  /  AlkPhos  61  12-18                            RADIOLOGY & ADDITIONAL STUDIES:    ACC: [id number] EXAM:  XR CHEST PORTABLE ROUTINE 1V                          PROCEDURE DATE:  12/08/2022          INTERPRETATION:  Portable chest radiograph    CLINICAL INFORMATION: Fluid overload    TECHNIQUE:  Portable  AP chest radiograph.    COMPARISON: 12/4/2022 chest x-ray and 12/7/2022 chest CT .    FINDINGS:  CATHETERS AND TUBES: None    PULMONARY: Residual LEFT lower lobe airspace consolidation/atelectasis   likely from mucous plug as seen on prior CT scan..   LEFT upper zone and RIGHT lung parenchyma grossly clear.  .   No pneumothorax.    HEART/VASCULAR: The  heart is enlarged in transverse diameter.    BONES: Visualized osseous structures are intact.    IMPRESSION:   Cardiomegaly.  Residual LEFT  lower zone  airspace consolidation..      ZELDA DA SILVA MD; Attending Radiologist  This document has been electronically signed. Dec  8 2022 12:25PM        ACC: [id number] EXAM:  CT CHEST                          PROCEDURE DATE:  12/07/2022          INTERPRETATION:  EXAMINATION: CT CHEST    CLINICAL INDICATION: Dyspnea.    TECHNIQUE: Noncontrast CT of the chest was obtained.    COMPARISON: Multiple CT, most recent 6/3/2021..    FINDINGS:    AIRWAYS AND LUNGS: Tracheobronchial secretions, extensive in the lower   lobes, greater on the left.  Partial atelectasis both lower lobes,   greater on the left. Patchy bilateral lung opacities.    MEDIASTINUM AND PLEURA: There are no enlarged mediastinal, hilar or   axillary lymph nodes. The visualized portion of the thyroid gland is   unremarkable. There are small bilateral pleural effusions.  There is no   pneumothorax.    HEART AND VESSELS: There is mild cardiomegaly.  There are atherosclerotic   calcifications of the aorta and coronary arteries.  There is a small   pericardial effusion.    UPPER ABDOMEN: Images of the upper abdomen demonstrate hiatal hernia with   debris in the esophagus.    BONES AND SOFT TISSUES: There are mild degenerative changes of the spine.    The soft tissues are unremarkable.    AI VERTEBRAL BODY ANALYSIS:  T4: Moderate compression fracture with 26% height loss.    IMPRESSION:  There is a small left pleural effusion. Tracheobronchial secretions,   extensive in the lower lobes, greater on the left.  Partial atelectasis   both lower lobes, greater on the left. Patchy bilateral lung opacities   may be infectious or inflammatory.    VERTEBRAL BODY ANALYSIS: Vertebral compression fractures as described,   consider further workup for osteoporosis.      DUSTY RANGEL MD; Attending Radiologist  This document has been electronically signed. Dec  7 2022 12:03PM    CT chest/abdomen:  IMPRESSION:  Moderate to large hiatal hernia with a patulous esophagus containing debris. Correlate for symptoms of reflux.  Patchy bilateral groundglass opacities, left lung greater than right. Trace left pleural effusion.  Long segment thickening of the descending colon compatible with colitis.  T4: Moderate compression fracture with 28% height loss.    ECHO:    Summary:   1. Normal left ventricular internal cavity size.   2. Normal global left ventricular systolic function.   3. Left ventricular ejection fraction, by visual estimation, is 65 to   70%.   4. Normal right ventricular size and function.   5. The left atrium is normal in size.   6. The right atrium is normal in size.   7. Sclerotic aortic valve with normal opening.   8. Mild tricuspid regurgitation.   9. Normal pulmonary artery pressure.  10. Trace mitral valve regurgitation.  11. Mild pulmonic valve regurgitation.    MD Henri Electronically signed on 11/29/2022 at 3:02:43 PM

## 2022-12-18 NOTE — PROGRESS NOTE ADULT - SUBJECTIVE AND OBJECTIVE BOX
[place] [facility] - [facility], THE HEART CENTER                                   [address] [place] [address]                                                      PHONE: [phone]                                                         FAX: [phone]  [email or web address]  ---------------------------------------------------------------------------------------------------------------------------------    Overnight events/patient complaints:  HR greatly improved     PAST MEDICAL & SURGICAL HISTORY:  Essential hypertension      Chronic obstructive pulmonary disease, unspecified COPD type      Hypothyroidism, unspecified type      H/O exploratory laparotomy  for a benign colon mass 4/19/12          codeine (Vomiting)  iodine (Unknown)    MEDICATIONS  (STANDING):  atorvastatin 40 milliGRAM(s) Oral at bedtime  enoxaparin Injectable 30 milliGRAM(s) SubCutaneous every 24 hours  levothyroxine 100 MICROGram(s) Oral daily  midodrine 5 milliGRAM(s) Oral every 8 hours  nystatin    Suspension [id number] Unit(s) Swish and Swallow four times a day  pantoprazole    Tablet 40 milliGRAM(s) Oral before breakfast  polyethylene glycol 3350 17 Gram(s) Oral daily  senna 2 Tablet(s) Oral daily  trimethoprim   80 mG/sulfamethoxazole 400 mG 1 Tablet(s) Oral every 12 hours    MEDICATIONS  (PRN):  diltiazem Injectable 10 milliGRAM(s) IV Push every 2 hours PRN for HR > 110 bpm  guaiFENesin Oral Liquid (Sugar-Free) 100 milliGRAM(s) Oral every 6 hours PRN Cough  levalbuterol Inhalation 1.25 milliGRAM(s) Inhalation every 6 hours PRN shortness of breath and wheezing  ondansetron Injectable 4 milliGRAM(s) IV Push every 6 hours PRN Nausea and/or Vomiting      Vital Signs Last 24 Hrs  T(C): 36.8 (18 Dec 2022 07:14), Max: 36.8 (17 Dec 2022 12:31)  T(F): 98.2 (18 Dec 2022 07:14), Max: 98.2 (17 Dec 2022 12:31)  HR: 78 (18 Dec 2022 06:00) (60 - 137)  BP: 133/97 (18 Dec 2022 06:00) (86/64 - 151/59)  BP(mean): 104 (18 Dec 2022 06:00) (60 - 107)  RR: 18 (18 Dec 2022 06:00) (12 - 18)  SpO2: 100% (18 Dec 2022 06:00) (100% - 100%)    Parameters below as of 18 Dec 2022 06:00  Patient On (Oxygen Delivery Method): nasal cannula  O2 Flow (L/min): 2    ICU Vital Signs Last 24 Hrs  TERESA NESS  I&O's Detail    17 Dec 2022 07:01  -  18 Dec 2022 07:00  --------------------------------------------------------  IN:    Oral Fluid: 300 mL    sodium chloride 0.9%: 825 mL  Total IN: 1125 mL    OUT:    Indwelling Catheter - Urethral (mL): 750 mL  Total OUT: 750 mL    Total NET: 375 mL        I&O's Summary    17 Dec 2022 07:01  -  18 Dec 2022 07:00  --------------------------------------------------------  IN: 1125 mL / OUT: 750 mL / NET: 375 mL      Drug Dosing Weight  TERESA NESS      PHYSICAL EXAM:  General: Appears well developed, well nourished alert and cooperative.  HEENT: Head; normocephalic, atraumatic.  Eyes: Pupils reactive, cornea wnl.  Neck: Supple, no nodes adenopathy, no NVD or carotid bruit or thyromegaly.  CARDIOVASCULAR: Normal S1 and S2, No murmur, rub, gallop or lift.   LUNGS: No rales, rhonchi or wheeze. Normal breath sounds bilaterally.  ABDOMEN: Soft, nontender without mass or organomegaly. bowel sounds normoactive.  EXTREMITIES: No clubbing, cyanosis or edema. Distal pulses wnl.   SKIN: warm and dry with normal turgor.  NEURO: Alert/oriented x 3/normal motor exam. No pathologic reflexes.    PSYCH: normal affect.        LABS:                        9.2    7.06  )-----------( 186      ( 17 Dec 2022 07:10 )             28.3     12-17    143  |  109<H>  |  46.3<H>  ----------------------------<  102<H>  4.1   |  24.0  |  1.26    Ca    8.8      17 Dec 2022 07:10  Mg     2.7     12-17    TPro  5.7<L>  /  Alb  3.3  /  TBili  0.5  /  DBili  x   /  AST  32<H>  /  ALT  16  /  AlkPhos  61  12-17    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 Being treated for COPD, PNA and ARF.   Cardio initially consulted for bradycardia.  Pt now with AF with RVR.     AF with rvr  -dig load pt for rate control  -cardizem 10 mg iv q2 hrs prn for >110 bpm   -pt was hypotensive earlier today which prohibited use of BB/CCB  -pt not a candidate for AC at this time given hx of Anemia and guic + stool   -tele   -DNR/DNI  -palliative care consulted       Thank you for allowing [facility] to participate in the care of this patient.  Please feel free to call with any questions or concerns.                  [place] [facility] - [facility], THE HEART CENTER                                   [address] [place] [address]                                                      PHONE: [phone]                                                         FAX: [phone]  [email or web address]  ---------------------------------------------------------------------------------------------------------------------------------    Overnight events/patient complaints:  HR greatly improved     PAST MEDICAL & SURGICAL HISTORY:  Essential hypertension      Chronic obstructive pulmonary disease, unspecified COPD type      Hypothyroidism, unspecified type      H/O exploratory laparotomy  for a benign colon mass 4/19/12          codeine (Vomiting)  iodine (Unknown)    MEDICATIONS  (STANDING):  atorvastatin 40 milliGRAM(s) Oral at bedtime  enoxaparin Injectable 30 milliGRAM(s) SubCutaneous every 24 hours  levothyroxine 100 MICROGram(s) Oral daily  midodrine 5 milliGRAM(s) Oral every 8 hours  nystatin    Suspension [id number] Unit(s) Swish and Swallow four times a day  pantoprazole    Tablet 40 milliGRAM(s) Oral before breakfast  polyethylene glycol 3350 17 Gram(s) Oral daily  senna 2 Tablet(s) Oral daily  trimethoprim   80 mG/sulfamethoxazole 400 mG 1 Tablet(s) Oral every 12 hours    MEDICATIONS  (PRN):  diltiazem Injectable 10 milliGRAM(s) IV Push every 2 hours PRN for HR > 110 bpm  guaiFENesin Oral Liquid (Sugar-Free) 100 milliGRAM(s) Oral every 6 hours PRN Cough  levalbuterol Inhalation 1.25 milliGRAM(s) Inhalation every 6 hours PRN shortness of breath and wheezing  ondansetron Injectable 4 milliGRAM(s) IV Push every 6 hours PRN Nausea and/or Vomiting      Vital Signs Last 24 Hrs  T(C): 36.8 (18 Dec 2022 07:14), Max: 36.8 (17 Dec 2022 12:31)  T(F): 98.2 (18 Dec 2022 07:14), Max: 98.2 (17 Dec 2022 12:31)  HR: 78 (18 Dec 2022 06:00) (60 - 137)  BP: 133/97 (18 Dec 2022 06:00) (86/64 - 151/59)  BP(mean): 104 (18 Dec 2022 06:00) (60 - 107)  RR: 18 (18 Dec 2022 06:00) (12 - 18)  SpO2: 100% (18 Dec 2022 06:00) (100% - 100%)    Parameters below as of 18 Dec 2022 06:00  Patient On (Oxygen Delivery Method): nasal cannula  O2 Flow (L/min): 2    ICU Vital Signs Last 24 Hrs  TERESA NESS  I&O's Detail    17 Dec 2022 07:01  -  18 Dec 2022 07:00  --------------------------------------------------------  IN:    Oral Fluid: 300 mL    sodium chloride 0.9%: 825 mL  Total IN: 1125 mL    OUT:    Indwelling Catheter - Urethral (mL): 750 mL  Total OUT: 750 mL    Total NET: 375 mL        I&O's Summary    17 Dec 2022 07:01  -  18 Dec 2022 07:00  --------------------------------------------------------  IN: 1125 mL / OUT: 750 mL / NET: 375 mL      Drug Dosing Weight  TERESA NESS      PHYSICAL EXAM:  General: Appears well developed, well nourished alert and cooperative.  HEENT: Head; normocephalic, atraumatic.  Eyes: Pupils reactive, cornea wnl.  Neck: Supple, no nodes adenopathy, no NVD or carotid bruit or thyromegaly.  CARDIOVASCULAR: Normal S1 and S2, No murmur, rub, gallop or lift.   LUNGS: No rales, rhonchi or wheeze. Normal breath sounds bilaterally.  ABDOMEN: Soft, nontender without mass or organomegaly. bowel sounds normoactive.  EXTREMITIES: No clubbing, cyanosis or edema. Distal pulses wnl.   SKIN: warm and dry with normal turgor.  NEURO: Alert/oriented x 3/normal motor exam. No pathologic reflexes.    PSYCH: normal affect.        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 Being treated for COPD, PNA and ARF.   Cardio initially consulted for bradycardia.  Pt now with AF with RVR.     AF with RVR  -converted to nsr and 1rst degree av block   -started toprol 25 mg daily   -decreased midodrine to 5 mg tid   -pt not a candidate for AC at this time given hx of Anemia and guiac + stool   -tele   -DNR/DNI  -palliative care consulted     Thank you for allowing [facility] to participate in the care of this patient.  Please feel free to call with any questions or concerns.

## 2022-12-18 NOTE — PROGRESS NOTE ADULT - ASSESSMENT
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, hypothyroidism, HTN, [place] s/p cochlear implants presented 11/29 after an episode of emesis and was found to be hypoxic on presentation with concern for PNA/COPD exacerbation and component of volume overload    Acute hypoxic respiratory failure in the setting of likely PNA/ COPD exacerbation now improved    wean supplemental O2 as tolerated for goal >88-92%; likely can wean off  chest PT q6h    Xopenex prn as tolerated by HR  steroids discontinued   aspiration precautions   monitor I/Os and keep net even   trend H/H   completed course of ceftriaxone/azithro and brief empiric zosyn; now on bactrim for noted Stenotrophomonas in sputum   f/u ID recs     colitis noted on imaging but no reported diarrhea; would send stool studies/c diff if develops   OOB/PT as tolerated   can repeat ct chest in 6-8 weeks for followup  rate control as per cardiology  outpt pulm f/u on discharge   continued goals of care discussion given she is at high risk for further decline; very guarded prognosis

## 2022-12-18 NOTE — CHART NOTE - NSCHARTNOTEFT_GEN_A_CORE
Source: Patient [ ]  Family [ ]   other [ x]EMR    Current Diet: easy to chew    Patient reports [ ] nausea  [ ] vomiting [ ] diarrhea [ x] constipation  [ ]chewing problems [ ] swallowing issues  [ ] other:     PO intake:  < 50% [ x]   50-75%  [ ]   %  [ ]  other :    Source for PO intake [ ] Patient [ ] family [x ] chart [ ] staff [ ] other    Enteral /Parenteral Nutrition:     Current Weight: 163.5# 11/30    % Weight Change     Pertinent Medications: MEDICATIONS  (STANDING):  atorvastatin 40 milliGRAM(s) Oral at bedtime  enoxaparin Injectable 30 milliGRAM(s) SubCutaneous every 24 hours  levothyroxine 100 MICROGram(s) Oral daily  midodrine 5 milliGRAM(s) Oral every 8 hours  nystatin    Suspension [id number] Unit(s) Swish and Swallow four times a day  pantoprazole    Tablet 40 milliGRAM(s) Oral before breakfast  polyethylene glycol 3350 17 Gram(s) Oral daily  senna 2 Tablet(s) Oral daily  trimethoprim   80 mG/sulfamethoxazole 400 mG 1 Tablet(s) Oral every 12 hours    MEDICATIONS  (PRN):  diltiazem Injectable 10 milliGRAM(s) IV Push every 2 hours PRN for HR > 110 bpm  guaiFENesin Oral Liquid (Sugar-Free) 100 milliGRAM(s) Oral every 6 hours PRN Cough  levalbuterol Inhalation 1.25 milliGRAM(s) Inhalation every 6 hours PRN shortness of breath and wheezing  ondansetron Injectable 4 milliGRAM(s) IV Push every 6 hours PRN Nausea and/or Vomiting    Pertinent Labs: CBC Full  -  ( 18 Dec 2022 08:50 )  WBC Count : 8.92 K/uL  RBC Count : 2.75 M/uL  Hemoglobin : 9.2 g/dL  Hematocrit : 30.0 %  Platelet Count - Automated : 135 K/uL  Mean Cell Volume : 109.1 fl  Mean Cell Hemoglobin : 33.5 pg  Mean Cell Hemoglobin Concentration : 30.7 gm/dL  Auto Neutrophil # : x  Auto Lymphocyte # : x  Auto Monocyte # : x  Auto Eosinophil # : x  Auto Basophil # : x  Auto Neutrophil % : x  Auto Lymphocyte % : x  Auto Monocyte % : x  Auto Eosinophil % : x  Auto Basophil % : x      12-18 Na141 mmol/L Glu 87 mg/dL K+ 4.6 mmol/L Cr  1.14 mg/dL BUN 34.1 mg/dL<H> Phos n/a   Alb 3.2 g/dL<L> PAB n/a           Skin:     Nutrition focused physical exam conducted - found signs of malnutrition [ ]absent [ x]present    Subcutaneous fat loss: [ x] Orbital fat pads region, [ ]Buccal fat region, [x ]Triceps region,  [ ]Ribs region    Muscle wasting: [x ]Temples region, [x ]Clavicle region, [ x]Shoulder region, [ ]Scapula region, [ ]Interosseous region,  [ ]thigh region, [ ]Calf region    Estimated Needs:   [x] no change since previous assessment  [ ] recalculated:     Current Nutrition Diagnosis: Malnutrition...  moderate  related to increased nutritional needs in setting of pneumonia, COPD  as evidenced by <75% intake >1mo, mod fat/muscle depletion. Pt disimpacted on 12/14. palliative following.      Recommendations:   Continue diet as ordered  Rec Ensure TID to optimize po intake and provide an additional 350 kcal, 20g protein per serving   Rec MVI, Vit C    Monitoring and Evaluation:   [x ] PO intake [ x] Tolerance to diet prescription [X] Weights  [X] Follow up per protocol [X] Labs:

## 2022-12-18 NOTE — PROGRESS NOTE ADULT - ASSESSMENT
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 She is admitted for Respiratory failure.     Acute Hypoxic Resp failure due to COPD/Pneumonia/acute HFpEF, improving  CT Chest atelectasis and pneumonia, secretions    Duoneb and prednisone taper now on 20mg daily, will not lower yet as her resp status has not improved.   Viral panel neg, improving leukocytosis  completed Rocephin + Zithromax  sputum sample with Stenotrophomonas, ID  consulted now on PO Bactrim D3/5  Chest percussion vest  pulm following, deferring Bronch at this time.     Sinus tachycardia  - Holding BB given bradycardia  - Discussed with cardiology- started toprol. No AC given anemia and guaiac stool.   - Discontinued midodrine given HTN    Syncope likely hypovolemia and hypotension  holding all anti-hypertensives  monitor on tele  stop IVFs to avoid fluid overload  wean midodrine  - Holding lasix given hypotension    KALPANA, improved  EF 65-70%  monitor cr  stop lasix  IVFs  strict os and os    Hypokalemia, resolved  - monitor K    Bradycardia  - Cards consult appreciated  - hold metoprolol.     Hypothyroid-severe- no compliance with medications  LT4 increased to 100mcg  Endo following, labs noted, Follow up appointment: 1/13 at 2:00pm [place] office     Vomiting, resolved was actually not vomiting but coughing up phlegm  - prn antiemetics  - CT a/p  - aspiration precautions    Thrush  - Nystatin    Anemia  - brown stool but stool occult  - appreciate GI eval, no intervention    Constipation  s/p manual disimpaction on 12/14  - senna and Miralax    Urinary Retention  - Espinal in place, tov when up out of bed    DVT PPx- Lovenox    Dispo -PT re-eval pending  code status DNR/DNI, Palliative care consulted.      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 She is admitted for Respiratory failure.     Acute Hypoxic Resp failure due to COPD/Pneumonia/acute HFpEF, improving  CT Chest atelectasis and pneumonia, secretions    Duoneb and prednisone taper now on 20mg daily, will not lower yet as her resp status has not improved.   Viral panel neg, improving leukocytosis  completed Rocephin + Zithromax  sputum sample with Stenotrophomonas, ID  consulted now on PO Bactrim D3/5  Chest percussion vest  pulm following, deferring Bronch at this time.     AF w/ RVR  - Discussed with cardiology- started toprol. No AC given anemia and guaiac stool.   - Discontinued midodrine given HTN    Syncope likely hypovolemia and hypotension  holding all anti-hypertensives  monitor on tele  stop IVFs to avoid fluid overload  wean midodrine  - Holding lasix given hypotension    KALPANA, improved  EF 65-70%  monitor cr  stop lasix  IVFs  strict os and os    Hypokalemia, resolved  - monitor K    Bradycardia  - Cards consult appreciated  - hold metoprolol.     Hypothyroid-severe- no compliance with medications  LT4 increased to 100mcg  Endo following, labs noted, Follow up appointment: 1/13 at 2:00pm [place] office     Vomiting, resolved was actually not vomiting but coughing up phlegm  - prn antiemetics  - CT a/p  - aspiration precautions    Thrush  - Nystatin    Anemia  - brown stool but stool occult  - appreciate GI eval, no intervention    Constipation  s/p manual disimpaction on 12/14  - senna and Miralax    Urinary Retention  - Espinal in place, tov when up out of bed    DVT PPx- Lovenox  Dispo -PT re-eval pending  code status DNR/DNI, Palliative care consulted.      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 She is admitted for Respiratory failure.     Acute Hypoxic Resp failure due to COPD/Pneumonia/acute HFpEF, improving  CT Chest atelectasis and pneumonia, secretions    Duoneb and prednisone taper now on 20mg daily, will not lower yet as her resp status has not improved.   Viral panel neg, improving leukocytosis  completed Rocephin + Zithromax  sputum sample with Stenotrophomonas, ID  - ID Consulted now on PO Bactrim D3/5  - D-dimer elevated. Unable to obtain CTA chest due to iodine allergy  - F/u repeat D-dimer. Will c/w Lovenox ppx. Risks of allergic reaction outweigh benefits. Will hold off CTA chest  - F/u US duplex to r/o DVT  Chest percussion vest  pulm following, deferring Bronch at this time.     AF w/ RVR  - Discussed with cardiology- started toprol. No AC given anemia and guaiac stool.   - Discontinued midodrine given HTN    Syncope likely hypovolemia and hypotension  holding all anti-hypertensives  monitor on tele  stop IVFs to avoid fluid overload  wean midodrine  - Holding lasix given hypotension    KALPANA, improved  EF 65-70%  monitor cr  stop lasix  IVFs  strict os and os    Hypokalemia, resolved  - monitor K    Bradycardia  - Cards consult appreciated  - hold metoprolol.     Hypothyroid-severe- no compliance with medications  LT4 increased to 100mcg  Endo following, labs noted, Follow up appointment: 1/13 at 2:00pm [place] office     Vomiting, resolved was actually not vomiting but coughing up phlegm  - prn antiemetics  - CT a/p- Moderate to large hiatal hernia with a patulous esophagus containing   debris  - Appreciate GI recs  - aspiration precautions    Thrush  - Nystatin    Anemia  - brown stool but stool occult  - appreciate GI eval, no intervention    Constipation  s/p manual disimpaction on 12/14  - senna and Miralax    Urinary Retention  - Will d/c henson and do TOV  - Started flomax    DVT PPx- Lovenox  Dispo -PT re-eval pending  code status DNR/DNI, Palliative care consulted.    Unable to reach patient's daughter, Andressa ([phone]) on 12/18. Unable to leave .

## 2022-12-18 NOTE — PROGRESS NOTE ADULT - SUBJECTIVE AND OBJECTIVE BOX
[facility] Division of Hospital Medicine    Chief Complaint: cough and vomiting      SUBJECTIVE / OVERNIGHT EVENTS: No acute events overnight. HD stable.     Patient denies chest pain, SOB, abd pain, N/V, fever, chills, dysuria or any other complaints. All remainder ROS negative.     MEDICATIONS  (STANDING):  atorvastatin 40 milliGRAM(s) Oral at bedtime  enoxaparin Injectable 30 milliGRAM(s) SubCutaneous every 24 hours  levothyroxine 100 MICROGram(s) Oral daily  metoprolol succinate ER 25 milliGRAM(s) Oral daily  midodrine 5 milliGRAM(s) Oral every 8 hours  nystatin    Suspension 626955 Unit(s) Swish and Swallow four times a day  pantoprazole    Tablet 40 milliGRAM(s) Oral before breakfast  polyethylene glycol 3350 17 Gram(s) Oral daily  senna 2 Tablet(s) Oral daily  trimethoprim   80 mG/sulfamethoxazole 400 mG 1 Tablet(s) Oral every 12 hours    MEDICATIONS  (PRN):  guaiFENesin Oral Liquid (Sugar-Free) 100 milliGRAM(s) Oral every 6 hours PRN Cough  levalbuterol Inhalation 1.25 milliGRAM(s) Inhalation every 6 hours PRN shortness of breath and wheezing  ondansetron Injectable 4 milliGRAM(s) IV Push every 6 hours PRN Nausea and/or Vomiting        I&O's Summary    17 Dec 2022 07:01  -  18 Dec 2022 07:00  --------------------------------------------------------  IN: 1125 mL / OUT: 750 mL / NET: 375 mL        PHYSICAL EXAM:  Vital Signs Last 24 Hrs  T(C): 36.8 (18 Dec 2022 07:14), Max: 36.8 (17 Dec 2022 12:31)  T(F): 98.2 (18 Dec 2022 07:14), Max: 98.2 (17 Dec 2022 12:31)  HR: 94 (18 Dec 2022 10:01) (60 - 133)  BP: 117/81 (18 Dec 2022 10:01) (96/60 - 173/78)  BP(mean): 104 (18 Dec 2022 06:00) (69 - 107)  RR: 18 (18 Dec 2022 10:01) (16 - 18)  SpO2: 100% (18 Dec 2022 10:01) (100% - 100%)    Parameters below as of 18 Dec 2022 10:01  Patient On (Oxygen Delivery Method): nasal cannula  O2 Flow (L/min): 2    CONSTITUTIONAL: NAD, resting comformtably  RESPIRATORY: scattered rhonchi much less then yesterday.  NC 2L  CARDIOVASCULAR: regular and regular rhythm, normal S1 and S2, trace lower extremity edema;  ABDOMEN: Nontender to palpation, normoactive bowel sounds  PSYCH: A+O to person, place, and time; affect appropriate  NEUROLOGY: CN 2-12 are intact and symmetric; no gross sensory deficits;   SKIN: No rashes; no palpable lesions    LABS:                        9.2    8.92  )-----------( 135      ( 18 Dec 2022 08:50 )             30.0     12-18    141  |  110<H>  |  34.1<H>  ----------------------------<  87  4.6   |  21.0<L>  |  1.14    Ca    8.5      18 Dec 2022 08:50  Mg     2.7     12-17    TPro  5.3<L>  /  Alb  3.2<L>  /  TBili  0.4  /  DBili  x   /  AST  44<H>  /  ALT  19  /  AlkPhos  61  12-18              CAPILLARY BLOOD GLUCOSE            RADIOLOGY & ADDITIONAL TESTS:  Results Reviewed:   Imaging Personally Reviewed:  Electrocardiogram Personally Reviewed:

## 2022-12-19 LAB
ALBUMIN SERPL ELPH-MCNC: 3 G/DL — LOW (ref 3.3–5.2)
ALP SERPL-CCNC: 73 U/L — SIGNIFICANT CHANGE UP (ref 40–120)
ALT FLD-CCNC: 25 U/L — SIGNIFICANT CHANGE UP
ANION GAP SERPL CALC-SCNC: 11 MMOL/L — SIGNIFICANT CHANGE UP (ref 5–17)
AST SERPL-CCNC: 59 U/L — HIGH
BILIRUB SERPL-MCNC: 0.4 MG/DL — SIGNIFICANT CHANGE UP (ref 0.4–2)
BUN SERPL-MCNC: 25.8 MG/DL — HIGH (ref 8–20)
CALCIUM SERPL-MCNC: 8.5 MG/DL — SIGNIFICANT CHANGE UP (ref 8.4–10.5)
CHLORIDE SERPL-SCNC: 108 MMOL/L — SIGNIFICANT CHANGE UP (ref 96–108)
CO2 SERPL-SCNC: 21 MMOL/L — LOW (ref 22–29)
CREAT SERPL-MCNC: 1.08 MG/DL — SIGNIFICANT CHANGE UP (ref 0.5–1.3)
CULTURE RESULTS: SIGNIFICANT CHANGE UP
D DIMER BLD IA.RAPID-MCNC: 389 NG/ML DDU — HIGH
EGFR: 49 ML/MIN/1.73M2 — LOW
GLUCOSE SERPL-MCNC: 88 MG/DL — SIGNIFICANT CHANGE UP (ref 70–99)
HCT VFR BLD CALC: 27.3 % — LOW (ref 34.5–45)
HGB BLD-MCNC: 8.8 G/DL — LOW (ref 11.5–15.5)
MCHC RBC-ENTMCNC: 32.2 GM/DL — SIGNIFICANT CHANGE UP (ref 32–36)
MCHC RBC-ENTMCNC: 33.2 PG — SIGNIFICANT CHANGE UP (ref 27–34)
MCV RBC AUTO: 103 FL — HIGH (ref 80–100)
PLATELET # BLD AUTO: 159 K/UL — SIGNIFICANT CHANGE UP (ref 150–400)
POTASSIUM SERPL-MCNC: 4.7 MMOL/L — SIGNIFICANT CHANGE UP (ref 3.5–5.3)
POTASSIUM SERPL-SCNC: 4.7 MMOL/L — SIGNIFICANT CHANGE UP (ref 3.5–5.3)
PROT SERPL-MCNC: 5.2 G/DL — LOW (ref 6.6–8.7)
RBC # BLD: 2.65 M/UL — LOW (ref 3.8–5.2)
RBC # FLD: 14.2 % — SIGNIFICANT CHANGE UP (ref 10.3–14.5)
SODIUM SERPL-SCNC: 140 MMOL/L — SIGNIFICANT CHANGE UP (ref 135–145)
SPECIMEN SOURCE: SIGNIFICANT CHANGE UP
WBC # BLD: 7.08 K/UL — SIGNIFICANT CHANGE UP (ref 3.8–10.5)
WBC # FLD AUTO: 7.08 K/UL — SIGNIFICANT CHANGE UP (ref 3.8–10.5)

## 2022-12-19 PROCEDURE — 99233 SBSQ HOSP IP/OBS HIGH 50: CPT

## 2022-12-19 PROCEDURE — 99232 SBSQ HOSP IP/OBS MODERATE 35: CPT

## 2022-12-19 RX ADMIN — ATORVASTATIN CALCIUM 40 MILLIGRAM(S): 80 TABLET, FILM COATED ORAL at 21:24

## 2022-12-19 RX ADMIN — PANTOPRAZOLE SODIUM 40 MILLIGRAM(S): 20 TABLET, DELAYED RELEASE ORAL at 05:28

## 2022-12-19 RX ADMIN — TAMSULOSIN HYDROCHLORIDE 0.4 MILLIGRAM(S): 0.4 CAPSULE ORAL at 21:24

## 2022-12-19 RX ADMIN — Medication 1 TABLET(S): at 05:28

## 2022-12-19 RX ADMIN — Medication 1 TABLET(S): at 16:30

## 2022-12-19 RX ADMIN — Medication 100 MICROGRAM(S): at 05:28

## 2022-12-19 RX ADMIN — ENOXAPARIN SODIUM 30 MILLIGRAM(S): 100 INJECTION SUBCUTANEOUS at 16:30

## 2022-12-19 RX ADMIN — Medication 25 MILLIGRAM(S): at 05:27

## 2022-12-19 NOTE — PROGRESS NOTE ADULT - ASSESSMENT
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 on 11/29 for hypoxic resp failure COPD exacerb/CAP treated with cetfriaxone (LD 12/4), azithromycin (LD 12/5), nebs, steroids. Also found to have uncontrolled hypothyroidism followed by endo. Hospital course notable for episodes of hypoxemia/ fluid overload responsive to diuresis.     ID consulted for Stenotrophomonas growing in sputum culture from 12/12. Culture was obtained after brief episode of desaturation. Otherwise patient has remained with stable oxygen requirements by NC, afebrile and w/o leukocytosis.     Started on piperacillin-tazobactam on 12/14 after syncopal event. Started on TMP-SMX on 12/15    Impression:  Hypoxic respiratory failure - resolved   COPD  Sputum culture positive for Stenotrophomonas     Plan:  - Continue Bactrim through 12/21  - On supplemental oxygen  - on Nystatin for oral thrush   - CT chest A/P noted including possible colitis, however no corresponding clinical symptoms   - 12/7 RVP neg   - 12/14 CXR improved compared to 12/8   - Afebrile  - No leukocytosis  - Aspiration precautions   - Clinically stable from an ID perspective.     ID will sign off. Please call with questions.

## 2022-12-19 NOTE — PHYSICAL THERAPY INITIAL EVALUATION ADULT - GENERAL OBSERVATIONS, REHAB EVAL
Pt rec'd upright in the chair at bedside breathing O2 via NC
Pt received in the semi-[place] position, NAD.

## 2022-12-19 NOTE — PROGRESS NOTE ADULT - SUBJECTIVE AND OBJECTIVE BOX
INTERVAL EVENTS:  Follow up hypothyroid    ROS: Patient denies chest pain, SOB, abd pain, N/V.    MEDICATIONS  (STANDING):  atorvastatin 40 milliGRAM(s) Oral at bedtime  enoxaparin Injectable 30 milliGRAM(s) SubCutaneous every 24 hours  levothyroxine 100 MICROGram(s) Oral daily  metoprolol succinate ER 25 milliGRAM(s) Oral daily  nystatin    Suspension [id number] Unit(s) Swish and Swallow four times a day  pantoprazole    Tablet 40 milliGRAM(s) Oral before breakfast  polyethylene glycol 3350 17 Gram(s) Oral daily  senna 2 Tablet(s) Oral daily  tamsulosin 0.4 milliGRAM(s) Oral at bedtime  trimethoprim   80 mG/sulfamethoxazole 400 mG 1 Tablet(s) Oral every 12 hours    MEDICATIONS  (PRN):  guaiFENesin Oral Liquid (Sugar-Free) 100 milliGRAM(s) Oral every 6 hours PRN Cough  levalbuterol Inhalation 1.25 milliGRAM(s) Inhalation every 6 hours PRN shortness of breath and wheezing  ondansetron Injectable 4 milliGRAM(s) IV Push every 6 hours PRN Nausea and/or Vomiting    Allergies  codeine (Vomiting)  iodine (Unknown)    Vital Signs Last 24 Hrs  T(C): 36.5 (19 Dec 2022 08:09), Max: 37.1 (18 Dec 2022 20:00)  T(F): 97.7 (19 Dec 2022 08:09), Max: 98.8 (18 Dec 2022 20:00)  HR: 84 (19 Dec 2022 10:00) (60 - 92)  BP: 147/88 (19 Dec 2022 10:00) (116/65 - 163/71)  BP(mean): 102 (19 Dec 2022 10:00) (75 - 103)  RR: 18 (19 Dec 2022 10:00) (16 - 18)  SpO2: 100% (19 Dec 2022 10:00) (99% - 100%)    Parameters below as of 19 Dec 2022 10:00  Patient On (Oxygen Delivery Method): room air      PHYSICAL EXAM:  General: No apparent distress  Neck: Supple, trachea midline, no thyromegaly  Respiratory: CTA  Cardiac: +S1, S2, no m/r/g  GI: +BS, soft, non tender, non distended  Extremities: No peripheral edema, no pedal lesions  Neuro: A+O, [facility]      LABS:                        8.8    7.08  )-----------( 159      ( 19 Dec 2022 06:40 )             27.3     12-19    140  |  108  |  25.8<H>  ----------------------------<  88  4.7   |  21.0<L>  |  1.08    Ca    8.5      19 Dec 2022 06:40    TPro  5.2<L>  /  Alb  3.0<L>  /  TBili  0.4  /  DBili  x   /  AST  59<H>  /  ALT  25  /  AlkPhos  73  12-19      Thyroid Stimulating Hormone, Serum: 10.40 uIU/mL (12-14-22 @ 07:06)  Free Thyroxine, Serum: 1.2 ng/dL (12-14-22 @ 07:06)  Triiodothyronine, Total (T3 Total): <40 ng/dL (12-14-22 @ 07:06)  Thyroid Stimulating Hormone, Serum: 30.06 uIU/mL (12-10-22 @ 04:38)  Free Thyroxine, Serum: 0.6 ng/dL (12-10-22 @ 04:38)  Triiodothyronine, Total (T3 Total): <40 ng/dL (12-10-22 @ 04:38)  Thyroid Stimulating Hormone, Serum: 53.33 uIU/mL (12-06-22 @ 06:17)  Triiodothyronine, Total (T3 Total): <40 ng/dL (12-06-22 @ 06:17)  Free Thyroxine, Serum: 0.2 ng/dL (12-06-22 @ 06:17)  Triiodothyronine, Total (T3 Total): <40 ng/dL (11-30-22 @ 04:38)  Thyroid Stimulating Hormone, Serum: 37.97 uIU/mL (11-30-22 @ 04:38)

## 2022-12-19 NOTE — PROGRESS NOTE ADULT - ASSESSMENT
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 She is admitted for Respiratory failure.     Acute Hypoxic Resp failure due to COPD/Pneumonia/acute HFpEF, improving  CT Chest atelectasis and pneumonia, secretions    Duoneb and prednisone taper now on 20mg daily, will not lower yet as her resp status has not improved.   Viral panel neg, improving leukocytosis  completed Rocephin + Zithromax  sputum sample with Stenotrophomonas, ID  - ID Consulted now on PO Bactrim D4/5  - D-dimer downtrending. Unable to obtain CTA chest due to iodine allergy  - F/u US duplex- negative for DVT  - Pulm following, deferring Bronch at this time.     AF w/ RVR  - Discussed with cardiology- c/w toprol. No AC given anemia and guaiac stool.   - Discontinued midodrine given HTN    Syncope likely hypovolemia and hypotension  holding all anti-hypertensives  monitor on tele  - Holding lasix given hypotension    KALPANA, improved  EF 65-70%  monitor cr  stop lasix  strict os and os    Hypothyroid-severe- no compliance with medications  LT4 increased to 100mcg  Endo following, labs noted, Follow up appointment: 1/13 at 2:00pm [place] office     Vomiting, resolved was actually not vomiting but coughing up phlegm  - prn antiemetics  - CT a/p- Moderate to large hiatal hernia with a patulous esophagus containing   debris  - Appreciate GI recs  - aspiration precautions    Thrush  - Nystatin    Anemia  - brown stool but stool occult  - appreciate GI eval, no intervention    Constipation  s/p manual disimpaction on 12/14  - senna and Miralax    Urinary Retention  - Will d/c henson and do TOV  - Started flomax    DVT PPx- Lovenox  Dispo -PT eval- GUCCI  code status DNR/DNI, Palliative care consulted.    Unable to reach patient's daughter, Andressa ([phone]) on 12/19. Unable to leave .

## 2022-12-19 NOTE — PHYSICAL THERAPY INITIAL EVALUATION ADULT - DIAGNOSIS, PT EVAL
Decreased functional mobility
Impaired Functional Mobility due to generalized weakness, (+) Acute Hypoxic Resp failure due to COPD/Pneumonia/acute HFpEF.

## 2022-12-19 NOTE — PHYSICAL THERAPY INITIAL EVALUATION ADULT - GAIT DEVIATIONS NOTED, PT EVAL
decreased step length/decreased stride length/decreased weight-shifting ability
decreased step length/decreased stride length/decreased weight-shifting ability

## 2022-12-19 NOTE — PROGRESS NOTE ADULT - ASSESSMENT
89 y/o F with PMH HTN, [place] s/p cochlear implants, COPD/emphysema, hypothyroidism woke up around 2 AM shaking and 30 mins later emesis and her daughter called EMS. Endocrine follows for hypothyroidism.    1. Hypothyroid - Slowly improving  - TFTs ordered for 12/20  - LT4 100mcg PO daily  - Follow up appointment: 1/13 at 2:00pm [place] office     2. Acute hypoxic respiratory failure/COPD/CAP  - On Bactrim for Stenotrophomona in sputum  - Supplemental oxygen as needed  - Completed steroid taper    3. HTN  - Continue metoprolol

## 2022-12-19 NOTE — PHYSICAL THERAPY INITIAL EVALUATION ADULT - PERTINENT HX OF CURRENT PROBLEM, REHAB EVAL
Pt presents to [facility] with reports of worsening cough
Pt presents to [facility] with reports of worsening cough, chills, vomit

## 2022-12-19 NOTE — PROGRESS NOTE ADULT - SUBJECTIVE AND OBJECTIVE BOX
[facility], THE [facility]                              [address] [place] [address]                                                 PHONE: [phone]                                                 FAX: [phone]  -----------------------------------------------------------------------------------------------  Pt seen and examined. FU for  shortness of breath     Overnight events/Complaints: Pt sitting up in bed. back in NSR.     Vital Signs Last 24 Hrs  T(C): 36.5 (19 Dec 2022 08:09), Max: 37.1 (18 Dec 2022 20:00)  T(F): 97.7 (19 Dec 2022 08:09), Max: 98.8 (18 Dec 2022 20:00)  HR: 84 (19 Dec 2022 10:00) (60 - 92)  BP: 147/88 (19 Dec 2022 10:00) (116/65 - 163/71)  BP(mean): 102 (19 Dec 2022 10:00) (75 - 103)  RR: 18 (19 Dec 2022 10:00) (16 - 18)  SpO2: 100% (19 Dec 2022 10:00) (99% - 100%)    Parameters below as of 19 Dec 2022 10:00  Patient On (Oxygen Delivery Method): room air      I&O's Summary    18 Dec 2022 07:01  -  19 Dec 2022 07:00  --------------------------------------------------------  IN: 830 mL / OUT: 1630 mL / NET: -800 mL    PHYSICAL EXAM:  Constitutional: Appears well; alert and co-operative  HEENT:     Head: Normocephalic and atraumatic  Neck: supple. No JVD  Cardiovascular: regular S1 S2  Respiratory: Lungs clear to auscultation; no crepitations, no wheeze  Gastrointestinal:  Soft, Non-tender, + BS	  Musculoskeletal: Normal range of motion. No edema  Skin: Warm and dry. No cyanosis . No diaphoresis.  Neurologic: No tremor.        LABS:                        8.8    7.08  )-----------( 159      ( 19 Dec 2022 06:40 )             27.3     12-19    140  |  108  |  25.8<H>  ----------------------------<  88  4.7   |  21.0<L>  |  1.08    Ca    8.5      19 Dec 2022 06:40    TPro  5.2<L>  /  Alb  3.0<L>  /  TBili  0.4  /  DBili  x   /  AST  59<H>  /  ALT  25  /  AlkPhos  73  12-19      RADIOLOGY & ADDITIONAL STUDIES: (reviewed)  CXR was independently visualized/reviewed  and demonstrated: Mild platelike atelectasis at the left base, markedly improved.    Nonobstructive bowel gas pattern    CARDIOLOGY TESTING:(reviewed)     ECG (Independent visualization): NSR with PVCs    ECHOCARDIOGRAM  (Independent visualization):  Summary:   1. Normal left ventricular internal cavity size.   2. Normal global left ventricular systolic function.   3. Left ventricular ejection fraction, by visual estimation, is 65 to   70%.   4. Normal right ventricular size and function.   5. The left atrium is normal in size.   6. The right atrium is normal in size.   7. Sclerotic aortic valve with normal opening.   8. Mild tricuspid regurgitation.   9. Normal pulmonary artery pressure.  10. Trace mitral valve regurgitation.  11. Mild pulmonic valve regurgitation.    TELEMETRY independently visualized/reviewed and demonstrated : sinus bradycardia with no obvious heart block, PAF resolved    MEDICATIONS:(reviewed)  MEDICATIONS  (STANDING):  atorvastatin 40 milliGRAM(s) Oral at bedtime  enoxaparin Injectable 30 milliGRAM(s) SubCutaneous every 24 hours  levothyroxine 100 MICROGram(s) Oral daily  metoprolol succinate ER 25 milliGRAM(s) Oral daily  nystatin    Suspension [id number] Unit(s) Swish and Swallow four times a day  pantoprazole    Tablet 40 milliGRAM(s) Oral before breakfast  polyethylene glycol 3350 17 Gram(s) Oral daily  senna 2 Tablet(s) Oral daily  tamsulosin 0.4 milliGRAM(s) Oral at bedtime  trimethoprim   80 mG/sulfamethoxazole 400 mG 1 Tablet(s) Oral every 12 hours    ASSESSMENT AND PLAN:    89y Female with prior history of HTN, [place] s/p cochlear implants, 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 Being treated for COPD, PNA and ARF.  Noted to have bradycardia, PAF with RVR. Now back in NSR    AF with RVR  -converted to nsr and 1rst degree av block   -started toprol 25 mg daily   -BP stable  -pt not a candidate for AC at this time given hx of Anemia and guiac + stool   -DNR/DNI

## 2022-12-19 NOTE — PROGRESS NOTE ADULT - SUBJECTIVE AND OBJECTIVE BOX
[facility]  INFECTIOUS DISEASES at [place] and [place]  ===============================================================                               Sebastián Allen MD*     Yue Mcclain MD*                         Polina Brar MD*       Uma De Leon MD*            Diplomates American Board of Internal Medicine & Infectious Diseases                * [place] Office - Appt - Tel  [phone] Fax [phone]                * [place] Office - Appt - Tel [phone] Fax [phone]                                  Hospital Consult line:  [phone]  ==============================================================    TERESA NESS [id number]    Follow up: Stenotrophomonas     doing much better.   ROZINA Chou stable  On RA     I have personally reviewed the labs and data; pertinent labs and data are listed in this note; please see below.     _______________________________________________________________  REVIEW OF SYSTEMS  Admits to feeling tired, but no other complaints. Has many questions about her thyroid condition   ________________________________________________________________  Allergies:  codeine (Vomiting)  iodine (Unknown)    ________________________________________________________________  PHYSICAL EXAM  GEN: NAD, lying in bed.   HEENT: Anicteric sclerae, EOMI. Moist mucous membranes. No mucosal lesions.   NECK: Supple. Mid-line trachea. No palpable masses or LN  LUNGS: eupneic. CTA B/L.  HEART: RRR, no m/r/g  ABDOMEN: Soft, NT, ND,no HSM.  +BS.    : No CVA tenderness. No Espinal catheter  EXTREMITIES: well perfused, without  edema.  MSK: No joint swelling or deformity   NEUROLOGIC: Grossly no focal deficits   PSYCHIATRIC: Appropriate affect and mood  SKIN: No rash, wounds or jaundice  LINES: no central lines, only peripheral access c/d/i  ________________________________________________________________  Vitals:  T(F): 97.7 (19 Dec 2022 08:09), Max: 98.8 (18 Dec 2022 20:00)  HR: 72 (19 Dec 2022 12:00)  BP: 105/63 (19 Dec 2022 12:00)  RR: 20 (19 Dec 2022 12:00)  SpO2: 100% (19 Dec 2022 12:00) (99% - 100%)  temp max in last 48H T(F): , Max: 98.8 (12-18-22 @ 20:00)    Current Antibiotics:  nystatin    Suspension 966319 Unit(s) Swish and Swallow four times a day  trimethoprim   80 mG/sulfamethoxazole 400 mG 1 Tablet(s) Oral every 12 hours    Other medications:  atorvastatin 40 milliGRAM(s) Oral at bedtime  enoxaparin Injectable 30 milliGRAM(s) SubCutaneous every 24 hours  levothyroxine 100 MICROGram(s) Oral daily  metoprolol succinate ER 25 milliGRAM(s) Oral daily  pantoprazole    Tablet 40 milliGRAM(s) Oral before breakfast  polyethylene glycol 3350 17 Gram(s) Oral daily  senna 2 Tablet(s) Oral daily  tamsulosin 0.4 milliGRAM(s) Oral at bedtime                            8.8    7.08  )-----------( 159      ( 19 Dec 2022 06:40 )             27.3     12-19    140  |  108  |  25.8<H>  ----------------------------<  88  4.7   |  21.0<L>  |  1.08    Ca    8.5      19 Dec 2022 06:40    TPro  5.2<L>  /  Alb  3.0<L>  /  TBili  0.4  /  DBili  x   /  AST  59<H>  /  ALT  25  /  AlkPhos  73  12-19    RECENT CULTURES:  12-14 @ 17:10 .Blood Blood     No growth to date.    12-12 @ 18:20 .Sputum Sputum Stenotrophomonas maltophilia    Moderate Stenotrophomonas maltophilia  Normal Respiratory Breanna present    Few polymorphonuclear leukocytes per low power field  Few Squamous epithelial cells per low power field  Few Yeast like cells seen per oil power field  Rare Gram Variable Rods seen per oil power field  Few Gram positive cocci in pairs seen per oil power field      12-07 @ 06:35    RVP with SARS-CoV-2   NotDetec      WBC Count: 7.08 K/uL (12-19-22 @ 06:40)  WBC Count: 8.92 K/uL (12-18-22 @ 08:50)  WBC Count: 7.06 K/uL (12-17-22 @ 07:10)  WBC Count: 6.73 K/uL (12-16-22 @ 06:13)  WBC Count: 6.79 K/uL (12-15-22 @ 05:35)  WBC Count: 7.45 K/uL (12-14-22 @ 17:10)    Creatinine, Serum: 1.08 mg/dL (12-19-22 @ 06:40)  Creatinine, Serum: 1.14 mg/dL (12-18-22 @ 08:50)  Creatinine, Serum: 1.26 mg/dL (12-17-22 @ 07:10)  Creatinine, Serum: 1.49 mg/dL (12-16-22 @ 06:13)  Creatinine, Serum: 1.54 mg/dL (12-15-22 @ 17:05)  Creatinine, Serum: 1.65 mg/dL (12-15-22 @ 05:35)        Procalcitonin, Serum: 0.19 ng/mL (12-14-22 @ 17:10)  Procalcitonin, Serum: 0.24 ng/mL (12-12-22 @ 07:43)  Procalcitonin, Serum: 0.19 ng/mL (12-10-22 @ 12:30)     SARS-CoV-2: NotDete (12-07-22 @ 06:35)  [facility] (11-29-22 @ 05:15)    ________________________________________________________________  RADIOLOGY  < from: CT Abdomen and Pelvis No Cont (12.15.22 @ 13:28) >  FINDINGS:  CHEST:  LUNGS AND LARGE AIRWAYS: Patent central airways. Patchy bilateral   groundglass opacities, left lung greater than right. Compressive   atelectasis in the left lower lobe adjacent to a hiatal hernia..  PLEURA: Trace left pleural effusion.  VESSELS: Atherosclerotic changes of the aorta and coronary arteries.  HEART: Cardiomegaly. No pericardial effusion.  MEDIASTINUM AND MAXIMINO: No lymphadenopathy. Moderate to large hiatal   hernia. Patulous esophagus containing debris  CHEST WALL AND LOWER NECK: Within normal limits.    ABDOMEN AND PELVIS:  LIVER: Within normal limits.  BILE DUCTS: Normal caliber.  GALLBLADDER: Cholelithiasis.  SPLEEN: Within normal limits.  PANCREAS: Within normal limits.  ADRENALS: Within normal limits.  KIDNEYS/URETERS: Within normal limits.    BLADDER: Marked urinary bladder distention above the level of the   umbilicus.  REPRODUCTIVE ORGANS: Fibroid uterus.    BOWEL: Stable lipoma of the ascending colon. Ileocolic anastomosis. No   bowel obstruction. Circumferential long segment thickening of the   descending colon with pericolonic inflammation. Diverticulosis of the   descending and sigmoid colon.  PERITONEUM: No ascites.  VESSELS: Atherosclerotic changes.  RETROPERITONEUM/LYMPH NODES: No lymphadenopathy.  ABDOMINAL WALL: Within normal limits.  BONES: Degenerative changes of the spine and glenohumeral joints. Mild   lumbar levoscoliosis.  AI VERTEBRAL BODY ANALYSIS:  T4: Moderate compression fracture with 28% height loss.    IMPRESSION:  Moderate to large hiatal hernia with a patulous esophagus containing   debris. Correlate for symptoms of reflux.  Patchy bilateral groundglass opacities, left lung greater than right.   Trace left pleural effusion.  Long segment thickening of the descending colon compatible with colitis.    < end of copied text >

## 2022-12-19 NOTE — PROGRESS NOTE ADULT - SUBJECTIVE AND OBJECTIVE BOX
[facility] Division of Hospital Medicine    Chief Complaint: cough and vomiting      SUBJECTIVE / OVERNIGHT EVENTS: No acute events overnight. HD stable. Patient had be straight cath x 1 overnight for retention.     Patient denies chest pain, SOB, abd pain, N/V, fever, chills, dysuria or any other complaints. All remainder ROS negative.     MEDICATIONS  (STANDING):  atorvastatin 40 milliGRAM(s) Oral at bedtime  enoxaparin Injectable 30 milliGRAM(s) SubCutaneous every 24 hours  levothyroxine 100 MICROGram(s) Oral daily  metoprolol succinate ER 25 milliGRAM(s) Oral daily  nystatin    Suspension 976202 Unit(s) Swish and Swallow four times a day  pantoprazole    Tablet 40 milliGRAM(s) Oral before breakfast  polyethylene glycol 3350 17 Gram(s) Oral daily  senna 2 Tablet(s) Oral daily  tamsulosin 0.4 milliGRAM(s) Oral at bedtime  trimethoprim   80 mG/sulfamethoxazole 400 mG 1 Tablet(s) Oral every 12 hours    MEDICATIONS  (PRN):  guaiFENesin Oral Liquid (Sugar-Free) 100 milliGRAM(s) Oral every 6 hours PRN Cough  levalbuterol Inhalation 1.25 milliGRAM(s) Inhalation every 6 hours PRN shortness of breath and wheezing  ondansetron Injectable 4 milliGRAM(s) IV Push every 6 hours PRN Nausea and/or Vomiting        I&O's Summary    18 Dec 2022 07:01  -  19 Dec 2022 07:00  --------------------------------------------------------  IN: 830 mL / OUT: 1630 mL / NET: -800 mL        PHYSICAL EXAM:  Vital Signs Last 24 Hrs  T(C): 36.5 (19 Dec 2022 08:09), Max: 37.1 (18 Dec 2022 20:00)  T(F): 97.7 (19 Dec 2022 08:09), Max: 98.8 (18 Dec 2022 20:00)  HR: 72 (19 Dec 2022 12:00) (60 - 92)  BP: 105/63 (19 Dec 2022 12:00) (105/63 - 160/84)  BP(mean): 74 (19 Dec 2022 12:00) (74 - 103)  RR: 20 (19 Dec 2022 12:00) (16 - 20)  SpO2: 100% (19 Dec 2022 12:00) (99% - 100%)    Parameters below as of 19 Dec 2022 12:00  Patient On (Oxygen Delivery Method): room air    CONSTITUTIONAL: NAD, resting comfortably  RESPIRATORY: scattered rhonchi much less then yesterday.  NC 2L  CARDIOVASCULAR: regular and regular rhythm, normal S1 and S2, trace lower extremity edema;  ABDOMEN: Nontender to palpation, normoactive bowel sounds  PSYCH: A+O to person, place, and time; affect appropriate  NEUROLOGY: CN 2-12 are intact and symmetric; no gross sensory deficits    LABS:                        8.8    7.08  )-----------( 159      ( 19 Dec 2022 06:40 )             27.3     12-19    140  |  108  |  25.8<H>  ----------------------------<  88  4.7   |  21.0<L>  |  1.08    Ca    8.5      19 Dec 2022 06:40    TPro  5.2<L>  /  Alb  3.0<L>  /  TBili  0.4  /  DBili  x   /  AST  59<H>  /  ALT  25  /  AlkPhos  73  12-19              CAPILLARY BLOOD GLUCOSE            RADIOLOGY & ADDITIONAL TESTS:  Results Reviewed:   Imaging Personally Reviewed:  Electrocardiogram Personally Reviewed:

## 2022-12-19 NOTE — PHYSICAL THERAPY INITIAL EVALUATION ADULT - ADDITIONAL COMMENTS
per patient she is active, walks with a walker and has several steps to enter the home. Pt states that she lives with her daughter who is home and would be able to assist. Pt is [place] and require questions be asked loudly.
per patient she is active, walks with a walker and has several steps to enter the home. Pt states that she lives with her daughter who is home and would be able to assist

## 2022-12-19 NOTE — PHYSICAL THERAPY INITIAL EVALUATION ADULT - PLANNED THERAPY INTERVENTIONS, PT EVAL
bed mobility training/gait training/transfer training
balance training/bed mobility training/gait training/strengthening/stretching/transfer training

## 2022-12-19 NOTE — PHYSICAL THERAPY INITIAL EVALUATION ADULT - NSPTDISCHREC_GEN_A_CORE
rehab disposition recommendation may be change base on patient  functional progress/Sub-acute Rehab
home with PT pending pt progress and availability of assistance upon d/c

## 2022-12-20 LAB
ALBUMIN SERPL ELPH-MCNC: 2.8 G/DL — LOW (ref 3.3–5.2)
ALP SERPL-CCNC: 68 U/L — SIGNIFICANT CHANGE UP (ref 40–120)
ALT FLD-CCNC: 25 U/L — SIGNIFICANT CHANGE UP
ANION GAP SERPL CALC-SCNC: 9 MMOL/L — SIGNIFICANT CHANGE UP (ref 5–17)
AST SERPL-CCNC: 53 U/L — HIGH
BILIRUB SERPL-MCNC: 0.3 MG/DL — LOW (ref 0.4–2)
BUN SERPL-MCNC: 21.4 MG/DL — HIGH (ref 8–20)
CALCIUM SERPL-MCNC: 8.5 MG/DL — SIGNIFICANT CHANGE UP (ref 8.4–10.5)
CHLORIDE SERPL-SCNC: 108 MMOL/L — SIGNIFICANT CHANGE UP (ref 96–108)
CO2 SERPL-SCNC: 23 MMOL/L — SIGNIFICANT CHANGE UP (ref 22–29)
CREAT SERPL-MCNC: 1.05 MG/DL — SIGNIFICANT CHANGE UP (ref 0.5–1.3)
EGFR: 50 ML/MIN/1.73M2 — LOW
GLUCOSE BLDC GLUCOMTR-MCNC: 141 MG/DL — HIGH (ref 70–99)
GLUCOSE SERPL-MCNC: 63 MG/DL — LOW (ref 70–99)
HCT VFR BLD CALC: 27.2 % — LOW (ref 34.5–45)
HGB BLD-MCNC: 8.7 G/DL — LOW (ref 11.5–15.5)
MCHC RBC-ENTMCNC: 32 GM/DL — SIGNIFICANT CHANGE UP (ref 32–36)
MCHC RBC-ENTMCNC: 33.3 PG — SIGNIFICANT CHANGE UP (ref 27–34)
MCV RBC AUTO: 104.2 FL — HIGH (ref 80–100)
PLATELET # BLD AUTO: 156 K/UL — SIGNIFICANT CHANGE UP (ref 150–400)
POTASSIUM SERPL-MCNC: 5 MMOL/L — SIGNIFICANT CHANGE UP (ref 3.5–5.3)
POTASSIUM SERPL-SCNC: 5 MMOL/L — SIGNIFICANT CHANGE UP (ref 3.5–5.3)
PROT SERPL-MCNC: 4.9 G/DL — LOW (ref 6.6–8.7)
RBC # BLD: 2.61 M/UL — LOW (ref 3.8–5.2)
RBC # FLD: 14.4 % — SIGNIFICANT CHANGE UP (ref 10.3–14.5)
SODIUM SERPL-SCNC: 139 MMOL/L — SIGNIFICANT CHANGE UP (ref 135–145)
T3 SERPL-MCNC: <40 NG/DL — LOW (ref 80–200)
T4 AB SER-ACNC: 5 UG/DL — SIGNIFICANT CHANGE UP (ref 4.5–12)
T4 FREE SERPL-MCNC: 1 NG/DL — SIGNIFICANT CHANGE UP (ref 0.9–1.8)
TSH SERPL-MCNC: 12.81 UIU/ML — HIGH (ref 0.27–4.2)
WBC # BLD: 7.65 K/UL — SIGNIFICANT CHANGE UP (ref 3.8–10.5)
WBC # FLD AUTO: 7.65 K/UL — SIGNIFICANT CHANGE UP (ref 3.8–10.5)

## 2022-12-20 PROCEDURE — 99232 SBSQ HOSP IP/OBS MODERATE 35: CPT

## 2022-12-20 RX ORDER — LEVOTHYROXINE SODIUM 125 MCG
112 TABLET ORAL DAILY
Refills: 0 | Status: DISCONTINUED | OUTPATIENT
Start: 2022-12-21 | End: 2022-12-22

## 2022-12-20 RX ORDER — FUROSEMIDE 40 MG
20 TABLET ORAL DAILY
Refills: 0 | Status: DISCONTINUED | OUTPATIENT
Start: 2022-12-20 | End: 2022-12-20

## 2022-12-20 RX ADMIN — TAMSULOSIN HYDROCHLORIDE 0.4 MILLIGRAM(S): 0.4 CAPSULE ORAL at 22:52

## 2022-12-20 RX ADMIN — ATORVASTATIN CALCIUM 40 MILLIGRAM(S): 80 TABLET, FILM COATED ORAL at 22:52

## 2022-12-20 RX ADMIN — PANTOPRAZOLE SODIUM 40 MILLIGRAM(S): 20 TABLET, DELAYED RELEASE ORAL at 05:16

## 2022-12-20 RX ADMIN — Medication 25 MILLIGRAM(S): at 05:16

## 2022-12-20 RX ADMIN — ENOXAPARIN SODIUM 30 MILLIGRAM(S): 100 INJECTION SUBCUTANEOUS at 18:28

## 2022-12-20 RX ADMIN — Medication 100 MICROGRAM(S): at 05:16

## 2022-12-20 RX ADMIN — Medication 1 TABLET(S): at 05:16

## 2022-12-20 RX ADMIN — Medication 1 TABLET(S): at 22:52

## 2022-12-20 RX ADMIN — SENNA PLUS 2 TABLET(S): 8.6 TABLET ORAL at 22:52

## 2022-12-20 RX ADMIN — Medication 500000 UNIT(S): at 18:28

## 2022-12-20 NOTE — PROGRESS NOTE ADULT - TIME BILLING
Review of chart documents, labs, imaging. Direct patient assessment,  formulation of care plan. Discussion with  Interdisciplinary  team  MARILY/AAYUSH

## 2022-12-20 NOTE — PROGRESS NOTE ADULT - ASSESSMENT
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 She is admitted for Respiratory failure.     Acute Hypoxic Resp failure due to COPD/Pneumonia/acute HFpEF, improving  CT Chest atelectasis and pneumonia, secretions    Duoneb and prednisone taper now on 20mg daily, will not lower yet as her resp status has not improved.   Viral panel neg, improving leukocytosis  completed Rocephin + Zithromax  sputum sample with Stenotrophomonas, ID  - ID Consulted now on PO Bactrim D5/5  - D-dimer downtrending. Unable to obtain CTA chest due to iodine allergy  - F/u US duplex- negative for DVT  - Pulm following, deferring Bronch at this time.     AF w/ RVR  - Discussed with cardiology- c/w toprol. No AC given anemia and guaiac stool.   - Discontinued midodrine given HTN    Syncope likely hypovolemia and hypotension  holding all anti-hypertensives  monitor on tele  - Holding lasix given hypotension    KALPANA, improved  EF 65-70%  monitor cr  stop lasix  strict os and os    Hypothyroid-severe- no compliance with medications  LT4 increased to 100mcg  Endo following, labs noted, Follow up appointment: 1/13 at 2:00pm [place] office     Vomiting, resolved was actually not vomiting but coughing up phlegm  - prn antiemetics  - CT a/p- Moderate to large hiatal hernia with a patulous esophagus containing   debris  - Appreciate GI recs  - aspiration precautions    Thrush  - Nystatin    Anemia  - brown stool but stool occult  - appreciate GI eval, no intervention    Constipation  s/p manual disimpaction on 12/14  - senna and Miralax    Urinary Retention  - Espinal placed  - C/w flomax    DVT PPx- Lovenox  Dispo -PT eval- GUCCI. Stable for downgrade to telemetry.   code status DNR/DNI, Palliative care consulted.    Unable to reach patient's daughter, Andressa ([phone]) on 12/19. Unable to leave .      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 She is admitted for Respiratory failure.     Acute Hypoxic Resp failure due to COPD/Pneumonia/acute HFpEF, improving  CT Chest atelectasis and pneumonia, secretions    Duoneb and prednisone taper now on 20mg daily, will not lower yet as her resp status has not improved.   Viral panel neg, improving leukocytosis  completed Rocephin + Zithromax  sputum sample with Stenotrophomonas, ID  - ID Consulted now on PO Bactrim D5/5  - D-dimer downtrending. Unable to obtain CTA chest due to iodine allergy  - F/u US duplex- negative for DVT  - Pulm following, deferring Bronch at this time.     AF w/ RVR  - Discussed with cardiology- c/w toprol. No AC given anemia and guaiac stool.   - Discontinued midodrine given HTN    Urinary Retention  - Espinal placed  - C/w flomax    Syncope likely hypovolemia and hypotension  holding lasix  monitor on tele    KALPANA, improved  EF 65-70%  monitor cr  strict is and os    Hypothyroid-severe- no compliance with medications  C/w LT4 100mcg  Endo following, labs noted, Follow up appointment: 1/13 at 2:00pm [place] office     Vomiting, resolved was actually not vomiting but coughing up phlegm  - prn antiemetics  - CT a/p- Moderate to large hiatal hernia with a patulous esophagus containing   debris  - Appreciate GI recs  - aspiration precautions    Thrush  - Nystatin    Anemia  - brown stool but stool occult  - appreciate GI eval, no intervention    Constipation  s/p manual disimpaction on 12/14  - senna and Miralax    DVT PPx- Lovenox  Dispo -PT eval- [facility]. Stable for downgrade to telemetry.   code status DNR/DNI, Palliative care consulted.    Unable to reach patient's daughter, Andressa ([phone]) on 12/20. Unable to leave .

## 2022-12-20 NOTE — PROGRESS NOTE ADULT - ASSESSMENT
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    Patient admitted with respiratory failure, now on room air  No symptoms to report  MOLST - DNR/I  Patient considering [facility]  Palliative Care to sign off

## 2022-12-20 NOTE — PROGRESS NOTE ADULT - SUBJECTIVE AND OBJECTIVE BOX
INTERVAL EVENTS:  Follow up hypothyroid management    ROS: Patient denies chest pain, SOB, abd pain.    MEDICATIONS  (STANDING):  atorvastatin 40 milliGRAM(s) Oral at bedtime  enoxaparin Injectable 30 milliGRAM(s) SubCutaneous every 24 hours  metoprolol succinate ER 25 milliGRAM(s) Oral daily  nystatin    Suspension 311655 Unit(s) Swish and Swallow four times a day  pantoprazole    Tablet 40 milliGRAM(s) Oral before breakfast  polyethylene glycol 3350 17 Gram(s) Oral daily  senna 2 Tablet(s) Oral daily  tamsulosin 0.4 milliGRAM(s) Oral at bedtime  trimethoprim   80 mG/sulfamethoxazole 400 mG 1 Tablet(s) Oral every 12 hours    MEDICATIONS  (PRN):  guaiFENesin Oral Liquid (Sugar-Free) 100 milliGRAM(s) Oral every 6 hours PRN Cough  levalbuterol Inhalation 1.25 milliGRAM(s) Inhalation every 6 hours PRN shortness of breath and wheezing  ondansetron Injectable 4 milliGRAM(s) IV Push every 6 hours PRN Nausea and/or Vomiting    Allergies  codeine (Vomiting)  iodine (Unknown)    Vital Signs Last 24 Hrs  T(C): 36.7 (20 Dec 2022 11:53), Max: 36.8 (20 Dec 2022 07:46)  T(F): 98.1 (20 Dec 2022 11:53), Max: 98.2 (20 Dec 2022 07:46)  HR: 73 (20 Dec 2022 12:02) (54 - 95)  BP: 121/69 (20 Dec 2022 12:02) (103/41 - 131/54)  BP(mean): 80 (20 Dec 2022 12:02) (54 - 80)  RR: 17 (20 Dec 2022 12:02) (12 - 21)  SpO2: 99% (20 Dec 2022 12:02) (91% - 100%)    Parameters below as of 20 Dec 2022 12:02  Patient On (Oxygen Delivery Method): room air      PHYSICAL EXAM:  General: No apparent distress  Neck: Supple, trachea midline, no thyromegaly  Respiratory: Lungs clear bilaterally, normal rate, effort  Cardiac: +S1, S2, no m/r/g  GI: +BS, soft, non tender, non distended  Extremities: No peripheral edema, no pedal lesions  Neuro: A+O X3, no tremor  Pysch: Affect appropriate   Skin: No acanthosis       LABS:                        8.7    7.65  )-----------( 156      ( 20 Dec 2022 05:30 )             27.2     12-20    139  |  108  |  21.4<H>  ----------------------------<  63<L>  5.0   |  23.0  |  1.05    Ca    8.5      20 Dec 2022 05:30    TPro  4.9<L>  /  Alb  2.8<L>  /  TBili  0.3<L>  /  DBili  x   /  AST  53<H>  /  ALT  25  /  AlkPhos  68  12-20      POCT Blood Glucose.: 141 mg/dL (12-20-22 @ 08:24)    Thyroid Stimulating Hormone, Serum: 12.81 uIU/mL (12-20-22 @ 05:30)  Free Thyroxine, Serum: 1.0 ng/dL (12-20-22 @ 05:30)  Triiodothyronine, Total (T3 Total): <40 ng/dL (12-20-22 @ 05:30)  Thyroid Stimulating Hormone, Serum: 10.40 uIU/mL (12-14-22 @ 07:06)  Free Thyroxine, Serum: 1.2 ng/dL (12-14-22 @ 07:06)  Triiodothyronine, Total (T3 Total): <40 ng/dL (12-14-22 @ 07:06)  Thyroid Stimulating Hormone, Serum: 30.06 uIU/mL (12-10-22 @ 04:38)  Free Thyroxine, Serum: 0.6 ng/dL (12-10-22 @ 04:38)  Triiodothyronine, Total (T3 Total): <40 ng/dL (12-10-22 @ 04:38)  Thyroid Stimulating Hormone, Serum: 53.33 uIU/mL (12-06-22 @ 06:17)  Triiodothyronine, Total (T3 Total): <40 ng/dL (12-06-22 @ 06:17)  Free Thyroxine, Serum: 0.2 ng/dL (12-06-22 @ 06:17)  Triiodothyronine, Total (T3 Total): <40 ng/dL (11-30-22 @ 04:38)  Thyroid Stimulating Hormone, Serum: 37.97 uIU/mL (11-30-22 @ 04:38)

## 2022-12-20 NOTE — PROGRESS NOTE ADULT - SUBJECTIVE AND OBJECTIVE BOX
CC:  Follow up GOC , Symptoms    OVERNIGHT EVENTS:  offers no complaints    Present Symptoms:   Dyspnea:  No    Nausea/Vomiting:  No   Anxiety:  No  Depression:  No    Fatigue:  Yes     Loss of appetite:  No   Constipation: Not Reported       Pain: No               Character-            Duration-            Location-            Severity-    Pain AD Score:  [email or web address]/[email or web address] (press ctrl + left click to view)    Review of Systems: Reviewed as above  All others negative    MEDICATIONS  (STANDING):  atorvastatin 40 milliGRAM(s) Oral at bedtime  enoxaparin Injectable 30 milliGRAM(s) SubCutaneous every 24 hours  metoprolol succinate ER 25 milliGRAM(s) Oral daily  nystatin    Suspension 141301 Unit(s) Swish and Swallow four times a day  pantoprazole    Tablet 40 milliGRAM(s) Oral before breakfast  polyethylene glycol 3350 17 Gram(s) Oral daily  senna 2 Tablet(s) Oral daily  tamsulosin 0.4 milliGRAM(s) Oral at bedtime  trimethoprim   80 mG/sulfamethoxazole 400 mG 1 Tablet(s) Oral every 12 hours    MEDICATIONS  (PRN):  guaiFENesin Oral Liquid (Sugar-Free) 100 milliGRAM(s) Oral every 6 hours PRN Cough  levalbuterol Inhalation 1.25 milliGRAM(s) Inhalation every 6 hours PRN shortness of breath and wheezing  ondansetron Injectable 4 milliGRAM(s) IV Push every 6 hours PRN Nausea and/or Vomiting      PHYSICAL EXAM:    Vital Signs Last 24 Hrs  T(C): 36.7 (20 Dec 2022 11:53), Max: 36.8 (20 Dec 2022 07:46)  T(F): 98.1 (20 Dec 2022 11:53), Max: 98.2 (20 Dec 2022 07:46)  HR: 73 (20 Dec 2022 12:02) (54 - 95)  BP: 121/69 (20 Dec 2022 12:02) (103/41 - 131/54)  BP(mean): 80 (20 Dec 2022 12:02) (54 - 80)  RR: 17 (20 Dec 2022 12:02) (12 - 21)  SpO2: 99% (20 Dec 2022 12:02) (91% - 100%)    Parameters below as of 20 Dec 2022 12:02  Patient On (Oxygen Delivery Method): room air    Karnofsky:  50%  General:   F awake alert NAD      HEENT:  NCAT          Lungs: comfortable  non labored  CV:  RR  GI:    soft NTND  :  \henson        MSK: weak  Skin:  warm/dry  Neuro  no focal deficits    LABS:                          8.7    7.65  )-----------( 156      ( 20 Dec 2022 05:30 )             27.2     12-20    139  |  108  |  21.4<H>  ----------------------------<  63<L>  5.0   |  23.0  |  1.05    Ca    8.5      20 Dec 2022 05:30    TPro  4.9<L>  /  Alb  2.8<L>  /  TBili  0.3<L>  /  DBili  x   /  AST  53<H>  /  ALT  25  /  AlkPhos  68  12-20        I&O's Summary    19 Dec 2022 07:01  -  20 Dec 2022 07:00  --------------------------------------------------------  IN: 0 mL / OUT: 550 mL / NET: -550 mL    20 Dec 2022 07:01  -  20 Dec 2022 15:19  --------------------------------------------------------  IN: 240 mL / OUT: 500 mL / NET: -260 mL        RADIOLOGY & ADDITIONAL STUDIES:    < from: Xray Chest 1 View- PORTABLE-Urgent (Xray Chest 1 View- PORTABLE-Urgent .) (12.14.22 @ 14:27) >  ACC: [id number] EXAM:  XR ABDOMEN 2V                        ACC: [id number] EXAM:  XR CHEST PORTABLE URGENT 1V                          PROCEDURE DATE:  12/14/2022          INTERPRETATION:  Clinical history: 89-year-old female, rapid response.    Portable view of the chest and abdomen are correlated with the chest CT   of 12/7/2022.    FINDINGS: Normal cardiac silhouette and normal pulmonary vasculature with   no lobar consolidation, right effusion, pneumothorax or acute osseous   finding. Esophageal dilatation is again evident    Mild platelike atelectasis at the left base, improved.    Nonobstructive bowel gas pattern with no pneumoperitoneum or acute   osseous finding.    Moderate lumbar levoscoliosis, unchanged.    IMPRESSION:  Mild platelike atelectasis at the left base, markedly improved.    Nonobstructive bowel gas pattern    --- End of Report ---    < end of copied text >      < from: TTE Echo Limited or F/U (12.14.22 @ 15:57) >  Summary:   1. Left ventricular ejection fraction, by visual estimation, is 70 to   75%.   2. Normal global left ventricular systolic function.   3. Normal right ventricular size and function.   4. Small pericardial effusion (0.6 cm) adjacentto the RV with fibrinous   thickening, no evidence of tamponade.    Tim Veronica DO Electronically signed on 12/14/2022 at 6:03:05 PM      < end of copied text >      ADVANCE DIRECTIVES/TREATMENT PREFERENCES:

## 2022-12-20 NOTE — PROGRESS NOTE ADULT - ASSESSMENT
89 y/o F with PMH HTN, [place] s/p cochlear implants, COPD/emphysema, hypothyroidism woke up around 2 AM shaking and 30 mins later emesis and her daughter called EMS. Endocrine follows for hypothyroidism.    1. Hypothyroid  - Repeat TSH 12/FT4 1.0/T3 <40   - LT4 increased to 112mcg PO daily  - Follow up appointment: 1/13 at 2:00pm [place] office     2. Acute hypoxic respiratory failure/COPD/CAP  - On Bactrim for Stenotrophomonas in sputum  - Supplemental oxygen as needed  - Completed steroid taper    3. HTN  - Continue metoprolol

## 2022-12-20 NOTE — PROGRESS NOTE ADULT - SUBJECTIVE AND OBJECTIVE BOX
[facility]                              [address] [place] [address]                                                 PHONE: [phone]                                                 FAX: [phone]  -----------------------------------------------------------------------------------------------  Pt seen and examined. FU for  shortness of breath     Overnight events/Complaints: Pt sitting up in bed. back in NSR.     Vital Signs Last 24 Hrs  T(C): 36.8 (20 Dec 2022 07:46), Max: 36.8 (20 Dec 2022 07:46)  T(F): 98.2 (20 Dec 2022 07:46), Max: 98.2 (20 Dec 2022 07:46)  HR: 54 (20 Dec 2022 08:00) (54 - 95)  BP: 131/54 (20 Dec 2022 08:00) (103/41 - 131/54)  BP(mean): 75 (20 Dec 2022 08:00) (54 - 75)  RR: 16 (20 Dec 2022 08:00) (12 - 21)  SpO2: 94% (20 Dec 2022 08:00) (91% - 100%)    Parameters below as of 20 Dec 2022 08:00  Patient On (Oxygen Delivery Method): room air      I&O's Summary    19 Dec 2022 07:01  -  20 Dec 2022 07:00  --------------------------------------------------------  IN: 0 mL / OUT: 550 mL / NET: -550 mL    20 Dec 2022 07:01  -  20 Dec 2022 10:51  --------------------------------------------------------  IN: 240 mL / OUT: 500 mL / NET: -260 mL    PHYSICAL EXAM:  Constitutional: Appears well; alert and co-operative  HEENT:     Head: Normocephalic and atraumatic  Neck: supple. No JVD  Cardiovascular: regular S1 S2  Respiratory: Lungs clear to auscultation; no crepitations, no wheeze  Gastrointestinal:  Soft, Non-tender, + BS	  Musculoskeletal: Normal range of motion. No edema  Skin: Warm and dry. No cyanosis . No diaphoresis.  Neurologic: No tremor.        LABS:                        8.8    7.08  )-----------( 159      ( 19 Dec 2022 06:40 )             27.3     12-19    140  |  108  |  25.8<H>  ----------------------------<  88  4.7   |  21.0<L>  |  1.08    Ca    8.5      19 Dec 2022 06:40    TPro  5.2<L>  /  Alb  3.0<L>  /  TBili  0.4  /  DBili  x   /  AST  59<H>  /  ALT  25  /  AlkPhos  73  12-19      RADIOLOGY & ADDITIONAL STUDIES: (reviewed)  CXR was independently visualized/reviewed  and demonstrated: Mild platelike atelectasis at the left base, markedly improved.    Nonobstructive bowel gas pattern    CARDIOLOGY TESTING:(reviewed)     ECG (Independent visualization): NSR with PVCs    ECHOCARDIOGRAM  (Independent visualization):  Summary:   1. Normal left ventricular internal cavity size.   2. Normal global left ventricular systolic function.   3. Left ventricular ejection fraction, by visual estimation, is 65 to   70%.   4. Normal right ventricular size and function.   5. The left atrium is normal in size.   6. The right atrium is normal in size.   7. Sclerotic aortic valve with normal opening.   8. Mild tricuspid regurgitation.   9. Normal pulmonary artery pressure.  10. Trace mitral valve regurgitation.  11. Mild pulmonic valve regurgitation.    TELEMETRY independently visualized/reviewed and demonstrated : sinus bradycardia with no obvious heart block, PAF resolved    MEDICATIONS:(reviewed)  MEDICATIONS  (STANDING):  atorvastatin 40 milliGRAM(s) Oral at bedtime  enoxaparin Injectable 30 milliGRAM(s) SubCutaneous every 24 hours  levothyroxine 100 MICROGram(s) Oral daily  metoprolol succinate ER 25 milliGRAM(s) Oral daily  nystatin    Suspension 957099 Unit(s) Swish and Swallow four times a day  pantoprazole    Tablet 40 milliGRAM(s) Oral before breakfast  polyethylene glycol 3350 17 Gram(s) Oral daily  senna 2 Tablet(s) Oral daily  tamsulosin 0.4 milliGRAM(s) Oral at bedtime  trimethoprim   80 mG/sulfamethoxazole 400 mG 1 Tablet(s) Oral every 12 hours    ASSESSMENT AND PLAN:    89y Female with prior history of HTN, [place] s/p cochlear implants, 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 Being treated for COPD, PNA and ARF.  Noted to have bradycardia, PAF with RVR. Now back in NSR    AF with RVR  -converted to nsr and 1rst degree av block   -started toprol 25 mg daily   -BP stable  -pt not a candidate for AC at this time given hx of Anemia and guiac + stool   -DNR/DNI  -continue statin    No further inpt cardiac work-up needed. Pls recall if any qns/concerns. Will arrange outpt FU post discharge.

## 2022-12-20 NOTE — PROGRESS NOTE ADULT - SUBJECTIVE AND OBJECTIVE BOX
[facility] Division of Hospital Medicine    Chief Complaint:      SUBJECTIVE / OVERNIGHT EVENTS:    Patient denies chest pain, SOB, abd pain, N/V, fever, chills, dysuria or any other complaints. All remainder ROS negative.     MEDICATIONS  (STANDING):  atorvastatin 40 milliGRAM(s) Oral at bedtime  enoxaparin Injectable 30 milliGRAM(s) SubCutaneous every 24 hours  levothyroxine 100 MICROGram(s) Oral daily  metoprolol succinate ER 25 milliGRAM(s) Oral daily  nystatin    Suspension 765376 Unit(s) Swish and Swallow four times a day  pantoprazole    Tablet 40 milliGRAM(s) Oral before breakfast  polyethylene glycol 3350 17 Gram(s) Oral daily  senna 2 Tablet(s) Oral daily  tamsulosin 0.4 milliGRAM(s) Oral at bedtime  trimethoprim   80 mG/sulfamethoxazole 400 mG 1 Tablet(s) Oral every 12 hours    MEDICATIONS  (PRN):  guaiFENesin Oral Liquid (Sugar-Free) 100 milliGRAM(s) Oral every 6 hours PRN Cough  levalbuterol Inhalation 1.25 milliGRAM(s) Inhalation every 6 hours PRN shortness of breath and wheezing  ondansetron Injectable 4 milliGRAM(s) IV Push every 6 hours PRN Nausea and/or Vomiting        I&O's Summary    19 Dec 2022 07:01  -  20 Dec 2022 07:00  --------------------------------------------------------  IN: 0 mL / OUT: 550 mL / NET: -550 mL    20 Dec 2022 07:01  -  20 Dec 2022 11:30  --------------------------------------------------------  IN: 240 mL / OUT: 500 mL / NET: -260 mL        PHYSICAL EXAM:  Vital Signs Last 24 Hrs  T(C): 36.8 (20 Dec 2022 07:46), Max: 36.8 (20 Dec 2022 07:46)  T(F): 98.2 (20 Dec 2022 07:46), Max: 98.2 (20 Dec 2022 07:46)  HR: 54 (20 Dec 2022 08:00) (54 - 95)  BP: 131/54 (20 Dec 2022 08:00) (103/41 - 131/54)  BP(mean): 75 (20 Dec 2022 08:00) (54 - 75)  RR: 16 (20 Dec 2022 08:00) (12 - 21)  SpO2: 94% (20 Dec 2022 08:00) (91% - 100%)    Parameters below as of 20 Dec 2022 08:00  Patient On (Oxygen Delivery Method): room air            CONSTITUTIONAL: NAD, well-developed, well-groomed  ENMT: Moist oral mucosa, no pharyngeal injection or exudates; normal dentition  RESPIRATORY: Normal respiratory effort; lungs are clear to auscultation bilaterally  CARDIOVASCULAR: Regular rate and rhythm, normal S1 and S2, no murmur/rub/gallop; No lower extremity edema; Peripheral pulses are 2+ bilaterally  ABDOMEN: Nontender to palpation, normoactive bowel sounds, no rebound/guarding; No hepatosplenomegaly  MUSCLOSKELETAL:  Normal gait; no clubbing or cyanosis of digits; no joint swelling or tenderness to palpation  PSYCH: A+O to person, place, and time; affect appropriate  NEUROLOGY: CN 2-12 are intact and symmetric; no gross sensory deficits;   SKIN: No rashes; no palpable lesions    LABS:                        8.7    7.65  )-----------( 156      ( 20 Dec 2022 05:30 )             27.2     12-20    139  |  108  |  21.4<H>  ----------------------------<  63<L>  5.0   |  23.0  |  1.05    Ca    8.5      20 Dec 2022 05:30    TPro  4.9<L>  /  Alb  2.8<L>  /  TBili  0.3<L>  /  DBili  x   /  AST  53<H>  /  ALT  25  /  AlkPhos  68  12-20              CAPILLARY BLOOD GLUCOSE      POCT Blood Glucose.: 141 mg/dL (20 Dec 2022 08:24)        RADIOLOGY & ADDITIONAL TESTS:  Results Reviewed:   Imaging Personally Reviewed:  Electrocardiogram Personally Reviewed:                                           [facility] Division of Hospital Medicine    Chief Complaint: cough and vomiting       SUBJECTIVE / OVERNIGHT EVENTS: No acute events overnight. HD stable. Hard of hearing.     Patient denies chest pain, SOB, abd pain, N/V, fever, chills, dysuria or any other complaints. All remainder ROS negative.     MEDICATIONS  (STANDING):  atorvastatin 40 milliGRAM(s) Oral at bedtime  enoxaparin Injectable 30 milliGRAM(s) SubCutaneous every 24 hours  levothyroxine 100 MICROGram(s) Oral daily  metoprolol succinate ER 25 milliGRAM(s) Oral daily  nystatin    Suspension 939706 Unit(s) Swish and Swallow four times a day  pantoprazole    Tablet 40 milliGRAM(s) Oral before breakfast  polyethylene glycol 3350 17 Gram(s) Oral daily  senna 2 Tablet(s) Oral daily  tamsulosin 0.4 milliGRAM(s) Oral at bedtime  trimethoprim   80 mG/sulfamethoxazole 400 mG 1 Tablet(s) Oral every 12 hours    MEDICATIONS  (PRN):  guaiFENesin Oral Liquid (Sugar-Free) 100 milliGRAM(s) Oral every 6 hours PRN Cough  levalbuterol Inhalation 1.25 milliGRAM(s) Inhalation every 6 hours PRN shortness of breath and wheezing  ondansetron Injectable 4 milliGRAM(s) IV Push every 6 hours PRN Nausea and/or Vomiting        I&O's Summary    19 Dec 2022 07:01  -  20 Dec 2022 07:00  --------------------------------------------------------  IN: 0 mL / OUT: 550 mL / NET: -550 mL    20 Dec 2022 07:01  -  20 Dec 2022 11:30  --------------------------------------------------------  IN: 240 mL / OUT: 500 mL / NET: -260 mL        PHYSICAL EXAM:  Vital Signs Last 24 Hrs  T(C): 36.8 (20 Dec 2022 07:46), Max: 36.8 (20 Dec 2022 07:46)  T(F): 98.2 (20 Dec 2022 07:46), Max: 98.2 (20 Dec 2022 07:46)  HR: 54 (20 Dec 2022 08:00) (54 - 95)  BP: 131/54 (20 Dec 2022 08:00) (103/41 - 131/54)  BP(mean): 75 (20 Dec 2022 08:00) (54 - 75)  RR: 16 (20 Dec 2022 08:00) (12 - 21)  SpO2: 94% (20 Dec 2022 08:00) (91% - 100%)    Parameters below as of 20 Dec 2022 08:00  Patient On (Oxygen Delivery Method): room air    CONSTITUTIONAL: NAD, resting comfortably  RESPIRATORY: scattered rhonchi much less then yesterday. NC 2L  CARDIOVASCULAR: regular and regular rhythm, normal S1 and S2, trace lower extremity edema;  ABDOMEN: Nontender to palpation, normoactive bowel sounds  PSYCH: A+O to person, place, and time; affect appropriate  NEUROLOGY: CN 2-12 are intact and symmetric; no gross sensory deficits    LABS:                        8.7    7.65  )-----------( 156      ( 20 Dec 2022 05:30 )             27.2     12-20    139  |  108  |  21.4<H>  ----------------------------<  63<L>  5.0   |  23.0  |  1.05    Ca    8.5      20 Dec 2022 05:30    TPro  4.9<L>  /  Alb  2.8<L>  /  TBili  0.3<L>  /  DBili  x   /  AST  53<H>  /  ALT  25  /  AlkPhos  68  12-20              CAPILLARY BLOOD GLUCOSE      POCT Blood Glucose.: 141 mg/dL (20 Dec 2022 08:24)        RADIOLOGY & ADDITIONAL TESTS:  Results Reviewed:   Imaging Personally Reviewed:  Electrocardiogram Personally Reviewed:

## 2022-12-21 LAB
ALBUMIN SERPL ELPH-MCNC: 2.8 G/DL — LOW (ref 3.3–5.2)
ALP SERPL-CCNC: 69 U/L — SIGNIFICANT CHANGE UP (ref 40–120)
ALT FLD-CCNC: 25 U/L — SIGNIFICANT CHANGE UP
ANION GAP SERPL CALC-SCNC: 7 MMOL/L — SIGNIFICANT CHANGE UP (ref 5–17)
AST SERPL-CCNC: 47 U/L — HIGH
BILIRUB SERPL-MCNC: 0.3 MG/DL — LOW (ref 0.4–2)
BUN SERPL-MCNC: 19.3 MG/DL — SIGNIFICANT CHANGE UP (ref 8–20)
CALCIUM SERPL-MCNC: 8.4 MG/DL — SIGNIFICANT CHANGE UP (ref 8.4–10.5)
CHLORIDE SERPL-SCNC: 111 MMOL/L — HIGH (ref 96–108)
CO2 SERPL-SCNC: 22 MMOL/L — SIGNIFICANT CHANGE UP (ref 22–29)
CREAT SERPL-MCNC: 1.05 MG/DL — SIGNIFICANT CHANGE UP (ref 0.5–1.3)
EGFR: 50 ML/MIN/1.73M2 — LOW
GLUCOSE SERPL-MCNC: 75 MG/DL — SIGNIFICANT CHANGE UP (ref 70–99)
HCT VFR BLD CALC: 28.2 % — LOW (ref 34.5–45)
HGB BLD-MCNC: 8.8 G/DL — LOW (ref 11.5–15.5)
MCHC RBC-ENTMCNC: 31.2 GM/DL — LOW (ref 32–36)
MCHC RBC-ENTMCNC: 33 PG — SIGNIFICANT CHANGE UP (ref 27–34)
MCV RBC AUTO: 105.6 FL — HIGH (ref 80–100)
PLATELET # BLD AUTO: 124 K/UL — LOW (ref 150–400)
POTASSIUM SERPL-MCNC: 4.9 MMOL/L — SIGNIFICANT CHANGE UP (ref 3.5–5.3)
POTASSIUM SERPL-SCNC: 4.9 MMOL/L — SIGNIFICANT CHANGE UP (ref 3.5–5.3)
PROT SERPL-MCNC: 5.1 G/DL — LOW (ref 6.6–8.7)
RBC # BLD: 2.67 M/UL — LOW (ref 3.8–5.2)
RBC # FLD: 14.4 % — SIGNIFICANT CHANGE UP (ref 10.3–14.5)
SARS-COV-2 RNA SPEC QL NAA+PROBE: SIGNIFICANT CHANGE UP
SODIUM SERPL-SCNC: 140 MMOL/L — SIGNIFICANT CHANGE UP (ref 135–145)
WBC # BLD: 7.32 K/UL — SIGNIFICANT CHANGE UP (ref 3.8–10.5)
WBC # FLD AUTO: 7.32 K/UL — SIGNIFICANT CHANGE UP (ref 3.8–10.5)

## 2022-12-21 PROCEDURE — 99233 SBSQ HOSP IP/OBS HIGH 50: CPT

## 2022-12-21 PROCEDURE — 99232 SBSQ HOSP IP/OBS MODERATE 35: CPT

## 2022-12-21 RX ADMIN — ENOXAPARIN SODIUM 30 MILLIGRAM(S): 100 INJECTION SUBCUTANEOUS at 17:44

## 2022-12-21 RX ADMIN — TAMSULOSIN HYDROCHLORIDE 0.4 MILLIGRAM(S): 0.4 CAPSULE ORAL at 21:49

## 2022-12-21 RX ADMIN — SENNA PLUS 2 TABLET(S): 8.6 TABLET ORAL at 21:49

## 2022-12-21 RX ADMIN — Medication 112 MICROGRAM(S): at 05:45

## 2022-12-21 RX ADMIN — Medication 1 TABLET(S): at 17:43

## 2022-12-21 RX ADMIN — Medication 25 MILLIGRAM(S): at 05:45

## 2022-12-21 RX ADMIN — ATORVASTATIN CALCIUM 40 MILLIGRAM(S): 80 TABLET, FILM COATED ORAL at 21:49

## 2022-12-21 RX ADMIN — PANTOPRAZOLE SODIUM 40 MILLIGRAM(S): 20 TABLET, DELAYED RELEASE ORAL at 05:45

## 2022-12-21 RX ADMIN — Medication 1 TABLET(S): at 05:46

## 2022-12-21 NOTE — PROGRESS NOTE ADULT - ASSESSMENT
The patient is a 89 year old female with a past medical history of hypertension, COPD not on home oxygen and hypothyroidism who presented to the ER with complaints of shaking and emesis. Admitted for acute hypoxic respiratory failure secondary to pneumonia and acute on chronic diastolic heart failure. Started on IV antibiotics and diuresis with improvement.  Noted to have severe hypothyroidism secondary to non compliance. Seen by endocrinology and started on PO synthroid    Assessment/Plan:    1. Acute hypoxic respiratory failure  - Multifactorial- COPD exacerbation/pneumonia and acute on chronic diastolic CHF  - Hypoxia resolved   - Off oxygen  - RVP negative  - Completed IV Rocephin and azithromycin for CAP  - Sputum culture positive for Stenotrophomonas Bactrim completed per ID   - LE duplex negative for DVT  - Ambulate  - ISS    2. Afib with RVR  - On metoprolol  - No AC given anemia and positive FOBT  - Rate controlled     3. Urinary retention  - Espinal placed on 12/15- TOV on 12/18 failed- Espinal placed again on 12/19     4. Syncope   - Suspected vasovagal  - Diuretics were held    5. KALPANA   -improved    6. Hypothyroidism  - Synthroid increased to 112mcg by endocrinology   Follow up appointment: 1/13 at 2:00pm [place] office     7. Hiatal hernia  - PPI     8. Constipation   - Bowel regimen    9. Hyperlipidemia  - On statin      VTE- Lovenox subcut     Code Status; DNR/DNI    Discharge disposition: Medically stable for discharge to [facility] pending Auth     Daughter Andressa ([phone])

## 2022-12-21 NOTE — PROGRESS NOTE ADULT - SUBJECTIVE AND OBJECTIVE BOX
INTERVAL EVENTS:  Follow up hypothyroid     ROS: Patient denies chest pain, SOB, abd pain, N/V.    MEDICATIONS  (STANDING):  atorvastatin 40 milliGRAM(s) Oral at bedtime  enoxaparin Injectable 30 milliGRAM(s) SubCutaneous every 24 hours  levothyroxine 112 MICROGram(s) Oral daily  metoprolol succinate ER 25 milliGRAM(s) Oral daily  nystatin    Suspension 089124 Unit(s) Swish and Swallow four times a day  pantoprazole    Tablet 40 milliGRAM(s) Oral before breakfast  polyethylene glycol 3350 17 Gram(s) Oral daily  senna 2 Tablet(s) Oral daily  tamsulosin 0.4 milliGRAM(s) Oral at bedtime  trimethoprim   80 mG/sulfamethoxazole 400 mG 1 Tablet(s) Oral every 12 hours    MEDICATIONS  (PRN):  guaiFENesin Oral Liquid (Sugar-Free) 100 milliGRAM(s) Oral every 6 hours PRN Cough  levalbuterol Inhalation 1.25 milliGRAM(s) Inhalation every 6 hours PRN shortness of breath and wheezing  ondansetron Injectable 4 milliGRAM(s) IV Push every 6 hours PRN Nausea and/or Vomiting    Allergies  codeine (Vomiting)  iodine (Unknown)    Vital Signs Last 24 Hrs  T(C): 36.7 (21 Dec 2022 10:00), Max: 36.8 (20 Dec 2022 16:49)  T(F): 98 (21 Dec 2022 10:00), Max: 98.2 (20 Dec 2022 16:49)  HR: 76 (21 Dec 2022 10:00) (74 - 90)  BP: 126/75 (21 Dec 2022 10:00) (124/70 - 152/91)  BP(mean): --  RR: 18 (21 Dec 2022 10:00) (17 - 18)  SpO2: 99% (21 Dec 2022 10:00) (94% - 99%)    Parameters below as of 21 Dec 2022 10:00  Patient On (Oxygen Delivery Method): room air      PHYSICAL EXAM:  General: No apparent distress  Neck: Supple, trachea midline, no thyromegaly  Respiratory: Lungs clear bilaterally, normal rate, effort  Cardiac: +S1, S2, no m/r/g  GI: +BS, soft, non tender, non distended  Extremities: No peripheral edema, no pedal lesions  Neuro: A+O X3, [place]    LABS:                        8.8    7.32  )-----------( 124      ( 21 Dec 2022 06:32 )             28.2     12-21    140  |  111<H>  |  19.3  ----------------------------<  75  4.9   |  22.0  |  1.05    Ca    8.4      21 Dec 2022 06:32    TPro  5.1<L>  /  Alb  2.8<L>  /  TBili  0.3<L>  /  DBili  x   /  AST  47<H>  /  ALT  25  /  AlkPhos  69  12-21      Thyroid Stimulating Hormone, Serum: 12.81 uIU/mL (12-20-22 @ 05:30)  Free Thyroxine, Serum: 1.0 ng/dL (12-20-22 @ 05:30)  Triiodothyronine, Total (T3 Total): <40 ng/dL (12-20-22 @ 05:30)  Thyroid Stimulating Hormone, Serum: 10.40 uIU/mL (12-14-22 @ 07:06)  Free Thyroxine, Serum: 1.2 ng/dL (12-14-22 @ 07:06)  Triiodothyronine, Total (T3 Total): <40 ng/dL (12-14-22 @ 07:06)  Thyroid Stimulating Hormone, Serum: 30.06 uIU/mL (12-10-22 @ 04:38)  Free Thyroxine, Serum: 0.6 ng/dL (12-10-22 @ 04:38)  Triiodothyronine, Total (T3 Total): <40 ng/dL (12-10-22 @ 04:38)  Thyroid Stimulating Hormone, Serum: 53.33 uIU/mL (12-06-22 @ 06:17)  Triiodothyronine, Total (T3 Total): <40 ng/dL (12-06-22 @ 06:17)  Free Thyroxine, Serum: 0.2 ng/dL (12-06-22 @ 06:17)  Triiodothyronine, Total (T3 Total): <40 ng/dL (11-30-22 @ 04:38)  Thyroid Stimulating Hormone, Serum: 37.97 uIU/mL (11-30-22 @ 04:38)

## 2022-12-21 NOTE — PROGRESS NOTE ADULT - ASSESSMENT
89 y/o F with PMH HTN, [place] s/p cochlear implants, COPD/emphysema, hypothyroidism woke up around 2 AM shaking and 30 mins later emesis and her daughter called EMS. Endocrine follows for hypothyroidism.    1. Hypothyroid  - Repeat TSH 12/FT4 1.0/T3 <40   - LT4 increased to 112mcg PO daily  - TFTs ordered for tomorrow 12/22  - Follow up appointment: 1/13 at 2:00pm [place] office     2. Acute hypoxic respiratory failure/COPD/CAP  - On Bactrim for Stenotrophomonas in sputum  - Supplemental oxygen as needed  - Completed steroid taper    3. HTN  - Continue metoprolol

## 2022-12-21 NOTE — PROGRESS NOTE ADULT - SUBJECTIVE AND OBJECTIVE BOX
CC: Follow up     INTERVAL HPI/OVERNIGHT EVENTS: Patient seen and examined, sitting up in a chair. Off oxygen. Denies sob chest pain or palpitations. Denies urinary or bowel complaints.       Vital Signs Last 24 Hrs  T(C): 36.7 (21 Dec 2022 10:00), Max: 36.8 (20 Dec 2022 16:49)  T(F): 98 (21 Dec 2022 10:00), Max: 98.2 (20 Dec 2022 16:49)  HR: 76 (21 Dec 2022 10:00) (74 - 90)  BP: 126/75 (21 Dec 2022 10:00) (124/70 - 152/91)  BP(mean): --  RR: 18 (21 Dec 2022 10:00) (17 - 18)  SpO2: 99% (21 Dec 2022 10:00) (94% - 99%)    Parameters below as of 21 Dec 2022 10:00  Patient On (Oxygen Delivery Method): room air        PHYSICAL EXAM:    GENERAL: NAD,AOX3  HEAD:  Atraumatic, Normocephalic  EYES:conjunctiva and sclera clear  ENMT: Moist mucous membranes  NECK: Supple  CHEST/LUNG: Clear to auscultation bilaterally; No rales, rhonchi, wheezing, or rubs  HEART: Regular rate and rhythm; No murmurs, rubs, or gallops  ABDOMEN: Soft, Nontender, Nondistended; Bowel sounds present  + Espinal   EXTREMITIES:  2+ Peripheral Pulses, No clubbing, cyanosis, or edema        MEDICATIONS  (STANDING):  atorvastatin 40 milliGRAM(s) Oral at bedtime  enoxaparin Injectable 30 milliGRAM(s) SubCutaneous every 24 hours  levothyroxine 112 MICROGram(s) Oral daily  metoprolol succinate ER 25 milliGRAM(s) Oral daily  nystatin    Suspension 415808 Unit(s) Swish and Swallow four times a day  pantoprazole    Tablet 40 milliGRAM(s) Oral before breakfast  polyethylene glycol 3350 17 Gram(s) Oral daily  senna 2 Tablet(s) Oral daily  tamsulosin 0.4 milliGRAM(s) Oral at bedtime  trimethoprim   80 mG/sulfamethoxazole 400 mG 1 Tablet(s) Oral every 12 hours    MEDICATIONS  (PRN):  guaiFENesin Oral Liquid (Sugar-Free) 100 milliGRAM(s) Oral every 6 hours PRN Cough  levalbuterol Inhalation 1.25 milliGRAM(s) Inhalation every 6 hours PRN shortness of breath and wheezing  ondansetron Injectable 4 milliGRAM(s) IV Push every 6 hours PRN Nausea and/or Vomiting      Allergies    codeine (Vomiting)  iodine (Unknown)    Intolerances          LABS:                          8.8    7.32  )-----------( 124      ( 21 Dec 2022 06:32 )             28.2     12-21    140  |  111<H>  |  19.3  ----------------------------<  75  4.9   |  22.0  |  1.05    Ca    8.4      21 Dec 2022 06:32    TPro  5.1<L>  /  Alb  2.8<L>  /  TBili  0.3<L>  /  DBili  x   /  AST  47<H>  /  ALT  25  /  AlkPhos  69  12-21          RADIOLOGY & ADDITIONAL TESTS:

## 2022-12-22 VITALS
DIASTOLIC BLOOD PRESSURE: 75 MMHG | OXYGEN SATURATION: 95 % | RESPIRATION RATE: 18 BRPM | TEMPERATURE: 98 F | SYSTOLIC BLOOD PRESSURE: 156 MMHG | HEART RATE: 80 BPM

## 2022-12-22 LAB
ANION GAP SERPL CALC-SCNC: 10 MMOL/L — SIGNIFICANT CHANGE UP (ref 5–17)
BUN SERPL-MCNC: 17.5 MG/DL — SIGNIFICANT CHANGE UP (ref 8–20)
CALCIUM SERPL-MCNC: 8.5 MG/DL — SIGNIFICANT CHANGE UP (ref 8.4–10.5)
CHLORIDE SERPL-SCNC: 110 MMOL/L — HIGH (ref 96–108)
CO2 SERPL-SCNC: 22 MMOL/L — SIGNIFICANT CHANGE UP (ref 22–29)
CREAT SERPL-MCNC: 0.94 MG/DL — SIGNIFICANT CHANGE UP (ref 0.5–1.3)
EGFR: 58 ML/MIN/1.73M2 — LOW
GLUCOSE SERPL-MCNC: 76 MG/DL — SIGNIFICANT CHANGE UP (ref 70–99)
HCT VFR BLD CALC: 26.3 % — LOW (ref 34.5–45)
HGB BLD-MCNC: 8.2 G/DL — LOW (ref 11.5–15.5)
MCHC RBC-ENTMCNC: 31.2 GM/DL — LOW (ref 32–36)
MCHC RBC-ENTMCNC: 33.2 PG — SIGNIFICANT CHANGE UP (ref 27–34)
MCV RBC AUTO: 106.5 FL — HIGH (ref 80–100)
PLATELET # BLD AUTO: 140 K/UL — LOW (ref 150–400)
POTASSIUM SERPL-MCNC: 4.8 MMOL/L — SIGNIFICANT CHANGE UP (ref 3.5–5.3)
POTASSIUM SERPL-SCNC: 4.8 MMOL/L — SIGNIFICANT CHANGE UP (ref 3.5–5.3)
RBC # BLD: 2.47 M/UL — LOW (ref 3.8–5.2)
RBC # FLD: 14.6 % — HIGH (ref 10.3–14.5)
SODIUM SERPL-SCNC: 141 MMOL/L — SIGNIFICANT CHANGE UP (ref 135–145)
T3 SERPL-MCNC: <40 NG/DL — LOW (ref 80–200)
T4 FREE SERPL-MCNC: 0.9 NG/DL — SIGNIFICANT CHANGE UP (ref 0.9–1.8)
TSH SERPL-MCNC: 16.17 UIU/ML — HIGH (ref 0.27–4.2)
WBC # BLD: 7.87 K/UL — SIGNIFICANT CHANGE UP (ref 3.8–10.5)
WBC # FLD AUTO: 7.87 K/UL — SIGNIFICANT CHANGE UP (ref 3.8–10.5)

## 2022-12-22 PROCEDURE — 87184 SC STD DISK METHOD PER PLATE: CPT

## 2022-12-22 PROCEDURE — 85014 HEMATOCRIT: CPT

## 2022-12-22 PROCEDURE — 92526 ORAL FUNCTION THERAPY: CPT

## 2022-12-22 PROCEDURE — 83735 ASSAY OF MAGNESIUM: CPT

## 2022-12-22 PROCEDURE — 87637 SARSCOV2&INF A&B&RSV AMP PRB: CPT

## 2022-12-22 PROCEDURE — 71045 X-RAY EXAM CHEST 1 VIEW: CPT

## 2022-12-22 PROCEDURE — 97163 PT EVAL HIGH COMPLEX 45 MIN: CPT

## 2022-12-22 PROCEDURE — 82330 ASSAY OF CALCIUM: CPT

## 2022-12-22 PROCEDURE — 83880 ASSAY OF NATRIURETIC PEPTIDE: CPT

## 2022-12-22 PROCEDURE — 80048 BASIC METABOLIC PNL TOTAL CA: CPT

## 2022-12-22 PROCEDURE — 85025 COMPLETE CBC W/AUTO DIFF WBC: CPT

## 2022-12-22 PROCEDURE — 87040 BLOOD CULTURE FOR BACTERIA: CPT

## 2022-12-22 PROCEDURE — 93970 EXTREMITY STUDY: CPT

## 2022-12-22 PROCEDURE — 85610 PROTHROMBIN TIME: CPT

## 2022-12-22 PROCEDURE — 85018 HEMOGLOBIN: CPT

## 2022-12-22 PROCEDURE — 82746 ASSAY OF FOLIC ACID SERUM: CPT

## 2022-12-22 PROCEDURE — 84436 ASSAY OF TOTAL THYROXINE: CPT

## 2022-12-22 PROCEDURE — 96374 THER/PROPH/DIAG INJ IV PUSH: CPT

## 2022-12-22 PROCEDURE — 84100 ASSAY OF PHOSPHORUS: CPT

## 2022-12-22 PROCEDURE — 84146 ASSAY OF PROLACTIN: CPT

## 2022-12-22 PROCEDURE — 93005 ELECTROCARDIOGRAM TRACING: CPT

## 2022-12-22 PROCEDURE — 83540 ASSAY OF IRON: CPT

## 2022-12-22 PROCEDURE — 84466 ASSAY OF TRANSFERRIN: CPT

## 2022-12-22 PROCEDURE — 82607 VITAMIN B-12: CPT

## 2022-12-22 PROCEDURE — 84145 PROCALCITONIN (PCT): CPT

## 2022-12-22 PROCEDURE — 36415 COLL VENOUS BLD VENIPUNCTURE: CPT

## 2022-12-22 PROCEDURE — 85379 FIBRIN DEGRADATION QUANT: CPT

## 2022-12-22 PROCEDURE — 74176 CT ABD & PELVIS W/O CONTRAST: CPT

## 2022-12-22 PROCEDURE — 84443 ASSAY THYROID STIM HORMONE: CPT

## 2022-12-22 PROCEDURE — C8929: CPT

## 2022-12-22 PROCEDURE — 84484 ASSAY OF TROPONIN QUANT: CPT

## 2022-12-22 PROCEDURE — 83550 IRON BINDING TEST: CPT

## 2022-12-22 PROCEDURE — 97116 GAIT TRAINING THERAPY: CPT

## 2022-12-22 PROCEDURE — 82550 ASSAY OF CK (CPK): CPT

## 2022-12-22 PROCEDURE — 84295 ASSAY OF SERUM SODIUM: CPT

## 2022-12-22 PROCEDURE — 92610 EVALUATE SWALLOWING FUNCTION: CPT

## 2022-12-22 PROCEDURE — 86900 BLOOD TYPING SEROLOGIC ABO: CPT

## 2022-12-22 PROCEDURE — U0005: CPT

## 2022-12-22 PROCEDURE — 82272 OCCULT BLD FECES 1-3 TESTS: CPT

## 2022-12-22 PROCEDURE — U0003: CPT

## 2022-12-22 PROCEDURE — 85027 COMPLETE CBC AUTOMATED: CPT

## 2022-12-22 PROCEDURE — 84132 ASSAY OF SERUM POTASSIUM: CPT

## 2022-12-22 PROCEDURE — 87186 SC STD MICRODIL/AGAR DIL: CPT

## 2022-12-22 PROCEDURE — 82962 GLUCOSE BLOOD TEST: CPT

## 2022-12-22 PROCEDURE — 83605 ASSAY OF LACTIC ACID: CPT

## 2022-12-22 PROCEDURE — 93308 TTE F-UP OR LMTD: CPT

## 2022-12-22 PROCEDURE — 82728 ASSAY OF FERRITIN: CPT

## 2022-12-22 PROCEDURE — 80053 COMPREHEN METABOLIC PANEL: CPT

## 2022-12-22 PROCEDURE — 82435 ASSAY OF BLOOD CHLORIDE: CPT

## 2022-12-22 PROCEDURE — 84439 ASSAY OF FREE THYROXINE: CPT

## 2022-12-22 PROCEDURE — 0225U NFCT DS DNA&RNA 21 SARSCOV2: CPT

## 2022-12-22 PROCEDURE — 86850 RBC ANTIBODY SCREEN: CPT

## 2022-12-22 PROCEDURE — 96375 TX/PRO/DX INJ NEW DRUG ADDON: CPT

## 2022-12-22 PROCEDURE — 97110 THERAPEUTIC EXERCISES: CPT

## 2022-12-22 PROCEDURE — 86901 BLOOD TYPING SEROLOGIC RH(D): CPT

## 2022-12-22 PROCEDURE — 71250 CT THORAX DX C-: CPT

## 2022-12-22 PROCEDURE — 94640 AIRWAY INHALATION TREATMENT: CPT

## 2022-12-22 PROCEDURE — 82947 ASSAY GLUCOSE BLOOD QUANT: CPT

## 2022-12-22 PROCEDURE — 36600 WITHDRAWAL OF ARTERIAL BLOOD: CPT

## 2022-12-22 PROCEDURE — 81001 URINALYSIS AUTO W/SCOPE: CPT

## 2022-12-22 PROCEDURE — 99285 EMERGENCY DEPT VISIT HI MDM: CPT | Mod: 25

## 2022-12-22 PROCEDURE — P9047: CPT

## 2022-12-22 PROCEDURE — 74019 RADEX ABDOMEN 2 VIEWS: CPT

## 2022-12-22 PROCEDURE — 84480 ASSAY TRIIODOTHYRONINE (T3): CPT

## 2022-12-22 PROCEDURE — 97530 THERAPEUTIC ACTIVITIES: CPT

## 2022-12-22 PROCEDURE — 87070 CULTURE OTHR SPECIMN AEROBIC: CPT

## 2022-12-22 PROCEDURE — 87077 CULTURE AEROBIC IDENTIFY: CPT

## 2022-12-22 PROCEDURE — 82803 BLOOD GASES ANY COMBINATION: CPT

## 2022-12-22 PROCEDURE — 82553 CREATINE MB FRACTION: CPT

## 2022-12-22 PROCEDURE — 99232 SBSQ HOSP IP/OBS MODERATE 35: CPT

## 2022-12-22 PROCEDURE — 83690 ASSAY OF LIPASE: CPT

## 2022-12-22 PROCEDURE — 99239 HOSP IP/OBS DSCHRG MGMT >30: CPT

## 2022-12-22 RX ORDER — METOPROLOL TARTRATE 50 MG
1 TABLET ORAL
Qty: 0 | Refills: 0 | DISCHARGE
Start: 2022-12-22

## 2022-12-22 RX ORDER — POLYETHYLENE GLYCOL 3350 17 G/17G
17 POWDER, FOR SOLUTION ORAL
Qty: 0 | Refills: 0 | DISCHARGE
Start: 2022-12-22

## 2022-12-22 RX ORDER — TAMSULOSIN HYDROCHLORIDE 0.4 MG/1
1 CAPSULE ORAL
Qty: 0 | Refills: 0 | DISCHARGE
Start: 2022-12-22

## 2022-12-22 RX ORDER — SENNA PLUS 8.6 MG/1
2 TABLET ORAL
Qty: 0 | Refills: 0 | DISCHARGE
Start: 2022-12-22

## 2022-12-22 RX ORDER — METOPROLOL TARTRATE 50 MG
1 TABLET ORAL
Qty: 0 | Refills: 0 | DISCHARGE

## 2022-12-22 RX ORDER — NYSTATIN 500MM UNIT
5 POWDER (EA) MISCELLANEOUS
Qty: 0 | Refills: 0 | DISCHARGE
Start: 2022-12-22

## 2022-12-22 RX ORDER — LEVOTHYROXINE SODIUM 125 MCG
1 TABLET ORAL
Qty: 0 | Refills: 0 | DISCHARGE
Start: 2022-12-22

## 2022-12-22 RX ADMIN — Medication 1 TABLET(S): at 05:49

## 2022-12-22 RX ADMIN — Medication 500000 UNIT(S): at 13:05

## 2022-12-22 RX ADMIN — Medication 25 MILLIGRAM(S): at 05:49

## 2022-12-22 RX ADMIN — Medication 112 MICROGRAM(S): at 05:49

## 2022-12-22 RX ADMIN — PANTOPRAZOLE SODIUM 40 MILLIGRAM(S): 20 TABLET, DELAYED RELEASE ORAL at 05:48

## 2022-12-22 NOTE — PROGRESS NOTE ADULT - SUBJECTIVE AND OBJECTIVE BOX
CC: Follow up     INTERVAL HPI/OVERNIGHT EVENTS: Patient seen and examined, no acute complaints overnight.       Vital Signs Last 24 Hrs  T(C): 36.6 (22 Dec 2022 08:34), Max: 36.8 (22 Dec 2022 00:29)  T(F): 97.9 (22 Dec 2022 08:34), Max: 98.2 (22 Dec 2022 00:29)  HR: 70 (22 Dec 2022 08:34) (70 - 83)  BP: 120/66 (22 Dec 2022 08:34) (120/66 - 151/79)  BP(mean): --  RR: 18 (22 Dec 2022 08:34) (18 - 18)  SpO2: 98% (22 Dec 2022 08:34) (95% - 98%)    Parameters below as of 22 Dec 2022 08:34  Patient On (Oxygen Delivery Method): room air        PHYSICAL EXAM:    GENERAL: NAD, AOX3  HEAD:  Atraumatic, Normocephalic  EYES:conjunctiva and sclera clear  ENMT: Moist mucous membranes  NECK: Supple  CHEST/LUNG: Clear to auscultation bilaterally; No rales, rhonchi, wheezing, or rubs  HEART: Regular rate and rhythm; No murmurs, rubs, or gallops  ABDOMEN: Soft, Nontender, Nondistended; Bowel sounds present  + [place]   EXTREMITIES:  2+ Peripheral Pulses, No clubbing, cyanosis, or edema        MEDICATIONS  (STANDING):  atorvastatin 40 milliGRAM(s) Oral at bedtime  enoxaparin Injectable 30 milliGRAM(s) SubCutaneous every 24 hours  levothyroxine 112 MICROGram(s) Oral daily  metoprolol succinate ER 25 milliGRAM(s) Oral daily  nystatin    Suspension 529802 Unit(s) Swish and Swallow four times a day  pantoprazole    Tablet 40 milliGRAM(s) Oral before breakfast  polyethylene glycol 3350 17 Gram(s) Oral daily  senna 2 Tablet(s) Oral daily  tamsulosin 0.4 milliGRAM(s) Oral at bedtime    MEDICATIONS  (PRN):  guaiFENesin Oral Liquid (Sugar-Free) 100 milliGRAM(s) Oral every 6 hours PRN Cough  levalbuterol Inhalation 1.25 milliGRAM(s) Inhalation every 6 hours PRN shortness of breath and wheezing  ondansetron Injectable 4 milliGRAM(s) IV Push every 6 hours PRN Nausea and/or Vomiting      Allergies    codeine (Vomiting)  iodine (Unknown)    Intolerances          LABS:                          8.2    7.87  )-----------( 140      ( 22 Dec 2022 06:40 )             26.3     12-22    141  |  110<H>  |  17.5  ----------------------------<  76  4.8   |  22.0  |  0.94    Ca    8.5      22 Dec 2022 06:40    TPro  5.1<L>  /  Alb  2.8<L>  /  TBili  0.3<L>  /  DBili  x   /  AST  47<H>  /  ALT  25  /  AlkPhos  69  12-21          RADIOLOGY & ADDITIONAL TESTS:

## 2022-12-22 NOTE — PROGRESS NOTE ADULT - SUBJECTIVE AND OBJECTIVE BOX
INTERVAL EVENTS:  Follow up hypothyroid management    ROS: Patient denies chest pain, SOB, abd pain, N/V.    MEDICATIONS  (STANDING):  atorvastatin 40 milliGRAM(s) Oral at bedtime  enoxaparin Injectable 30 milliGRAM(s) SubCutaneous every 24 hours  levothyroxine 112 MICROGram(s) Oral daily  metoprolol succinate ER 25 milliGRAM(s) Oral daily  nystatin    Suspension 494451 Unit(s) Swish and Swallow four times a day  pantoprazole    Tablet 40 milliGRAM(s) Oral before breakfast  polyethylene glycol 3350 17 Gram(s) Oral daily  senna 2 Tablet(s) Oral daily  tamsulosin 0.4 milliGRAM(s) Oral at bedtime    MEDICATIONS  (PRN):  guaiFENesin Oral Liquid (Sugar-Free) 100 milliGRAM(s) Oral every 6 hours PRN Cough  levalbuterol Inhalation 1.25 milliGRAM(s) Inhalation every 6 hours PRN shortness of breath and wheezing  ondansetron Injectable 4 milliGRAM(s) IV Push every 6 hours PRN Nausea and/or Vomiting    Allergies  codeine (Vomiting)  iodine (Unknown)    Vital Signs Last 24 Hrs  T(C): 36.6 (22 Dec 2022 08:34), Max: 36.8 (22 Dec 2022 00:29)  T(F): 97.9 (22 Dec 2022 08:34), Max: 98.2 (22 Dec 2022 00:29)  HR: 70 (22 Dec 2022 08:34) (70 - 83)  BP: 120/66 (22 Dec 2022 08:34) (120/66 - 151/79)  BP(mean): --  RR: 18 (22 Dec 2022 08:34) (18 - 18)  SpO2: 98% (22 Dec 2022 08:34) (95% - 98%)    Parameters below as of 22 Dec 2022 08:34  Patient On (Oxygen Delivery Method): room air      PHYSICAL EXAM:  General: No apparent distress  Neck: Supple, trachea midline, no thyromegaly  Respiratory: Lungs clear bilaterally, normal rate, effort  Cardiac: +S1, S2, no m/r/g  GI: +BS, soft, non tender, non distended  Extremities: No peripheral edema, no pedal lesions  Neuro: A+O X3, no tremor  Pysch: Affect appropriate   Skin: No acanthosis         LABS:                        8.2    7.87  )-----------( 140      ( 22 Dec 2022 06:40 )             26.3     12-22    141  |  110<H>  |  17.5  ----------------------------<  76  4.8   |  22.0  |  0.94    Ca    8.5      22 Dec 2022 06:40    TPro  5.1<L>  /  Alb  2.8<L>  /  TBili  0.3<L>  /  DBili  x   /  AST  47<H>  /  ALT  25  /  AlkPhos  69  12-21      Thyroid Stimulating Hormone, Serum: 16.17 uIU/mL (12-22-22 @ 06:40)  Triiodothyronine, Total (T3 Total): <40 ng/dL (12-22-22 @ 06:40)  Thyroid Stimulating Hormone, Serum: 12.81 uIU/mL (12-20-22 @ 05:30)  Free Thyroxine, Serum: 1.0 ng/dL (12-20-22 @ 05:30)  Triiodothyronine, Total (T3 Total): <40 ng/dL (12-20-22 @ 05:30)  Thyroid Stimulating Hormone, Serum: 10.40 uIU/mL (12-14-22 @ 07:06)  Free Thyroxine, Serum: 1.2 ng/dL (12-14-22 @ 07:06)  Triiodothyronine, Total (T3 Total): <40 ng/dL (12-14-22 @ 07:06)  Thyroid Stimulating Hormone, Serum: 30.06 uIU/mL (12-10-22 @ 04:38)  Free Thyroxine, Serum: 0.6 ng/dL (12-10-22 @ 04:38)  Triiodothyronine, Total (T3 Total): <40 ng/dL (12-10-22 @ 04:38)  Thyroid Stimulating Hormone, Serum: 53.33 uIU/mL (12-06-22 @ 06:17)  Triiodothyronine, Total (T3 Total): <40 ng/dL (12-06-22 @ 06:17)  Free Thyroxine, Serum: 0.2 ng/dL (12-06-22 @ 06:17)  Triiodothyronine, Total (T3 Total): <40 ng/dL (11-30-22 @ 04:38)  Thyroid Stimulating Hormone, Serum: 37.97 uIU/mL (11-30-22 @ 04:38)

## 2022-12-22 NOTE — PROGRESS NOTE ADULT - ASSESSMENT
The patient is a 89 year old female with a past medical history of hypertension, COPD not on home oxygen and hypothyroidism who presented to the ER with complaints of shaking and emesis. Admitted for acute hypoxic respiratory failure secondary to pneumonia and acute on chronic diastolic heart failure. Started on IV antibiotics and diuresis with improvement.  Noted to have severe hypothyroidism secondary to non compliance. Seen by endocrinology and started on PO synthroid    Assessment/Plan:    1. Acute hypoxic respiratory failure  - Multifactorial- COPD exacerbation/pneumonia and acute on chronic diastolic CHF  - Hypoxia resolved   - Off oxygen  - RVP negative  - Completed IV Rocephin and azithromycin for CAP  - Sputum culture positive for Stenotrophomonas Bactrim completed per ID   - LE duplex negative for DVT  - Ambulate  - ISS    2. Afib with RVR  - On metoprolol  - No AC given anemia and positive FOBT  - Rate controlled     3. Urinary retention  - Espinal placed on 12/15- TOV on 12/18 failed- Espinal placed again on 12/19     4. Syncope   - Suspected vasovagal  - Diuretics were held    5. KALPANA   -improved    6. Hypothyroidism  - Synthroid increased to 112mcg by endocrinology   Follow up appointment: 1/13 at 2:00pm [place] office     7. Hiatal hernia  - PPI     8. Constipation   - Bowel regimen    9. Hyperlipidemia  - On statin      VTE- Lovenox subcut     Code Status; DNR/DNI    Discharge disposition: Medically stable for discharge to [facility] pending Auth     Daughter Andressa ([phone])    The patient is a 89 year old female with a past medical history of hypertension, COPD not on home oxygen and hypothyroidism who presented to the ER with complaints of shaking and emesis. Admitted for acute hypoxic respiratory failure secondary to pneumonia and acute on chronic diastolic heart failure. Started on IV antibiotics and diuresis with improvement.  Noted to have severe hypothyroidism secondary to non compliance. Seen by endocrinology and started on PO synthroid    Assessment/Plan:    1. Acute hypoxic respiratory failure  - Multifactorial- COPD exacerbation/pneumonia and acute on chronic diastolic CHF  - Hypoxia resolved   - Off oxygen  - RVP negative  - Completed IV Rocephin and azithromycin for CAP  - Sputum culture positive for Stenotrophomonas Bactrim completed per ID   - LE duplex negative for DVT  - Ambulate  - ISS    2. Afib with RVR  - On metoprolol  - No AC given anemia and positive FOBT  - Rate controlled     3. Urinary retention  - Espinal placed on 12/15- TOV on 12/18 failed- Espinal placed again on 12/19     4. Syncope   - Suspected vasovagal  - Diuretics were held    5. KALPANA   -improved    6. Hypothyroidism  - Synthroid increased to 112mcg by endocrinology on 12.21  - TSH elevated today; recommend continue 112mcg PO OD and follow up for repeat TFTS   Follow up appointment: 1/13 at 2:00pm [place] office     7. Hiatal hernia  - PPI     8. Constipation   - Bowel regimen    9. Hyperlipidemia  - On statin      VTE- Lovenox subcut     Code Status; DNR/DNI    Discharge disposition: Medically stable for discharge to [facility] pending Auth     Daughter Andressa ([phone])

## 2022-12-22 NOTE — PROGRESS NOTE ADULT - ASSESSMENT
89 y/o F with PMH HTN, Nikolai s/p cochlear implants, COPD/emphysema, hypothyroidism woke up around 2 AM shaking and 30 mins later emesis and her daughter called EMS. Endocrine follows for hypothyroidism.    1. Hypothyroid  - Repeat TSH 16/T3 <40/FT4 pending  - Continue 112mcg PO daily (it was increased on 12/21)  - To recheck TFTs outpatient in 6 weeks  - Follow up appointment: 1/13 at 2:00pm [place] office     2. Acute hypoxic respiratory failure/COPD/CAP  - On room air  - Completed IV Rocephin and azithromycin for CAP  - Sputum culture positive for Stenotrophomonas Bactrim completed per ID     3. HTN  - Continue metoprolol

## 2022-12-22 NOTE — PROGRESS NOTE ADULT - NS ATTEND OPT1 GEN_ALL_CORE

## 2022-12-22 NOTE — PROGRESS NOTE ADULT - NS ATTEND AMEND GEN_ALL_CORE FT
I agree with plan above and discussed with NP.  check tfts in 1 week .   tfts slowly improving
I have seen and examined patient with NP, agree with above assessment and plan
Only a small improvement noted in thyroid levels; increase T4 dose ordered.
Patient seen and examined at bedside with NP. Agree with the above plan.
pt. remains fully alert, with no evidence of worsening hypothyroidism clinically. Updated thyroid function tests ordered on the higher dose of synthroid.
I agree with plan above and discussed with NP.  recheck tfts in 1 week
I have seen and examined patient with NP, agree with above assessment and plan
Pt. is clinically hypothyroid, with raspy voice, periorbital edema, and loss of lateral eyebrow hair. Expect need for gradual increase in thyroid hormone dose.
I have seen and examined patient with NP, AGREE WITH ABOVE ASSESSMENT AND PLAN
Patient seen and examined at bedside with NP. Agree with the above plan.
I have seen and examined pt with NP, agree  with above assessment and plan
Agree with above. Patient seen and examined at bedside. Abdominal pain likely related to urinary retention now resolved. She had a brown BM yesterday, stable HGB no further GI work up needed. Continue bowel regimen for constipation   GI will sign off, Please call with questions

## 2022-12-22 NOTE — PROGRESS NOTE ADULT - NUTRITIONAL ASSESSMENT
This patient has been assessed with a concern for Malnutrition and has been determined to have a diagnosis/diagnoses of Moderate protein-calorie malnutrition.    This patient is being managed with:   Diet DASH/TLC-  Sodium & Cholesterol Restricted  Easy to Chew (EASYTOCHEW)  Supplement Feeding Modality:  Oral  Ensure Enlive Cans or Servings Per Day:  3       Frequency:  Three Times a day  Entered: Dec 11 2022 10:09AM    
This patient has been assessed with a concern for Malnutrition and has been determined to have a diagnosis/diagnoses of Moderate protein-calorie malnutrition.    This patient is being managed with:   Diet DASH/TLC-  Sodium & Cholesterol Restricted  Easy to Chew (EASYTOCHEW)  Entered: Dec  5 2022 11:35AM    
This patient has been assessed with a concern for Malnutrition and has been determined to have a diagnosis/diagnoses of Moderate protein-calorie malnutrition.    This patient is being managed with:   Diet DASH/TLC-  Sodium & Cholesterol Restricted  Easy to Chew (EASYTOCHEW)  Supplement Feeding Modality:  Oral  Ensure Enlive Cans or Servings Per Day:  3       Frequency:  Three Times a day  Entered: Dec 11 2022 10:09AM    
This patient has been assessed with a concern for Malnutrition and has been determined to have a diagnosis/diagnoses of Moderate protein-calorie malnutrition.    This patient is being managed with:   Diet Easy to Chew-  Entered: Dec 15 2022  4:29PM    
This patient has been assessed with a concern for Malnutrition and has been determined to have a diagnosis/diagnoses of Moderate protein-calorie malnutrition.    This patient is being managed with:   Diet Easy to Chew-  Entered: Dec 15 2022  4:29PM    
This patient has been assessed with a concern for Malnutrition and has been determined to have a diagnosis/diagnoses of Moderate protein-calorie malnutrition.    This patient is being managed with:   Diet DASH/TLC-  Sodium & Cholesterol Restricted  Easy to Chew (EASYTOCHEW)  Entered: Dec  5 2022 11:35AM    
This patient has been assessed with a concern for Malnutrition and has been determined to have a diagnosis/diagnoses of Moderate protein-calorie malnutrition.    This patient is being managed with:   Diet Easy to Chew-  Entered: Dec 15 2022  4:29PM    
This patient has been assessed with a concern for Malnutrition and has been determined to have a diagnosis/diagnoses of Moderate protein-calorie malnutrition.    This patient is being managed with:   Diet NPO-  Except Medications  Entered: Dec 15 2022  5:44AM    
This patient has been assessed with a concern for Malnutrition and has been determined to have a diagnosis/diagnoses of Moderate protein-calorie malnutrition.    This patient is being managed with:   Diet DASH/TLC-  Sodium & Cholesterol Restricted  Easy to Chew (EASYTOCHEW)  Entered: Dec  5 2022 11:35AM    
This patient has been assessed with a concern for Malnutrition and has been determined to have a diagnosis/diagnoses of Moderate protein-calorie malnutrition.    This patient is being managed with:   Diet DASH/TLC-  Sodium & Cholesterol Restricted  Easy to Chew (EASYTOCHEW)  Supplement Feeding Modality:  Oral  Ensure Enlive Cans or Servings Per Day:  3       Frequency:  Three Times a day  Entered: Dec 11 2022 10:09AM    
This patient has been assessed with a concern for Malnutrition and has been determined to have a diagnosis/diagnoses of Moderate protein-calorie malnutrition.    This patient is being managed with:   Diet DASH/TLC-  Sodium & Cholesterol Restricted  Easy to Chew (EASYTOCHEW)  Supplement Feeding Modality:  Oral  Ensure Enlive Cans or Servings Per Day:  3       Frequency:  Three Times a day  Entered: Dec 11 2022 10:09AM    
This patient has been assessed with a concern for Malnutrition and has been determined to have a diagnosis/diagnoses of Moderate protein-calorie malnutrition.    This patient is being managed with:   Diet Easy to Chew-  Entered: Dec 15 2022  4:29PM    
This patient has been assessed with a concern for Malnutrition and has been determined to have a diagnosis/diagnoses of Moderate protein-calorie malnutrition.    This patient is being managed with:   Diet Easy to Chew-  Entered: Dec 15 2022  4:29PM

## 2023-01-06 NOTE — CDI QUERY NOTE - NSCDIOTHERTXTBX_GEN_ALL_CORE_HH
Sputum culture positive for Stenotrophomonas was patient was given Zosyn then switch to Bactrim, please clarify if these bacteria was the causative organism involved in pneumonia?  A.	Pneumonia due to Stenotrophomonas ( gram negative bacteria), present on admission  B.	Stenotrophomonas bacteria not associated with pneumonia  C.	Other, please specify  D.	Not clinically significant      Supporting Documentation:      Progress Note Adult-Endocrinology Nurse Practitioner/Attendi [Charted Location: [address] [facility]] [Authored: 19-Dec-2022 11:31]  2. Acute hypoxic respiratory failure/COPD/CAP  - On Bactrim for Stenotrophomona in sputum  - Supplemental oxygen as needed  - Completed steroid taper      Progress Note Adult-Infectious Disease Attending [Charted Location: [address] [facility]] [Authored: 15-Dec-2022 15:29]  Started on piperacillin-tazobactam today 12/14 after syncopal event.     Impression:  Hypoxic respiratory failure  COPD  Sputum culture positive for Stenotrophomonas     Plan:  - Given frail resp status, not unreasonable to treat Stenotrophomonas in sputum, specially with patient requiring more supplemental oxygen.   - Start TMP-SMX 1 DS tab once, followed by 1 SS tab q12h (adjusted to GFR)  - Would treat for 5 days      Antiinfectives:  azithromycin  IVPB 500 milliGRAM(s) IV Intermittent every 24 hours  cefTRIAXone Injectable. 1000 milliGRAM(s) IV Push every 24 hours  piperacillin/tazobactam IVPB.. 3.375 Gram(s) IV Intermittent every 8 hours  trimethoprim   80 mG/sulfamethoxazole 400 mG 1 Tablet(s) Oral every 12 hours

## 2023-01-13 ENCOUNTER — APPOINTMENT (OUTPATIENT)
Dept: ENDOCRINOLOGY | Facility: CLINIC | Age: 88
End: 2023-01-13

## 2023-06-21 RX ORDER — AZITHROMYCIN 250 MG/1
250 TABLET, FILM COATED ORAL
Qty: 6 | Refills: 0 | Status: COMPLETED | COMMUNITY
Start: 2022-06-03 | End: 2023-06-21

## 2023-06-30 ENCOUNTER — APPOINTMENT (OUTPATIENT)
Dept: PULMONOLOGY | Facility: CLINIC | Age: 88
End: 2023-06-30
Payer: MEDICARE

## 2023-06-30 PROCEDURE — 99443: CPT | Mod: 95

## 2023-06-30 NOTE — CONSULT LETTER
[Dear  ___] : Dear  [unfilled], [FreeTextEntry1] : I had the pleasure of evaluating your patient, TERESA NESS , in the office today.  Please review my consultation and evaluation report that follows below.  Please do not hesitate to call me if further information is necessary or if you wish to discuss ongoing care or diagnostic work-up.   \par I very much appreciate your referral and it is a privilege to be able to provide care for your patient.\par \par Sincerely,\par  \par Clifton Freitas MD, MHCM, FACP, DINH-C\par Pulmonary Medicine\par  of Medicine\par [employer] [facility] [employer] [facility] at [facility]\par [email or web address]\par \par [facility] -Pulmonary in [place]\par [address]\[place]  [address]\par    Fax \par \par [facility] at [place]\par [address]\par [place], [place] \par \par

## 2023-06-30 NOTE — HISTORY OF PRESENT ILLNESS
[Home] : at home, [unfilled] , at the time of the visit. [Medical Office: ([facility])___] : at the medical office located in  [Verbal consent obtained from patient] : the patient, [unfilled] [TextBox_4] : 89 yo woman with COPD\par Last seen here one year ago\par Dr Almanza is primary\par The patient had hospitlaization at [facility] and then [facility] in Nov-Dec and then went to rehab for about 4 months and c\par came home about two weeks ago\par Saw Dr Paul the day after discharge\par Called here last week for increased sputum--was given Prednisone course pending telephone visit\par Meds now inclue\par TRELEGY\par Thyroid, famotidine, lasix, losartan\par K\par She says she is improving, has no dyspnea, has less cough, no fever\par Couldnt come because they are installing a ramp to her home\par Recommend finish Prednisone taper and arrange for visit here in one month\par \par Time on telephone 31 min

## 2023-08-11 ENCOUNTER — APPOINTMENT (OUTPATIENT)
Dept: PULMONOLOGY | Facility: CLINIC | Age: 88
End: 2023-08-11
Payer: MEDICARE

## 2023-08-11 VITALS
OXYGEN SATURATION: 98 % | BODY MASS INDEX: 32.15 KG/M2 | HEIGHT: 57 IN | SYSTOLIC BLOOD PRESSURE: 145 MMHG | WEIGHT: 149 LBS | HEART RATE: 62 BPM | RESPIRATION RATE: 16 BRPM | DIASTOLIC BLOOD PRESSURE: 80 MMHG

## 2023-08-11 PROCEDURE — 99214 OFFICE O/P EST MOD 30 MIN: CPT

## 2023-08-11 RX ORDER — ALBUTEROL SULFATE 2.5 MG/3ML
(2.5 MG/3ML) SOLUTION RESPIRATORY (INHALATION)
Qty: 3 | Refills: 3 | Status: ACTIVE | COMMUNITY
Start: 2023-08-11 | End: 1900-01-01

## 2023-08-11 NOTE — HISTORY OF PRESENT ILLNESS
[TextBox_4] : 89 yo woman comes in with her daughter--her daughter now lives with her and cares for her Last seen in June 22 but I spoke with her last months soon after discharge from rehab She had been hospitilized at [facility] and then [facility] for pneumonia, earlier in the year and then sent to rehab; Currently taking Trelegy for her COPD and tolerating it She finished the steroid taper as was ordered last time She does note some gurgling and congestion at times with a cough She doesnt appear to take her albuterol very often  Also taking thyroid,famotidine, lasix twice a week and losartan  Now that she has a ramp at her house, she can come to the doctors She had seen her primary, Dr Grossman a few days ago

## 2023-08-11 NOTE — CONSULT LETTER
[Dear  ___] : Dear  [unfilled], [FreeTextEntry1] : I had the pleasure of evaluating your patient, TERESA NESS , in the office today.  Please review my consultation and evaluation report that follows below.  Please do not hesitate to call me if further information is necessary or if you wish to discuss ongoing care or diagnostic work-up.    I very much appreciate your referral and it is a privilege to be able to provide care for your patient.  Sincerely,   Clifton Freitas MD, MHCM, FACP, DINH-C Pulmonary Medicine  of Medicine [employer] [facility] [employer] at [facility] [place] [email or web address] -[facility] in [address] [place]  [address]    Fax   Multi-Specialties at [address]  [phone]

## 2023-08-11 NOTE — ASSESSMENT
[FreeTextEntry1] : 89 yo woman comes in with her daughter--her daughter now lives with her and cares for her Last seen in June 22 but I spoke with her last months soon after discharge from rehab She had been hospitilized at [facility] and then [facility] for pneumonia, earlier in the year and then sent to rehab; Currently taking Trelegy for her COPD and tolerating it She finished the steroid taper as was ordered last time She does note some gurgling and congestion at times with a cough She doesnt appear to take her albuterol very often  Also taking thyroid,famotidine, lasix twice a week and losartan  Now that she has a ramp at her house, she can come to the doctors She had seen her primary, Dr Grossman a few days ago  Imp 89 yo woman with long term COPD Currently stable on Trelegy She will benfit from the use of nebulized albuterol at  least twice a day to facilitate coughing and secretion removal Will see again in six months or earlier as required

## 2023-09-29 ENCOUNTER — APPOINTMENT (OUTPATIENT)
Dept: OTOLARYNGOLOGY | Facility: CLINIC | Age: 88
End: 2023-09-29

## 2023-11-09 ENCOUNTER — APPOINTMENT (OUTPATIENT)
Dept: OTOLARYNGOLOGY | Facility: CLINIC | Age: 88
End: 2023-11-09
Payer: MEDICARE

## 2023-11-09 VITALS — WEIGHT: 148 LBS | BODY MASS INDEX: 29.84 KG/M2 | HEIGHT: 59 IN

## 2023-11-09 DIAGNOSIS — H90.3 SENSORINEURAL HEARING LOSS, BILATERAL: ICD-10-CM

## 2023-11-09 PROCEDURE — 92567 TYMPANOMETRY: CPT

## 2023-11-09 PROCEDURE — 99203 OFFICE O/P NEW LOW 30 MIN: CPT

## 2023-11-09 PROCEDURE — 92504 EAR MICROSCOPY EXAMINATION: CPT

## 2023-11-09 PROCEDURE — 92557 COMPREHENSIVE HEARING TEST: CPT

## 2023-11-09 RX ORDER — FAMOTIDINE 10 MG/1
TABLET, FILM COATED ORAL
Refills: 0 | Status: ACTIVE | COMMUNITY

## 2024-02-05 ENCOUNTER — APPOINTMENT (OUTPATIENT)
Dept: PULMONOLOGY | Facility: CLINIC | Age: 89
End: 2024-02-05
Payer: MEDICARE

## 2024-02-05 VITALS
DIASTOLIC BLOOD PRESSURE: 82 MMHG | RESPIRATION RATE: 16 BRPM | BODY MASS INDEX: 31.28 KG/M2 | HEIGHT: 58 IN | SYSTOLIC BLOOD PRESSURE: 126 MMHG | WEIGHT: 149 LBS

## 2024-02-05 PROCEDURE — 99214 OFFICE O/P EST MOD 30 MIN: CPT

## 2024-02-05 RX ORDER — PREDNISONE 10 MG/1
10 TABLET ORAL
Qty: 50 | Refills: 1 | Status: DISCONTINUED | COMMUNITY
Start: 2019-08-22 | End: 2024-02-05

## 2024-02-05 RX ORDER — POTASSIUM CHLORIDE 10 MEQ
10 CAPSULE, EXTENDED RELEASE ORAL
Refills: 0 | Status: ACTIVE | COMMUNITY

## 2024-02-05 RX ORDER — BUDESONIDE, GLYCOPYRROLATE, AND FORMOTEROL FUMARATE 160; 9; 4.8 UG/1; UG/1; UG/1
160-9-4.8 AEROSOL, METERED RESPIRATORY (INHALATION) TWICE DAILY
Qty: 3 | Refills: 3 | Status: DISCONTINUED | COMMUNITY
Start: 2023-09-11 | End: 2024-02-05

## 2024-02-05 RX ORDER — LEVOTHYROXINE SODIUM 0.17 MG/1
TABLET ORAL
Refills: 0 | Status: ACTIVE | COMMUNITY

## 2024-02-05 RX ORDER — LOSARTAN POTASSIUM 100 MG/1
TABLET, FILM COATED ORAL
Refills: 0 | Status: ACTIVE | COMMUNITY

## 2024-02-05 RX ORDER — METOPROLOL TARTRATE 75 MG/1
TABLET, FILM COATED ORAL
Refills: 0 | Status: DISCONTINUED | COMMUNITY

## 2024-02-05 RX ORDER — TIOTROPIUM BROMIDE 18 UG/1
18 CAPSULE ORAL; RESPIRATORY (INHALATION)
Qty: 30 | Refills: 0 | Status: DISCONTINUED | COMMUNITY
Start: 2018-02-24 | End: 2024-02-05

## 2024-02-05 RX ORDER — FLUTICASONE PROPIONATE AND SALMETEROL 250; 50 UG/1; UG/1
250-50 POWDER RESPIRATORY (INHALATION)
Qty: 1 | Refills: 0 | Status: DISCONTINUED | COMMUNITY
Start: 2022-03-10 | End: 2024-02-05

## 2024-02-05 NOTE — HISTORY OF PRESENT ILLNESS
[TextBox_4] : 89 yo woman comes in with her daughter--her daughter now lives with her and cares for her Last seen in June 22 but I spoke with her last months soon after discharge from rehab She had been hospitalized at [facility] and then [facility] for pneumonia, earlier in the year and then sent to rehab; Currently taking Trelegy for her COPD and tolerating it She finished the steroid taper as was ordered last time She does note some gurgling and congestion at times with a cough She doesnt appear to take her albuterol very often Also taking thyroid,famotidine, lasix twice a week and losartan Now that she has a ramp at her house, she can come to the doctors  Last seen in Aug 2023 Doing fairly well She is comfortable Using Trelegy once a day, occasional periods of increased cough but improves Daughter says she is doing well Patient is in good spirits

## 2024-02-05 NOTE — ASSESSMENT
[FreeTextEntry1] : 89 yo woman comes in with her daughter--her daughter now lives with her and cares for her Last seen in June 22 but I spoke with her last months soon after discharge from rehab She had been hospitalized at [facility] and then [facility] for pneumonia, earlier in the year and then sent to rehab; Currently taking Trelegy for her COPD and tolerating it She finished the steroid taper as was ordered last time She does note some gurgling and congestion at times with a cough She doesnt appear to take her albuterol very often Also taking thyroid,famotidine, lasix twice a week and losartan Now that she has a ramp at her house, she can come to the doctors  Last seen in Aug 2023 Doing fairly well She is comfortable Using Trelegy once a day, occasional periods of increased cough but improves Daughter says she is doing well Patient is in good spirits  Imp 89 yo woman  with mild COPD Pneumonia last year Doing well on maintenance of Trelegy Would continue Albuterol can be used for mucociliary clearance Patient wishes to return in six months F/u by her Primary doctor Sincere

## 2024-02-05 NOTE — CONSULT LETTER
[Dear  ___] : Dear  [unfilled], [FreeTextEntry1] : I had the pleasure of evaluating your patient, TERESA NESS , in the office today.  Please review my consultation and evaluation report that follows below.  Please do not hesitate to call me if further information is necessary or if you wish to discuss ongoing care or diagnostic work-up.    I very much appreciate your referral and it is a privilege to be able to provide care for your patient.  Sincerely,   Clifton Freitas MD, MHCM, FACP, DINH-C Pulmonary Medicine  of Medicine [employer] [facility] [employer] [facility] at \[facility]\""/[facility] [email or web address] -[facility] in [address] [place]  [address]    Fax   Multi-Specialties at [address]  [phone]

## 2024-02-23 RX ORDER — FLUTICASONE FUROATE, UMECLIDINIUM BROMIDE AND VILANTEROL TRIFENATATE 100; 62.5; 25 UG/1; UG/1; UG/1
100-62.5-25 POWDER RESPIRATORY (INHALATION)
Qty: 3 | Refills: 3 | Status: ACTIVE | COMMUNITY
Start: 2023-08-11 | End: 1900-01-01

## 2024-04-12 ENCOUNTER — APPOINTMENT (OUTPATIENT)
Dept: PULMONOLOGY | Facility: CLINIC | Age: 89
End: 2024-04-12
Payer: MEDICARE

## 2024-04-12 VITALS
WEIGHT: 150 LBS | OXYGEN SATURATION: 94 % | HEART RATE: 62 BPM | RESPIRATION RATE: 16 BRPM | BODY MASS INDEX: 31.49 KG/M2 | DIASTOLIC BLOOD PRESSURE: 76 MMHG | SYSTOLIC BLOOD PRESSURE: 132 MMHG | HEIGHT: 58 IN

## 2024-04-12 DIAGNOSIS — J44.9 CHRONIC OBSTRUCTIVE PULMONARY DISEASE, UNSPECIFIED: ICD-10-CM

## 2024-04-12 PROCEDURE — G2211 COMPLEX E/M VISIT ADD ON: CPT

## 2024-04-12 PROCEDURE — 99213 OFFICE O/P EST LOW 20 MIN: CPT

## 2024-04-12 NOTE — HISTORY OF PRESENT ILLNESS
[TextBox_4] : 89 yo woman comes in with her daughter--her daughter now lives with her and cares for her Last seen in June 22 but I spoke with her last months soon after discharge from rehab She had been hospitalized at [facility] and then [facility] for pneumonia, earlier in the year and then sent to rehab; Currently taking Trelegy for her COPD and tolerating it She finished the steroid taper as was ordered last time She does note some gurgling and congestion at times with a cough She doesnt appear to take her albuterol very often Also taking thyroid,famotidine, lasix twice a week and losartan Now that she has a ramp at her house, she can come to the doctors Last seen in Aug 2023 Doing fairly well She is comfortable Using Trelegy once a day, occasional periods of increased cough but improves Daughter says she is doing well Patient is in good spirits  Interim: Doing nicely Sent for CXR Jan 2024: No acute pulmonary infiltrates Still taking trelegy

## 2024-04-12 NOTE — ASSESSMENT
[FreeTextEntry1] : 89 yo woman comes in with her daughter--her daughter now lives with her and cares for her Last seen in June 22 but I spoke with her last months soon after discharge from rehab She had been hospitalized at [facility] and then [facility] for pneumonia, earlier in the year and then sent to rehab; Currently taking Trelegy for her COPD and tolerating it She finished the steroid taper as was ordered last time She does note some gurgling and congestion at times with a cough She doesnt appear to take her albuterol very often Also taking thyroid,famotidine, lasix twice a week and losartan Now that she has a ramp at her house, she can come to the doctors Last seen in Aug 2023 Doing fairly well She is comfortable Using Trelegy once a day, occasional periods of increased cough but improves Daughter says she is doing well Patient is in good spirits  Interim: Doing nicely Sent for CXR Jan 2024: No acute pulmonary infiltrates Still taking trelegy  Imp Stable COPD Given samples of Trelelgy RTC 6 months as per patient

## 2024-07-24 NOTE — DIETITIAN INITIAL EVALUATION ADULT. - CALCULATED TO (G/KG)
Lipids were elevated and trigs were back up to 429. I am going to call in medication for this. She was on fenofibrate in the past.   I want her to also take a low dose crestor with it. I will get both called in and repeat labs in 3 months.     The 10-year ASCVD risk score (Cristina HOGAN, et al., 2019) is: 8.4%    Values used to calculate the score:      Age: 55 years      Sex: Female      Is Non- : No      Diabetic: No      Tobacco smoker: No      Systolic Blood Pressure: 168 mmHg      Is BP treated: Yes      HDL Cholesterol: 34 mg/dL      Total Cholesterol: 234 mg/dL  
Spoke with patient, voiced understanding.  She would like you to fill her Amlodipine as well. 
89.52

## 2024-10-11 ENCOUNTER — APPOINTMENT (OUTPATIENT)
Dept: PULMONOLOGY | Facility: CLINIC | Age: 89
End: 2024-10-11

## 2024-11-07 NOTE — DISCHARGE NOTE NURSING/CASE MANAGEMENT/SOCIAL WORK - NSDCPEELIQUISFU_GEN_ALL_CORE
Go for blood tests as directed. Your doctor will do lab tests at regular visits to monitor the effects of this medicine. Please follow up with your doctor and keep your health care provider appointments. diabetes/musculoskeletal

## 2024-11-23 NOTE — CONSULT NOTE ADULT - CONVERSATION DETAILS
No
- Role of health care proxy explained to patient.  Previously expressed a preference for daughter, Andressa, affirmed.  Form completed and placed in chart.   - Noted prior expressed preferences for DNR/I.  Patient confirms these preferences verbally.  No changes to current care preferences/limitations in management.  As such, current MOLST form stands as is, no need to amend/update care preferences requiring documentation on a MOLST form.  - Noting her limited ability to engaged in discussions at this time ([place], hearing aide battery dead), inquired if writer can contact HCP for additional information.  She is in agreement.

## 2024-11-27 NOTE — RAPID RESPONSE TEAM SUMMARY - NSOTHERSPECIFYRRT_GEN_ALL_CORE
Evaristo Mg,    I have had the opportunity to review your recent results and an interpretation is as follows:  Your bone density scan returned improved - continue vitamin D replacement    Sincerely,  Cristino Contreras MD Unresponsiveness

## 2025-04-25 ENCOUNTER — APPOINTMENT (OUTPATIENT)
Dept: PULMONOLOGY | Facility: CLINIC | Age: 89
End: 2025-04-25
Payer: MEDICARE

## 2025-04-25 VITALS
HEART RATE: 54 BPM | DIASTOLIC BLOOD PRESSURE: 72 MMHG | HEIGHT: 58 IN | SYSTOLIC BLOOD PRESSURE: 124 MMHG | RESPIRATION RATE: 16 BRPM | OXYGEN SATURATION: 96 % | WEIGHT: 165 LBS | BODY MASS INDEX: 34.63 KG/M2

## 2025-04-25 DIAGNOSIS — J44.9 CHRONIC OBSTRUCTIVE PULMONARY DISEASE, UNSPECIFIED: ICD-10-CM

## 2025-04-25 PROCEDURE — 99214 OFFICE O/P EST MOD 30 MIN: CPT

## 2025-04-25 RX ORDER — CYANOCOBALAMIN (VITAMIN B-12) 1000 MCG
TABLET ORAL
Refills: 0 | Status: ACTIVE | COMMUNITY

## 2025-04-25 RX ORDER — CHOLECALCIFEROL (VITAMIN D3) 125 MCG
TABLET ORAL
Refills: 0 | Status: ACTIVE | COMMUNITY

## 2025-04-25 RX ORDER — OMEPRAZOLE 20 MG/1
TABLET, DELAYED RELEASE ORAL
Refills: 0 | Status: ACTIVE | COMMUNITY

## 2025-07-21 NOTE — ED PROVIDER NOTE - AXIS
Problem: Pain - Adult  Goal: Verbalizes/displays adequate comfort level or baseline comfort level  Outcome: Progressing     Problem: Safety - Adult  Goal: Free from fall injury  7/21/2025 1759 by Kayla Cervantes RN  Outcome: Progressing  7/21/2025 1200 by Kayla Cervantes RN  Outcome: Progressing     Problem: Chronic Conditions and Co-morbidities  Goal: Patient's chronic conditions and co-morbidity symptoms are monitored and maintained or improved  7/21/2025 1759 by Kayla Cervantes RN  Outcome: Progressing  7/21/2025 1200 by Kayla Cervantes RN  Outcome: Progressing     Problem: Nutrition  Goal: Nutrient intake appropriate for maintaining nutritional needs  7/21/2025 1759 by Kayla Cervantes RN  Outcome: Progressing  7/21/2025 1200 by Kayla Cervantes RN  Outcome: Progressing     Problem: Skin  Goal: Decreased wound size/increased tissue granulation at next dressing change  7/21/2025 1759 by Kayla Cervantes RN  Outcome: Progressing  7/21/2025 1200 by Kayla Cervantes RN  Outcome: Progressing  Goal: Participates in plan/prevention/treatment measures  Outcome: Progressing  Goal: Prevent/manage excess moisture  Outcome: Progressing  Goal: Promote/optimize nutrition  Outcome: Progressing     Problem: Fall/Injury  Goal: Not fall by end of shift  7/21/2025 1759 by Kayla Cervantes RN  Outcome: Progressing  7/21/2025 1200 by Kayla Cervantes RN  Outcome: Progressing   .     Normal

## 2025-07-24 NOTE — ED PROVIDER NOTE - CADM POA URETHRAL CATHETER
Health Maintenance       Breast Cancer Screening (Every 2 Years)  Overdue since 7/31/2022    Hepatitis B Vaccine (2 of 3 - 19+ 3-dose series)  Overdue since 10/24/2022    Cervical Cancer Screening  Never done    Colorectal Cancer Screening  Never done           Following review of the above:  Patient is not proceeding with: Cervical Cancer Screening, Colorectal Cancer Screening, Mammogram, and Hep B    Note: Refer to final orders and clinician documentation.         No